# Patient Record
Sex: MALE | Race: WHITE | NOT HISPANIC OR LATINO | Employment: OTHER | URBAN - NONMETROPOLITAN AREA
[De-identification: names, ages, dates, MRNs, and addresses within clinical notes are randomized per-mention and may not be internally consistent; named-entity substitution may affect disease eponyms.]

---

## 2018-04-09 ENCOUNTER — OFFICE VISIT (OUTPATIENT)
Dept: GASTROENTEROLOGY | Facility: CLINIC | Age: 58
End: 2018-04-09

## 2018-04-09 VITALS
WEIGHT: 280 LBS | OXYGEN SATURATION: 100 % | DIASTOLIC BLOOD PRESSURE: 80 MMHG | HEART RATE: 101 BPM | BODY MASS INDEX: 40.09 KG/M2 | TEMPERATURE: 97 F | HEIGHT: 70 IN | SYSTOLIC BLOOD PRESSURE: 120 MMHG

## 2018-04-09 DIAGNOSIS — E11.9 TYPE 2 DIABETES MELLITUS WITHOUT COMPLICATION, WITH LONG-TERM CURRENT USE OF INSULIN (HCC): ICD-10-CM

## 2018-04-09 DIAGNOSIS — I10 ESSENTIAL HYPERTENSION: ICD-10-CM

## 2018-04-09 DIAGNOSIS — D68.9 COAGULOPATHY (HCC): ICD-10-CM

## 2018-04-09 DIAGNOSIS — Z86.010 HISTORY OF ADENOMATOUS POLYP OF COLON: Primary | ICD-10-CM

## 2018-04-09 DIAGNOSIS — Z72.0 TOBACCO USE: ICD-10-CM

## 2018-04-09 DIAGNOSIS — Z79.4 TYPE 2 DIABETES MELLITUS WITHOUT COMPLICATION, WITH LONG-TERM CURRENT USE OF INSULIN (HCC): ICD-10-CM

## 2018-04-09 PROCEDURE — S0260 H&P FOR SURGERY: HCPCS | Performed by: NURSE PRACTITIONER

## 2018-04-09 RX ORDER — POLYETHYLENE GLYCOL 3350, SODIUM CHLORIDE, SODIUM BICARBONATE, POTASSIUM CHLORIDE 420; 11.2; 5.72; 1.48 G/4L; G/4L; G/4L; G/4L
4000 POWDER, FOR SOLUTION ORAL SEE ADMIN INSTRUCTIONS
Qty: 4000 ML | Refills: 0 | Status: ON HOLD | OUTPATIENT
Start: 2018-04-09 | End: 2021-04-08

## 2018-04-09 RX ORDER — FENOFIBRATE 145 MG/1
145 TABLET, COATED ORAL DAILY
COMMUNITY

## 2018-04-09 NOTE — PROGRESS NOTES
[facility] Gastroenterology    Primary Physician Praveen Garza MD    4/9/2018    Isacc Green   1960      Chief Complaint   Patient presents with   • Colonoscopy       Subjective     HPI    Isacc Green is a 57 y.o. male who presents as a referral for preventative maintenance. He has no complaints of nausea or vomiting. No change in bowels. No wt loss. No BRBPR. No melena. No abdominal pain.  Last colonoscopy was 4/2015, noting 3 polyps, and diverticulosis, path adenomatous polyp,  recall rec 3 yr, .  The patient does have history of colon polyps. The patient does not have history of colon cancer.   Family history unknown.     He is on Plavix for history of cardiac stent , with last being more than a year ago.  His cardiologist is Dr. Wood.     Past Medical History:   Diagnosis Date   • Anxiety    • Atherosclerosis of coronary artery    • Bipolar disorder    • Bladder cancer    • Colon polyp    • Coronary artery disease    • Depression    • Diabetes mellitus    • Hemorrhoids    • Hyperlipidemia    • Hypertension    • Nephrolithiasis    • Sleep apnea        Past Surgical History:   Procedure Laterality Date   • ANGIOPLASTY      with stent placement   • BACK SURGERY     • BLADDER SURGERY      cancer   • CARDIAC CATHETERIZATION     • COLONOSCOPY  04/13/2015   • CORONARY ARTERY BYPASS GRAFT     • KNEE SURGERY     • SHOULDER SURGERY      x 2   • SINUS SURGERY         Outpatient Prescriptions Marked as Taking for the 4/9/18 encounter (Office Visit) with REMA Pena   Medication Sig Dispense Refill   • ALPRAZolam (XANAX) 0.5 MG tablet 3 (three) times a day.     • Bisacodyl (LAXATIVE PO) Take  by mouth As Needed.     • clopidogrel (PLAVIX) 75 MG tablet TAKE ONE TABLET BY MOUTH EVERY DAY     • cyclobenzaprine (FLEXERIL) 10 MG tablet 3 (three) times a day.     • fenofibrate (TRICOR) 145 MG tablet Take 145 mg by mouth Daily.     • Glucose Blood (BLOOD GLUCOSE TEST) strip Place 1 each onto the  skin daily     • glucose monitoring kit (FREESTYLE) monitoring kit every day.     • insulin detemir (LEVEMIR) 100 UNIT/ML injection Inject  under the skin Daily.     • Insulin Zinc Human (NOVOLIN L SC) Inject  under the skin.     • isosorbide mononitrate (IMDUR) 60 MG 24 hr tablet Take 1 tablet by mouth daily     • Lancets misc Place 1 each onto the skin daily     • metFORMIN (GLUCOPHAGE) 1000 MG tablet Take 1 tablet by mouth 2 times daily (with meals)     • metoprolol succinate XL (TOPROL XL) 100 MG 24 hr tablet Take 1 tablet by mouth daily     • nitroglycerin (NITROSTAT) 0.4 MG SL tablet Place 1 tablet under the tongue every 5 minutes as needed for Chest pain X 3.     • oxyCODONE-acetaminophen (PERCOCET)  MG per tablet Take 1 tablet by mouth 3 times daily      • rosuvastatin (CRESTOR) 20 MG tablet Take 1 tablet by mouth nightly     • varenicline (CHANTIX STARTING MONTH PAK) 0.5 MG X 11 & 1 MG X 42 tablet Take by mouth.     • zolpidem (AMBIEN) 10 MG tablet TAKE 1 TABLET BY MOUTH AT BEDTIME AS NEEDED         Allergies   Allergen Reactions   • Codeine Nausea Only       Social History     Social History   • Marital status: Single     Spouse name: N/A   • Number of children: N/A   • Years of education: N/A     Occupational History   • Not on file.     Social History Main Topics   • Smoking status: Current Some Day Smoker     Packs/day: 0.50   • Smokeless tobacco: Never Used   • Alcohol use Yes      Comment: Rarely   • Drug use: No   • Sexual activity: Defer     Other Topics Concern   • Not on file     Social History Narrative   • No narrative on file       Family History   Problem Relation Age of Onset   • Adopted: Yes       Review of Systems   Constitutional: Negative for appetite change, chills, fatigue, fever and unexpected weight change.   HENT: Negative for sore throat and trouble swallowing.    Respiratory: Negative for cough, chest tightness, shortness of breath and wheezing.    Cardiovascular: Negative  for chest pain and palpitations.   Gastrointestinal: Negative for abdominal distention, abdominal pain, anal bleeding, blood in stool, constipation, diarrhea, nausea, rectal pain and vomiting.        As mentioned in hpi   Genitourinary: Negative for difficulty urinating and hematuria.   Musculoskeletal: Positive for arthralgias (chronic ) and back pain (chronic ).   Skin: Negative for color change and rash.   Neurological: Negative for dizziness, syncope, light-headedness and headaches.   Psychiatric/Behavioral: Negative for confusion. The patient is not nervous/anxious.        Objective     Vitals:    04/09/18 1023   BP: 120/80   Pulse: 101   Temp: 97 °F (36.1 °C)   SpO2: 100%     1    04/09/18  1023   Weight: 127 kg (280 lb)     Body mass index is 40.18 kg/m².    Physical Exam   Constitutional: He appears well-developed and well-nourished. No distress.   HENT:   Head: Normocephalic and atraumatic.   Eyes: EOM are normal. No scleral icterus.   Neck: Neck supple. No JVD present.   Cardiovascular: Normal rate, regular rhythm and normal heart sounds.    Pulmonary/Chest: Effort normal and breath sounds normal. No stridor.   Abdominal: Soft. Bowel sounds are normal. He exhibits no distension and no mass. There is no tenderness. There is no rebound and no guarding.   Musculoskeletal: He exhibits no edema.   Neurological: He is alert.   Skin: Skin is warm and dry. No rash noted.   Psychiatric: He has a normal mood and affect. His behavior is normal.   Vitals reviewed.      Imaging Results (most recent)     None          Assessment/Plan     Isacc was seen today for colonoscopy.    Diagnoses and all orders for this visit:    History of adenomatous polyp of colon  -     Case Request; Standing  -     Case Request    Type 2 diabetes mellitus without complication, with long-term current use of insulin    Essential hypertension    Coagulopathy  Comments:  plavix, h/o cardiac stent with last being more than 1 yr ago.   moreno    Tobacco use    Other orders  -     Implement Anesthesia Orders Day of Procedure; Standing  -     Obtain Informed Consent; Standing  -     polyethylene glycol-electrolytes (NULYTELY WITH FLAVOR PACKS) 420 g solution; Take 4,000 mL by mouth See Admin Instructions.      Plan for colonoscopy.  The patient was advised to take any blood pressure or heart  medications the morning of  procedure if that is when he/she normally takes. The patient was instructed how to adjust diabetes medications the am of procedure.                 COLONOSCOPY WITH ANESTHESIA (N/A)  All risks, benefits, alternatives, and indications of colonoscopy procedure have been discussed with the patient. Risks to include perforation of the colon requiring possible surgery or colostomy, risk of bleeding from biopsies or removal of colon tissue, possibility of missing a colon polyp or cancer, or adverse drug reaction.  Benefits to include the diagnosis and management of disease of the colon and rectum. Alternatives to include barium enema, radiographic evaluation, lab testing or no intervention. Pt verbalizes understanding and agrees.     The patient was advised on the risks of stopping blood thinners for a procedure.  The risks discussed included the risk of developing myocardial infarction, CVA, embolus, clogging of the arteries or stents, etc.  We discussed the potential consequences of the risks discussed.  The benefits of stopping as well as the alternatives were discussed as well.   Patient is to hold their anticoagulation medication per the direction of their prescribing physician, Dr Wood..    A letter will be sent to prescribing This is to prevent any risk or complication from bleeding intra and post procedure. If they develop bleeding post procedure they are to go the emergency department for further evaluation and treatment immediately.     I advised the patient of the risks in continuing to use tobacco, and I provided this  patient with smoking cessation educational materials.    During this visit, I spent > 3-10 minutes counseling the patient regarding smoking cessation.    Discussed the patient's BMI with him. BMI is above normal parameters. Follow-up plan includes:  no follow-up required.          Body mass index is 40.18 kg/m².        REMA Marin      EMR Dragon/transcription disclaimer:  Much of this encounter note is electronic transcription/translation of spoken language to printed text.  The electronic translation of spoken language may be erroneous, or at times, nonsensical words or phrases may be inadvertently transcribed.  Although I have reviewed the note for such errors, some may still exist.

## 2018-04-11 PROBLEM — Z86.0101 HISTORY OF ADENOMATOUS POLYP OF COLON: Status: ACTIVE | Noted: 2018-04-11

## 2018-04-11 PROBLEM — Z86.010 HISTORY OF ADENOMATOUS POLYP OF COLON: Status: ACTIVE | Noted: 2018-04-11

## 2018-04-18 ENCOUNTER — TELEPHONE (OUTPATIENT)
Dept: GASTROENTEROLOGY | Facility: CLINIC | Age: 58
End: 2018-04-18

## 2018-04-30 ENCOUNTER — HOSPITAL ENCOUNTER (OUTPATIENT)
Facility: HOSPITAL | Age: 58
Setting detail: HOSPITAL OUTPATIENT SURGERY
Discharge: HOME | End: 2018-04-30
Attending: INTERNAL MEDICINE | Admitting: INTERNAL MEDICINE

## 2018-04-30 ENCOUNTER — ANESTHESIA EVENT (OUTPATIENT)
Dept: GASTROENTEROLOGY | Facility: HOSPITAL | Age: 58
End: 2018-04-30

## 2018-04-30 ENCOUNTER — ANESTHESIA (OUTPATIENT)
Dept: GASTROENTEROLOGY | Facility: HOSPITAL | Age: 58
End: 2018-04-30

## 2018-04-30 VITALS
DIASTOLIC BLOOD PRESSURE: 70 MMHG | HEIGHT: 70 IN | BODY MASS INDEX: 38.94 KG/M2 | SYSTOLIC BLOOD PRESSURE: 109 MMHG | OXYGEN SATURATION: 98 % | RESPIRATION RATE: 16 BRPM | TEMPERATURE: 97.2 F | WEIGHT: 272 LBS | HEART RATE: 89 BPM

## 2018-04-30 DIAGNOSIS — Z86.010 HISTORY OF ADENOMATOUS POLYP OF COLON: ICD-10-CM

## 2018-04-30 LAB — GLUCOSE BLDC GLUCOMTR-MCNC: 196 MG/DL (ref 70–130)

## 2018-04-30 PROCEDURE — 45385 COLONOSCOPY W/LESION REMOVAL: CPT | Performed by: INTERNAL MEDICINE

## 2018-04-30 PROCEDURE — 25010000002 PROPOFOL 10 MG/ML EMULSION: Performed by: NURSE ANESTHETIST, CERTIFIED REGISTERED

## 2018-04-30 PROCEDURE — 88305 TISSUE EXAM BY PATHOLOGIST: CPT | Performed by: INTERNAL MEDICINE

## 2018-04-30 PROCEDURE — 82962 GLUCOSE BLOOD TEST: CPT

## 2018-04-30 RX ORDER — SODIUM CHLORIDE 9 MG/ML
100 INJECTION, SOLUTION INTRAVENOUS CONTINUOUS
Status: CANCELLED | OUTPATIENT
Start: 2018-04-30

## 2018-04-30 RX ORDER — ONDANSETRON 2 MG/ML
4 INJECTION INTRAMUSCULAR; INTRAVENOUS ONCE AS NEEDED
Status: DISCONTINUED | OUTPATIENT
Start: 2018-04-30 | End: 2018-04-30 | Stop reason: HOSPADM

## 2018-04-30 RX ORDER — METOPROLOL TARTRATE 5 MG/5ML
2 INJECTION INTRAVENOUS ONCE AS NEEDED
Status: COMPLETED | OUTPATIENT
Start: 2018-04-30 | End: 2018-04-30

## 2018-04-30 RX ORDER — SODIUM CHLORIDE 0.9 % (FLUSH) 0.9 %
3 SYRINGE (ML) INJECTION AS NEEDED
Status: DISCONTINUED | OUTPATIENT
Start: 2018-04-30 | End: 2018-04-30 | Stop reason: HOSPADM

## 2018-04-30 RX ORDER — SODIUM CHLORIDE 9 MG/ML
500 INJECTION, SOLUTION INTRAVENOUS CONTINUOUS PRN
Status: DISCONTINUED | OUTPATIENT
Start: 2018-04-30 | End: 2018-04-30 | Stop reason: HOSPADM

## 2018-04-30 RX ORDER — LIDOCAINE HYDROCHLORIDE 20 MG/ML
INJECTION, SOLUTION INFILTRATION; PERINEURAL AS NEEDED
Status: DISCONTINUED | OUTPATIENT
Start: 2018-04-30 | End: 2018-04-30 | Stop reason: SURG

## 2018-04-30 RX ORDER — SODIUM CHLORIDE 0.9 % (FLUSH) 0.9 %
1-10 SYRINGE (ML) INJECTION AS NEEDED
Status: CANCELLED | OUTPATIENT
Start: 2018-04-30

## 2018-04-30 RX ORDER — PROPOFOL 10 MG/ML
VIAL (ML) INTRAVENOUS AS NEEDED
Status: DISCONTINUED | OUTPATIENT
Start: 2018-04-30 | End: 2018-04-30 | Stop reason: SURG

## 2018-04-30 RX ADMIN — PROPOFOL 50 MG: 10 INJECTION, EMULSION INTRAVENOUS at 08:44

## 2018-04-30 RX ADMIN — PROPOFOL 50 MG: 10 INJECTION, EMULSION INTRAVENOUS at 08:36

## 2018-04-30 RX ADMIN — LIDOCAINE HYDROCHLORIDE 100 MG: 20 INJECTION, SOLUTION INFILTRATION; PERINEURAL at 08:22

## 2018-04-30 RX ADMIN — PROPOFOL 50 MG: 10 INJECTION, EMULSION INTRAVENOUS at 08:26

## 2018-04-30 RX ADMIN — PROPOFOL 50 MG: 10 INJECTION, EMULSION INTRAVENOUS at 08:30

## 2018-04-30 RX ADMIN — PROPOFOL 100 MG: 10 INJECTION, EMULSION INTRAVENOUS at 08:22

## 2018-04-30 RX ADMIN — LIDOCAINE HYDROCHLORIDE 0.5 ML: 10 INJECTION, SOLUTION EPIDURAL; INFILTRATION; INTRACAUDAL; PERINEURAL at 07:30

## 2018-04-30 RX ADMIN — METOROPROLOL TARTRATE 2 MG: 5 INJECTION, SOLUTION INTRAVENOUS at 07:49

## 2018-04-30 RX ADMIN — SODIUM CHLORIDE 500 ML: 9 INJECTION, SOLUTION INTRAVENOUS at 07:30

## 2018-04-30 RX ADMIN — PROPOFOL 50 MG: 10 INJECTION, EMULSION INTRAVENOUS at 08:33

## 2018-04-30 RX ADMIN — PROPOFOL 50 MG: 10 INJECTION, EMULSION INTRAVENOUS at 08:40

## 2018-04-30 NOTE — ANESTHESIA POSTPROCEDURE EVALUATION
Patient: Ld Green    Procedure Summary     Date:  04/30/18 Room / Location:  [facility] ENDOSCOPY 4 / [facility]    Anesthesia Start:  0822 Anesthesia Stop:  0845    Procedure:  COLONOSCOPY WITH ANESTHESIA (N/A ) Diagnosis:       History of adenomatous polyp of colon      (History of adenomatous polyp of colon [Z86.010])    Surgeon:  Gerson Martini MD Provider:  GENE Goyal CRNA    Anesthesia Type:  general ASA Status:  3          Anesthesia Type: general  Last vitals  BP   139/86 (04/30/18 0713)   Temp   97.2 °F (36.2 °C) (04/30/18 0713)   Pulse   102 (04/30/18 0713)   Resp   20 (04/30/18 0713)     SpO2   96 % (04/30/18 0713)     Post Anesthesia Care and Evaluation    Patient location during evaluation: PACU  Patient participation: complete - patient participated  Level of consciousness: awake and alert  Pain score: 0  Pain management: adequate  Airway patency: patent  Anesthetic complications: No anesthetic complications    Cardiovascular status: acceptable and stable  Respiratory status: acceptable and unassisted  Hydration status: acceptable

## 2018-04-30 NOTE — ANESTHESIA PREPROCEDURE EVALUATION
Anesthesia Evaluation     Patient summary reviewed and Nursing notes reviewed   no history of anesthetic complications:  NPO Solid Status: > 8 hours  NPO Liquid Status: > 2 hours           Airway   Mallampati: I  TM distance: <3 FB  Neck ROM: full  no difficulty expected  Dental - normal exam     Pulmonary - normal exam   (+) a smoker Current, sleep apnea,   Cardiovascular - normal exam  Exercise tolerance: poor (<4 METS) (Limited by back pain)    Patient on routine beta blocker and Beta blocker not taken-may be given intraoperatively    (+) hypertension, CAD, CABG (early 2000's) > 6 Months, cardiac stents (2 stnts, most recent 3 years ago) more than 12 months ago hyperlipidemia,     ROS comment: Pt stopped toprol 3 days ago. IV toprol ordered for pre-op    Neuro/Psych  (+) psychiatric history Anxiety, Depression and Bipolar,     (-) seizures, TIA, CVA  GI/Hepatic/Renal/Endo    (+)   diabetes mellitus type 2 using insulin,   (-) liver disease, no renal disease    Musculoskeletal     Abdominal  - normal exam    Bowel sounds: normal.   Substance History      OB/GYN          Other                        Anesthesia Plan    ASA 3     general     intravenous induction   Anesthetic plan and risks discussed with patient.

## 2018-05-01 LAB
CYTO UR: NORMAL
LAB AP CASE REPORT: NORMAL
LAB AP CLINICAL INFORMATION: NORMAL
Lab: NORMAL
PATH REPORT.FINAL DX SPEC: NORMAL
PATH REPORT.GROSS SPEC: NORMAL

## 2020-07-06 ENCOUNTER — TRANSCRIBE ORDERS (OUTPATIENT)
Dept: ADMINISTRATIVE | Facility: HOSPITAL | Age: 60
End: 2020-07-06

## 2020-07-06 DIAGNOSIS — Z01.818 PREOP TESTING: Primary | ICD-10-CM

## 2020-07-10 ENCOUNTER — LAB (OUTPATIENT)
Dept: LAB | Facility: HOSPITAL | Age: 60
End: 2020-07-10

## 2020-07-10 PROCEDURE — U0003 INFECTIOUS AGENT DETECTION BY NUCLEIC ACID (DNA OR RNA); SEVERE ACUTE RESPIRATORY SYNDROME CORONAVIRUS 2 (SARS-COV-2) (CORONAVIRUS DISEASE [COVID-19]), AMPLIFIED PROBE TECHNIQUE, MAKING USE OF HIGH THROUGHPUT TECHNOLOGIES AS DESCRIBED BY CMS-2020-01-R: HCPCS | Performed by: PAIN MEDICINE

## 2020-07-10 PROCEDURE — C9803 HOPD COVID-19 SPEC COLLECT: HCPCS | Performed by: PAIN MEDICINE

## 2020-07-11 LAB
COVID LABCORP PRIORITY: NORMAL
SARS-COV-2 RNA RESP QL NAA+PROBE: NOT DETECTED

## 2020-10-09 ENCOUNTER — TRANSCRIBE ORDERS (OUTPATIENT)
Dept: ADMINISTRATIVE | Facility: HOSPITAL | Age: 60
End: 2020-10-09

## 2020-10-09 DIAGNOSIS — Z01.818 PREOP TESTING: Primary | ICD-10-CM

## 2020-10-12 ENCOUNTER — LAB (OUTPATIENT)
Dept: LAB | Facility: HOSPITAL | Age: 60
End: 2020-10-12

## 2020-10-12 PROCEDURE — U0003 INFECTIOUS AGENT DETECTION BY NUCLEIC ACID (DNA OR RNA); SEVERE ACUTE RESPIRATORY SYNDROME CORONAVIRUS 2 (SARS-COV-2) (CORONAVIRUS DISEASE [COVID-19]), AMPLIFIED PROBE TECHNIQUE, MAKING USE OF HIGH THROUGHPUT TECHNOLOGIES AS DESCRIBED BY CMS-2020-01-R: HCPCS | Performed by: PAIN MEDICINE

## 2020-10-12 PROCEDURE — C9803 HOPD COVID-19 SPEC COLLECT: HCPCS | Performed by: PAIN MEDICINE

## 2020-10-12 PROCEDURE — C9803 HOPD COVID-19 SPEC COLLECT: HCPCS

## 2020-10-13 LAB
COVID LABCORP PRIORITY: NORMAL
SARS-COV-2 RNA RESP QL NAA+PROBE: NOT DETECTED

## 2021-01-14 ENCOUNTER — TRANSCRIBE ORDERS (OUTPATIENT)
Dept: ADMINISTRATIVE | Facility: HOSPITAL | Age: 61
End: 2021-01-14

## 2021-01-14 DIAGNOSIS — Z01.818 PREOP TESTING: Primary | ICD-10-CM

## 2021-01-15 ENCOUNTER — LAB (OUTPATIENT)
Dept: LAB | Facility: HOSPITAL | Age: 61
End: 2021-01-15

## 2021-01-15 LAB — SARS-COV-2 ORF1AB RESP QL NAA+PROBE: NOT DETECTED

## 2021-01-15 PROCEDURE — U0004 COV-19 TEST NON-CDC HGH THRU: HCPCS | Performed by: PAIN MEDICINE

## 2021-01-15 PROCEDURE — C9803 HOPD COVID-19 SPEC COLLECT: HCPCS | Performed by: PAIN MEDICINE

## 2021-03-10 ENCOUNTER — OFFICE VISIT (OUTPATIENT)
Dept: GASTROENTEROLOGY | Facility: CLINIC | Age: 61
End: 2021-03-10

## 2021-03-10 VITALS
SYSTOLIC BLOOD PRESSURE: 122 MMHG | WEIGHT: 267 LBS | HEART RATE: 87 BPM | BODY MASS INDEX: 38.22 KG/M2 | OXYGEN SATURATION: 98 % | DIASTOLIC BLOOD PRESSURE: 72 MMHG | HEIGHT: 70 IN | TEMPERATURE: 95.7 F

## 2021-03-10 DIAGNOSIS — D68.9 COAGULOPATHY (HCC): ICD-10-CM

## 2021-03-10 DIAGNOSIS — I10 HYPERTENSION, UNSPECIFIED TYPE: ICD-10-CM

## 2021-03-10 DIAGNOSIS — E11.9 TYPE 2 DIABETES MELLITUS WITHOUT COMPLICATION, UNSPECIFIED WHETHER LONG TERM INSULIN USE (HCC): ICD-10-CM

## 2021-03-10 DIAGNOSIS — Z86.010 HISTORY OF ADENOMATOUS POLYP OF COLON: Primary | ICD-10-CM

## 2021-03-10 PROCEDURE — 99214 OFFICE O/P EST MOD 30 MIN: CPT | Performed by: NURSE PRACTITIONER

## 2021-03-10 NOTE — PROGRESS NOTES
[facility] Gastroenterology    Primary Physician Praveen Garza MD    3/10/2021    Ld Green   1960      Chief Complaint   Patient presents with   • Colonoscopy       Subjective     HPI    Ld Green is a 60 y.o. male who presents as a referral for preventative maintenance. He has no complaints of nausea or vomiting. No change in bowels. No wt loss. No BRBPR. No melena. No abdominal pain.        Last colonoscopy was 4/2018 noting 3 polyps ( path adenomatous) and diverticulosis. The patient does not have history of colon cancer.   He has no medical history of his biological family.       He is on plavix for history of cardiac stents with last being 2-3 years ago. Cardiologist is Dr. Birch at [facility].       Past Medical History:   Diagnosis Date   • Anxiety    • Atherosclerosis of coronary artery    • Bipolar disorder (CMS/HCC)    • Bladder cancer (CMS/HCC)    • Colon polyp    • Coronary artery disease    • Depression    • Diabetes mellitus (CMS/HCC)    • Hemorrhoids    • Hyperlipidemia    • Hypertension    • Nephrolithiasis    • Sleep apnea        Past Surgical History:   Procedure Laterality Date   • ANGIOPLASTY      with stent placement   • BACK SURGERY     • BLADDER SURGERY      cancer   • CARDIAC CATHETERIZATION     • COLONOSCOPY  04/13/2015   • COLONOSCOPY N/A 4/30/2018    Procedure: COLONOSCOPY WITH ANESTHESIA;  Surgeon: Gerson Martini MD;  Location: [facility] ENDOSCOPY;  Service: Gastroenterology   • CORONARY ARTERY BYPASS GRAFT     • KNEE SURGERY     • SHOULDER SURGERY      x 2   • SINUS SURGERY         Outpatient Medications Marked as Taking for the 3/10/21 encounter (Office Visit) with Maria L Keyes APRN   Medication Sig Dispense Refill   • Alirocumab (PRALUENT SC) Inject  under the skin into the appropriate area as directed Every 14 (Fourteen) Days.     • ALPRAZolam (XANAX) 0.5 MG tablet 3 (three) times a day.     • Bisacodyl (LAXATIVE PO) Take  by mouth As Needed  (rare).     • clopidogrel (PLAVIX) 75 MG tablet TAKE ONE TABLET BY MOUTH EVERY DAY     • cyclobenzaprine (FLEXERIL) 10 MG tablet 3 (three) times a day.     • Dulaglutide (TRULICITY SC) Inject  under the skin into the appropriate area as directed 1 (One) Time Per Week.     • fenofibrate (TRICOR) 145 MG tablet Take 145 mg by mouth Daily.     • Glucose Blood (BLOOD GLUCOSE TEST) strip Place 1 each onto the skin daily     • glucose monitoring kit (FREESTYLE) monitoring kit every day.     • insulin detemir (LEVEMIR) 100 UNIT/ML injection Inject  under the skin Daily.     • Insulin Zinc Human (NOVOLIN L SC) Inject  under the skin.     • isosorbide mononitrate (IMDUR) 60 MG 24 hr tablet Take 1 tablet by mouth daily     • Lancets misc Place 1 each onto the skin daily     • metFORMIN (GLUCOPHAGE) 1000 MG tablet Take 1 tablet by mouth 2 times daily (with meals)     • metoprolol succinate XL (TOPROL XL) 100 MG 24 hr tablet Take 1 tablet by mouth daily     • nitroglycerin (NITROSTAT) 0.4 MG SL tablet Place 1 tablet under the tongue every 5 minutes as needed for Chest pain X 3.     • oxyCODONE-acetaminophen (PERCOCET)  MG per tablet Take 1 tablet by mouth 3 times daily      • zolpidem (AMBIEN) 10 MG tablet TAKE 1 TABLET BY MOUTH AT BEDTIME AS NEEDED         Allergies   Allergen Reactions   • Codeine Nausea Only       Social History     Socioeconomic History   • Marital status:      Spouse name: Not on file   • Number of children: Not on file   • Years of education: Not on file   • Highest education level: Not on file   Tobacco Use   • Smoking status: Current Every Day Smoker     Packs/day: 0.50   • Smokeless tobacco: Never Used   • Tobacco comment: 2 a day   Substance and Sexual Activity   • Alcohol use: Yes     Comment: Rarely   • Drug use: No   • Sexual activity: Defer       Family History   Adopted: Yes       Review of Systems   Constitutional: Negative for chills, fever and unexpected weight change.      Respiratory: Negative for cough, shortness of breath and wheezing.    Cardiovascular: Negative for chest pain and palpitations.   Gastrointestinal: Negative for abdominal distention, abdominal pain, anal bleeding, blood in stool, constipation, diarrhea, nausea and vomiting.       Objective     Vitals:    03/10/21 0944   BP: 122/72   Pulse: 87   Temp: 95.7 °F (35.4 °C)   SpO2: 98%         03/10/21  0944   Weight: 121 kg (267 lb)     Body mass index is 38.31 kg/m².    Physical Exam  Vitals reviewed.   Constitutional:       General: He is not in acute distress.  Cardiovascular:      Rate and Rhythm: Normal rate and regular rhythm.      Heart sounds: Normal heart sounds.   Pulmonary:      Effort: Pulmonary effort is normal.      Breath sounds: Normal breath sounds.   Abdominal:      General: Bowel sounds are normal. There is no distension.      Palpations: Abdomen is soft.      Tenderness: There is no abdominal tenderness.   Skin:     General: Skin is warm and dry.   Neurological:      Mental Status: He is alert.         Imaging Results (Most Recent)     None          Assessment/Plan     Diagnoses and all orders for this visit:    1. History of adenomatous polyp of colon (Primary)    2. Coagulopathy (CMS/[facility])    3. Type 2 diabetes mellitus without complication, unspecified whether long term insulin use (CMS/[facility])    4. Hypertension, unspecified type             Plan for colonoscopy. The patient was advised to take any blood pressure or heart  medications the morning of  procedure if that is when he/she normally takes. The patient was instructed how to adjust diabetes medications the am of procedure.       The patient was advised on the risks of stopping blood thinners for a procedure.  The risks discussed included the risk of developing myocardial infarction, CVA, embolus, clogging of the arteries or stents, etc.  We discussed the potential consequences of the risks discussed.  The benefits of stopping as well as the  alternatives were discussed as well.   Patient is to hold their anticoagulation medication per the direction of their prescribing physician, Dr. Osman.   A letter will be sent to prescribing This is to prevent any risk or complication from bleeding intra and post procedure. If they develop bleeding post procedure they are to go the emergency department for further evaluation and treatment immediately.            Body mass index is 38.31 kg/m².    Patient's Body mass index is 38.31 kg/m². BMI is above normal parameters. Recommendations include: recommend weight loss, no f/u .    Colonoscopy   All risks, benefits, alternatives, and indications of colonoscopy procedure have been discussed with the patient. Risks to include perforation of the colon requiring possible surgery or colostomy, risk of bleeding from biopsies or removal of colon tissue, possibility of missing a colon polyp or cancer, or adverse drug reaction.  Benefits to include the diagnosis and management of disease of the colon and rectum. Alternatives to include barium enema, radiographic evaluation, lab testing or no intervention. Pt verbalizes understanding and agrees.       REMA Marin      EMR Dragon/transcription disclaimer:  Much of this encounter note is electronic transcription/translation of spoken language to printed text.  The electronic translation of spoken language may be erroneous, or at times, nonsensical words or phrases may be inadvertently transcribed.  Although I have reviewed the note for such errors, some may still exist.

## 2021-03-19 ENCOUNTER — TELEPHONE (OUTPATIENT)
Dept: GASTROENTEROLOGY | Facility: CLINIC | Age: 61
End: 2021-03-19

## 2021-03-19 ENCOUNTER — PREP FOR SURGERY (OUTPATIENT)
Dept: OTHER | Facility: HOSPITAL | Age: 61
End: 2021-03-19

## 2021-03-19 DIAGNOSIS — Z86.010 HISTORY OF ADENOMATOUS POLYP OF COLON: Primary | ICD-10-CM

## 2021-03-19 NOTE — TELEPHONE ENCOUNTER
Please let patient know that Dr. Birch says ok to hold Plavix 7 days prior to procedure.  If he is taking aspirin 81 mg he can continue that but if he takes 325 mg he will have to switch over to the 81 mg 5 days prior to procedure.  He can use MiraLAX prep.

## 2021-03-19 NOTE — TELEPHONE ENCOUNTER
PT is scheduled for 4-8-21.  PT will hold his Plavix.  COVID test discussed.   Will mail instructions.

## 2021-04-02 ENCOUNTER — TRANSCRIBE ORDERS (OUTPATIENT)
Dept: LAB | Facility: HOSPITAL | Age: 61
End: 2021-04-02

## 2021-04-02 DIAGNOSIS — Z01.818 PREOPERATIVE TESTING: Primary | ICD-10-CM

## 2021-04-06 ENCOUNTER — LAB (OUTPATIENT)
Dept: LAB | Facility: HOSPITAL | Age: 61
End: 2021-04-06

## 2021-04-06 LAB — SARS-COV-2 ORF1AB RESP QL NAA+PROBE: NOT DETECTED

## 2021-04-06 PROCEDURE — U0004 COV-19 TEST NON-CDC HGH THRU: HCPCS | Performed by: INTERNAL MEDICINE

## 2021-04-06 PROCEDURE — U0005 INFEC AGEN DETEC AMPLI PROBE: HCPCS | Performed by: INTERNAL MEDICINE

## 2021-04-06 PROCEDURE — C9803 HOPD COVID-19 SPEC COLLECT: HCPCS | Performed by: INTERNAL MEDICINE

## 2021-04-08 ENCOUNTER — TELEPHONE (OUTPATIENT)
Dept: GASTROENTEROLOGY | Facility: CLINIC | Age: 61
End: 2021-04-08

## 2021-04-08 ENCOUNTER — ANESTHESIA (OUTPATIENT)
Dept: GASTROENTEROLOGY | Facility: HOSPITAL | Age: 61
End: 2021-04-08

## 2021-04-08 ENCOUNTER — HOSPITAL ENCOUNTER (OUTPATIENT)
Facility: HOSPITAL | Age: 61
Setting detail: HOSPITAL OUTPATIENT SURGERY
Discharge: HOME | End: 2021-04-08
Attending: INTERNAL MEDICINE | Admitting: INTERNAL MEDICINE

## 2021-04-08 ENCOUNTER — ANESTHESIA EVENT (OUTPATIENT)
Dept: GASTROENTEROLOGY | Facility: HOSPITAL | Age: 61
End: 2021-04-08

## 2021-04-08 VITALS
BODY MASS INDEX: 36.94 KG/M2 | DIASTOLIC BLOOD PRESSURE: 75 MMHG | SYSTOLIC BLOOD PRESSURE: 115 MMHG | WEIGHT: 258 LBS | HEART RATE: 75 BPM | OXYGEN SATURATION: 94 % | HEIGHT: 70 IN | RESPIRATION RATE: 16 BRPM | TEMPERATURE: 97.1 F

## 2021-04-08 LAB — GLUCOSE BLDC GLUCOMTR-MCNC: 112 MG/DL (ref 70–130)

## 2021-04-08 PROCEDURE — 25010000002 PROPOFOL 10 MG/ML EMULSION: Performed by: NURSE ANESTHETIST, CERTIFIED REGISTERED

## 2021-04-08 PROCEDURE — 82962 GLUCOSE BLOOD TEST: CPT

## 2021-04-08 PROCEDURE — G0105 COLORECTAL SCRN; HI RISK IND: HCPCS | Performed by: INTERNAL MEDICINE

## 2021-04-08 RX ORDER — SODIUM CHLORIDE 9 MG/ML
500 INJECTION, SOLUTION INTRAVENOUS CONTINUOUS PRN
Status: DISCONTINUED | OUTPATIENT
Start: 2021-04-08 | End: 2021-04-08 | Stop reason: HOSPADM

## 2021-04-08 RX ORDER — LIDOCAINE HYDROCHLORIDE 10 MG/ML
0.5 INJECTION, SOLUTION EPIDURAL; INFILTRATION; INTRACAUDAL; PERINEURAL ONCE AS NEEDED
Status: CANCELLED | OUTPATIENT
Start: 2021-04-08

## 2021-04-08 RX ORDER — SODIUM CHLORIDE 0.9 % (FLUSH) 0.9 %
10 SYRINGE (ML) INJECTION AS NEEDED
Status: DISCONTINUED | OUTPATIENT
Start: 2021-04-08 | End: 2021-04-08 | Stop reason: HOSPADM

## 2021-04-08 RX ORDER — PROPOFOL 10 MG/ML
VIAL (ML) INTRAVENOUS AS NEEDED
Status: DISCONTINUED | OUTPATIENT
Start: 2021-04-08 | End: 2021-04-08 | Stop reason: SURG

## 2021-04-08 RX ORDER — LIDOCAINE HYDROCHLORIDE 20 MG/ML
INJECTION, SOLUTION EPIDURAL; INFILTRATION; INTRACAUDAL; PERINEURAL AS NEEDED
Status: DISCONTINUED | OUTPATIENT
Start: 2021-04-08 | End: 2021-04-08 | Stop reason: SURG

## 2021-04-08 RX ORDER — ONDANSETRON 2 MG/ML
4 INJECTION INTRAMUSCULAR; INTRAVENOUS ONCE AS NEEDED
Status: DISCONTINUED | OUTPATIENT
Start: 2021-04-08 | End: 2021-04-08 | Stop reason: HOSPADM

## 2021-04-08 RX ADMIN — SODIUM CHLORIDE 500 ML: 9 INJECTION, SOLUTION INTRAVENOUS at 11:27

## 2021-04-08 RX ADMIN — PROPOFOL 290 MG: 10 INJECTION, EMULSION INTRAVENOUS at 13:47

## 2021-04-08 RX ADMIN — LIDOCAINE HYDROCHLORIDE 100 MG: 20 INJECTION, SOLUTION EPIDURAL; INFILTRATION; INTRACAUDAL; PERINEURAL at 13:47

## 2021-04-08 NOTE — ANESTHESIA POSTPROCEDURE EVALUATION
"Patient: Ld Green    Procedure Summary     Date: 04/08/21 Room / Location: [facility] ENDOSCOPY 5 / [facility]OS[facility]    Anesthesia Start: 1346 Anesthesia Stop: 1406    Procedure: COLONOSCOPY WITH ANESTHESIA (N/A ) Diagnosis:       History of adenomatous polyp of colon      (History of adenomatous polyp of colon [Z86.010])    Surgeons: Gerson Martini MD Provider: Mirna Curz CRNA    Anesthesia Type: MAC ASA Status: 3          Anesthesia Type: MAC    Vitals  Vitals Value Taken Time   BP 95/72 04/08/21 1406   Temp     Pulse 75 04/08/21 1409   Resp 22 04/08/21 1406   SpO2 95 % 04/08/21 1409   Vitals shown include unvalidated device data.        Post Anesthesia Care and Evaluation    Patient location during evaluation: PACU  Patient participation: complete - patient participated  Level of consciousness: awake and alert  Pain management: adequate  Airway patency: patent  Anesthetic complications: No anesthetic complications    Cardiovascular status: acceptable  Respiratory status: acceptable  Hydration status: acceptable    Comments: Blood pressure 95/72, pulse 76, temperature 97.1 °F (36.2 °C), temperature source Temporal, resp. rate 22, height 177.8 cm (70\"), weight 117 kg (258 lb), SpO2 92 %.    Pt discharged from PACU based on josé score >8      "

## 2021-04-08 NOTE — ANESTHESIA PREPROCEDURE EVALUATION
Anesthesia Evaluation     Patient summary reviewed   no history of anesthetic complications:  NPO Solid Status: > 8 hours  NPO Liquid Status: > 2 hours           Airway   Mallampati: I  TM distance: >3 FB  Neck ROM: full  No difficulty expected  Dental      Comment: Multiple chipped teeth    Pulmonary    (+) a smoker Current Smoked day of surgery, sleep apnea on CPAP,   (-) asthma  Cardiovascular     (+) hypertension, CAD, CABG (3V), angina, hyperlipidemia,       Neuro/Psych  (-) seizures, TIA, CVA  GI/Hepatic/Renal/Endo    (+) obesity,   diabetes mellitus using insulin,   (-) liver disease    Musculoskeletal     Abdominal    Substance History      OB/GYN          Other      history of cancer (bladder)                    Anesthesia Plan    ASA 3     MAC     intravenous induction     Anesthetic plan, all risks, benefits, and alternatives have been provided, discussed and informed consent has been obtained with: patient.

## 2021-04-16 ENCOUNTER — TRANSCRIBE ORDERS (OUTPATIENT)
Dept: ADMINISTRATIVE | Facility: HOSPITAL | Age: 61
End: 2021-04-16

## 2021-04-16 DIAGNOSIS — Z01.818 PREOP TESTING: Primary | ICD-10-CM

## 2021-04-20 ENCOUNTER — LAB (OUTPATIENT)
Dept: LAB | Facility: HOSPITAL | Age: 61
End: 2021-04-20

## 2021-04-20 LAB — SARS-COV-2 ORF1AB RESP QL NAA+PROBE: NOT DETECTED

## 2021-04-20 PROCEDURE — U0005 INFEC AGEN DETEC AMPLI PROBE: HCPCS | Performed by: PAIN MEDICINE

## 2021-04-20 PROCEDURE — U0004 COV-19 TEST NON-CDC HGH THRU: HCPCS | Performed by: PAIN MEDICINE

## 2021-04-20 PROCEDURE — C9803 HOPD COVID-19 SPEC COLLECT: HCPCS | Performed by: PAIN MEDICINE

## 2021-08-02 ENCOUNTER — TRANSCRIBE ORDERS (OUTPATIENT)
Dept: LAB | Facility: HOSPITAL | Age: 61
End: 2021-08-02

## 2021-08-05 PROCEDURE — U0005 INFEC AGEN DETEC AMPLI PROBE: HCPCS | Performed by: NURSE PRACTITIONER

## 2021-08-05 PROCEDURE — U0004 COV-19 TEST NON-CDC HGH THRU: HCPCS | Performed by: NURSE PRACTITIONER

## 2021-12-27 ENCOUNTER — TRANSCRIBE ORDERS (OUTPATIENT)
Dept: LAB | Facility: HOSPITAL | Age: 61
End: 2021-12-27

## 2021-12-27 DIAGNOSIS — Z01.818 PREOP TESTING: Primary | ICD-10-CM

## 2022-02-07 ENCOUNTER — TRANSCRIBE ORDERS (OUTPATIENT)
Dept: LAB | Facility: HOSPITAL | Age: 62
End: 2022-02-07

## 2022-02-07 DIAGNOSIS — Z01.818 PREOP TESTING: Primary | ICD-10-CM

## 2022-02-08 ENCOUNTER — LAB (OUTPATIENT)
Dept: LAB | Facility: HOSPITAL | Age: 62
End: 2022-02-08

## 2022-02-08 LAB — SARS-COV-2 ORF1AB RESP QL NAA+PROBE: NOT DETECTED

## 2022-02-08 PROCEDURE — U0005 INFEC AGEN DETEC AMPLI PROBE: HCPCS | Performed by: PAIN MEDICINE

## 2022-02-08 PROCEDURE — U0004 COV-19 TEST NON-CDC HGH THRU: HCPCS | Performed by: PAIN MEDICINE

## 2022-02-08 PROCEDURE — C9803 HOPD COVID-19 SPEC COLLECT: HCPCS | Performed by: PAIN MEDICINE

## 2022-03-01 ENCOUNTER — TRANSCRIBE ORDERS (OUTPATIENT)
Dept: LAB | Facility: HOSPITAL | Age: 62
End: 2022-03-01

## 2022-03-01 DIAGNOSIS — Z01.818 PREOP TESTING: Primary | ICD-10-CM

## 2022-03-08 ENCOUNTER — LAB (OUTPATIENT)
Dept: LAB | Facility: HOSPITAL | Age: 62
End: 2022-03-08

## 2022-03-08 LAB — SARS-COV-2 ORF1AB RESP QL NAA+PROBE: NOT DETECTED

## 2022-03-08 PROCEDURE — U0005 INFEC AGEN DETEC AMPLI PROBE: HCPCS | Performed by: PAIN MEDICINE

## 2022-03-08 PROCEDURE — C9803 HOPD COVID-19 SPEC COLLECT: HCPCS | Performed by: PAIN MEDICINE

## 2022-03-08 PROCEDURE — U0004 COV-19 TEST NON-CDC HGH THRU: HCPCS | Performed by: PAIN MEDICINE

## 2024-01-01 ENCOUNTER — APPOINTMENT (OUTPATIENT)
Dept: CT IMAGING | Facility: HOSPITAL | Age: 64
End: 2024-01-01
Payer: MEDICARE

## 2024-01-01 ENCOUNTER — APPOINTMENT (OUTPATIENT)
Dept: GENERAL RADIOLOGY | Facility: HOSPITAL | Age: 64
End: 2024-01-01
Payer: MEDICARE

## 2024-01-01 ENCOUNTER — HOSPITAL ENCOUNTER (INPATIENT)
Facility: HOSPITAL | Age: 64
LOS: 10 days | End: 2025-01-08
Attending: EMERGENCY MEDICINE
Payer: MEDICARE

## 2024-01-01 DIAGNOSIS — R41.82 ALTERED MENTAL STATUS, UNSPECIFIED ALTERED MENTAL STATUS TYPE: ICD-10-CM

## 2024-01-01 DIAGNOSIS — J96.00 ACUTE RESPIRATORY FAILURE, UNSPECIFIED WHETHER WITH HYPOXIA OR HYPERCAPNIA: Primary | ICD-10-CM

## 2024-01-01 DIAGNOSIS — F14.10 COCAINE ABUSE: ICD-10-CM

## 2024-01-01 LAB
ALBUMIN SERPL-MCNC: 3.3 G/DL (ref 3.5–5.2)
ALBUMIN SERPL-MCNC: 3.7 G/DL (ref 3.5–5.2)
ALBUMIN SERPL-MCNC: 4.5 G/DL (ref 3.5–5.2)
ALBUMIN/GLOB SERPL: 1.3 G/DL
ALBUMIN/GLOB SERPL: 1.5 G/DL
ALBUMIN/GLOB SERPL: 1.6 G/DL
ALP SERPL-CCNC: 108 U/L (ref 39–117)
ALP SERPL-CCNC: 126 U/L (ref 39–117)
ALP SERPL-CCNC: 165 U/L (ref 39–117)
ALT SERPL W P-5'-P-CCNC: 15 U/L (ref 1–41)
ALT SERPL W P-5'-P-CCNC: 16 U/L (ref 1–41)
ALT SERPL W P-5'-P-CCNC: 22 U/L (ref 1–41)
AMPHET+METHAMPHET UR QL: NEGATIVE
AMPHETAMINES UR QL: NEGATIVE
ANION GAP SERPL CALCULATED.3IONS-SCNC: 13 MMOL/L (ref 5–15)
ANION GAP SERPL CALCULATED.3IONS-SCNC: 22 MMOL/L (ref 5–15)
ANION GAP SERPL CALCULATED.3IONS-SCNC: 6 MMOL/L (ref 5–15)
APAP SERPL-MCNC: <5 MCG/ML (ref 0–30)
ARTERIAL PATENCY WRIST A: ABNORMAL
ARTERIAL PATENCY WRIST A: POSITIVE
AST SERPL-CCNC: 25 U/L (ref 1–40)
AST SERPL-CCNC: 26 U/L (ref 1–40)
AST SERPL-CCNC: 27 U/L (ref 1–40)
ATMOSPHERIC PRESS: 739 MMHG
ATMOSPHERIC PRESS: 746 MMHG
ATMOSPHERIC PRESS: 746 MMHG
ATMOSPHERIC PRESS: 747 MMHG
ATMOSPHERIC PRESS: 748 MMHG
B PARAPERT DNA SPEC QL NAA+PROBE: NOT DETECTED
B PERT DNA SPEC QL NAA+PROBE: NOT DETECTED
BACTERIA UR QL AUTO: ABNORMAL /HPF
BARBITURATES UR QL SCN: NEGATIVE
BASE EXCESS BLDA CALC-SCNC: -2 MMOL/L (ref 0–2)
BASE EXCESS BLDA CALC-SCNC: -2.6 MMOL/L (ref 0–2)
BASE EXCESS BLDA CALC-SCNC: -3.4 MMOL/L (ref 0–2)
BASE EXCESS BLDA CALC-SCNC: 0.7 MMOL/L (ref 0–2)
BASE EXCESS BLDA CALC-SCNC: 3.5 MMOL/L (ref 0–2)
BASOPHILS # BLD AUTO: 0.07 10*3/MM3 (ref 0–0.2)
BASOPHILS # BLD AUTO: 0.12 10*3/MM3 (ref 0–0.2)
BASOPHILS # BLD AUTO: 0.19 10*3/MM3 (ref 0–0.2)
BASOPHILS NFR BLD AUTO: 0.4 % (ref 0–1.5)
BASOPHILS NFR BLD AUTO: 0.6 % (ref 0–1.5)
BASOPHILS NFR BLD AUTO: 0.6 % (ref 0–1.5)
BDY SITE: ABNORMAL
BENZODIAZ UR QL SCN: NEGATIVE
BILIRUB SERPL-MCNC: 1.2 MG/DL (ref 0–1.2)
BILIRUB SERPL-MCNC: 1.6 MG/DL (ref 0–1.2)
BILIRUB SERPL-MCNC: 1.6 MG/DL (ref 0–1.2)
BILIRUB UR QL STRIP: NEGATIVE
BODY TEMPERATURE: 37
BUN SERPL-MCNC: 17 MG/DL (ref 8–23)
BUN SERPL-MCNC: 24 MG/DL (ref 8–23)
BUN SERPL-MCNC: 30 MG/DL (ref 8–23)
BUN/CREAT SERPL: 20.9 (ref 7–25)
BUN/CREAT SERPL: 25.8 (ref 7–25)
BUN/CREAT SERPL: 28.8 (ref 7–25)
BUPRENORPHINE SERPL-MCNC: NEGATIVE NG/ML
C PNEUM DNA NPH QL NAA+NON-PROBE: NOT DETECTED
C TRACH RRNA CVX QL NAA+PROBE: NOT DETECTED
CALCIUM SPEC-SCNC: 8.8 MG/DL (ref 8.6–10.5)
CALCIUM SPEC-SCNC: 9 MG/DL (ref 8.6–10.5)
CALCIUM SPEC-SCNC: 9.4 MG/DL (ref 8.6–10.5)
CANNABINOIDS SERPL QL: NEGATIVE
CHLORIDE SERPL-SCNC: 100 MMOL/L (ref 98–107)
CHLORIDE SERPL-SCNC: 107 MMOL/L (ref 98–107)
CHLORIDE SERPL-SCNC: 112 MMOL/L (ref 98–107)
CK SERPL-CCNC: 263 U/L (ref 20–200)
CK SERPL-CCNC: 502 U/L (ref 20–200)
CK SERPL-CCNC: 548 U/L (ref 20–200)
CLARITY UR: ABNORMAL
CO2 SERPL-SCNC: 20 MMOL/L (ref 22–29)
CO2 SERPL-SCNC: 23 MMOL/L (ref 22–29)
CO2 SERPL-SCNC: 27 MMOL/L (ref 22–29)
COCAINE UR QL: POSITIVE
COLOR UR: YELLOW
CREAT SERPL-MCNC: 0.66 MG/DL (ref 0.76–1.27)
CREAT SERPL-MCNC: 1.04 MG/DL (ref 0.76–1.27)
CREAT SERPL-MCNC: 1.15 MG/DL (ref 0.76–1.27)
D-LACTATE SERPL-SCNC: 1.7 MMOL/L (ref 0.5–2)
DEPRECATED RDW RBC AUTO: 42.4 FL (ref 37–54)
DEPRECATED RDW RBC AUTO: 45.3 FL (ref 37–54)
DEPRECATED RDW RBC AUTO: 46.4 FL (ref 37–54)
EGFRCR SERPLBLD CKD-EPI 2021: 105.4 ML/MIN/1.73
EGFRCR SERPLBLD CKD-EPI 2021: 71.5 ML/MIN/1.73
EGFRCR SERPLBLD CKD-EPI 2021: 80.7 ML/MIN/1.73
EOSINOPHIL # BLD AUTO: 0 10*3/MM3 (ref 0–0.4)
EOSINOPHIL # BLD AUTO: 0.01 10*3/MM3 (ref 0–0.4)
EOSINOPHIL # BLD AUTO: 0.03 10*3/MM3 (ref 0–0.4)
EOSINOPHIL NFR BLD AUTO: 0 % (ref 0.3–6.2)
EOSINOPHIL NFR BLD AUTO: 0 % (ref 0.3–6.2)
EOSINOPHIL NFR BLD AUTO: 0.2 % (ref 0.3–6.2)
ERYTHROCYTE [DISTWIDTH] IN BLOOD BY AUTOMATED COUNT: 12.9 % (ref 12.3–15.4)
ERYTHROCYTE [DISTWIDTH] IN BLOOD BY AUTOMATED COUNT: 13.3 % (ref 12.3–15.4)
ERYTHROCYTE [DISTWIDTH] IN BLOOD BY AUTOMATED COUNT: 13.3 % (ref 12.3–15.4)
ETHANOL UR QL: <0.01 %
FENTANYL UR-MCNC: NEGATIVE NG/ML
FLUAV SUBTYP SPEC NAA+PROBE: NOT DETECTED
FLUBV RNA ISLT QL NAA+PROBE: NOT DETECTED
GEN 5 1HR TROPONIN T REFLEX: 16 NG/L
GLOBULIN UR ELPH-MCNC: 2.5 GM/DL
GLOBULIN UR ELPH-MCNC: 2.6 GM/DL
GLOBULIN UR ELPH-MCNC: 2.9 GM/DL
GLUCOSE BLDC GLUCOMTR-MCNC: 120 MG/DL (ref 70–130)
GLUCOSE BLDC GLUCOMTR-MCNC: 149 MG/DL (ref 70–130)
GLUCOSE BLDC GLUCOMTR-MCNC: 151 MG/DL (ref 70–130)
GLUCOSE BLDC GLUCOMTR-MCNC: 164 MG/DL (ref 70–130)
GLUCOSE BLDC GLUCOMTR-MCNC: 164 MG/DL (ref 70–130)
GLUCOSE BLDC GLUCOMTR-MCNC: 175 MG/DL (ref 70–130)
GLUCOSE BLDC GLUCOMTR-MCNC: 179 MG/DL (ref 70–130)
GLUCOSE BLDC GLUCOMTR-MCNC: 180 MG/DL (ref 70–130)
GLUCOSE SERPL-MCNC: 111 MG/DL (ref 65–99)
GLUCOSE SERPL-MCNC: 165 MG/DL (ref 65–99)
GLUCOSE SERPL-MCNC: 180 MG/DL (ref 65–99)
GLUCOSE UR STRIP-MCNC: NEGATIVE MG/DL
GRAN CASTS URNS QL MICRO: ABNORMAL /LPF
HADV DNA SPEC NAA+PROBE: NOT DETECTED
HCO3 BLDA-SCNC: 23.3 MMOL/L (ref 20–26)
HCO3 BLDA-SCNC: 23.3 MMOL/L (ref 20–26)
HCO3 BLDA-SCNC: 24.9 MMOL/L (ref 20–26)
HCO3 BLDA-SCNC: 25.3 MMOL/L (ref 20–26)
HCO3 BLDA-SCNC: 28.2 MMOL/L (ref 20–26)
HCOV 229E RNA SPEC QL NAA+PROBE: NOT DETECTED
HCOV HKU1 RNA SPEC QL NAA+PROBE: NOT DETECTED
HCOV NL63 RNA SPEC QL NAA+PROBE: NOT DETECTED
HCOV OC43 RNA SPEC QL NAA+PROBE: NOT DETECTED
HCT VFR BLD AUTO: 41.7 % (ref 37.5–51)
HCT VFR BLD AUTO: 43.4 % (ref 37.5–51)
HCT VFR BLD AUTO: 50 % (ref 37.5–51)
HGB BLD-MCNC: 13.7 G/DL (ref 13–17.7)
HGB BLD-MCNC: 14.4 G/DL (ref 13–17.7)
HGB BLD-MCNC: 17.7 G/DL (ref 13–17.7)
HGB UR QL STRIP.AUTO: ABNORMAL
HMPV RNA NPH QL NAA+NON-PROBE: NOT DETECTED
HOLD SPECIMEN: NORMAL
HPIV1 RNA ISLT QL NAA+PROBE: NOT DETECTED
HPIV2 RNA SPEC QL NAA+PROBE: NOT DETECTED
HPIV3 RNA NPH QL NAA+PROBE: NOT DETECTED
HPIV4 P GENE NPH QL NAA+PROBE: NOT DETECTED
HYALINE CASTS UR QL AUTO: ABNORMAL /LPF
IMM GRANULOCYTES # BLD AUTO: 0.16 10*3/MM3 (ref 0–0.05)
IMM GRANULOCYTES # BLD AUTO: 0.22 10*3/MM3 (ref 0–0.05)
IMM GRANULOCYTES # BLD AUTO: 0.88 10*3/MM3 (ref 0–0.05)
IMM GRANULOCYTES NFR BLD AUTO: 0.9 % (ref 0–0.5)
IMM GRANULOCYTES NFR BLD AUTO: 1.1 % (ref 0–0.5)
IMM GRANULOCYTES NFR BLD AUTO: 3 % (ref 0–0.5)
INHALED O2 CONCENTRATION: 100 %
INHALED O2 CONCENTRATION: 30 %
INHALED O2 CONCENTRATION: 40 %
INHALED O2 CONCENTRATION: 40 %
INHALED O2 CONCENTRATION: 60 %
INR PPP: 0.96 (ref 0.91–1.09)
KETONES UR QL STRIP: ABNORMAL
LEUKOCYTE ESTERASE UR QL STRIP.AUTO: NEGATIVE
LYMPHOCYTES # BLD AUTO: 1.21 10*3/MM3 (ref 0.7–3.1)
LYMPHOCYTES # BLD AUTO: 1.95 10*3/MM3 (ref 0.7–3.1)
LYMPHOCYTES # BLD AUTO: 2.19 10*3/MM3 (ref 0.7–3.1)
LYMPHOCYTES NFR BLD AUTO: 10.8 % (ref 19.6–45.3)
LYMPHOCYTES NFR BLD AUTO: 11.1 % (ref 19.6–45.3)
LYMPHOCYTES NFR BLD AUTO: 4.1 % (ref 19.6–45.3)
Lab: ABNORMAL
M PNEUMO IGG SER IA-ACNC: NOT DETECTED
MAGNESIUM SERPL-MCNC: 1.8 MG/DL (ref 1.6–2.4)
MAGNESIUM SERPL-MCNC: 2 MG/DL (ref 1.6–2.4)
MAGNESIUM SERPL-MCNC: 2.1 MG/DL (ref 1.6–2.4)
MCH RBC QN AUTO: 30.8 PG (ref 26.6–33)
MCH RBC QN AUTO: 30.9 PG (ref 26.6–33)
MCH RBC QN AUTO: 31.8 PG (ref 26.6–33)
MCHC RBC AUTO-ENTMCNC: 32.9 G/DL (ref 31.5–35.7)
MCHC RBC AUTO-ENTMCNC: 33.2 G/DL (ref 31.5–35.7)
MCHC RBC AUTO-ENTMCNC: 35.4 G/DL (ref 31.5–35.7)
MCV RBC AUTO: 89.8 FL (ref 79–97)
MCV RBC AUTO: 92.9 FL (ref 79–97)
MCV RBC AUTO: 93.9 FL (ref 79–97)
METHADONE UR QL SCN: NEGATIVE
MODALITY: ABNORMAL
MONOCYTES # BLD AUTO: 2.31 10*3/MM3 (ref 0.1–0.9)
MONOCYTES # BLD AUTO: 2.64 10*3/MM3 (ref 0.1–0.9)
MONOCYTES # BLD AUTO: 2.66 10*3/MM3 (ref 0.1–0.9)
MONOCYTES NFR BLD AUTO: 13 % (ref 5–12)
MONOCYTES NFR BLD AUTO: 15.2 % (ref 5–12)
MONOCYTES NFR BLD AUTO: 7.8 % (ref 5–12)
MRSA DNA SPEC QL NAA+PROBE: NORMAL
N GONORRHOEA RRNA SPEC QL NAA+PROBE: NOT DETECTED
NEUTROPHILS NFR BLD AUTO: 12.65 10*3/MM3 (ref 1.7–7)
NEUTROPHILS NFR BLD AUTO: 15.05 10*3/MM3 (ref 1.7–7)
NEUTROPHILS NFR BLD AUTO: 24.89 10*3/MM3 (ref 1.7–7)
NEUTROPHILS NFR BLD AUTO: 72.2 % (ref 42.7–76)
NEUTROPHILS NFR BLD AUTO: 74.5 % (ref 42.7–76)
NEUTROPHILS NFR BLD AUTO: 84.5 % (ref 42.7–76)
NITRITE UR QL STRIP: NEGATIVE
NRBC BLD AUTO-RTO: 0 /100 WBC (ref 0–0.2)
OPIATES UR QL: NEGATIVE
OSMOLALITY SERPL: 302 MOSM/KG (ref 280–301)
OXYCODONE UR QL SCN: NEGATIVE
PCO2 BLDA: 39.7 MM HG (ref 35–45)
PCO2 BLDA: 40.8 MM HG (ref 35–45)
PCO2 BLDA: 42.1 MM HG (ref 35–45)
PCO2 BLDA: 43.3 MM HG (ref 35–45)
PCO2 BLDA: 55.7 MM HG (ref 35–45)
PCO2 TEMP ADJ BLD: 39.7 MM HG (ref 35–45)
PCO2 TEMP ADJ BLD: 40.8 MM HG (ref 35–45)
PCO2 TEMP ADJ BLD: 42.1 MM HG (ref 35–45)
PCO2 TEMP ADJ BLD: 43.3 MM HG (ref 35–45)
PCO2 TEMP ADJ BLD: 55.7 MM HG (ref 35–45)
PCP UR QL SCN: NEGATIVE
PEEP RESPIRATORY: 5 CM[H2O]
PH BLDA: 7.26 PH UNITS (ref 7.35–7.45)
PH BLDA: 7.34 PH UNITS (ref 7.35–7.45)
PH BLDA: 7.37 PH UNITS (ref 7.35–7.45)
PH BLDA: 7.41 PH UNITS (ref 7.35–7.45)
PH BLDA: 7.43 PH UNITS (ref 7.35–7.45)
PH UR STRIP.AUTO: 5.5 [PH] (ref 5–8)
PH, TEMP CORRECTED: 7.26 PH UNITS (ref 7.35–7.45)
PH, TEMP CORRECTED: 7.34 PH UNITS (ref 7.35–7.45)
PH, TEMP CORRECTED: 7.37 PH UNITS (ref 7.35–7.45)
PH, TEMP CORRECTED: 7.41 PH UNITS (ref 7.35–7.45)
PH, TEMP CORRECTED: 7.43 PH UNITS (ref 7.35–7.45)
PHOSPHATE SERPL-MCNC: 1.7 MG/DL (ref 2.5–4.5)
PHOSPHATE SERPL-MCNC: 2.9 MG/DL (ref 2.5–4.5)
PHOSPHATE SERPL-MCNC: 4.2 MG/DL (ref 2.5–4.5)
PLATELET # BLD AUTO: 177 10*3/MM3 (ref 140–450)
PLATELET # BLD AUTO: 232 10*3/MM3 (ref 140–450)
PLATELET # BLD AUTO: 308 10*3/MM3 (ref 140–450)
PMV BLD AUTO: 10.4 FL (ref 6–12)
PMV BLD AUTO: 10.5 FL (ref 6–12)
PMV BLD AUTO: 10.7 FL (ref 6–12)
PO2 BLD: 178 MM[HG] (ref 0–500)
PO2 BLD: 183 MM[HG] (ref 0–500)
PO2 BLD: 203 MM[HG] (ref 0–500)
PO2 BLD: 246 MM[HG] (ref 0–500)
PO2 BLD: 259 MM[HG] (ref 0–500)
PO2 BLDA: 110 MM HG (ref 83–108)
PO2 BLDA: 246 MM HG (ref 83–108)
PO2 BLDA: 71.2 MM HG (ref 83–108)
PO2 BLDA: 77.6 MM HG (ref 83–108)
PO2 BLDA: 81.3 MM HG (ref 83–108)
PO2 TEMP ADJ BLD: 110 MM HG (ref 83–108)
PO2 TEMP ADJ BLD: 246 MM HG (ref 83–108)
PO2 TEMP ADJ BLD: 71.2 MM HG (ref 83–108)
PO2 TEMP ADJ BLD: 77.6 MM HG (ref 83–108)
PO2 TEMP ADJ BLD: 81.3 MM HG (ref 83–108)
POTASSIUM SERPL-SCNC: 3.3 MMOL/L (ref 3.5–5.2)
POTASSIUM SERPL-SCNC: 3.5 MMOL/L (ref 3.5–5.2)
POTASSIUM SERPL-SCNC: 3.7 MMOL/L (ref 3.5–5.2)
POTASSIUM SERPL-SCNC: 4.3 MMOL/L (ref 3.5–5.2)
PROCALCITONIN SERPL-MCNC: 0.28 NG/ML (ref 0–0.25)
PROT SERPL-MCNC: 5.9 G/DL (ref 6–8.5)
PROT SERPL-MCNC: 6.2 G/DL (ref 6–8.5)
PROT SERPL-MCNC: 7.4 G/DL (ref 6–8.5)
PROT UR QL STRIP: ABNORMAL
PROTHROMBIN TIME: 13.2 SECONDS (ref 11.8–14.8)
QT INTERVAL: 348 MS
QT INTERVAL: 354 MS
QT INTERVAL: 358 MS
QT INTERVAL: 396 MS
QTC INTERVAL: 430 MS
QTC INTERVAL: 462 MS
QTC INTERVAL: 464 MS
QTC INTERVAL: 497 MS
RBC # BLD AUTO: 4.44 10*6/MM3 (ref 4.14–5.8)
RBC # BLD AUTO: 4.67 10*6/MM3 (ref 4.14–5.8)
RBC # BLD AUTO: 5.57 10*6/MM3 (ref 4.14–5.8)
RBC # UR STRIP: ABNORMAL /HPF
REF LAB TEST METHOD: ABNORMAL
RHINOVIRUS RNA SPEC NAA+PROBE: NOT DETECTED
RSV RNA NPH QL NAA+NON-PROBE: NOT DETECTED
SALICYLATES SERPL-MCNC: 0.3 MG/DL
SAO2 % BLDCOA: 91.7 % (ref 94–99)
SAO2 % BLDCOA: 96 % (ref 94–99)
SAO2 % BLDCOA: 96.7 % (ref 94–99)
SAO2 % BLDCOA: 98.1 % (ref 94–99)
SAO2 % BLDCOA: 99.7 % (ref 94–99)
SARS-COV-2 RNA RESP QL NAA+PROBE: NOT DETECTED
SET MECH RESP RATE: 14
SET MECH RESP RATE: 16
SODIUM SERPL-SCNC: 142 MMOL/L (ref 136–145)
SODIUM SERPL-SCNC: 143 MMOL/L (ref 136–145)
SODIUM SERPL-SCNC: 145 MMOL/L (ref 136–145)
SP GR UR STRIP: 1.01 (ref 1–1.03)
SQUAMOUS #/AREA URNS HPF: ABNORMAL /HPF
TRICYCLICS UR QL SCN: POSITIVE
TROPONIN T NUMERIC DELTA: 0 NG/L
TROPONIN T SERPL HS-MCNC: 16 NG/L
UROBILINOGEN UR QL STRIP: ABNORMAL
VENTILATOR MODE: ABNORMAL
VENTILATOR MODE: AC
VT ON VENT VENT: 500 ML
WBC # UR STRIP: ABNORMAL /HPF
WBC NRBC COR # BLD AUTO: 17.52 10*3/MM3 (ref 3.4–10.8)
WBC NRBC COR # BLD AUTO: 20.23 10*3/MM3 (ref 3.4–10.8)
WBC NRBC COR # BLD AUTO: 29.48 10*3/MM3 (ref 3.4–10.8)
WHOLE BLOOD HOLD COAG: NORMAL
WHOLE BLOOD HOLD SPECIMEN: NORMAL

## 2024-01-01 PROCEDURE — 25010000002 PROPOFOL 1000 MG/100ML EMULSION

## 2024-01-01 PROCEDURE — 87070 CULTURE OTHR SPECIMN AEROBIC: CPT | Performed by: INTERNAL MEDICINE

## 2024-01-01 PROCEDURE — 36600 WITHDRAWAL OF ARTERIAL BLOOD: CPT

## 2024-01-01 PROCEDURE — 31500 INSERT EMERGENCY AIRWAY: CPT

## 2024-01-01 PROCEDURE — 94761 N-INVAS EAR/PLS OXIMETRY MLT: CPT

## 2024-01-01 PROCEDURE — 36415 COLL VENOUS BLD VENIPUNCTURE: CPT | Performed by: EMERGENCY MEDICINE

## 2024-01-01 PROCEDURE — 25010000002 VANCOMYCIN 1 G RECONSTITUTED SOLUTION: Performed by: INTERNAL MEDICINE

## 2024-01-01 PROCEDURE — 82803 BLOOD GASES ANY COMBINATION: CPT

## 2024-01-01 PROCEDURE — 93010 ELECTROCARDIOGRAM REPORT: CPT | Performed by: INTERNAL MEDICINE

## 2024-01-01 PROCEDURE — 87186 SC STD MICRODIL/AGAR DIL: CPT | Performed by: INTERNAL MEDICINE

## 2024-01-01 PROCEDURE — 80179 DRUG ASSAY SALICYLATE: CPT | Performed by: EMERGENCY MEDICINE

## 2024-01-01 PROCEDURE — 71045 X-RAY EXAM CHEST 1 VIEW: CPT

## 2024-01-01 PROCEDURE — 25010000002 PIPERACILLIN SOD-TAZOBACTAM PER 1 G: Performed by: INTERNAL MEDICINE

## 2024-01-01 PROCEDURE — 25010000002 ENOXAPARIN PER 10 MG: Performed by: NURSE PRACTITIONER

## 2024-01-01 PROCEDURE — 85610 PROTHROMBIN TIME: CPT | Performed by: EMERGENCY MEDICINE

## 2024-01-01 PROCEDURE — 82077 ASSAY SPEC XCP UR&BREATH IA: CPT | Performed by: EMERGENCY MEDICINE

## 2024-01-01 PROCEDURE — 82550 ASSAY OF CK (CPK): CPT | Performed by: INTERNAL MEDICINE

## 2024-01-01 PROCEDURE — 70450 CT HEAD/BRAIN W/O DYE: CPT

## 2024-01-01 PROCEDURE — 25810000003 LACTATED RINGERS SOLUTION: Performed by: EMERGENCY MEDICINE

## 2024-01-01 PROCEDURE — 85025 COMPLETE CBC W/AUTO DIFF WBC: CPT | Performed by: INTERNAL MEDICINE

## 2024-01-01 PROCEDURE — 25010000002 FUROSEMIDE PER 20 MG

## 2024-01-01 PROCEDURE — 5A1955Z RESPIRATORY VENTILATION, GREATER THAN 96 CONSECUTIVE HOURS: ICD-10-PCS | Performed by: INTERNAL MEDICINE

## 2024-01-01 PROCEDURE — 25010000002 MORPHINE PER 10 MG: Performed by: INTERNAL MEDICINE

## 2024-01-01 PROCEDURE — 82948 REAGENT STRIP/BLOOD GLUCOSE: CPT

## 2024-01-01 PROCEDURE — 94799 UNLISTED PULMONARY SVC/PX: CPT

## 2024-01-01 PROCEDURE — 83605 ASSAY OF LACTIC ACID: CPT | Performed by: EMERGENCY MEDICINE

## 2024-01-01 PROCEDURE — 99291 CRITICAL CARE FIRST HOUR: CPT | Performed by: INTERNAL MEDICINE

## 2024-01-01 PROCEDURE — 25010000002 PIPERACILLIN SOD-TAZOBACTAM PER 1 G: Performed by: EMERGENCY MEDICINE

## 2024-01-01 PROCEDURE — 0BH17EZ INSERTION OF ENDOTRACHEAL AIRWAY INTO TRACHEA, VIA NATURAL OR ARTIFICIAL OPENING: ICD-10-PCS | Performed by: EMERGENCY MEDICINE

## 2024-01-01 PROCEDURE — 84100 ASSAY OF PHOSPHORUS: CPT

## 2024-01-01 PROCEDURE — 25010000002 PROPOFOL 10 MG/ML EMULSION: Performed by: EMERGENCY MEDICINE

## 2024-01-01 PROCEDURE — 80320 DRUG SCREEN QUANTALCOHOLS: CPT | Performed by: EMERGENCY MEDICINE

## 2024-01-01 PROCEDURE — 25810000003 DEXTROSE 5 % AND SODIUM CHLORIDE 0.9 % 5-0.9 % SOLUTION: Performed by: INTERNAL MEDICINE

## 2024-01-01 PROCEDURE — 87641 MR-STAPH DNA AMP PROBE: CPT | Performed by: NURSE PRACTITIONER

## 2024-01-01 PROCEDURE — 94003 VENT MGMT INPAT SUBQ DAY: CPT

## 2024-01-01 PROCEDURE — 0202U NFCT DS 22 TRGT SARS-COV-2: CPT | Performed by: EMERGENCY MEDICINE

## 2024-01-01 PROCEDURE — 84132 ASSAY OF SERUM POTASSIUM: CPT

## 2024-01-01 PROCEDURE — 25810000003 SODIUM CHLORIDE 0.9 % SOLUTION 500 ML FLEX CONT: Performed by: EMERGENCY MEDICINE

## 2024-01-01 PROCEDURE — 80307 DRUG TEST PRSMV CHEM ANLYZR: CPT | Performed by: EMERGENCY MEDICINE

## 2024-01-01 PROCEDURE — 85025 COMPLETE CBC W/AUTO DIFF WBC: CPT | Performed by: EMERGENCY MEDICINE

## 2024-01-01 PROCEDURE — 25010000002 VANCOMYCIN 1 G RECONSTITUTED SOLUTION 1 EACH VIAL: Performed by: EMERGENCY MEDICINE

## 2024-01-01 PROCEDURE — 80053 COMPREHEN METABOLIC PANEL: CPT | Performed by: EMERGENCY MEDICINE

## 2024-01-01 PROCEDURE — 25810000003 SODIUM CHLORIDE 0.9 % SOLUTION: Performed by: EMERGENCY MEDICINE

## 2024-01-01 PROCEDURE — 80053 COMPREHEN METABOLIC PANEL: CPT | Performed by: INTERNAL MEDICINE

## 2024-01-01 PROCEDURE — 82550 ASSAY OF CK (CPK): CPT | Performed by: EMERGENCY MEDICINE

## 2024-01-01 PROCEDURE — 83930 ASSAY OF BLOOD OSMOLALITY: CPT | Performed by: EMERGENCY MEDICINE

## 2024-01-01 PROCEDURE — 84100 ASSAY OF PHOSPHORUS: CPT | Performed by: INTERNAL MEDICINE

## 2024-01-01 PROCEDURE — 87147 CULTURE TYPE IMMUNOLOGIC: CPT | Performed by: INTERNAL MEDICINE

## 2024-01-01 PROCEDURE — 83735 ASSAY OF MAGNESIUM: CPT | Performed by: EMERGENCY MEDICINE

## 2024-01-01 PROCEDURE — 51702 INSERT TEMP BLADDER CATH: CPT

## 2024-01-01 PROCEDURE — 25010000002 AMIODARONE IN DEXTROSE 5% 150-4.21 MG/100ML-% SOLUTION: Performed by: INTERNAL MEDICINE

## 2024-01-01 PROCEDURE — 94664 DEMO&/EVAL PT USE INHALER: CPT

## 2024-01-01 PROCEDURE — 72125 CT NECK SPINE W/O DYE: CPT

## 2024-01-01 PROCEDURE — 87491 CHLMYD TRACH DNA AMP PROBE: CPT | Performed by: PHYSICIAN ASSISTANT

## 2024-01-01 PROCEDURE — 74018 RADEX ABDOMEN 1 VIEW: CPT

## 2024-01-01 PROCEDURE — 25010000002 AMIODARONE IN DEXTROSE 5% 360-4.14 MG/200ML-% SOLUTION: Performed by: INTERNAL MEDICINE

## 2024-01-01 PROCEDURE — 80143 DRUG ASSAY ACETAMINOPHEN: CPT | Performed by: EMERGENCY MEDICINE

## 2024-01-01 PROCEDURE — 99291 CRITICAL CARE FIRST HOUR: CPT

## 2024-01-01 PROCEDURE — 83735 ASSAY OF MAGNESIUM: CPT | Performed by: INTERNAL MEDICINE

## 2024-01-01 PROCEDURE — 25810000003 SODIUM CHLORIDE 0.9 % SOLUTION: Performed by: INTERNAL MEDICINE

## 2024-01-01 PROCEDURE — 87040 BLOOD CULTURE FOR BACTERIA: CPT | Performed by: EMERGENCY MEDICINE

## 2024-01-01 PROCEDURE — 81001 URINALYSIS AUTO W/SCOPE: CPT | Performed by: EMERGENCY MEDICINE

## 2024-01-01 PROCEDURE — 87591 N.GONORRHOEAE DNA AMP PROB: CPT | Performed by: PHYSICIAN ASSISTANT

## 2024-01-01 PROCEDURE — 93005 ELECTROCARDIOGRAM TRACING: CPT | Performed by: EMERGENCY MEDICINE

## 2024-01-01 PROCEDURE — 84484 ASSAY OF TROPONIN QUANT: CPT | Performed by: EMERGENCY MEDICINE

## 2024-01-01 PROCEDURE — 94002 VENT MGMT INPAT INIT DAY: CPT

## 2024-01-01 PROCEDURE — 93005 ELECTROCARDIOGRAM TRACING: CPT | Performed by: INTERNAL MEDICINE

## 2024-01-01 PROCEDURE — 87205 SMEAR GRAM STAIN: CPT | Performed by: INTERNAL MEDICINE

## 2024-01-01 PROCEDURE — 84145 PROCALCITONIN (PCT): CPT | Performed by: EMERGENCY MEDICINE

## 2024-01-01 PROCEDURE — 25010000002 FENTANYL CITRATE (PF) 50 MCG/ML SOLUTION: Performed by: INTERNAL MEDICINE

## 2024-01-01 RX ORDER — BISACODYL 10 MG
10 SUPPOSITORY, RECTAL RECTAL DAILY PRN
Status: DISCONTINUED | OUTPATIENT
Start: 2024-01-01 | End: 2024-01-01

## 2024-01-01 RX ORDER — METOPROLOL TARTRATE 100 MG/1
100 TABLET ORAL EVERY 12 HOURS SCHEDULED
Status: DISCONTINUED | OUTPATIENT
Start: 2024-01-01 | End: 2025-01-01

## 2024-01-01 RX ORDER — ENOXAPARIN SODIUM 100 MG/ML
40 INJECTION SUBCUTANEOUS DAILY
Status: DISCONTINUED | OUTPATIENT
Start: 2024-01-01 | End: 2025-01-01

## 2024-01-01 RX ORDER — ACETAMINOPHEN 325 MG/1
650 TABLET ORAL EVERY 6 HOURS PRN
Status: DISCONTINUED | OUTPATIENT
Start: 2024-01-01 | End: 2025-01-01

## 2024-01-01 RX ORDER — SODIUM CHLORIDE 9 MG/ML
40 INJECTION, SOLUTION INTRAVENOUS AS NEEDED
Status: DISCONTINUED | OUTPATIENT
Start: 2024-01-01 | End: 2025-01-01

## 2024-01-01 RX ORDER — AMOXICILLIN 250 MG
2 CAPSULE ORAL 2 TIMES DAILY
Status: DISCONTINUED | OUTPATIENT
Start: 2024-01-01 | End: 2024-01-01

## 2024-01-01 RX ORDER — LISINOPRIL 20 MG/1
40 TABLET ORAL DAILY
Status: DISCONTINUED | OUTPATIENT
Start: 2024-01-01 | End: 2025-01-01

## 2024-01-01 RX ORDER — AMOXICILLIN 250 MG
2 CAPSULE ORAL 2 TIMES DAILY
Status: DISCONTINUED | OUTPATIENT
Start: 2024-01-01 | End: 2025-01-01

## 2024-01-01 RX ORDER — DEXTROSE MONOHYDRATE AND SODIUM CHLORIDE 5; .9 G/100ML; G/100ML
100 INJECTION, SOLUTION INTRAVENOUS CONTINUOUS
Status: DISCONTINUED | OUTPATIENT
Start: 2024-01-01 | End: 2024-01-01

## 2024-01-01 RX ORDER — FUROSEMIDE 10 MG/ML
20 INJECTION INTRAMUSCULAR; INTRAVENOUS ONCE
Status: COMPLETED | OUTPATIENT
Start: 2024-01-01 | End: 2024-01-01

## 2024-01-01 RX ORDER — BISACODYL 10 MG
10 SUPPOSITORY, RECTAL RECTAL DAILY PRN
Status: DISCONTINUED | OUTPATIENT
Start: 2024-01-01 | End: 2025-01-01

## 2024-01-01 RX ORDER — ATORVASTATIN CALCIUM 40 MG/1
40 TABLET, FILM COATED ORAL NIGHTLY
Status: DISCONTINUED | OUTPATIENT
Start: 2024-01-01 | End: 2025-01-01

## 2024-01-01 RX ORDER — BISACODYL 5 MG/1
5 TABLET, DELAYED RELEASE ORAL DAILY PRN
Status: DISCONTINUED | OUTPATIENT
Start: 2024-01-01 | End: 2024-01-01

## 2024-01-01 RX ORDER — IPRATROPIUM BROMIDE AND ALBUTEROL SULFATE 2.5; .5 MG/3ML; MG/3ML
3 SOLUTION RESPIRATORY (INHALATION)
Status: DISCONTINUED | OUTPATIENT
Start: 2024-01-01 | End: 2025-01-01

## 2024-01-01 RX ORDER — POLYETHYLENE GLYCOL 3350 17 G/17G
17 POWDER, FOR SOLUTION ORAL DAILY PRN
Status: DISCONTINUED | OUTPATIENT
Start: 2024-01-01 | End: 2024-01-01

## 2024-01-01 RX ORDER — MORPHINE SULFATE 2 MG/ML
2 INJECTION, SOLUTION INTRAMUSCULAR; INTRAVENOUS
Status: DISCONTINUED | OUTPATIENT
Start: 2024-01-01 | End: 2024-01-01

## 2024-01-01 RX ORDER — FAMOTIDINE 20 MG/1
20 TABLET, FILM COATED ORAL EVERY 12 HOURS SCHEDULED
Status: DISCONTINUED | OUTPATIENT
Start: 2024-01-01 | End: 2025-01-01

## 2024-01-01 RX ORDER — CLOPIDOGREL BISULFATE 75 MG/1
75 TABLET ORAL NIGHTLY
Status: DISCONTINUED | OUTPATIENT
Start: 2024-01-01 | End: 2025-01-01

## 2024-01-01 RX ORDER — NITROGLYCERIN 0.4 MG/1
0.4 TABLET SUBLINGUAL
Status: DISCONTINUED | OUTPATIENT
Start: 2024-01-01 | End: 2025-01-01

## 2024-01-01 RX ORDER — VANCOMYCIN/0.9 % SOD CHLORIDE 1.5G/250ML
1500 PLASTIC BAG, INJECTION (ML) INTRAVENOUS EVERY 24 HOURS
Status: DISCONTINUED | OUTPATIENT
Start: 2024-01-01 | End: 2024-01-01

## 2024-01-01 RX ORDER — ACETAMINOPHEN 650 MG/1
650 SUPPOSITORY RECTAL EVERY 4 HOURS PRN
Status: DISCONTINUED | OUTPATIENT
Start: 2024-01-01 | End: 2025-01-01

## 2024-01-01 RX ORDER — FAMOTIDINE 10 MG/ML
20 INJECTION, SOLUTION INTRAVENOUS 2 TIMES DAILY
Status: DISCONTINUED | OUTPATIENT
Start: 2024-01-01 | End: 2024-01-01

## 2024-01-01 RX ORDER — ROCURONIUM BROMIDE 10 MG/ML
75 INJECTION, SOLUTION INTRAVENOUS ONCE
Status: COMPLETED | OUTPATIENT
Start: 2024-01-01 | End: 2024-01-01

## 2024-01-01 RX ORDER — ALBUTEROL SULFATE 5 MG/ML
1.25 SOLUTION RESPIRATORY (INHALATION)
Status: DISCONTINUED | OUTPATIENT
Start: 2024-01-01 | End: 2024-01-01

## 2024-01-01 RX ORDER — POTASSIUM CHLORIDE 1.5 G/1.58G
40 POWDER, FOR SOLUTION ORAL EVERY 4 HOURS
Status: COMPLETED | OUTPATIENT
Start: 2024-01-01 | End: 2024-01-01

## 2024-01-01 RX ORDER — AMLODIPINE BESYLATE 5 MG/1
10 TABLET ORAL NIGHTLY
Status: DISCONTINUED | OUTPATIENT
Start: 2024-01-01 | End: 2025-01-01

## 2024-01-01 RX ORDER — PROPOFOL 10 MG/ML
INJECTION, EMULSION INTRAVENOUS
Status: COMPLETED
Start: 2024-01-01 | End: 2024-01-01

## 2024-01-01 RX ORDER — SODIUM CHLORIDE 0.9 % (FLUSH) 0.9 %
10 SYRINGE (ML) INJECTION AS NEEDED
Status: DISCONTINUED | OUTPATIENT
Start: 2024-01-01 | End: 2025-01-01

## 2024-01-01 RX ORDER — SODIUM CHLORIDE 0.9 % (FLUSH) 0.9 %
10 SYRINGE (ML) INJECTION EVERY 12 HOURS SCHEDULED
Status: DISCONTINUED | OUTPATIENT
Start: 2024-01-01 | End: 2025-01-01

## 2024-01-01 RX ORDER — CHLORHEXIDINE GLUCONATE 500 MG/1
1 CLOTH TOPICAL ONCE
Status: COMPLETED | OUTPATIENT
Start: 2024-01-01 | End: 2024-01-01

## 2024-01-01 RX ORDER — CHLORHEXIDINE GLUCONATE ORAL RINSE 1.2 MG/ML
15 SOLUTION DENTAL EVERY 12 HOURS SCHEDULED
Status: DISCONTINUED | OUTPATIENT
Start: 2024-01-01 | End: 2025-01-01

## 2024-01-01 RX ORDER — FENTANYL CITRATE 50 UG/ML
25 INJECTION, SOLUTION INTRAMUSCULAR; INTRAVENOUS
Status: DISCONTINUED | OUTPATIENT
Start: 2024-01-01 | End: 2025-01-01 | Stop reason: SDUPTHER

## 2024-01-01 RX ORDER — BUDESONIDE 0.5 MG/2ML
0.5 INHALANT ORAL
Status: DISCONTINUED | OUTPATIENT
Start: 2024-01-01 | End: 2025-01-01

## 2024-01-01 RX ORDER — ACETAMINOPHEN 325 MG/1
650 TABLET ORAL EVERY 6 HOURS PRN
Status: DISCONTINUED | OUTPATIENT
Start: 2024-01-01 | End: 2024-01-01

## 2024-01-01 RX ORDER — HYDRALAZINE HYDROCHLORIDE 20 MG/ML
10 INJECTION INTRAMUSCULAR; INTRAVENOUS EVERY 6 HOURS PRN
Status: DISCONTINUED | OUTPATIENT
Start: 2024-01-01 | End: 2025-01-01

## 2024-01-01 RX ORDER — CHLORHEXIDINE GLUCONATE 500 MG/1
1 CLOTH TOPICAL EVERY 24 HOURS
Status: DISCONTINUED | OUTPATIENT
Start: 2024-01-01 | End: 2025-01-01

## 2024-01-01 RX ORDER — ETOMIDATE 2 MG/ML
20 INJECTION INTRAVENOUS ONCE
Status: COMPLETED | OUTPATIENT
Start: 2024-01-01 | End: 2024-01-01

## 2024-01-01 RX ORDER — POLYETHYLENE GLYCOL 3350 17 G/17G
17 POWDER, FOR SOLUTION ORAL DAILY PRN
Status: DISCONTINUED | OUTPATIENT
Start: 2024-01-01 | End: 2025-01-01

## 2024-01-01 RX ORDER — METOPROLOL SUCCINATE 50 MG/1
200 TABLET, EXTENDED RELEASE ORAL NIGHTLY
Status: DISCONTINUED | OUTPATIENT
Start: 2024-01-01 | End: 2024-01-01

## 2024-01-01 RX ADMIN — Medication 10 ML: at 20:23

## 2024-01-01 RX ADMIN — AMLODIPINE BESYLATE 10 MG: 5 TABLET ORAL at 21:13

## 2024-01-01 RX ADMIN — AMIODARONE HYDROCHLORIDE 1 MG/MIN: 1.8 INJECTION, SOLUTION INTRAVENOUS at 14:33

## 2024-01-01 RX ADMIN — FENTANYL CITRATE 25 MCG: 50 INJECTION, SOLUTION INTRAMUSCULAR; INTRAVENOUS at 08:17

## 2024-01-01 RX ADMIN — PIPERACILLIN SODIUM AND TAZOBACTAM SODIUM 3.38 G: 3; .375 INJECTION, POWDER, LYOPHILIZED, FOR SOLUTION INTRAVENOUS at 20:30

## 2024-01-01 RX ADMIN — POTASSIUM CHLORIDE 40 MEQ: 1.5 POWDER, FOR SOLUTION ORAL at 03:36

## 2024-01-01 RX ADMIN — PIPERACILLIN AND TAZOBACTAM 4.5 G: 4; .5 INJECTION, POWDER, FOR SOLUTION INTRAVENOUS at 21:13

## 2024-01-01 RX ADMIN — ALBUTEROL SULFATE 1.25 MG: 2.5 SOLUTION RESPIRATORY (INHALATION) at 20:24

## 2024-01-01 RX ADMIN — MORPHINE SULFATE 2 MG: 2 INJECTION, SOLUTION INTRAMUSCULAR; INTRAVENOUS at 09:25

## 2024-01-01 RX ADMIN — BUDESONIDE INHALATION 0.5 MG: 0.5 SUSPENSION RESPIRATORY (INHALATION) at 20:24

## 2024-01-01 RX ADMIN — ENOXAPARIN SODIUM 40 MG: 100 INJECTION SUBCUTANEOUS at 18:30

## 2024-01-01 RX ADMIN — FENTANYL CITRATE 25 MCG: 50 INJECTION, SOLUTION INTRAMUSCULAR; INTRAVENOUS at 22:22

## 2024-01-01 RX ADMIN — METOPROLOL TARTRATE 100 MG: 100 TABLET, FILM COATED ORAL at 21:13

## 2024-01-01 RX ADMIN — ALBUTEROL SULFATE 1.25 MG: 2.5 SOLUTION RESPIRATORY (INHALATION) at 06:57

## 2024-01-01 RX ADMIN — CLOPIDOGREL BISULFATE 75 MG: 75 TABLET ORAL at 20:49

## 2024-01-01 RX ADMIN — FLUOROMETHOLONE ACETATE 2 DROP: 1 SUSPENSION/ DROPS OPHTHALMIC at 21:14

## 2024-01-01 RX ADMIN — CHLORHEXIDINE GLUCONATE 1 APPLICATION: 500 CLOTH TOPICAL at 03:07

## 2024-01-01 RX ADMIN — DOCUSATE SODIUM 50 MG AND SENNOSIDES 8.6 MG 2 TABLET: 8.6; 5 TABLET, FILM COATED ORAL at 08:17

## 2024-01-01 RX ADMIN — Medication 10 ML: at 08:58

## 2024-01-01 RX ADMIN — FLUOROMETHOLONE ACETATE 2 DROP: 1 SUSPENSION/ DROPS OPHTHALMIC at 17:28

## 2024-01-01 RX ADMIN — CHLORHEXIDINE GLUCONATE 1 APPLICATION: 500 CLOTH TOPICAL at 18:31

## 2024-01-01 RX ADMIN — Medication 1 APPLICATION: at 08:16

## 2024-01-01 RX ADMIN — ROCURONIUM 75 MG: 50 INJECTION, SOLUTION INTRAVENOUS at 12:16

## 2024-01-01 RX ADMIN — ACETAMINOPHEN 650 MG: 325 TABLET ORAL at 14:10

## 2024-01-01 RX ADMIN — PIPERACILLIN SODIUM AND TAZOBACTAM SODIUM 3.38 G: 3; .375 INJECTION, POWDER, LYOPHILIZED, FOR SOLUTION INTRAVENOUS at 15:07

## 2024-01-01 RX ADMIN — PIPERACILLIN SODIUM AND TAZOBACTAM SODIUM 3.38 G: 3; .375 INJECTION, POWDER, LYOPHILIZED, FOR SOLUTION INTRAVENOUS at 12:56

## 2024-01-01 RX ADMIN — SODIUM CHLORIDE, POTASSIUM CHLORIDE, SODIUM LACTATE AND CALCIUM CHLORIDE 1000 ML: 600; 310; 30; 20 INJECTION, SOLUTION INTRAVENOUS at 12:45

## 2024-01-01 RX ADMIN — AMIODARONE HYDROCHLORIDE 0.5 MG/MIN: 1.8 INJECTION, SOLUTION INTRAVENOUS at 20:03

## 2024-01-01 RX ADMIN — PROPOFOL INJECTABLE EMULSION 15 MCG/KG/MIN: 10 INJECTION, EMULSION INTRAVENOUS at 18:31

## 2024-01-01 RX ADMIN — ATORVASTATIN CALCIUM 40 MG: 40 TABLET, FILM COATED ORAL at 21:13

## 2024-01-01 RX ADMIN — POTASSIUM & SODIUM PHOSPHATES POWDER PACK 280-160-250 MG 2 PACKET: 280-160-250 PACK at 04:34

## 2024-01-01 RX ADMIN — AMLODIPINE BESYLATE 10 MG: 5 TABLET ORAL at 20:49

## 2024-01-01 RX ADMIN — Medication 10 ML: at 20:50

## 2024-01-01 RX ADMIN — METOPROLOL TARTRATE 100 MG: 100 TABLET, FILM COATED ORAL at 08:16

## 2024-01-01 RX ADMIN — ACETAMINOPHEN 650 MG: 650 SUPPOSITORY RECTAL at 16:12

## 2024-01-01 RX ADMIN — IPRATROPIUM BROMIDE AND ALBUTEROL SULFATE 3 ML: .5; 3 SOLUTION RESPIRATORY (INHALATION) at 21:05

## 2024-01-01 RX ADMIN — VANCOMYCIN HYDROCHLORIDE 2250 MG: 1 INJECTION, POWDER, LYOPHILIZED, FOR SOLUTION INTRAVENOUS at 15:42

## 2024-01-01 RX ADMIN — CHLORHEXIDINE GLUCONATE 0.12% ORAL RINSE 15 ML: 1.2 LIQUID ORAL at 08:16

## 2024-01-01 RX ADMIN — FLUOROMETHOLONE ACETATE 2 DROP: 1 SUSPENSION/ DROPS OPHTHALMIC at 11:08

## 2024-01-01 RX ADMIN — ENOXAPARIN SODIUM 40 MG: 100 INJECTION SUBCUTANEOUS at 08:06

## 2024-01-01 RX ADMIN — CHLORHEXIDINE GLUCONATE 0.12% ORAL RINSE 15 ML: 1.2 LIQUID ORAL at 08:06

## 2024-01-01 RX ADMIN — VANCOMYCIN HYDROCHLORIDE 1500 MG: 1 INJECTION, POWDER, LYOPHILIZED, FOR SOLUTION INTRAVENOUS at 16:15

## 2024-01-01 RX ADMIN — DOCUSATE SODIUM 50 MG AND SENNOSIDES 8.6 MG 2 TABLET: 8.6; 5 TABLET, FILM COATED ORAL at 20:24

## 2024-01-01 RX ADMIN — ETOMIDATE 20 MG: 2 INJECTION, SOLUTION INTRAVENOUS at 12:16

## 2024-01-01 RX ADMIN — MORPHINE SULFATE 2 MG: 2 INJECTION, SOLUTION INTRAMUSCULAR; INTRAVENOUS at 14:33

## 2024-01-01 RX ADMIN — IPRATROPIUM BROMIDE AND ALBUTEROL SULFATE 3 ML: .5; 3 SOLUTION RESPIRATORY (INHALATION) at 07:11

## 2024-01-01 RX ADMIN — Medication 10 ML: at 21:14

## 2024-01-01 RX ADMIN — Medication 1 APPLICATION: at 08:06

## 2024-01-01 RX ADMIN — POTASSIUM & SODIUM PHOSPHATES POWDER PACK 280-160-250 MG 2 PACKET: 280-160-250 PACK at 11:22

## 2024-01-01 RX ADMIN — IPRATROPIUM BROMIDE 0.5 MG: 0.5 SOLUTION RESPIRATORY (INHALATION) at 06:57

## 2024-01-01 RX ADMIN — Medication 10 ML: at 22:01

## 2024-01-01 RX ADMIN — ACETAMINOPHEN 650 MG: 325 TABLET ORAL at 08:16

## 2024-01-01 RX ADMIN — DOCUSATE SODIUM 50 MG AND SENNOSIDES 8.6 MG 2 TABLET: 8.6; 5 TABLET, FILM COATED ORAL at 20:49

## 2024-01-01 RX ADMIN — FAMOTIDINE 20 MG: 20 TABLET, FILM COATED ORAL at 21:13

## 2024-01-01 RX ADMIN — Medication 1 APPLICATION: at 20:49

## 2024-01-01 RX ADMIN — PIPERACILLIN SODIUM AND TAZOBACTAM SODIUM 3.38 G: 3; .375 INJECTION, POWDER, LYOPHILIZED, FOR SOLUTION INTRAVENOUS at 05:17

## 2024-01-01 RX ADMIN — IPRATROPIUM BROMIDE AND ALBUTEROL SULFATE 3 ML: .5; 3 SOLUTION RESPIRATORY (INHALATION) at 15:11

## 2024-01-01 RX ADMIN — FENTANYL CITRATE 25 MCG: 50 INJECTION, SOLUTION INTRAMUSCULAR; INTRAVENOUS at 18:51

## 2024-01-01 RX ADMIN — AMIODARONE HYDROCHLORIDE 0.5 MG/MIN: 1.8 INJECTION, SOLUTION INTRAVENOUS at 08:58

## 2024-01-01 RX ADMIN — Medication 10 ML: at 08:06

## 2024-01-01 RX ADMIN — CHLORHEXIDINE GLUCONATE 0.12% ORAL RINSE 15 ML: 1.2 LIQUID ORAL at 20:49

## 2024-01-01 RX ADMIN — DOCUSATE SODIUM 50 MG AND SENNOSIDES 8.6 MG 2 TABLET: 8.6; 5 TABLET, FILM COATED ORAL at 22:01

## 2024-01-01 RX ADMIN — ACETAMINOPHEN 650 MG: 325 TABLET ORAL at 21:13

## 2024-01-01 RX ADMIN — DEXTROSE AND SODIUM CHLORIDE 100 ML/HR: 5; 900 INJECTION, SOLUTION INTRAVENOUS at 06:50

## 2024-01-01 RX ADMIN — FAMOTIDINE 20 MG: 10 INJECTION INTRAVENOUS at 08:06

## 2024-01-01 RX ADMIN — CLOPIDOGREL BISULFATE 75 MG: 75 TABLET ORAL at 21:13

## 2024-01-01 RX ADMIN — MORPHINE SULFATE 2 MG: 2 INJECTION, SOLUTION INTRAMUSCULAR; INTRAVENOUS at 03:59

## 2024-01-01 RX ADMIN — METOPROLOL TARTRATE 100 MG: 100 TABLET, FILM COATED ORAL at 20:49

## 2024-01-01 RX ADMIN — IPRATROPIUM BROMIDE AND ALBUTEROL SULFATE 3 ML: .5; 3 SOLUTION RESPIRATORY (INHALATION) at 14:26

## 2024-01-01 RX ADMIN — FENTANYL CITRATE 25 MCG: 50 INJECTION, SOLUTION INTRAMUSCULAR; INTRAVENOUS at 15:26

## 2024-01-01 RX ADMIN — Medication 1 APPLICATION: at 18:31

## 2024-01-01 RX ADMIN — Medication 1 APPLICATION: at 21:13

## 2024-01-01 RX ADMIN — AMIODARONE HYDROCHLORIDE 150 MG: 1.5 INJECTION, SOLUTION INTRAVENOUS at 14:15

## 2024-01-01 RX ADMIN — CHLORHEXIDINE GLUCONATE 0.12% ORAL RINSE 15 ML: 1.2 LIQUID ORAL at 20:22

## 2024-01-01 RX ADMIN — BUDESONIDE INHALATION 0.5 MG: 0.5 SUSPENSION RESPIRATORY (INHALATION) at 06:57

## 2024-01-01 RX ADMIN — CHLORHEXIDINE GLUCONATE 1 APPLICATION: 500 CLOTH TOPICAL at 03:36

## 2024-01-01 RX ADMIN — BUDESONIDE INHALATION 0.5 MG: 0.5 SUSPENSION RESPIRATORY (INHALATION) at 07:11

## 2024-01-01 RX ADMIN — ENOXAPARIN SODIUM 40 MG: 100 INJECTION SUBCUTANEOUS at 08:17

## 2024-01-01 RX ADMIN — MORPHINE SULFATE 2 MG: 2 INJECTION, SOLUTION INTRAMUSCULAR; INTRAVENOUS at 21:57

## 2024-01-01 RX ADMIN — METOPROLOL TARTRATE 100 MG: 100 TABLET, FILM COATED ORAL at 12:56

## 2024-01-01 RX ADMIN — PIPERACILLIN SODIUM AND TAZOBACTAM SODIUM 3.38 G: 3; .375 INJECTION, POWDER, LYOPHILIZED, FOR SOLUTION INTRAVENOUS at 04:34

## 2024-01-01 RX ADMIN — DOCUSATE SODIUM 50 MG AND SENNOSIDES 8.6 MG 2 TABLET: 8.6; 5 TABLET, FILM COATED ORAL at 08:06

## 2024-01-01 RX ADMIN — POTASSIUM CHLORIDE 40 MEQ: 1.5 POWDER, FOR SOLUTION ORAL at 08:16

## 2024-01-01 RX ADMIN — PROPOFOL INJECTABLE EMULSION 20 MCG/KG/MIN: 10 INJECTION, EMULSION INTRAVENOUS at 03:04

## 2024-01-01 RX ADMIN — PIPERACILLIN AND TAZOBACTAM 4.5 G: 4; .5 INJECTION, POWDER, FOR SOLUTION INTRAVENOUS at 12:33

## 2024-01-01 RX ADMIN — ATORVASTATIN CALCIUM 40 MG: 40 TABLET, FILM COATED ORAL at 20:49

## 2024-01-01 RX ADMIN — BUDESONIDE INHALATION 0.5 MG: 0.5 SUSPENSION RESPIRATORY (INHALATION) at 20:16

## 2024-01-01 RX ADMIN — PIPERACILLIN SODIUM AND TAZOBACTAM SODIUM 3.38 G: 3; .375 INJECTION, POWDER, LYOPHILIZED, FOR SOLUTION INTRAVENOUS at 20:49

## 2024-01-01 RX ADMIN — FAMOTIDINE 20 MG: 20 TABLET, FILM COATED ORAL at 20:49

## 2024-01-01 RX ADMIN — BUDESONIDE INHALATION 0.5 MG: 0.5 SUSPENSION RESPIRATORY (INHALATION) at 21:05

## 2024-01-01 RX ADMIN — ACETAMINOPHEN 650 MG: 325 TABLET ORAL at 00:09

## 2024-01-01 RX ADMIN — FAMOTIDINE 20 MG: 10 INJECTION INTRAVENOUS at 20:22

## 2024-01-01 RX ADMIN — PROPOFOL INJECTABLE EMULSION 5 MCG/KG/MIN: 10 INJECTION, EMULSION INTRAVENOUS at 12:29

## 2024-01-01 RX ADMIN — IPRATROPIUM BROMIDE 0.5 MG: 0.5 SOLUTION RESPIRATORY (INHALATION) at 20:24

## 2024-01-01 RX ADMIN — SODIUM CHLORIDE 500 ML: 0.9 INJECTION, SOLUTION INTRAVENOUS at 15:37

## 2024-01-01 RX ADMIN — FENTANYL CITRATE 25 MCG: 50 INJECTION, SOLUTION INTRAMUSCULAR; INTRAVENOUS at 17:22

## 2024-01-01 RX ADMIN — IPRATROPIUM BROMIDE AND ALBUTEROL SULFATE 3 ML: .5; 3 SOLUTION RESPIRATORY (INHALATION) at 20:16

## 2024-01-01 RX ADMIN — IPRATROPIUM BROMIDE AND ALBUTEROL SULFATE 3 ML: .5; 3 SOLUTION RESPIRATORY (INHALATION) at 11:31

## 2024-01-01 RX ADMIN — FLUOROMETHOLONE ACETATE 2 DROP: 1 SUSPENSION/ DROPS OPHTHALMIC at 08:17

## 2024-01-01 RX ADMIN — FAMOTIDINE 20 MG: 20 TABLET, FILM COATED ORAL at 08:16

## 2024-01-01 RX ADMIN — FENTANYL CITRATE 25 MCG: 50 INJECTION, SOLUTION INTRAMUSCULAR; INTRAVENOUS at 13:15

## 2024-01-01 RX ADMIN — LISINOPRIL 40 MG: 20 TABLET ORAL at 08:16

## 2024-01-01 RX ADMIN — FUROSEMIDE 20 MG: 10 INJECTION, SOLUTION INTRAMUSCULAR; INTRAVENOUS at 18:31

## 2024-01-01 RX ADMIN — DEXTROSE AND SODIUM CHLORIDE 100 ML/HR: 5; 900 INJECTION, SOLUTION INTRAVENOUS at 20:32

## 2024-01-01 RX ADMIN — CHLORHEXIDINE GLUCONATE 0.12% ORAL RINSE 15 ML: 1.2 LIQUID ORAL at 21:13

## 2024-01-01 RX ADMIN — DEXTROSE AND SODIUM CHLORIDE 100 ML/HR: 5; 900 INJECTION, SOLUTION INTRAVENOUS at 16:44

## 2024-07-13 ENCOUNTER — APPOINTMENT (OUTPATIENT)
Dept: GENERAL RADIOLOGY | Facility: HOSPITAL | Age: 64
End: 2024-07-13
Payer: MEDICARE

## 2024-07-13 ENCOUNTER — HOSPITAL ENCOUNTER (INPATIENT)
Facility: HOSPITAL | Age: 64
LOS: 1 days | Discharge: HOME | End: 2024-07-14
Attending: STUDENT IN AN ORGANIZED HEALTH CARE EDUCATION/TRAINING PROGRAM | Admitting: INTERNAL MEDICINE
Payer: MEDICARE

## 2024-07-13 ENCOUNTER — APPOINTMENT (OUTPATIENT)
Dept: CT IMAGING | Facility: HOSPITAL | Age: 64
End: 2024-07-13
Payer: MEDICARE

## 2024-07-13 DIAGNOSIS — R56.9 SEIZURE: Primary | ICD-10-CM

## 2024-07-13 DIAGNOSIS — E16.2 HYPOGLYCEMIA: ICD-10-CM

## 2024-07-13 LAB
ALBUMIN SERPL-MCNC: 4.1 G/DL (ref 3.5–5.2)
ALBUMIN/GLOB SERPL: 1.3 G/DL
ALP SERPL-CCNC: 150 U/L (ref 39–117)
ALT SERPL W P-5'-P-CCNC: 25 U/L (ref 1–41)
AMPHET+METHAMPHET UR QL: NEGATIVE
AMPHETAMINES UR QL: NEGATIVE
ANION GAP SERPL CALCULATED.3IONS-SCNC: 10 MMOL/L (ref 5–15)
AST SERPL-CCNC: 30 U/L (ref 1–40)
BACTERIA UR QL AUTO: NORMAL /HPF
BARBITURATES UR QL SCN: NEGATIVE
BASOPHILS # BLD AUTO: 0.06 10*3/MM3 (ref 0–0.2)
BASOPHILS NFR BLD AUTO: 0.4 % (ref 0–1.5)
BENZODIAZ UR QL SCN: NEGATIVE
BILIRUB SERPL-MCNC: 0.9 MG/DL (ref 0–1.2)
BILIRUB UR QL STRIP: NEGATIVE
BUN SERPL-MCNC: 18 MG/DL (ref 8–23)
BUN/CREAT SERPL: 25.4 (ref 7–25)
BUPRENORPHINE SERPL-MCNC: NEGATIVE NG/ML
CALCIUM SPEC-SCNC: 9.6 MG/DL (ref 8.6–10.5)
CANNABINOIDS SERPL QL: NEGATIVE
CHLORIDE SERPL-SCNC: 102 MMOL/L (ref 98–107)
CLARITY UR: CLEAR
CO2 SERPL-SCNC: 28 MMOL/L (ref 22–29)
COCAINE UR QL: POSITIVE
COLOR UR: ABNORMAL
CREAT SERPL-MCNC: 0.71 MG/DL (ref 0.76–1.27)
DEPRECATED RDW RBC AUTO: 42 FL (ref 37–54)
EGFRCR SERPLBLD CKD-EPI 2021: 103.7 ML/MIN/1.73
EOSINOPHIL # BLD AUTO: 0.07 10*3/MM3 (ref 0–0.4)
EOSINOPHIL NFR BLD AUTO: 0.5 % (ref 0.3–6.2)
ERYTHROCYTE [DISTWIDTH] IN BLOOD BY AUTOMATED COUNT: 12.3 % (ref 12.3–15.4)
FENTANYL UR-MCNC: NEGATIVE NG/ML
GLOBULIN UR ELPH-MCNC: 3.1 GM/DL
GLUCOSE BLDC GLUCOMTR-MCNC: 110 MG/DL (ref 70–130)
GLUCOSE BLDC GLUCOMTR-MCNC: 110 MG/DL (ref 70–130)
GLUCOSE BLDC GLUCOMTR-MCNC: 112 MG/DL (ref 70–130)
GLUCOSE BLDC GLUCOMTR-MCNC: 148 MG/DL (ref 70–130)
GLUCOSE BLDC GLUCOMTR-MCNC: 48 MG/DL (ref 70–130)
GLUCOSE BLDC GLUCOMTR-MCNC: 80 MG/DL (ref 70–130)
GLUCOSE BLDC GLUCOMTR-MCNC: 83 MG/DL (ref 70–130)
GLUCOSE SERPL-MCNC: 198 MG/DL (ref 65–99)
GLUCOSE UR STRIP-MCNC: ABNORMAL MG/DL
HCT VFR BLD AUTO: 44.5 % (ref 37.5–51)
HGB BLD-MCNC: 15.2 G/DL (ref 13–17.7)
HGB UR QL STRIP.AUTO: NEGATIVE
HYALINE CASTS UR QL AUTO: NORMAL /LPF
IMM GRANULOCYTES # BLD AUTO: 0.07 10*3/MM3 (ref 0–0.05)
IMM GRANULOCYTES NFR BLD AUTO: 0.5 % (ref 0–0.5)
KETONES UR QL STRIP: ABNORMAL
LEUKOCYTE ESTERASE UR QL STRIP.AUTO: NEGATIVE
LIPASE SERPL-CCNC: 21 U/L (ref 13–60)
LYMPHOCYTES # BLD AUTO: 1 10*3/MM3 (ref 0.7–3.1)
LYMPHOCYTES NFR BLD AUTO: 7.2 % (ref 19.6–45.3)
MAGNESIUM SERPL-MCNC: 2.1 MG/DL (ref 1.6–2.4)
MCH RBC QN AUTO: 31.6 PG (ref 26.6–33)
MCHC RBC AUTO-ENTMCNC: 34.2 G/DL (ref 31.5–35.7)
MCV RBC AUTO: 92.5 FL (ref 79–97)
METHADONE UR QL SCN: NEGATIVE
MONOCYTES # BLD AUTO: 1.01 10*3/MM3 (ref 0.1–0.9)
MONOCYTES NFR BLD AUTO: 7.3 % (ref 5–12)
NEUTROPHILS NFR BLD AUTO: 11.6 10*3/MM3 (ref 1.7–7)
NEUTROPHILS NFR BLD AUTO: 84.1 % (ref 42.7–76)
NITRITE UR QL STRIP: NEGATIVE
NRBC BLD AUTO-RTO: 0 /100 WBC (ref 0–0.2)
OPIATES UR QL: NEGATIVE
OXYCODONE UR QL SCN: NEGATIVE
PCP UR QL SCN: NEGATIVE
PH UR STRIP.AUTO: 6 [PH] (ref 5–8)
PLATELET # BLD AUTO: 199 10*3/MM3 (ref 140–450)
PMV BLD AUTO: 10.3 FL (ref 6–12)
POTASSIUM SERPL-SCNC: 3.3 MMOL/L (ref 3.5–5.2)
PROT SERPL-MCNC: 7.2 G/DL (ref 6–8.5)
PROT UR QL STRIP: ABNORMAL
RBC # BLD AUTO: 4.81 10*6/MM3 (ref 4.14–5.8)
RBC # UR STRIP: NORMAL /HPF
REF LAB TEST METHOD: NORMAL
SODIUM SERPL-SCNC: 140 MMOL/L (ref 136–145)
SP GR UR STRIP: 1.02 (ref 1–1.03)
SQUAMOUS #/AREA URNS HPF: NORMAL /HPF
TRICYCLICS UR QL SCN: POSITIVE
UROBILINOGEN UR QL STRIP: ABNORMAL
WBC # UR STRIP: NORMAL /HPF
WBC NRBC COR # BLD AUTO: 13.81 10*3/MM3 (ref 3.4–10.8)

## 2024-07-13 PROCEDURE — 83690 ASSAY OF LIPASE: CPT | Performed by: STUDENT IN AN ORGANIZED HEALTH CARE EDUCATION/TRAINING PROGRAM

## 2024-07-13 PROCEDURE — 93005 ELECTROCARDIOGRAM TRACING: CPT | Performed by: STUDENT IN AN ORGANIZED HEALTH CARE EDUCATION/TRAINING PROGRAM

## 2024-07-13 PROCEDURE — 81001 URINALYSIS AUTO W/SCOPE: CPT | Performed by: STUDENT IN AN ORGANIZED HEALTH CARE EDUCATION/TRAINING PROGRAM

## 2024-07-13 PROCEDURE — 36415 COLL VENOUS BLD VENIPUNCTURE: CPT

## 2024-07-13 PROCEDURE — 83735 ASSAY OF MAGNESIUM: CPT | Performed by: STUDENT IN AN ORGANIZED HEALTH CARE EDUCATION/TRAINING PROGRAM

## 2024-07-13 PROCEDURE — 82948 REAGENT STRIP/BLOOD GLUCOSE: CPT

## 2024-07-13 PROCEDURE — 99285 EMERGENCY DEPT VISIT HI MDM: CPT

## 2024-07-13 PROCEDURE — 85025 COMPLETE CBC W/AUTO DIFF WBC: CPT | Performed by: STUDENT IN AN ORGANIZED HEALTH CARE EDUCATION/TRAINING PROGRAM

## 2024-07-13 PROCEDURE — 80307 DRUG TEST PRSMV CHEM ANLYZR: CPT | Performed by: STUDENT IN AN ORGANIZED HEALTH CARE EDUCATION/TRAINING PROGRAM

## 2024-07-13 PROCEDURE — 70450 CT HEAD/BRAIN W/O DYE: CPT

## 2024-07-13 PROCEDURE — 80053 COMPREHEN METABOLIC PANEL: CPT | Performed by: STUDENT IN AN ORGANIZED HEALTH CARE EDUCATION/TRAINING PROGRAM

## 2024-07-13 PROCEDURE — 71045 X-RAY EXAM CHEST 1 VIEW: CPT

## 2024-07-13 PROCEDURE — 25010000002 ENOXAPARIN PER 10 MG: Performed by: INTERNAL MEDICINE

## 2024-07-13 PROCEDURE — P9612 CATHETERIZE FOR URINE SPEC: HCPCS

## 2024-07-13 PROCEDURE — 72125 CT NECK SPINE W/O DYE: CPT

## 2024-07-13 PROCEDURE — 93010 ELECTROCARDIOGRAM REPORT: CPT | Performed by: INTERNAL MEDICINE

## 2024-07-13 RX ORDER — SODIUM CHLORIDE 9 MG/ML
40 INJECTION, SOLUTION INTRAVENOUS AS NEEDED
Status: DISCONTINUED | OUTPATIENT
Start: 2024-07-13 | End: 2024-07-14 | Stop reason: HOSPADM

## 2024-07-13 RX ORDER — SODIUM CHLORIDE 0.9 % (FLUSH) 0.9 %
10 SYRINGE (ML) INJECTION AS NEEDED
Status: DISCONTINUED | OUTPATIENT
Start: 2024-07-13 | End: 2024-07-14 | Stop reason: HOSPADM

## 2024-07-13 RX ORDER — DEXTROSE MONOHYDRATE 25 G/50ML
25 INJECTION, SOLUTION INTRAVENOUS
Status: DISCONTINUED | OUTPATIENT
Start: 2024-07-13 | End: 2024-07-14 | Stop reason: HOSPADM

## 2024-07-13 RX ORDER — ENOXAPARIN SODIUM 100 MG/ML
40 INJECTION SUBCUTANEOUS
Status: DISCONTINUED | OUTPATIENT
Start: 2024-07-13 | End: 2024-07-14 | Stop reason: HOSPADM

## 2024-07-13 RX ORDER — POLYETHYLENE GLYCOL 3350 17 G/17G
17 POWDER, FOR SOLUTION ORAL DAILY PRN
Status: DISCONTINUED | OUTPATIENT
Start: 2024-07-13 | End: 2024-07-14 | Stop reason: HOSPADM

## 2024-07-13 RX ORDER — BISACODYL 5 MG/1
5 TABLET, DELAYED RELEASE ORAL DAILY PRN
Status: DISCONTINUED | OUTPATIENT
Start: 2024-07-13 | End: 2024-07-14 | Stop reason: HOSPADM

## 2024-07-13 RX ORDER — POTASSIUM CHLORIDE 1.5 G/1.58G
40 POWDER, FOR SOLUTION ORAL EVERY 4 HOURS
Status: COMPLETED | OUTPATIENT
Start: 2024-07-13 | End: 2024-07-13

## 2024-07-13 RX ORDER — DEXTROSE MONOHYDRATE AND SODIUM CHLORIDE 5; .45 G/100ML; G/100ML
100 INJECTION, SOLUTION INTRAVENOUS CONTINUOUS
Status: DISCONTINUED | OUTPATIENT
Start: 2024-07-13 | End: 2024-07-14 | Stop reason: HOSPADM

## 2024-07-13 RX ORDER — BISACODYL 10 MG
10 SUPPOSITORY, RECTAL RECTAL DAILY PRN
Status: DISCONTINUED | OUTPATIENT
Start: 2024-07-13 | End: 2024-07-14 | Stop reason: HOSPADM

## 2024-07-13 RX ORDER — NICOTINE POLACRILEX 4 MG
15 LOZENGE BUCCAL
Status: DISCONTINUED | OUTPATIENT
Start: 2024-07-13 | End: 2024-07-14 | Stop reason: HOSPADM

## 2024-07-13 RX ORDER — SODIUM CHLORIDE 0.9 % (FLUSH) 0.9 %
10 SYRINGE (ML) INJECTION EVERY 12 HOURS SCHEDULED
Status: DISCONTINUED | OUTPATIENT
Start: 2024-07-13 | End: 2024-07-14 | Stop reason: HOSPADM

## 2024-07-13 RX ORDER — AMOXICILLIN 250 MG
2 CAPSULE ORAL 2 TIMES DAILY PRN
Status: DISCONTINUED | OUTPATIENT
Start: 2024-07-13 | End: 2024-07-14 | Stop reason: HOSPADM

## 2024-07-13 RX ORDER — DEXTROSE MONOHYDRATE 25 G/50ML
25 INJECTION, SOLUTION INTRAVENOUS ONCE
Status: COMPLETED | OUTPATIENT
Start: 2024-07-13 | End: 2024-07-13

## 2024-07-13 RX ORDER — IBUPROFEN 600 MG/1
1 TABLET ORAL
Status: DISCONTINUED | OUTPATIENT
Start: 2024-07-13 | End: 2024-07-14 | Stop reason: HOSPADM

## 2024-07-13 RX ADMIN — DEXTROSE AND SODIUM CHLORIDE 100 ML/HR: 5; 450 INJECTION, SOLUTION INTRAVENOUS at 18:05

## 2024-07-13 RX ADMIN — POTASSIUM CHLORIDE 40 MEQ: 1.5 POWDER, FOR SOLUTION ORAL at 20:17

## 2024-07-13 RX ADMIN — Medication 10 ML: at 20:18

## 2024-07-13 RX ADMIN — POTASSIUM CHLORIDE 40 MEQ: 1.5 POWDER, FOR SOLUTION ORAL at 18:05

## 2024-07-13 RX ADMIN — DEXTROSE MONOHYDRATE 25 G: 25 INJECTION, SOLUTION INTRAVENOUS at 12:59

## 2024-07-13 RX ADMIN — PHENOL 1 SPRAY: 1.5 LIQUID ORAL at 22:03

## 2024-07-13 RX ADMIN — ENOXAPARIN SODIUM 40 MG: 100 INJECTION SUBCUTANEOUS at 18:05

## 2024-07-13 NOTE — ED PROVIDER NOTES
Subjective   History of Present Illness  Patient presents due to altered mental status.  Somebody called EMS after finding him unresponsive laying in chair slumped over.  EMS had a blood sugar in the 30s on arrival to the administered oral glucose and brought him here.  Hypoglycemic on arrival here, but responds well to D50.  He has evidence of urinary incontinence and tongue biting, was not aware of either.  Denies any known head trauma.  Says that he usually uses a Dexcom and his Dexcom is not working and he has been using his insulin.  Denies other focal symptoms at this time; he is still somnolent and review of systems is limited.    Review of Systems   Constitutional:  Negative for fever.   Respiratory:  Negative for shortness of breath.    Cardiovascular:  Negative for chest pain.   Gastrointestinal:  Negative for abdominal pain and vomiting.       Past Medical History:   Diagnosis Date    Anxiety     Atherosclerosis of coronary artery     Bipolar disorder     Bladder cancer     Colon polyp     Coronary artery disease     Depression     Diabetes mellitus     Hemorrhoids     Hyperlipidemia     Hypertension     Nephrolithiasis     Sleep apnea        Allergies   Allergen Reactions    Codeine Nausea Only       Past Surgical History:   Procedure Laterality Date    ANGIOPLASTY      with stent placement    BACK SURGERY      BLADDER SURGERY      cancer    CARDIAC CATHETERIZATION      COLONOSCOPY  04/13/2015    COLONOSCOPY N/A 4/30/2018    Procedure: COLONOSCOPY WITH ANESTHESIA;  Surgeon: Gerson Martini MD;  Location:  [facility];  Service: Gastroenterology    COLONOSCOPY N/A 4/8/2021    Procedure: COLONOSCOPY WITH ANESTHESIA;  Surgeon: Gerson Martini MD;  Location:  [facility];  Service: Gastroenterology;  Laterality: N/A;  Pre: Hx Colon Polyps  Post: divertivculosis   Dr. Garza  CO2 Inflation Used    CORONARY ARTERY BYPASS GRAFT      KNEE SURGERY      SHOULDER SURGERY      x 2    SINUS SURGERY          Family History   Adopted: Yes       Social History     Socioeconomic History    Marital status:    Tobacco Use    Smoking status: Every Day     Current packs/day: 0.50     Types: Cigarettes    Smokeless tobacco: Never    Tobacco comments:     2 a day   Vaping Use    Vaping status: Never Used   Substance and Sexual Activity    Alcohol use: Yes     Comment: Rarely    Drug use: No    Sexual activity: Defer           Objective   Physical Exam  Vitals reviewed.   Constitutional:       General: He is not in acute distress.  HENT:      Head: Normocephalic and atraumatic.      Comments: Enlarge dtongue no clear bite marks no other dental trauma     Right Ear: Tympanic membrane normal.      Left Ear: Tympanic membrane normal.   Eyes:      Extraocular Movements: Extraocular movements intact.      Conjunctiva/sclera: Conjunctivae normal.   Cardiovascular:      Pulses: Normal pulses.      Heart sounds: Normal heart sounds.   Pulmonary:      Effort: Pulmonary effort is normal. No respiratory distress.   Abdominal:      General: Abdomen is flat. There is no distension.      Tenderness: There is abdominal tenderness (generalized).   Musculoskeletal:      Cervical back: Normal range of motion and neck supple.      Comments: Scalp atraumatic.   Forehead atraumatic, stable to palpation, nontender.   Midface stable, nontender, no injuries noted.   No intraoral injuries noted.   No otorrhea.   Trachea midline, breath sounds were noted bilaterally. PERRL.    Cervical spine: no midline tenderness to palpation. No paraspinal tenderness to palpation.  Thoracic spine: no midline tenderness to palpation. No paraspinal tenderness to palpation.  Lumbar spine: chronic lower midline tenderness to palpation. Pt states this is unchanged, and not new. No paraspinal tenderness to palpation.  Spine stable with no palpable deformity or step-off    Clavicles stable and nontender to AP and lateral compression.  Chest stable and nontender  to AP and lateral compression.  Pelvis stable and nontender to lateral compression.    Extremities are warm, well-perfused with capillary refill intact and no gross motor deficits.  No focal tenderness upon palpation of the four extremities.   Skin:     General: Skin is warm and dry.      Comments: Small amount of blood outside of mouth, dried. Likely small amount of dried emesis on upper shirt as well. No clear tongue biting but tongue is swollen.    Neurological:      General: No focal deficit present.      Mental Status: He is alert. Mental status is at baseline.      Comments: Right upper extremity: 5/5 strength with handgrip and flexion/extension of shoulders, elbows.   Light touch sensation intact and equal when compared to the left upper extremity.    Left upper extremity: 5/5 strength with handgrip and flexion/extension of shoulders, elbows.   Light touch sensation intact and equal when compared to the right upper extremity.    Right lower extremity: 5/5 strength with flexion/extension of hips, knees, and dorsi/plantarflexion of ankles. Able to wiggle toes.   Light touch sensation intact and equal when compared to the left lower extremity.    Left lower extremity: 5/5 strength with flexion/extension of hips, knees, and dorsi/plantarflexion of ankles. Able to wiggle toes.   Light touch sensation intact and equal when compared to the right lower extremity.    Light sensation intact in bilateral face. CN 2-12 normal.    Psychiatric:         Behavior: Behavior normal.         Thought Content: Thought content normal.         Procedures           ED Course  ED Course as of 07/13/24 1654   Sat Jul 13, 2024   1428 CT imaging unremarkable.  Patient is cocaine positive UDS, labs unremarkable, glucose obtained at an hour is 110.  Will reassess his symptoms. [AS]   1450 Did ozempic yesterday. Also with same dose long acting. Has reduced novolin. [AS]   1519 WBC(!): 13.81  Dr. Lynch from ICU will evaluate; I called ICU  due to the history of seizure, swollen tongue, and need for hourly glucose checks.  None of these are heart indications however her glucose checks may be difficult to perform on the floor so I have started with the ICU doctor. [AS]      ED Course User Index  [AS] Imer Bermudez MD                                             Medical Decision Making  Problems Addressed:  Hypoglycemia: complicated acute illness or injury  Seizure: complicated acute illness or injury    Amount and/or Complexity of Data Reviewed  Labs: ordered. Decision-making details documented in ED Course.  Radiology: ordered.  ECG/medicine tests: ordered.    Risk  Prescription drug management.  Decision regarding hospitalization.      Ld Green is a 63 y.o. male with PMH above who presents to the Emergency Department with AMS. Hypoglycemic with evidence of a seizure. H/o DM using insulin.  Mentation improved with D50.  Mild abdominal tenderness nonfocal with no peritonitic signs.  Will reassess after he improves more from the D50.  Most likely summation of the patient's constellation of symptoms is hypoglycemia related to insulin use without monitoring his sugar with the Dexcom, which probably led to hypoglycemic seizure. Chart review shows he does take long acting insulin.     ED Course:   -See ED course. Dr. Lynch evaluated rec'd floor admission for the above. Admitted to hospitalist. Serial exams no tongue enlargemnet no dyspnea. Held glc stable in ED.      Final diagnosis: hypoglycemia, seizure    All questions answered. Patient/family was understanding and in agreement with today's assessment and plan. The patient was monitored during their stay in the ED and dispositioned without acute event.    Electronically signed by:  Imer Bermudez MD 7/13/2024 16:54 CDT      Note: Dragon medical dictation software was used in the creation of this note.        Final diagnoses:   Seizure   Hypoglycemia       ED Disposition  ED  Disposition       ED Disposition   Decision to Admit    Condition   --    Comment   Level of Care: Remote Telemetry [26]   Diagnosis: Hypoglycemia [296665]   Admitting Physician: BARBARA GONZALEZ [[id number]]   Attending Physician: BARBARA GONZALEZ [[id number]]   Certification: I Certify That Inpatient Hospital Services Are Medically Necessary For Greater Than 2 Midnights                 No follow-up provider specified.       Medication List      No changes were made to your prescriptions during this visit.            Imer Bermudez MD  07/13/24 [id number]

## 2024-07-13 NOTE — PLAN OF CARE
Goal Outcome Evaluation:  Plan of Care Reviewed With: patient        Progress: no change     Pt arrived to the floor around 1800. Pt a/o x4. Up x1 to bathroom. Last BM 7/11/2024. Room air. Tele on. No c/o pain at this time. Lovenox for VTE. FOLEY. PPP. VSS. Call light within reach. Safety maintained.

## 2024-07-13 NOTE — H&P
[facility] Medicine Services  HISTORY AND PHYSICAL    Date of Admission: 7/13/2024  Primary Care Physician: Praveen Garza MD    Subjective   Primary Historian: patient    Chief Complaint: altered mental status    History of Present Illness  This is a 63-year-old gentleman with past medical history significant for diabetes mellitus type 2 who apparently was found by EMS unresponsive laying in chair after he slumped over apparently has a blood glucose of 30 upon arrival he responded well to D50 apparently had urinary incontinence he had evidence of tongue biting, apparently his glucometer was not working at home, he acknowledged the fact that he did some cocaine yesterday with some of his friends.  He will be admitted for further evaluation and management.        Review of Systems   Otherwise complete ROS reviewed and negative except as mentioned in the HPI.    Past Medical History:   Past Medical History:   Diagnosis Date    Anxiety     Atherosclerosis of coronary artery     Bipolar disorder     Bladder cancer     Colon polyp     Coronary artery disease     Depression     Diabetes mellitus     Hemorrhoids     Hyperlipidemia     Hypertension     Nephrolithiasis     Sleep apnea      Past Surgical History:  Past Surgical History:   Procedure Laterality Date    ANGIOPLASTY      with stent placement    BACK SURGERY      BLADDER SURGERY      cancer    CARDIAC CATHETERIZATION      COLONOSCOPY  04/13/2015    COLONOSCOPY N/A 4/30/2018    Procedure: COLONOSCOPY WITH ANESTHESIA;  Surgeon: Gerson Martini MD;  Location: [facility] ENDOSCOPY;  Service: Gastroenterology    COLONOSCOPY N/A 4/8/2021    Procedure: COLONOSCOPY WITH ANESTHESIA;  Surgeon: Gerson Martini MD;  Location: [facility] ENDOSCOPY;  Service: Gastroenterology;  Laterality: N/A;  Pre: Hx Colon Polyps  Post: divertivculosis   Dr. Garza  CO2 Inflation Used    CORONARY ARTERY BYPASS GRAFT      KNEE SURGERY      SHOULDER  SURGERY      x 2    SINUS SURGERY       Social History:  reports that he has been smoking cigarettes. He has never used smokeless tobacco. He reports current alcohol use. He reports that he does not use drugs.    Family History: family history is not on file. He was adopted.       Allergies:  Allergies   Allergen Reactions    Codeine Nausea Only       Medications:  Prior to Admission medications    Medication Sig Start Date End Date Taking? Authorizing Provider   Alirocumab (PRALUENT SC) Inject  under the skin into the appropriate area as directed Every 14 (Fourteen) Days.    Rizwan Peralta MD   ALPRAZolam (XANAX) 0.5 MG tablet 3 (three) times a day.    Rizwan Peralta MD   atorvastatin (LIPITOR) 40 MG tablet Take 1 tablet by mouth Daily. 7/19/21   Emergency, Nurse Dee RN   Bisacodyl (LAXATIVE PO) Take  by mouth As Needed (rare).    Rizwan Peralta MD   clopidogrel (PLAVIX) 75 MG tablet TAKE ONE TABLET BY MOUTH EVERY DAY 4/26/16   Rizwan Peralta MD   cyclobenzaprine (FLEXERIL) 10 MG tablet 3 (three) times a day.    Rizwan Peralta MD   Dulaglutide (TRULICITY SC) Inject  under the skin into the appropriate area as directed 1 (One) Time Per Week.    Rizwan Peralta MD   fenofibrate (TRICOR) 145 MG tablet Take 145 mg by mouth Daily.    Rizwan Peralta MD   Glucose Blood (BLOOD GLUCOSE TEST) strip Place 1 each onto the skin daily 5/21/15   Rizwan Peralta MD   glucose monitoring kit (FREESTYLE) monitoring kit every day. 5/6/15   Rizwan Peralta MD   insulin detemir (LEVEMIR) 100 UNIT/ML injection Inject  under the skin Daily.    Rizwan Peralta MD   insulin regular (NovoLIN R) 100 UNIT/ML injection INJECT 30 UNITS SUBCUTANEOUSLY 4 TIMES DAILY WITH MEALS AND AT BEDTIME 2/19/21   Emergency, Nurse Epic, RN   Insulin Zinc Human (NOVOLIN L SC) Inject  under the skin.    Rizwan Peralta MD   isosorbide mononitrate (IMDUR) 60 MG 24 hr tablet Take 1 tablet by  "mouth daily 8/16/16   Rizwan Peralta MD   Lancets misc Place 1 each onto the skin daily 5/21/15   Rizwan Peralta MD   metFORMIN (GLUCOPHAGE) 1000 MG tablet Take 1 tablet by mouth 2 times daily (with meals) 9/21/16   Rizwan Peralta MD   metoprolol succinate XL (TOPROL XL) 100 MG 24 hr tablet Take 1 tablet by mouth daily 6/9/16   Rizwan Peralta MD   nitroglycerin (NITROSTAT) 0.4 MG SL tablet Place 1 tablet under the tongue every 5 minutes as needed for Chest pain X 3. 7/22/16   Rizwan Peralta MD   ondansetron (ZOFRAN) 4 MG tablet Take 4 mg by mouth As Needed. 5/6/21   Emergency, Nurse Epic, RN   oxyCODONE-acetaminophen (PERCOCET)  MG per tablet Take 1 tablet by mouth 3 times daily  8/5/16   Rizwan Peralta MD   ranolazine (RANEXA) 500 MG 12 hr tablet Take 1 tablet by mouth 2 (two) times a day. 7/19/21   Emergency, Nurse NADIA Price   rosuvastatin (CRESTOR) 20 MG tablet Take 1 tablet by mouth nightly 9/21/16   Rizwan Peralta MD   zolpidem (AMBIEN) 10 MG tablet TAKE 1 TABLET BY MOUTH AT BEDTIME AS NEEDED 7/13/16   Rizwan Peralta MD     I have utilized all available immediate resources to obtain, update, or review the patient's current medications (including all prescriptions, over-the-counter products, herbals, cannabis/cannabidiol products, and vitamin/mineral/dietary (nutritional) supplements).    Objective     Vital Signs: /90   Pulse 88   Temp 97.8 °F (36.6 °C) (Oral)   Resp 19   Ht 175.3 cm (69\")   Wt 122 kg (268 lb)   SpO2 96%   BMI 39.58 kg/m²   Physical Exam   General: Patient is alert, awake, oriented x 3 in no apparent distress at time of examination  HEENT: Normocephalic, atraumatic, pupils are equal reactive to light and accommodate.  Neck: Supple, no JVD, no carotid bruits  CVS: S1, S2, regular rhythm and rate  Lungs: Clear to auscultation bilaterally  Abdomen: Soft, nontender, nondistended bowel sounds are present  Extremities: No " cyanosis, no clubbing, no edema pulses are intact  Neurologic: No focal deficits      Results Reviewed:  Lab Results (last 24 hours)       Procedure Component Value Units Date/Time    POC Glucose Once [[id number]]  (Normal) Collected: 07/13/24 1613    Specimen: Blood Updated: 07/13/24 1624     Glucose 80 mg/dL      Comment: : [id number] Gio DesireeMeter ID: [id number]       POC Glucose Once [[id number]]  (Normal) Collected: 07/13/24 1526    Specimen: Blood Updated: 07/13/24 1538     Glucose 83 mg/dL      Comment: : [id number] Sudhakarlenpatito DesireeMeter ID: [id number]       Urinalysis With Culture If Indicated - Straight Cath [[id number]]  (Abnormal) Collected: 07/13/24 1338    Specimen: Urine from Straight Cath Updated: 07/13/24 1427     Color, UA Dark Yellow     Appearance, UA Clear     pH, UA 6.0     Specific Gravity, UA 1.022     Glucose, UA >=1000 mg/dL (3+)     Ketones, UA 15 mg/dL (1+)     Bilirubin, UA Negative     Blood, UA Negative     Protein,  mg/dL (2+)     Leuk Esterase, UA Negative     Nitrite, UA Negative     Urobilinogen, UA 1.0 E.U./dL    Narrative:      In absence of clinical symptoms, the presence of pyuria, bacteria, and/or nitrites on the urinalysis result does not correlate with infection.    Urinalysis, Microscopic Only - Straight Cath [[id number]] Collected: 07/13/24 1338    Specimen: Urine from Straight Cath Updated: 07/13/24 1427     RBC, UA 0-2 /HPF      WBC, UA 0-2 /HPF      Comment: Urine culture not indicated.        Bacteria, UA None Seen /HPF      Squamous Epithelial Cells, UA 0-2 /HPF      Hyaline Casts, UA 3-6 /LPF      Methodology Automated Microscopy    Fentanyl, Urine - Straight Cath [[id number]]  (Normal) Collected: 07/13/24 1338    Specimen: Urine from Straight Cath Updated: 07/13/24 1420     Fentanyl, Urine Negative    Narrative:      Negative Threshold:      Fentanyl 5 ng/mL     The normal value for the drug tested is negative. This report includes final  unconfirmed screening results to be used for medical treatment purposes only. Unconfirmed results must not be used for non-medical purposes such as employment or legal testing. Clinical consideration should be applied to any drug of abuse test, particularly when unconfirmed results are used.           Urine Drug Screen - Straight Cath [[id number]]  (Abnormal) Collected: 07/13/24 1338    Specimen: Urine from Straight Cath Updated: 07/13/24 1419     THC, Screen, Urine Negative     Phencyclidine (PCP), Urine Negative     Cocaine Screen, Urine Positive     Methamphetamine, Ur Negative     Opiate Screen Negative     Amphetamine Screen, Urine Negative     Benzodiazepine Screen, Urine Negative     Tricyclic Antidepressants Screen Positive     Methadone Screen, Urine Negative     Barbiturates Screen, Urine Negative     Oxycodone Screen, Urine Negative     Buprenorphine, Screen, Urine Negative    Narrative:      Cutoff For Drugs Screened:    Amphetamines               500 ng/ml  Barbiturates               200 ng/ml  Benzodiazepines            150 ng/ml  Cocaine                    150 ng/ml  Methadone                  200 ng/ml  Opiates                    100 ng/ml  Phencyclidine               25 ng/ml  THC                         50 ng/ml  Methamphetamine            500 ng/ml  Tricyclic Antidepressants  300 ng/ml  Oxycodone                  100 ng/ml  Buprenorphine               10 ng/ml    The normal value for all drugs tested is negative. This report includes unconfirmed screening results, with the cutoff values listed, to be used for medical treatment purposes only.  Unconfirmed results must not be used for non-medical purposes such as employment or legal testing.  Clinical consideration should be applied to any drug of abuse test, particularly when unconfirmed results are used.      POC Glucose Once [[id number]]  (Normal) Collected: 07/13/24 1407    Specimen: Blood Updated: 07/13/24 1418     Glucose 110 mg/dL      Comment:  : [id number] Gio Alberto ID: [id number]       Comprehensive Metabolic Panel [[id number]]  (Abnormal) Collected: 07/13/24 1323    Specimen: Blood Updated: 07/13/24 1351     Glucose 198 mg/dL      BUN 18 mg/dL      Creatinine 0.71 mg/dL      Sodium 140 mmol/L      Potassium 3.3 mmol/L      Chloride 102 mmol/L      CO2 28.0 mmol/L      Calcium 9.6 mg/dL      Total Protein 7.2 g/dL      Albumin 4.1 g/dL      ALT (SGPT) 25 U/L      AST (SGOT) 30 U/L      Alkaline Phosphatase 150 U/L      Total Bilirubin 0.9 mg/dL      Globulin 3.1 gm/dL      A/G Ratio 1.3 g/dL      BUN/Creatinine Ratio 25.4     Anion Gap 10.0 mmol/L      eGFR 103.7 mL/min/1.73     Narrative:      GFR Normal >60  Chronic Kidney Disease <60  Kidney Failure <15      Magnesium [[id number]]  (Normal) Collected: 07/13/24 1323    Specimen: Blood Updated: 07/13/24 1351     Magnesium 2.1 mg/dL     Lipase [[id number]]  (Normal) Collected: 07/13/24 1323    Specimen: Blood Updated: 07/13/24 1351     Lipase 21 U/L     CBC & Differential [[id number]]  (Abnormal) Collected: 07/13/24 1323    Specimen: Blood Updated: 07/13/24 1332    Narrative:      The following orders were created for panel order CBC & Differential.  Procedure                               Abnormality         Status                     ---------                               -----------         ------                     CBC Auto Differential[[id number]]        Abnormal            Final result                 Please view results for these tests on the individual orders.    CBC Auto Differential [[id number]]  (Abnormal) Collected: 07/13/24 1323    Specimen: Blood Updated: 07/13/24 1332     WBC 13.81 10*3/mm3      RBC 4.81 10*6/mm3      Hemoglobin 15.2 g/dL      Hematocrit 44.5 %      MCV 92.5 fL      MCH 31.6 pg      MCHC 34.2 g/dL      RDW 12.3 %      RDW-SD 42.0 fl      MPV 10.3 fL      Platelets 199 10*3/mm3      Neutrophil % 84.1 %      Lymphocyte % 7.2 %      Monocyte % 7.3 %       Eosinophil % 0.5 %      Basophil % 0.4 %      Immature Grans % 0.5 %      Neutrophils, Absolute 11.60 10*3/mm3      Lymphocytes, Absolute 1.00 10*3/mm3      Monocytes, Absolute 1.01 10*3/mm3      Eosinophils, Absolute 0.07 10*3/mm3      Basophils, Absolute 0.06 10*3/mm3      Immature Grans, Absolute 0.07 10*3/mm3      nRBC 0.0 /100 WBC     POC Glucose Once [[id number]]  (Normal) Collected: 07/13/24 1318    Specimen: Blood Updated: 07/13/24 1328     Glucose 110 mg/dL      Comment: : [id number] Gio Alberto ID: [id number]       POC Glucose Once [[id number]]  (Abnormal) Collected: 07/13/24 1248    Specimen: Blood Updated: 07/13/24 1259     Glucose 48 mg/dL      Comment: : [id number] James FlemingMeter ID: [id number]             Imaging Results (Last 24 Hours)       Procedure Component Value Units Date/Time    CT Head Without Contrast [[id number]] Collected: 07/13/24 1405     Updated: 07/13/24 1421    Narrative:      EXAM: CT HEAD WO CONTRAST-, CT CERVICAL SPINE WO CONTRAST-      DATE: 7/13/2024 12:45 PM     HISTORY: possible head trauma AMS       COMPARISON: None available.     DOSE LENGTH PRODUCT: 1411.45 mGy.cm  Automated exposure control was also  utilized to decrease patient radiation dose.     CT HEAD:     TECHNIQUE: Unenhanced CT images obtained from vertex to skull base with  multiplanar reformats.     FINDINGS:     There are chronic changes with volume loss and chronic ischemic change  of the periventricular white matter. No acute intracranial hemorrhage is  identified. No mass effect or midline shift or hydrocephalus is seen.  There is no evidence for infarct.     The visualized paranasal sinuses and mastoid air cells are clear. Scalp  soft tissues are unremarkable. Visualized orbits and globes are  unremarkable.          Impression:      1. Chronic changes and no acute intracranial abnormality.           CT CERVICAL SPINE:     TECHNIQUE: Unenhanced CT images of the cervical spine were obtained  with  multiplanar reformats.     FINDINGS:     There is degenerative change at C1-C2. Anterior and interbody partial  osseous fusion noted at C3-C4. There is fusion of the facet joints at  C3-C4 and fusion of the left facet joint at C2-C3. There is multilevel  cervical spine spondylosis with spurring of the endplates at the C4-C5,  C5-C6, and C6-C7 levels. Prevertebral soft tissues are normal. No acute  fracture or spondylolisthesis is seen.     The visualized soft tissues of the neck are unremarkable. There is  bilateral carotid calcification.     Visualized lung apices are clear.     IMPRESSION:     Postoperative and chronic degenerative changes with no visualized  fracture or spondylolisthesis.     This report was signed and finalized on 7/13/2024 2:18 PM by Dimitri Mike.       CT Cervical Spine Without Contrast [[id number]] Collected: 07/13/24 1405     Updated: 07/13/24 1421    Narrative:      EXAM: CT HEAD WO CONTRAST-, CT CERVICAL SPINE WO CONTRAST-      DATE: 7/13/2024 12:45 PM     HISTORY: possible head trauma AMS       COMPARISON: None available.     DOSE LENGTH PRODUCT: 1411.45 mGy.cm  Automated exposure control was also  utilized to decrease patient radiation dose.     CT HEAD:     TECHNIQUE: Unenhanced CT images obtained from vertex to skull base with  multiplanar reformats.     FINDINGS:     There are chronic changes with volume loss and chronic ischemic change  of the periventricular white matter. No acute intracranial hemorrhage is  identified. No mass effect or midline shift or hydrocephalus is seen.  There is no evidence for infarct.     The visualized paranasal sinuses and mastoid air cells are clear. Scalp  soft tissues are unremarkable. Visualized orbits and globes are  unremarkable.          Impression:      1. Chronic changes and no acute intracranial abnormality.           CT CERVICAL SPINE:     TECHNIQUE: Unenhanced CT images of the cervical spine were obtained with  multiplanar  reformats.     FINDINGS:     There is degenerative change at C1-C2. Anterior and interbody partial  osseous fusion noted at C3-C4. There is fusion of the facet joints at  C3-C4 and fusion of the left facet joint at C2-C3. There is multilevel  cervical spine spondylosis with spurring of the endplates at the C4-C5,  C5-C6, and C6-C7 levels. Prevertebral soft tissues are normal. No acute  fracture or spondylolisthesis is seen.     The visualized soft tissues of the neck are unremarkable. There is  bilateral carotid calcification.     Visualized lung apices are clear.     IMPRESSION:     Postoperative and chronic degenerative changes with no visualized  fracture or spondylolisthesis.     This report was signed and finalized on 7/13/2024 2:18 PM by Dimitri Mike.       XR Chest 1 View [[id number]] Collected: 07/13/24 1354     Updated: 07/13/24 1358    Narrative:      EXAM: XR CHEST 1 VW-      DATE: 7/13/2024 12:21 PM     HISTORY: AMS       COMPARISON: 11/13/2014.     TECHNIQUE:  Frontal view(s) of the chest submitted.     FINDINGS:    Lungs are grossly clear. No effusion or pneumothorax is seen. There is  borderline enlargement of the cardiac silhouette. The patient is status  post median sternotomy. Superior median sternotomy wires are fractured  but there is no migration. Dorsal column stimulator leads project at the  lower thoracic spine.          Impression:         1. Postoperative chest with no active disease identified.     This report was signed and finalized on 7/13/2024 1:55 PM by Dimitri Mike.             I have personally reviewed and interpreted the radiology studies and ECG obtained at time of admission.     Assessment / Plan   Assessment:   Active Hospital Problems    Diagnosis     **Hypoglycemia        Treatment Plan  The patient will be admitted to my service here at [facility].  1.  Hypoglycemic episode  2.  History of diabetes mellitus type 2  3.  Suspected seizure activity  secondary to hypoglycemia  4.  Cocaine intoxication      The patient will be admitted to the floor, he will be placed on glycemic management, patient will be started on D5 half-normal saline, will hold oral hypoglycemic at this time.    Medical Decision Making  Number and Complexity of problems: 2  Differential Diagnosis: none    Conditions and Status        Condition is improving.     [facility] Data  External documents reviewed: n/a  Cardiac tracing (EKG, telemetry) interpretation: sinus  Radiology interpretation: ct head   Labs reviewed: yes  Any tests that were considered but not ordered: none     Decision rules/scores evaluated (example [id number], [place], etc):     Discussed with: patient , nursing staff      Care Planning  Shared decision making: patient  Code status and discussions: full    Disposition  Social Determinants of Health that impact treatment or disposition: none  Estimated length of stay is 1 day.     I confirmed that the patient's advanced care plan is present, code status is documented, and a surrogate decision maker is listed in the patient's medical record.     The patient's surrogate decision maker is the patient.     The patient was seen and examined by me on 7/13/2024 at 1615.    Electronically signed by Ha Dominguez MD, 07/13/24, 16:36 CDT.

## 2024-07-13 NOTE — ED NOTES
The following fall interventions were initiated:    [x] Patient and/or family given education   [x] Call light within reach and educated on how to use   [x] Bed rails up per protocol    [x] Bed locked and in the lowest position   [x] Bed alarm set and on loudest setting   [x] Fall wrist band applied   [] Non skid footwear applied   [x] Room free of clutter   [x] Patient items within reach   [x] Adequate lighting provided  [] Falls sign present    [x] Patient moved closer to nursing station   [] Restraints applied

## 2024-07-13 NOTE — ED NOTES
Nursing report ED to floor  Ld rGeen  63 y.o.  male    HPI:   Chief Complaint   Patient presents with    Hypoglycemia       Admitting doctor:   Ha Dominguez MD    Consulting provider(s):  Consults       No orders found from 6/14/2024 to 7/14/2024.             Admitting diagnosis:   The primary encounter diagnosis was Seizure. A diagnosis of Hypoglycemia was also pertinent to this visit.    Code status:   Current Code Status       Date Active Code Status Order ID Comments User Context       7/13/2024 1607 CPR (Attempt to Resuscitate) [id number]  Ha Dominguez MD ED        Question Answer    Code Status (Patient has no pulse and is not breathing) CPR (Attempt to Resuscitate)    Medical Interventions (Patient has pulse or is breathing) Full Support    Level Of Support Discussed With Patient                    Allergies:   Codeine    Intake and Output  No intake or output data in the 24 hours ending 07/13/24 1626    Weight:       07/13/24  1252   Weight: 122 kg (268 lb)       Most recent vitals:   Vitals:    07/13/24 1609 07/13/24 1613 07/13/24 1615 07/13/24 1616   BP:   158/95 153/90   Pulse: 85  88 88   Resp:       Temp:  97.8 °F (36.6 °C)     TempSrc:  Oral     SpO2: 94%  97% 96%   Weight:       Height:         Oxygen Therapy: .    Active LDAs/IV Access:   Lines, Drains & Airways       Active LDAs       Name Placement date Placement time Site Days    Peripheral IV 07/13/24 1256 Anterior;Distal;Left;Upper Arm 07/13/24  1256  Arm  less than 1    Peripheral IV 07/13/24 1334 Anterior;Right Forearm 07/13/24  1334  Forearm  less than 1                    Labs (abnormal labs have a star):   Labs Reviewed   COMPREHENSIVE METABOLIC PANEL - Abnormal; Notable for the following components:       Result Value    Glucose 198 (*)     Creatinine 0.71 (*)     Potassium 3.3 (*)     Alkaline Phosphatase 150 (*)     BUN/Creatinine Ratio 25.4 (*)     All other components within normal limits    Narrative:     GFR Normal  >60  Chronic Kidney Disease <60  Kidney Failure <15     URINALYSIS W/ CULTURE IF INDICATED - Abnormal; Notable for the following components:    Color, UA Dark Yellow (*)     Glucose, UA >=1000 mg/dL (3+) (*)     Ketones, UA 15 mg/dL (1+) (*)     Protein,  mg/dL (2+) (*)     All other components within normal limits    Narrative:     In absence of clinical symptoms, the presence of pyuria, bacteria, and/or nitrites on the urinalysis result does not correlate with infection.   URINE DRUG SCREEN - Abnormal; Notable for the following components:    Cocaine Screen, Urine Positive (*)     Tricyclic Antidepressants Screen Positive (*)     All other components within normal limits    Narrative:     Cutoff For Drugs Screened:    Amphetamines               500 ng/ml  Barbiturates               200 ng/ml  Benzodiazepines            150 ng/ml  Cocaine                    150 ng/ml  Methadone                  200 ng/ml  Opiates                    100 ng/ml  Phencyclidine               25 ng/ml  THC                         50 ng/ml  Methamphetamine            500 ng/ml  Tricyclic Antidepressants  300 ng/ml  Oxycodone                  100 ng/ml  Buprenorphine               10 ng/ml    The normal value for all drugs tested is negative. This report includes unconfirmed screening results, with the cutoff values listed, to be used for medical treatment purposes only.  Unconfirmed results must not be used for non-medical purposes such as employment or legal testing.  Clinical consideration should be applied to any drug of abuse test, particularly when unconfirmed results are used.     CBC WITH AUTO DIFFERENTIAL - Abnormal; Notable for the following components:    WBC 13.81 (*)     Neutrophil % 84.1 (*)     Lymphocyte % 7.2 (*)     Neutrophils, Absolute 11.60 (*)     Monocytes, Absolute 1.01 (*)     Immature Grans, Absolute 0.07 (*)     All other components within normal limits   POCT GLUCOSE FINGERSTICK - Abnormal; Notable for the  following components:    Glucose 48 (*)     All other components within normal limits   MAGNESIUM - Normal   LIPASE - Normal   FENTANYL, URINE - Normal    Narrative:     Negative Threshold:      Fentanyl 5 ng/mL     The normal value for the drug tested is negative. This report includes final unconfirmed screening results to be used for medical treatment purposes only. Unconfirmed results must not be used for non-medical purposes such as employment or legal testing. Clinical consideration should be applied to any drug of abuse test, particularly when unconfirmed results are used.          POCT GLUCOSE FINGERSTICK - Normal   POCT GLUCOSE FINGERSTICK - Normal   POCT GLUCOSE FINGERSTICK - Normal   POCT GLUCOSE FINGERSTICK - Normal   URINALYSIS, MICROSCOPIC ONLY   POCT GLUCOSE FINGERSTICK   POCT GLUCOSE FINGERSTICK   POCT GLUCOSE FINGERSTICK   CBC AND DIFFERENTIAL    Narrative:     The following orders were created for panel order CBC & Differential.  Procedure                               Abnormality         Status                     ---------                               -----------         ------                     CBC Auto Differential[[id number]]        Abnormal            Final result                 Please view results for these tests on the individual orders.       Meds given in ED:   Medications   potassium chloride (KLOR-CON) packet 40 mEq (has no administration in time range)   dextrose (D50W) (25 g/50 mL) IV injection 25 g (25 g Intravenous Given 7/13/24 1259)           NIH Stroke Scale:       Isolation/Infection(s):  No active isolations   No active infections     COVID Testing  Collected .  Resulted .    Nursing report ED to floor:  Mental status: a&ox4  Ambulatory status: fall risk precautions.   Precautions: fall risk    ED nurse phone extentsiph- [phone]

## 2024-07-14 VITALS
HEART RATE: 93 BPM | OXYGEN SATURATION: 94 % | HEIGHT: 70 IN | WEIGHT: 244.8 LBS | RESPIRATION RATE: 16 BRPM | SYSTOLIC BLOOD PRESSURE: 117 MMHG | DIASTOLIC BLOOD PRESSURE: 50 MMHG | BODY MASS INDEX: 35.05 KG/M2 | TEMPERATURE: 97.8 F

## 2024-07-14 PROBLEM — E16.2 HYPOGLYCEMIA: Status: RESOLVED | Noted: 2024-07-13 | Resolved: 2024-07-14

## 2024-07-14 LAB
GLUCOSE BLDC GLUCOMTR-MCNC: 110 MG/DL (ref 70–130)
GLUCOSE BLDC GLUCOMTR-MCNC: 131 MG/DL (ref 70–130)
GLUCOSE BLDC GLUCOMTR-MCNC: 141 MG/DL (ref 70–130)
POTASSIUM SERPL-SCNC: 3.9 MMOL/L (ref 3.5–5.2)

## 2024-07-14 PROCEDURE — 36415 COLL VENOUS BLD VENIPUNCTURE: CPT | Performed by: INTERNAL MEDICINE

## 2024-07-14 PROCEDURE — 82948 REAGENT STRIP/BLOOD GLUCOSE: CPT

## 2024-07-14 PROCEDURE — 84132 ASSAY OF SERUM POTASSIUM: CPT | Performed by: INTERNAL MEDICINE

## 2024-07-14 RX ORDER — LISINOPRIL 40 MG/1
40 TABLET ORAL DAILY
COMMUNITY

## 2024-07-14 RX ORDER — AMLODIPINE BESYLATE 10 MG/1
10 TABLET ORAL NIGHTLY
COMMUNITY

## 2024-07-14 RX ORDER — ESZOPICLONE 3 MG/1
3 TABLET, FILM COATED ORAL NIGHTLY
COMMUNITY

## 2024-07-14 RX ORDER — FUROSEMIDE 40 MG/1
40 TABLET ORAL NIGHTLY
COMMUNITY

## 2024-07-14 RX ORDER — SEMAGLUTIDE 2.68 MG/ML
1 INJECTION, SOLUTION SUBCUTANEOUS WEEKLY
COMMUNITY

## 2024-07-14 RX ADMIN — Medication 10 ML: at 08:37

## 2024-07-14 NOTE — PLAN OF CARE
Goal Outcome Evaluation:  Plan of Care Reviewed With: patient        Progress: improving     Pt a/o x4. Pt is back to baseline. Room air. No c/o pain. Lovenox for VTE. Last BM 7/11/2024. LANA GOINS. PPP. Pt is to d/c home today. Call light within reach. Safety maintained.

## 2024-07-14 NOTE — DISCHARGE SUMMARY
[facility] Medicine Services  DISCHARGE SUMMARY       Date of Admission: 7/13/2024  Date of Discharge:  7/14/2024  Primary Care Physician: Praveen Garza MD    Presenting Problem/History of Present Illness:  hypoglycemia    Final Discharge Diagnoses:  There are no active hospital problems to display for this patient.      Consults: none    Procedures Performed: none    Pertinent Test Results:       Imaging Results (All)       Procedure Component Value Units Date/Time    CT Head Without Contrast [[id number]] Collected: 07/13/24 1405     Updated: 07/13/24 1421    Narrative:      EXAM: CT HEAD WO CONTRAST-, CT CERVICAL SPINE WO CONTRAST-      DATE: 7/13/2024 12:45 PM     HISTORY: possible head trauma AMS       COMPARISON: None available.     DOSE LENGTH PRODUCT: 1411.45 mGy.cm  Automated exposure control was also  utilized to decrease patient radiation dose.     CT HEAD:     TECHNIQUE: Unenhanced CT images obtained from vertex to skull base with  multiplanar reformats.     FINDINGS:     There are chronic changes with volume loss and chronic ischemic change  of the periventricular white matter. No acute intracranial hemorrhage is  identified. No mass effect or midline shift or hydrocephalus is seen.  There is no evidence for infarct.     The visualized paranasal sinuses and mastoid air cells are clear. Scalp  soft tissues are unremarkable. Visualized orbits and globes are  unremarkable.          Impression:      1. Chronic changes and no acute intracranial abnormality.           CT CERVICAL SPINE:     TECHNIQUE: Unenhanced CT images of the cervical spine were obtained with  multiplanar reformats.     FINDINGS:     There is degenerative change at C1-C2. Anterior and interbody partial  osseous fusion noted at C3-C4. There is fusion of the facet joints at  C3-C4 and fusion of the left facet joint at C2-C3. There is multilevel  cervical spine spondylosis with spurring of the  endplates at the C4-C5,  C5-C6, and C6-C7 levels. Prevertebral soft tissues are normal. No acute  fracture or spondylolisthesis is seen.     The visualized soft tissues of the neck are unremarkable. There is  bilateral carotid calcification.     Visualized lung apices are clear.     IMPRESSION:     Postoperative and chronic degenerative changes with no visualized  fracture or spondylolisthesis.     This report was signed and finalized on 7/13/2024 2:18 PM by Dimitri Mike.       CT Cervical Spine Without Contrast [[id number]] Collected: 07/13/24 1405     Updated: 07/13/24 1421    Narrative:      EXAM: CT HEAD WO CONTRAST-, CT CERVICAL SPINE WO CONTRAST-      DATE: 7/13/2024 12:45 PM     HISTORY: possible head trauma AMS       COMPARISON: None available.     DOSE LENGTH PRODUCT: 1411.45 mGy.cm  Automated exposure control was also  utilized to decrease patient radiation dose.     CT HEAD:     TECHNIQUE: Unenhanced CT images obtained from vertex to skull base with  multiplanar reformats.     FINDINGS:     There are chronic changes with volume loss and chronic ischemic change  of the periventricular white matter. No acute intracranial hemorrhage is  identified. No mass effect or midline shift or hydrocephalus is seen.  There is no evidence for infarct.     The visualized paranasal sinuses and mastoid air cells are clear. Scalp  soft tissues are unremarkable. Visualized orbits and globes are  unremarkable.          Impression:      1. Chronic changes and no acute intracranial abnormality.           CT CERVICAL SPINE:     TECHNIQUE: Unenhanced CT images of the cervical spine were obtained with  multiplanar reformats.     FINDINGS:     There is degenerative change at C1-C2. Anterior and interbody partial  osseous fusion noted at C3-C4. There is fusion of the facet joints at  C3-C4 and fusion of the left facet joint at C2-C3. There is multilevel  cervical spine spondylosis with spurring of the endplates at the  C4-C5,  C5-C6, and C6-C7 levels. Prevertebral soft tissues are normal. No acute  fracture or spondylolisthesis is seen.     The visualized soft tissues of the neck are unremarkable. There is  bilateral carotid calcification.     Visualized lung apices are clear.     IMPRESSION:     Postoperative and chronic degenerative changes with no visualized  fracture or spondylolisthesis.     This report was signed and finalized on 7/13/2024 2:18 PM by Dimitri Mike.       XR Chest 1 View [[id number]] Collected: 07/13/24 1354     Updated: 07/13/24 1358    Narrative:      EXAM: XR CHEST 1 VW-      DATE: 7/13/2024 12:21 PM     HISTORY: AMS       COMPARISON: 11/13/2014.     TECHNIQUE:  Frontal view(s) of the chest submitted.     FINDINGS:    Lungs are grossly clear. No effusion or pneumothorax is seen. There is  borderline enlargement of the cardiac silhouette. The patient is status  post median sternotomy. Superior median sternotomy wires are fractured  but there is no migration. Dorsal column stimulator leads project at the  lower thoracic spine.          Impression:         1. Postoperative chest with no active disease identified.     This report was signed and finalized on 7/13/2024 1:55 PM by Dimitri Mike.             LAB RESULTS:      Lab 07/13/24  1323   WBC 13.81*   HEMOGLOBIN 15.2   HEMATOCRIT 44.5   PLATELETS 199   NEUTROS ABS 11.60*   IMMATURE GRANS (ABS) 0.07*   LYMPHS ABS 1.00   MONOS ABS 1.01*   EOS ABS 0.07   MCV 92.5         Lab 07/14/24  0439 07/13/24  1323   SODIUM  --  140   POTASSIUM 3.9 3.3*   CHLORIDE  --  102   CO2  --  28.0   ANION GAP  --  10.0   BUN  --  18   CREATININE  --  0.71*   EGFR  --  103.7   GLUCOSE  --  198*   CALCIUM  --  9.6   MAGNESIUM  --  2.1         Lab 07/13/24  1323   TOTAL PROTEIN 7.2   ALBUMIN 4.1   GLOBULIN 3.1   ALT (SGPT) 25   AST (SGOT) 30   BILIRUBIN 0.9   ALK PHOS 150*   LIPASE 21                     Brief Urine Lab Results  (Last result in the past 365 days)         "Color   Clarity   Blood   Leuk Est   Nitrite   Protein   CREAT   Urine HCG        07/13/24 1338 Dark Yellow   Clear   Negative   Negative   Negative   100 mg/dL (2+)                 Microbiology Results (last 10 days)       ** No results found for the last 240 hours. **            Hospital Course: This is a 63-year-old gentleman with past medical history significant for diabetes mellitus type 2 who apparently was found by EMS unresponsive laying in chair after he slumped over apparently has a blood glucose of 30 upon arrival he responded well to D50 apparently had urinary incontinence he had evidence of tongue biting, apparently his glucometer was not working at home, he acknowledged the fact that he did some cocaine yesterday with some of his friends. He will be admitted for further evaluation and management.     His glucose has improved, he was able to tolerate his diet, has no evidence of seizure activity, he will be discharged home today, please follow-up with his primary care physician in 3 to 5 days.      Physical Exam on Discharge:  /50 (BP Location: Right arm, Patient Position: Lying)   Pulse 93   Temp 97.8 °F (36.6 °C) (Oral)   Resp 16   Ht 177.8 cm (70\")   Wt 111 kg (244 lb 12.8 oz)   SpO2 94%   BMI 35.13 kg/m²   Physical Exam    General: Patient is alert, awake, oriented x 3 in no apparent distress at time of examination  HEENT: Normocephalic, atraumatic, pupils are equal reactive to light and accommodate.  Neck: Supple, no JVD, no carotid bruits  CVS: S1, S2, regular rhythm and rate  Lungs: Clear to auscultation bilaterally  Abdomen: Soft, nontender, nondistended bowel sounds are present  Extremities: No cyanosis, no clubbing, no edema pulses are intact  Neurologic: No focal deficits    Condition on Discharge: stable    Discharge Disposition:  Home or Self Care    Discharge Medications:     Discharge Medications        Continue These Medications        Instructions Start Date   amLODIPine 10 " MG tablet  Commonly known as: NORVASC   10 mg, Oral, Nightly      atorvastatin 40 MG tablet  Commonly known as: LIPITOR   1 tablet, Oral, Nightly      clopidogrel 75 MG tablet  Commonly known as: PLAVIX   Take 1 tablet by mouth Every Night.      cyclobenzaprine 10 MG tablet  Commonly known as: FLEXERIL   10 mg, Oral, 3 Times Daily PRN      eszopiclone 3 MG tablet  Commonly known as: LUNESTA   3 mg, Oral, Nightly, Take immediately before bedtime      FreeStyle Honorio 2 Sensor misc   1 each, Does not apply, Every 14 Days      furosemide 40 MG tablet  Commonly known as: LASIX   40 mg, Oral, Nightly      insulin detemir 100 UNIT/ML injection  Commonly known as: LEVEMIR   40 Units, Subcutaneous, Daily      insulin regular 100 UNIT/ML injection  Commonly known as: humuLIN R,novoLIN R   10 Units, Subcutaneous, 3 Times Daily Before Meals      isosorbide mononitrate 60 MG 24 hr tablet  Commonly known as: IMDUR   Take 1 tablet by mouth Every Night.      lisinopril 40 MG tablet  Commonly known as: PRINIVIL,ZESTRIL   40 mg, Oral, Daily      metoprolol succinate  MG 24 hr tablet  Commonly known as: TOPROL-XL   Take 1 tablet by mouth Every Night.      nitroglycerin 0.4 MG SL tablet  Commonly known as: NITROSTAT   Place 1 tablet under the tongue Every 5 (Five) Minutes As Needed for Chest Pain.      oxyCODONE-acetaminophen  MG per tablet  Commonly known as: PERCOCET   Take 1 tablet by mouth 3 times daily       Ozempic (2 MG/DOSE) 8 MG/3ML solution pen-injector  Generic drug: Semaglutide (2 MG/DOSE)   1 mg, Subcutaneous, Weekly             ASK your doctor about these medications        Instructions Start Date   Dextromethorphan-buPROPion ER  MG tablet controlled-release  Commonly known as: AUVELITY   1 tablet, Oral, 2 Times Daily      naloxone 4 MG/0.1ML nasal spray  Commonly known as: NARCAN   1 spray, Nasal, As Needed, Call 911. Don't prime. [place] in 1 nostril for overdose. Repeat in 2-3 minutes in other nostril  if no or minimal breathing/responsiveness.                   Discharge Diet:   Diet Instructions       Diet: Diabetic Diets; Consistent Carbohydrate; Regular (IDDSI 7); Thin (IDDSI 0)      Discharge Diet: Diabetic Diets    Diabetic Diet: Consistent Carbohydrate    Texture: Regular (IDDSI 7)    Fluid Consistency: Thin (IDDSI 0)            Activity at Discharge:   Activity Instructions       Activity as Tolerated              Follow-up Appointments:   No future appointments.    Test Results Pending at Discharge: none    Electronically signed by Ha Dominguez MD, 07/14/24, 11:16 CDT.    Time: 30 minutes.

## 2024-07-14 NOTE — PLAN OF CARE
Goal Outcome Evaluation:  Plan of Care Reviewed With: patient        Progress: no change     Alert and oriented x4. IVF. VSS. Safety maintained.

## 2024-07-15 NOTE — PAYOR COMM NOTE
"Ld Green (63 y.o. Male) [id number]   ADMIT 07/13   [facility] [phone]      Date of Birth   1960    Social Security Number       Address   [address] [facility] [address] [facility] [address]    Home Phone   [phone]    MRN   [id number]       [employer]   Other    Marital Status                               Admission Date   7/13/24    Admission Type   Emergency    Admitting Provider   Ha Dominguez MD    Attending Provider       Department, Room/Bed   [facility] 3A, 337/1       Discharge Date   7/14/2024    Discharge Disposition   Home or Self Care    Discharge Destination                                 Attending Provider: (none)   Allergies: Codeine    Isolation: None   Infection: None   Code Status: Prior    Ht: 177.8 cm (70\")   Wt: 111 kg (244 lb 12.8 oz)    Admission Cmt: None   Principal Problem: Hypoglycemia [E16.2]                   Active Insurance as of 7/13/2024       Primary Coverage       Payor Plan Insurance Group Employer/Plan Group    [facility] MEDICARE REPLACEMENT [facility] MED ADV SNP HMO [id number]       Payor Plan Address Payor Plan Phone Number Payor Plan Fax Number Effective Dates    [address] [address]   1/1/2024 - None Entered    [facility] [id number]         Subscriber Name Subscriber Birth Date Member ID       LD GREEN 1960 [id number]               Secondary Coverage       Payor Plan Insurance Group Employer/Plan Group    [employer]        Payor Plan Address Payor Plan Phone Number Payor Plan Fax Number Effective Dates    [address]   10/1/2014 - None Entered    [facility] [id number]         Subscriber Name Subscriber Birth Date Member ID       LD GREEN 1960 [id number]                     Emergency Contacts        (Rel.) Home Phone Work Phone Mobile Phone    Fe Suresh (Other) -- [phone]               "   History & Physical        Ha Dominguez MD at 07/13/24 1635              [facility]  HISTORY AND PHYSICAL    Date of Admission: 7/13/2024  Primary Care Physician: Praveen Garza MD    Subjective   Primary Historian: patient    Chief Complaint: altered mental status    History of Present Illness  This is a 63-year-old gentleman with past medical history significant for diabetes mellitus type 2 who apparently was found by EMS unresponsive laying in chair after he slumped over apparently has a blood glucose of 30 upon arrival he responded well to D50 apparently had urinary incontinence he had evidence of tongue biting, apparently his glucometer was not working at home, he acknowledged the fact that he did some cocaine yesterday with some of his friends.  He will be admitted for further evaluation and management.        Review of Systems   Otherwise complete ROS reviewed and negative except as mentioned in the HPI.    Past Medical History:   Past Medical History:   Diagnosis Date    Anxiety     Atherosclerosis of coronary artery     Bipolar disorder     Bladder cancer     Colon polyp     Coronary artery disease     Depression     Diabetes mellitus     Hemorrhoids     Hyperlipidemia     Hypertension     Nephrolithiasis     Sleep apnea      Past Surgical History:  Past Surgical History:   Procedure Laterality Date    ANGIOPLASTY      with stent placement    BACK SURGERY      BLADDER SURGERY      cancer    CARDIAC CATHETERIZATION      COLONOSCOPY  04/13/2015    COLONOSCOPY N/A 4/30/2018    Procedure: COLONOSCOPY WITH ANESTHESIA;  Surgeon: Gerson Martini MD;  Location: [facility] ENDOSCOPY;  Service: Gastroenterology    COLONOSCOPY N/A 4/8/2021    Procedure: COLONOSCOPY WITH ANESTHESIA;  Surgeon: Gerson Martini MD;  Location: [facility] ENDOSCOPY;  Service: Gastroenterology;  Laterality: N/A;  Pre: Hx Colon Polyps  Post: divertivculosis   Dr. Garza  CO2  Inflation Used    CORONARY ARTERY BYPASS GRAFT      KNEE SURGERY      SHOULDER SURGERY      x 2    SINUS SURGERY       Social History:  reports that he has been smoking cigarettes. He has never used smokeless tobacco. He reports current alcohol use. He reports that he does not use drugs.    Family History: family history is not on file. He was adopted.       Allergies:  Allergies   Allergen Reactions    Codeine Nausea Only       Medications:  Prior to Admission medications    Medication Sig Start Date End Date Taking? Authorizing Provider   Alirocumab (PRALUENT SC) Inject  under the skin into the appropriate area as directed Every 14 (Fourteen) Days.    Rizwan Peralta MD   ALPRAZolam (XANAX) 0.5 MG tablet 3 (three) times a day.    Rizwan Peralta MD   atorvastatin (LIPITOR) 40 MG tablet Take 1 tablet by mouth Daily. 7/19/21   Emergency, Nurse Dee RN   Bisacodyl (LAXATIVE PO) Take  by mouth As Needed (rare).    Rizwan Peralta MD   clopidogrel (PLAVIX) 75 MG tablet TAKE ONE TABLET BY MOUTH EVERY DAY 4/26/16   Rizwan Peralta MD   cyclobenzaprine (FLEXERIL) 10 MG tablet 3 (three) times a day.    Rizwan Peralta MD   Dulaglutide (TRULICITY SC) Inject  under the skin into the appropriate area as directed 1 (One) Time Per Week.    Rizwan Peralta MD   fenofibrate (TRICOR) 145 MG tablet Take 145 mg by mouth Daily.    Rizwan Peralta MD   Glucose Blood (BLOOD GLUCOSE TEST) strip Place 1 each onto the skin daily 5/21/15   Rizwan Peralta MD   glucose monitoring kit (FREESTYLE) monitoring kit every day. 5/6/15   Rizwan Peralta MD   insulin detemir (LEVEMIR) 100 UNIT/ML injection Inject  under the skin Daily.    Rizwan Peralta MD   insulin regular (NovoLIN R) 100 UNIT/ML injection INJECT 30 UNITS SUBCUTANEOUSLY 4 TIMES DAILY WITH MEALS AND AT BEDTIME 2/19/21   Emergency, Nurse Epic, RN   Insulin Zinc Human (NOVOLIN L SC) Inject  under the skin.    Fabian  "MD Rizwan   isosorbide mononitrate (IMDUR) 60 MG 24 hr tablet Take 1 tablet by mouth daily 8/16/16   Rizwan Peralta MD   Lancets misc Place 1 each onto the skin daily 5/21/15   Rizwan Peralta MD   metFORMIN (GLUCOPHAGE) 1000 MG tablet Take 1 tablet by mouth 2 times daily (with meals) 9/21/16   Rizwan Peralta MD   metoprolol succinate XL (TOPROL XL) 100 MG 24 hr tablet Take 1 tablet by mouth daily 6/9/16   Rizwan Peralta MD   nitroglycerin (NITROSTAT) 0.4 MG SL tablet Place 1 tablet under the tongue every 5 minutes as needed for Chest pain X 3. 7/22/16   Rizwan Peralta MD   ondansetron (ZOFRAN) 4 MG tablet Take 4 mg by mouth As Needed. 5/6/21   Emergency, Nurse Epic, RN   oxyCODONE-acetaminophen (PERCOCET)  MG per tablet Take 1 tablet by mouth 3 times daily  8/5/16   Rizwan Peralta MD   ranolazine (RANEXA) 500 MG 12 hr tablet Take 1 tablet by mouth 2 (two) times a day. 7/19/21   Emergency, Nurse NADIA Price   rosuvastatin (CRESTOR) 20 MG tablet Take 1 tablet by mouth nightly 9/21/16   Rizwan Peralta MD   zolpidem (AMBIEN) 10 MG tablet TAKE 1 TABLET BY MOUTH AT BEDTIME AS NEEDED 7/13/16   Rizwan Peralta MD     I have utilized all available immediate resources to obtain, update, or review the patient's current medications (including all prescriptions, over-the-counter products, herbals, cannabis/cannabidiol products, and vitamin/mineral/dietary (nutritional) supplements).    Objective     Vital Signs: /90   Pulse 88   Temp 97.8 °F (36.6 °C) (Oral)   Resp 19   Ht 175.3 cm (69\")   Wt 122 kg (268 lb)   SpO2 96%   BMI 39.58 kg/m²   Physical Exam   General: Patient is alert, awake, oriented x 3 in no apparent distress at time of examination  HEENT: Normocephalic, atraumatic, pupils are equal reactive to light and accommodate.  Neck: Supple, no JVD, no carotid bruits  CVS: S1, S2, regular rhythm and rate  Lungs: Clear to auscultation " bilaterally  Abdomen: Soft, nontender, nondistended bowel sounds are present  Extremities: No cyanosis, no clubbing, no edema pulses are intact  Neurologic: No focal deficits      Results Reviewed:  Lab Results (last 24 hours)       Procedure Component Value Units Date/Time    POC Glucose Once [[id number]]  (Normal) Collected: 07/13/24 1613    Specimen: Blood Updated: 07/13/24 1624     Glucose 80 mg/dL      Comment: : [id number] Gio DesireeMeter ID: [id number]       POC Glucose Once [[id number]]  (Normal) Collected: 07/13/24 1526    Specimen: Blood Updated: 07/13/24 1538     Glucose 83 mg/dL      Comment: : [id number] Sullenger DesireeMeter ID: [id number]       Urinalysis With Culture If Indicated - Straight Cath [[id number]]  (Abnormal) Collected: 07/13/24 1338    Specimen: Urine from Straight Cath Updated: 07/13/24 1427     Color, UA Dark Yellow     Appearance, UA Clear     pH, UA 6.0     Specific Gravity, UA 1.022     Glucose, UA >=1000 mg/dL (3+)     Ketones, UA 15 mg/dL (1+)     Bilirubin, UA Negative     Blood, UA Negative     Protein,  mg/dL (2+)     Leuk Esterase, UA Negative     Nitrite, UA Negative     Urobilinogen, UA 1.0 E.U./dL    Narrative:      In absence of clinical symptoms, the presence of pyuria, bacteria, and/or nitrites on the urinalysis result does not correlate with infection.    Urinalysis, Microscopic Only - Straight Cath [[id number]] Collected: 07/13/24 1338    Specimen: Urine from Straight Cath Updated: 07/13/24 1427     RBC, UA 0-2 /HPF      WBC, UA 0-2 /HPF      Comment: Urine culture not indicated.        Bacteria, UA None Seen /HPF      Squamous Epithelial Cells, UA 0-2 /HPF      Hyaline Casts, UA 3-6 /LPF      Methodology Automated Microscopy    Fentanyl, Urine - Straight Cath [[id number]]  (Normal) Collected: 07/13/24 1338    Specimen: Urine from Straight Cath Updated: 07/13/24 1420     Fentanyl, Urine Negative    Narrative:      Negative Threshold:      Fentanyl 5  ng/mL     The normal value for the drug tested is negative. This report includes final unconfirmed screening results to be used for medical treatment purposes only. Unconfirmed results must not be used for non-medical purposes such as employment or legal testing. Clinical consideration should be applied to any drug of abuse test, particularly when unconfirmed results are used.           Urine Drug Screen - Straight Cath [[id number]]  (Abnormal) Collected: 07/13/24 1338    Specimen: Urine from Straight Cath Updated: 07/13/24 1419     THC, Screen, Urine Negative     Phencyclidine (PCP), Urine Negative     Cocaine Screen, Urine Positive     Methamphetamine, Ur Negative     Opiate Screen Negative     Amphetamine Screen, Urine Negative     Benzodiazepine Screen, Urine Negative     Tricyclic Antidepressants Screen Positive     Methadone Screen, Urine Negative     Barbiturates Screen, Urine Negative     Oxycodone Screen, Urine Negative     Buprenorphine, Screen, Urine Negative    Narrative:      Cutoff For Drugs Screened:    Amphetamines               500 ng/ml  Barbiturates               200 ng/ml  Benzodiazepines            150 ng/ml  Cocaine                    150 ng/ml  Methadone                  200 ng/ml  Opiates                    100 ng/ml  Phencyclidine               25 ng/ml  THC                         50 ng/ml  Methamphetamine            500 ng/ml  Tricyclic Antidepressants  300 ng/ml  Oxycodone                  100 ng/ml  Buprenorphine               10 ng/ml    The normal value for all drugs tested is negative. This report includes unconfirmed screening results, with the cutoff values listed, to be used for medical treatment purposes only.  Unconfirmed results must not be used for non-medical purposes such as employment or legal testing.  Clinical consideration should be applied to any drug of abuse test, particularly when unconfirmed results are used.      POC Glucose Once [[id number]]  (Normal) Collected:  07/13/24 1407    Specimen: Blood Updated: 07/13/24 1418     Glucose 110 mg/dL      Comment: : [id number] Gio Alberto ID: [id number]       Comprehensive Metabolic Panel [[id number]]  (Abnormal) Collected: 07/13/24 1323    Specimen: Blood Updated: 07/13/24 1351     Glucose 198 mg/dL      BUN 18 mg/dL      Creatinine 0.71 mg/dL      Sodium 140 mmol/L      Potassium 3.3 mmol/L      Chloride 102 mmol/L      CO2 28.0 mmol/L      Calcium 9.6 mg/dL      Total Protein 7.2 g/dL      Albumin 4.1 g/dL      ALT (SGPT) 25 U/L      AST (SGOT) 30 U/L      Alkaline Phosphatase 150 U/L      Total Bilirubin 0.9 mg/dL      Globulin 3.1 gm/dL      A/G Ratio 1.3 g/dL      BUN/Creatinine Ratio 25.4     Anion Gap 10.0 mmol/L      eGFR 103.7 mL/min/1.73     Narrative:      GFR Normal >60  Chronic Kidney Disease <60  Kidney Failure <15      Magnesium [[id number]]  (Normal) Collected: 07/13/24 1323    Specimen: Blood Updated: 07/13/24 1351     Magnesium 2.1 mg/dL     Lipase [[id number]]  (Normal) Collected: 07/13/24 1323    Specimen: Blood Updated: 07/13/24 1351     Lipase 21 U/L     CBC & Differential [[id number]]  (Abnormal) Collected: 07/13/24 1323    Specimen: Blood Updated: 07/13/24 1332    Narrative:      The following orders were created for panel order CBC & Differential.  Procedure                               Abnormality         Status                     ---------                               -----------         ------                     CBC Auto Differential[[id number]]        Abnormal            Final result                 Please view results for these tests on the individual orders.    CBC Auto Differential [[id number]]  (Abnormal) Collected: 07/13/24 1323    Specimen: Blood Updated: 07/13/24 1332     WBC 13.81 10*3/mm3      RBC 4.81 10*6/mm3      Hemoglobin 15.2 g/dL      Hematocrit 44.5 %      MCV 92.5 fL      MCH 31.6 pg      MCHC 34.2 g/dL      RDW 12.3 %      RDW-SD 42.0 fl      MPV 10.3 fL      Platelets 199  10*3/mm3      Neutrophil % 84.1 %      Lymphocyte % 7.2 %      Monocyte % 7.3 %      Eosinophil % 0.5 %      Basophil % 0.4 %      Immature Grans % 0.5 %      Neutrophils, Absolute 11.60 10*3/mm3      Lymphocytes, Absolute 1.00 10*3/mm3      Monocytes, Absolute 1.01 10*3/mm3      Eosinophils, Absolute 0.07 10*3/mm3      Basophils, Absolute 0.06 10*3/mm3      Immature Grans, Absolute 0.07 10*3/mm3      nRBC 0.0 /100 WBC     POC Glucose Once [[id number]]  (Normal) Collected: 07/13/24 1318    Specimen: Blood Updated: 07/13/24 1328     Glucose 110 mg/dL      Comment: : [id number] Gio Alberto ID: [id number]       POC Glucose Once [[id number]]  (Abnormal) Collected: 07/13/24 1248    Specimen: Blood Updated: 07/13/24 1259     Glucose 48 mg/dL      Comment: : [id number] James FlemingMeter ID: [id number]             Imaging Results (Last 24 Hours)       Procedure Component Value Units Date/Time    CT Head Without Contrast [[id number]] Collected: 07/13/24 1405     Updated: 07/13/24 1421    Narrative:      EXAM: CT HEAD WO CONTRAST-, CT CERVICAL SPINE WO CONTRAST-      DATE: 7/13/2024 12:45 PM     HISTORY: possible head trauma AMS       COMPARISON: None available.     DOSE LENGTH PRODUCT: 1411.45 mGy.cm  Automated exposure control was also  utilized to decrease patient radiation dose.     CT HEAD:     TECHNIQUE: Unenhanced CT images obtained from vertex to skull base with  multiplanar reformats.     FINDINGS:     There are chronic changes with volume loss and chronic ischemic change  of the periventricular white matter. No acute intracranial hemorrhage is  identified. No mass effect or midline shift or hydrocephalus is seen.  There is no evidence for infarct.     The visualized paranasal sinuses and mastoid air cells are clear. Scalp  soft tissues are unremarkable. Visualized orbits and globes are  unremarkable.          Impression:      1. Chronic changes and no acute intracranial abnormality.           CT  CERVICAL SPINE:     TECHNIQUE: Unenhanced CT images of the cervical spine were obtained with  multiplanar reformats.     FINDINGS:     There is degenerative change at C1-C2. Anterior and interbody partial  osseous fusion noted at C3-C4. There is fusion of the facet joints at  C3-C4 and fusion of the left facet joint at C2-C3. There is multilevel  cervical spine spondylosis with spurring of the endplates at the C4-C5,  C5-C6, and C6-C7 levels. Prevertebral soft tissues are normal. No acute  fracture or spondylolisthesis is seen.     The visualized soft tissues of the neck are unremarkable. There is  bilateral carotid calcification.     Visualized lung apices are clear.     IMPRESSION:     Postoperative and chronic degenerative changes with no visualized  fracture or spondylolisthesis.     This report was signed and finalized on 7/13/2024 2:18 PM by Dimitri Mike.       CT Cervical Spine Without Contrast [[id number]] Collected: 07/13/24 1405     Updated: 07/13/24 1421    Narrative:      EXAM: CT HEAD WO CONTRAST-, CT CERVICAL SPINE WO CONTRAST-      DATE: 7/13/2024 12:45 PM     HISTORY: possible head trauma AMS       COMPARISON: None available.     DOSE LENGTH PRODUCT: 1411.45 mGy.cm  Automated exposure control was also  utilized to decrease patient radiation dose.     CT HEAD:     TECHNIQUE: Unenhanced CT images obtained from vertex to skull base with  multiplanar reformats.     FINDINGS:     There are chronic changes with volume loss and chronic ischemic change  of the periventricular white matter. No acute intracranial hemorrhage is  identified. No mass effect or midline shift or hydrocephalus is seen.  There is no evidence for infarct.     The visualized paranasal sinuses and mastoid air cells are clear. Scalp  soft tissues are unremarkable. Visualized orbits and globes are  unremarkable.          Impression:      1. Chronic changes and no acute intracranial abnormality.           CT CERVICAL SPINE:      TECHNIQUE: Unenhanced CT images of the cervical spine were obtained with  multiplanar reformats.     FINDINGS:     There is degenerative change at C1-C2. Anterior and interbody partial  osseous fusion noted at C3-C4. There is fusion of the facet joints at  C3-C4 and fusion of the left facet joint at C2-C3. There is multilevel  cervical spine spondylosis with spurring of the endplates at the C4-C5,  C5-C6, and C6-C7 levels. Prevertebral soft tissues are normal. No acute  fracture or spondylolisthesis is seen.     The visualized soft tissues of the neck are unremarkable. There is  bilateral carotid calcification.     Visualized lung apices are clear.     IMPRESSION:     Postoperative and chronic degenerative changes with no visualized  fracture or spondylolisthesis.     This report was signed and finalized on 7/13/2024 2:18 PM by Dimitri Mike.       XR Chest 1 View [[id number]] Collected: 07/13/24 1354     Updated: 07/13/24 1358    Narrative:      EXAM: XR CHEST 1 VW-      DATE: 7/13/2024 12:21 PM     HISTORY: AMS       COMPARISON: 11/13/2014.     TECHNIQUE:  Frontal view(s) of the chest submitted.     FINDINGS:    Lungs are grossly clear. No effusion or pneumothorax is seen. There is  borderline enlargement of the cardiac silhouette. The patient is status  post median sternotomy. Superior median sternotomy wires are fractured  but there is no migration. Dorsal column stimulator leads project at the  lower thoracic spine.          Impression:         1. Postoperative chest with no active disease identified.     This report was signed and finalized on 7/13/2024 1:55 PM by Dimitri Mike.             I have personally reviewed and interpreted the radiology studies and ECG obtained at time of admission.     Assessment / Plan   Assessment:   Active Hospital Problems    Diagnosis     **Hypoglycemia        Treatment Plan  The patient will be admitted to my service here at [facility].  1.  Hypoglycemic  episode  2.  History of diabetes mellitus type 2  3.  Suspected seizure activity secondary to hypoglycemia  4.  Cocaine intoxication      The patient will be admitted to the floor, he will be placed on glycemic management, patient will be started on D5 half-normal saline, will hold oral hypoglycemic at this time.    Medical Decision Making  Number and Complexity of problems: 2  Differential Diagnosis: none    Conditions and Status        Condition is improving.     MDM Data  External documents reviewed: n/a  Cardiac tracing (EKG, telemetry) interpretation: sinus  Radiology interpretation: ct head   Labs reviewed: yes  Any tests that were considered but not ordered: none     Decision rules/scores evaluated (example [id number], Wells, etc):     Discussed with: patient , nursing staff      Care Planning  Shared decision making: patient  Code status and discussions: full    Disposition  Social Determinants of Health that impact treatment or disposition: none  Estimated length of stay is 1 day.     I confirmed that the patient's advanced care plan is present, code status is documented, and a surrogate decision maker is listed in the patient's medical record.     The patient's surrogate decision maker is the patient.     The patient was seen and examined by me on 7/13/2024 at 1615.    Electronically signed by Ha Dominguez MD, 07/13/24, 16:36 CDT.              Electronically signed by Ha Dominguez MD at 07/13/24 1659          Emergency Department Notes        Marizol Powers, RN at 07/13/24 1626          Nursing report ED to floor  Ld Green  63 y.o.  male    HPI:   Chief Complaint   Patient presents with    Hypoglycemia       Admitting doctor:   Ha Dominguez MD    Consulting provider(s):  Consults       No orders found from 6/14/2024 to 7/14/2024.             Admitting diagnosis:   The primary encounter diagnosis was Seizure. A diagnosis of Hypoglycemia was also pertinent to this visit.    Code  status:   Current Code Status       Date Active Code Status Order ID Comments User Context       7/13/2024 1607 CPR (Attempt to Resuscitate) [id number]  Ha Dominguez MD ED        Question Answer    Code Status (Patient has no pulse and is not breathing) CPR (Attempt to Resuscitate)    Medical Interventions (Patient has pulse or is breathing) Full Support    Level Of Support Discussed With Patient                    Allergies:   Codeine    Intake and Output  No intake or output data in the 24 hours ending 07/13/24 1626    Weight:       07/13/24  1252   Weight: 122 kg (268 lb)       Most recent vitals:   Vitals:    07/13/24 1609 07/13/24 1613 07/13/24 1615 07/13/24 1616   BP:   158/95 153/90   Pulse: 85  88 88   Resp:       Temp:  97.8 °F (36.6 °C)     TempSrc:  Oral     SpO2: 94%  97% 96%   Weight:       Height:         Oxygen Therapy: .    Active LDAs/IV Access:   Lines, Drains & Airways       Active LDAs       Name Placement date Placement time Site Days    Peripheral IV 07/13/24 1256 Anterior;Distal;Left;Upper Arm 07/13/24  1256  Arm  less than 1    Peripheral IV 07/13/24 1334 Anterior;Right Forearm 07/13/24  1334  Forearm  less than 1                    Labs (abnormal labs have a star):   Labs Reviewed   COMPREHENSIVE METABOLIC PANEL - Abnormal; Notable for the following components:       Result Value    Glucose 198 (*)     Creatinine 0.71 (*)     Potassium 3.3 (*)     Alkaline Phosphatase 150 (*)     BUN/Creatinine Ratio 25.4 (*)     All other components within normal limits    Narrative:     GFR Normal >60  Chronic Kidney Disease <60  Kidney Failure <15     URINALYSIS W/ CULTURE IF INDICATED - Abnormal; Notable for the following components:    Color, UA Dark Yellow (*)     Glucose, UA >=1000 mg/dL (3+) (*)     Ketones, UA 15 mg/dL (1+) (*)     Protein,  mg/dL (2+) (*)     All other components within normal limits    Narrative:     In absence of clinical symptoms, the presence of pyuria, bacteria,  and/or nitrites on the urinalysis result does not correlate with infection.   URINE DRUG SCREEN - Abnormal; Notable for the following components:    Cocaine Screen, Urine Positive (*)     Tricyclic Antidepressants Screen Positive (*)     All other components within normal limits    Narrative:     Cutoff For Drugs Screened:    Amphetamines               500 ng/ml  Barbiturates               200 ng/ml  Benzodiazepines            150 ng/ml  Cocaine                    150 ng/ml  Methadone                  200 ng/ml  Opiates                    100 ng/ml  Phencyclidine               25 ng/ml  THC                         50 ng/ml  Methamphetamine            500 ng/ml  Tricyclic Antidepressants  300 ng/ml  Oxycodone                  100 ng/ml  Buprenorphine               10 ng/ml    The normal value for all drugs tested is negative. This report includes unconfirmed screening results, with the cutoff values listed, to be used for medical treatment purposes only.  Unconfirmed results must not be used for non-medical purposes such as employment or legal testing.  Clinical consideration should be applied to any drug of abuse test, particularly when unconfirmed results are used.     CBC WITH AUTO DIFFERENTIAL - Abnormal; Notable for the following components:    WBC 13.81 (*)     Neutrophil % 84.1 (*)     Lymphocyte % 7.2 (*)     Neutrophils, Absolute 11.60 (*)     Monocytes, Absolute 1.01 (*)     Immature Grans, Absolute 0.07 (*)     All other components within normal limits   POCT GLUCOSE FINGERSTICK - Abnormal; Notable for the following components:    Glucose 48 (*)     All other components within normal limits   MAGNESIUM - Normal   LIPASE - Normal   FENTANYL, URINE - Normal    Narrative:     Negative Threshold:      Fentanyl 5 ng/mL     The normal value for the drug tested is negative. This report includes final unconfirmed screening results to be used for medical treatment purposes only. Unconfirmed results must not be used  for non-medical purposes such as employment or legal testing. Clinical consideration should be applied to any drug of abuse test, particularly when unconfirmed results are used.          POCT GLUCOSE FINGERSTICK - Normal   POCT GLUCOSE FINGERSTICK - Normal   POCT GLUCOSE FINGERSTICK - Normal   POCT GLUCOSE FINGERSTICK - Normal   URINALYSIS, MICROSCOPIC ONLY   POCT GLUCOSE FINGERSTICK   POCT GLUCOSE FINGERSTICK   POCT GLUCOSE FINGERSTICK   CBC AND DIFFERENTIAL    Narrative:     The following orders were created for panel order CBC & Differential.  Procedure                               Abnormality         Status                     ---------                               -----------         ------                     CBC Auto Differential[[id number]]        Abnormal            Final result                 Please view results for these tests on the individual orders.       Meds given in ED:   Medications   potassium chloride (KLOR-CON) packet 40 mEq (has no administration in time range)   dextrose (D50W) (25 g/50 mL) IV injection 25 g (25 g Intravenous Given 7/13/24 1259)           NIH Stroke Scale:       Isolation/Infection(s):  No active isolations   No active infections     COVID Testing  Collected .  Resulted .    Nursing report ED to floor:  Mental status: a&ox4  Ambulatory status: fall risk precautions.   Precautions: fall risk    ED nurse phone extentsity- [phone]    Electronically signed by Marizol Powers RN at 07/13/24 6788       Imer Bermudez MD at 07/13/24 1303          Subjective   History of Present Illness  Patient presents due to altered mental status.  Somebody called EMS after finding him unresponsive laying in chair slumped over.  EMS had a blood sugar in the 30s on arrival to the administered oral glucose and brought him here.  Hypoglycemic on arrival here, but responds well to D50.  He has evidence of urinary incontinence and tongue biting, was not aware of either.  Denies any known  head trauma.  Says that he usually uses a Dexcom and his Dexcom is not working and he has been using his insulin.  Denies other focal symptoms at this time; he is still somnolent and review of systems is limited.    Review of Systems   Constitutional:  Negative for fever.   Respiratory:  Negative for shortness of breath.    Cardiovascular:  Negative for chest pain.   Gastrointestinal:  Negative for abdominal pain and vomiting.       Past Medical History:   Diagnosis Date    Anxiety     Atherosclerosis of coronary artery     Bipolar disorder     Bladder cancer     Colon polyp     Coronary artery disease     Depression     Diabetes mellitus     Hemorrhoids     Hyperlipidemia     Hypertension     Nephrolithiasis     Sleep apnea        Allergies   Allergen Reactions    Codeine Nausea Only       Past Surgical History:   Procedure Laterality Date    ANGIOPLASTY      with stent placement    BACK SURGERY      BLADDER SURGERY      cancer    CARDIAC CATHETERIZATION      COLONOSCOPY  04/13/2015    COLONOSCOPY N/A 4/30/2018    Procedure: COLONOSCOPY WITH ANESTHESIA;  Surgeon: Gerson Martini MD;  Location:  [facility];  Service: Gastroenterology    COLONOSCOPY N/A 4/8/2021    Procedure: COLONOSCOPY WITH ANESTHESIA;  Surgeon: Gerson Martini MD;  Location:  [facility];  Service: Gastroenterology;  Laterality: N/A;  Pre: Hx Colon Polyps  Post: divertivculosis   Dr. Garza  CO2 Inflation Used    CORONARY ARTERY BYPASS GRAFT      KNEE SURGERY      SHOULDER SURGERY      x 2    SINUS SURGERY         Family History   Adopted: Yes       Social History     Socioeconomic History    Marital status:    Tobacco Use    Smoking status: Every Day     Current packs/day: 0.50     Types: Cigarettes    Smokeless tobacco: Never    Tobacco comments:     2 a day   Vaping Use    Vaping status: Never Used   Substance and Sexual Activity    Alcohol use: Yes     Comment: Rarely    Drug use: No    Sexual activity: Defer            Objective   Physical Exam  Vitals reviewed.   Constitutional:       General: He is not in acute distress.  HENT:      Head: Normocephalic and atraumatic.      Comments: Enlarge dtongue no clear bite marks no other dental trauma     Right Ear: Tympanic membrane normal.      Left Ear: Tympanic membrane normal.   Eyes:      Extraocular Movements: Extraocular movements intact.      Conjunctiva/sclera: Conjunctivae normal.   Cardiovascular:      Pulses: Normal pulses.      Heart sounds: Normal heart sounds.   Pulmonary:      Effort: Pulmonary effort is normal. No respiratory distress.   Abdominal:      General: Abdomen is flat. There is no distension.      Tenderness: There is abdominal tenderness (generalized).   Musculoskeletal:      Cervical back: Normal range of motion and neck supple.      Comments: Scalp atraumatic.   Forehead atraumatic, stable to palpation, nontender.   Midface stable, nontender, no injuries noted.   No intraoral injuries noted.   No otorrhea.   Trachea midline, breath sounds were noted bilaterally. PERRL.    Cervical spine: no midline tenderness to palpation. No paraspinal tenderness to palpation.  Thoracic spine: no midline tenderness to palpation. No paraspinal tenderness to palpation.  Lumbar spine: chronic lower midline tenderness to palpation. Pt states this is unchanged, and not new. No paraspinal tenderness to palpation.  Spine stable with no palpable deformity or step-off    Clavicles stable and nontender to AP and lateral compression.  Chest stable and nontender to AP and lateral compression.  Pelvis stable and nontender to lateral compression.    Extremities are warm, well-perfused with capillary refill intact and no gross motor deficits.  No focal tenderness upon palpation of the four extremities.   Skin:     General: Skin is warm and dry.      Comments: Small amount of blood outside of mouth, dried. Likely small amount of dried emesis on upper shirt as well. No clear tongue  biting but tongue is swollen.    Neurological:      General: No focal deficit present.      Mental Status: He is alert. Mental status is at baseline.      Comments: Right upper extremity: 5/5 strength with handgrip and flexion/extension of shoulders, elbows.   Light touch sensation intact and equal when compared to the left upper extremity.    Left upper extremity: 5/5 strength with handgrip and flexion/extension of shoulders, elbows.   Light touch sensation intact and equal when compared to the right upper extremity.    Right lower extremity: 5/5 strength with flexion/extension of hips, knees, and dorsi/plantarflexion of ankles. Able to wiggle toes.   Light touch sensation intact and equal when compared to the left lower extremity.    Left lower extremity: 5/5 strength with flexion/extension of hips, knees, and dorsi/plantarflexion of ankles. Able to wiggle toes.   Light touch sensation intact and equal when compared to the right lower extremity.    Light sensation intact in bilateral face. CN 2-12 normal.    Psychiatric:         Behavior: Behavior normal.         Thought Content: Thought content normal.         Procedures          ED Course  ED Course as of 07/13/24 1654   Sat Jul 13, 2024   1428 CT imaging unremarkable.  Patient is cocaine positive UDS, labs unremarkable, glucose obtained at an hour is 110.  Will reassess his symptoms. [AS]   1450 Did ozempic yesterday. Also with same dose long acting. Has reduced novolin. [AS]   1519 WBC(!): 13.81  Dr. Lynch from ICU will evaluate; I called ICU due to the history of seizure, swollen tongue, and need for hourly glucose checks.  None of these are heart indications however her glucose checks may be difficult to perform on the floor so I have started with the ICU doctor. [AS]      ED Course User Index  [AS] Imer Bermudez MD                                             Medical Decision Making  Problems Addressed:  Hypoglycemia: complicated acute illness or  injury  Seizure: complicated acute illness or injury    Amount and/or Complexity of Data Reviewed  Labs: ordered. Decision-making details documented in ED Course.  Radiology: ordered.  ECG/medicine tests: ordered.    Risk  Prescription drug management.  Decision regarding hospitalization.      Ld Green is a 63 y.o. male with PMH above who presents to the Emergency Department with AMS. Hypoglycemic with evidence of a seizure. H/o DM using insulin.  Mentation improved with D50.  Mild abdominal tenderness nonfocal with no peritonitic signs.  Will reassess after he improves more from the D50.  Most likely summation of the patient's constellation of symptoms is hypoglycemia related to insulin use without monitoring his sugar with the Dexcom, which probably led to hypoglycemic seizure. Chart review shows he does take long acting insulin.     ED Course:   -See ED course. Dr. Lynch evaluated rec'd floor admission for the above. Admitted to hospitalist. Serial exams no tongue enlargemnet no dyspnea. Held glc stable in ED.      Final diagnosis: hypoglycemia, seizure    All questions answered. Patient/family was understanding and in agreement with today's assessment and plan. The patient was monitored during their stay in the ED and dispositioned without acute event.    Electronically signed by:  Imer Bermudez MD 7/13/2024 16:54 CDT      Note: Dragon medical dictation software was used in the creation of this note.        Final diagnoses:   Seizure   Hypoglycemia       ED Disposition  ED Disposition       ED Disposition   Decision to Admit    Condition   --    Comment   Level of Care: Remote Telemetry [26]   Diagnosis: Hypoglycemia [672577]   Admitting Physician: BARBARA GONZALEZ [[id number]]   Attending Physician: BARBARA GONZALEZ [[id number]]   Certification: I Certify That Inpatient Hospital Services Are Medically Necessary For Greater Than 2 Midnights                 No follow-up provider specified.       Medication  List      No changes were made to your prescriptions during this visit.            Imer Bermudez MD  07/13/24 1654      Electronically signed by Imer Bermudez MD at 07/13/24 1654       Marizol Powers, RN at 07/13/24 1300          The following fall interventions were initiated:    [x] Patient and/or family given education   [x] Call light within reach and educated on how to use   [x] Bed rails up per protocol    [x] Bed locked and in the lowest position   [x] Bed alarm set and on loudest setting   [x] Fall wrist band applied   [] Non skid footwear applied   [x] Room free of clutter   [x] Patient items within reach   [x] Adequate lighting provided  [] Falls sign present    [x] Patient moved closer to nursing station   [] Restraints applied        Electronically signed by Marizol Powers, RN at 07/13/24 1625       Vital Signs (last 3 days) before discharge       Date/Time Temp Temp src Pulse Resp BP Patient Position SpO2    07/14/24 0738 97.8 (36.6) Oral 93 16 117/50 Lying 94    07/14/24 0428 98.3 (36.8) Oral 101 18 138/65 Lying 96    07/14/24 0030 98.9 (37.2) Oral 95 18 125/53 Lying 97    07/13/24 1931 98.6 (37) Oral 97 18 145/61 Lying 99    07/13/24 1700 99.2 (37.3) Oral 96 18 141/81 Lying 100    07/13/24 1631 -- -- 90 -- 160/89 -- 95    07/13/24 1616 -- -- 88 -- 153/90 -- 96    07/13/24 1615 -- -- 88 -- 158/95 -- 97    07/13/24 16:13:56 97.8 (36.6) Oral -- -- -- -- --    07/13/24 1609 -- -- 85 -- -- -- 94    07/13/24 15:25:27 97.5 (36.4) Oral -- -- -- -- --    07/13/24 1252 97.7 (36.5) Oral 87 19 128/73 -- 95          No current facility-administered medications for this encounter.     Current Outpatient Medications   Medication Sig Dispense Refill    amLODIPine (NORVASC) 10 MG tablet Take 1 tablet by mouth Every Night.      atorvastatin (LIPITOR) 40 MG tablet Take 1 tablet by mouth Every Night.      clopidogrel (PLAVIX) 75 MG tablet Take 1 tablet by mouth Every Night.       Continuous Glucose Sensor (FreeStyle Honorio 2 Sensor) misc Use 1 each Every 14 (Fourteen) Days.      cyclobenzaprine (FLEXERIL) 10 MG tablet Take 1 tablet by mouth 3 (Three) Times a Day As Needed for Muscle Spasms.      Dextromethorphan-buPROPion ER (AUVELITY)  MG tablet controlled-release Take 1 tablet by mouth 2 (Two) Times a Day.      eszopiclone (LUNESTA) 3 MG tablet Take 1 tablet by mouth Every Night. Take immediately before bedtime      furosemide (LASIX) 40 MG tablet Take 1 tablet by mouth Every Night.      insulin detemir (LEVEMIR) 100 UNIT/ML injection Inject 40 Units under the skin into the appropriate area as directed Daily.      insulin regular (humuLIN R,novoLIN R) 100 UNIT/ML injection Inject 10 Units under the skin into the appropriate area as directed 3 (Three) Times a Day Before Meals.      isosorbide mononitrate (IMDUR) 60 MG 24 hr tablet Take 1 tablet by mouth Every Night.      lisinopril (PRINIVIL,ZESTRIL) 40 MG tablet Take 1 tablet by mouth Daily.      metoprolol succinate XL (TOPROL-XL) 200 MG 24 hr tablet Take 1 tablet by mouth Every Night.      oxyCODONE-acetaminophen (PERCOCET)  MG per tablet Take 1 tablet by mouth 3 times daily       Semaglutide, 2 MG/DOSE, (Ozempic, 2 MG/DOSE,) 8 MG/3ML solution pen-injector Inject 1 mg under the skin into the appropriate area as directed 1 (One) Time Per Week.      naloxone (NARCAN) 4 MG/0.1ML nasal spray 1 spray into the nostril(s) as directed by provider As Needed for Opioid Reversal. Call 911. Don't prime. [place] in 1 nostril for overdose. Repeat in 2-3 minutes in other nostril if no or minimal breathing/responsiveness.      nitroglycerin (NITROSTAT) 0.4 MG SL tablet Place 1 tablet under the tongue Every 5 (Five) Minutes As Needed for Chest Pain.          Discharge Summary        Ha Dominguez MD at 07/14/24 1116                [facility]  DISCHARGE SUMMARY       Date of Admission:  7/13/2024  Date of Discharge:  7/14/2024  Primary Care Physician: Praveen Garza MD    Presenting Problem/History of Present Illness:  hypoglycemia    Final Discharge Diagnoses:  There are no active hospital problems to display for this patient.      Consults: none    Procedures Performed: none    Pertinent Test Results:       Imaging Results (All)       Procedure Component Value Units Date/Time    CT Head Without Contrast [[id number]] Collected: 07/13/24 1405     Updated: 07/13/24 1421    Narrative:      EXAM: CT HEAD WO CONTRAST-, CT CERVICAL SPINE WO CONTRAST-      DATE: 7/13/2024 12:45 PM     HISTORY: possible head trauma AMS       COMPARISON: None available.     DOSE LENGTH PRODUCT: 1411.45 mGy.cm  Automated exposure control was also  utilized to decrease patient radiation dose.     CT HEAD:     TECHNIQUE: Unenhanced CT images obtained from vertex to skull base with  multiplanar reformats.     FINDINGS:     There are chronic changes with volume loss and chronic ischemic change  of the periventricular white matter. No acute intracranial hemorrhage is  identified. No mass effect or midline shift or hydrocephalus is seen.  There is no evidence for infarct.     The visualized paranasal sinuses and mastoid air cells are clear. Scalp  soft tissues are unremarkable. Visualized orbits and globes are  unremarkable.          Impression:      1. Chronic changes and no acute intracranial abnormality.           CT CERVICAL SPINE:     TECHNIQUE: Unenhanced CT images of the cervical spine were obtained with  multiplanar reformats.     FINDINGS:     There is degenerative change at C1-C2. Anterior and interbody partial  osseous fusion noted at C3-C4. There is fusion of the facet joints at  C3-C4 and fusion of the left facet joint at C2-C3. There is multilevel  cervical spine spondylosis with spurring of the endplates at the C4-C5,  C5-C6, and C6-C7 levels. Prevertebral soft tissues are normal. No acute  fracture or  spondylolisthesis is seen.     The visualized soft tissues of the neck are unremarkable. There is  bilateral carotid calcification.     Visualized lung apices are clear.     IMPRESSION:     Postoperative and chronic degenerative changes with no visualized  fracture or spondylolisthesis.     This report was signed and finalized on 7/13/2024 2:18 PM by Dimitri Mike.       CT Cervical Spine Without Contrast [[id number]] Collected: 07/13/24 1405     Updated: 07/13/24 1421    Narrative:      EXAM: CT HEAD WO CONTRAST-, CT CERVICAL SPINE WO CONTRAST-      DATE: 7/13/2024 12:45 PM     HISTORY: possible head trauma AMS       COMPARISON: None available.     DOSE LENGTH PRODUCT: 1411.45 mGy.cm  Automated exposure control was also  utilized to decrease patient radiation dose.     CT HEAD:     TECHNIQUE: Unenhanced CT images obtained from vertex to skull base with  multiplanar reformats.     FINDINGS:     There are chronic changes with volume loss and chronic ischemic change  of the periventricular white matter. No acute intracranial hemorrhage is  identified. No mass effect or midline shift or hydrocephalus is seen.  There is no evidence for infarct.     The visualized paranasal sinuses and mastoid air cells are clear. Scalp  soft tissues are unremarkable. Visualized orbits and globes are  unremarkable.          Impression:      1. Chronic changes and no acute intracranial abnormality.           CT CERVICAL SPINE:     TECHNIQUE: Unenhanced CT images of the cervical spine were obtained with  multiplanar reformats.     FINDINGS:     There is degenerative change at C1-C2. Anterior and interbody partial  osseous fusion noted at C3-C4. There is fusion of the facet joints at  C3-C4 and fusion of the left facet joint at C2-C3. There is multilevel  cervical spine spondylosis with spurring of the endplates at the C4-C5,  C5-C6, and C6-C7 levels. Prevertebral soft tissues are normal. No acute  fracture or spondylolisthesis is  seen.     The visualized soft tissues of the neck are unremarkable. There is  bilateral carotid calcification.     Visualized lung apices are clear.     IMPRESSION:     Postoperative and chronic degenerative changes with no visualized  fracture or spondylolisthesis.     This report was signed and finalized on 7/13/2024 2:18 PM by Dimitri Mike.       XR Chest 1 View [[id number]] Collected: 07/13/24 1354     Updated: 07/13/24 1358    Narrative:      EXAM: XR CHEST 1 VW-      DATE: 7/13/2024 12:21 PM     HISTORY: AMS       COMPARISON: 11/13/2014.     TECHNIQUE:  Frontal view(s) of the chest submitted.     FINDINGS:    Lungs are grossly clear. No effusion or pneumothorax is seen. There is  borderline enlargement of the cardiac silhouette. The patient is status  post median sternotomy. Superior median sternotomy wires are fractured  but there is no migration. Dorsal column stimulator leads project at the  lower thoracic spine.          Impression:         1. Postoperative chest with no active disease identified.     This report was signed and finalized on 7/13/2024 1:55 PM by Dimitri Mike.             LAB RESULTS:      Lab 07/13/24  1323   WBC 13.81*   HEMOGLOBIN 15.2   HEMATOCRIT 44.5   PLATELETS 199   NEUTROS ABS 11.60*   IMMATURE GRANS (ABS) 0.07*   LYMPHS ABS 1.00   MONOS ABS 1.01*   EOS ABS 0.07   MCV 92.5         Lab 07/14/24  0439 07/13/24  1323   SODIUM  --  140   POTASSIUM 3.9 3.3*   CHLORIDE  --  102   CO2  --  28.0   ANION GAP  --  10.0   BUN  --  18   CREATININE  --  0.71*   EGFR  --  103.7   GLUCOSE  --  198*   CALCIUM  --  9.6   MAGNESIUM  --  2.1         Lab 07/13/24  1323   TOTAL PROTEIN 7.2   ALBUMIN 4.1   GLOBULIN 3.1   ALT (SGPT) 25   AST (SGOT) 30   BILIRUBIN 0.9   ALK PHOS 150*   LIPASE 21                     Brief Urine Lab Results  (Last result in the past 365 days)        Color   Clarity   Blood   Leuk Est   Nitrite   Protein   CREAT   Urine HCG        07/13/24 1338 Dark Yellow   Clear    "Negative   Negative   Negative   100 mg/dL (2+)                 Microbiology Results (last 10 days)       ** No results found for the last 240 hours. **            Hospital Course: This is a 63-year-old gentleman with past medical history significant for diabetes mellitus type 2 who apparently was found by EMS unresponsive laying in chair after he slumped over apparently has a blood glucose of 30 upon arrival he responded well to D50 apparently had urinary incontinence he had evidence of tongue biting, apparently his glucometer was not working at home, he acknowledged the fact that he did some cocaine yesterday with some of his friends. He will be admitted for further evaluation and management.     His glucose has improved, he was able to tolerate his diet, has no evidence of seizure activity, he will be discharged home today, please follow-up with his primary care physician in 3 to 5 days.      Physical Exam on Discharge:  /50 (BP Location: Right arm, Patient Position: Lying)   Pulse 93   Temp 97.8 °F (36.6 °C) (Oral)   Resp 16   Ht 177.8 cm (70\")   Wt 111 kg (244 lb 12.8 oz)   SpO2 94%   BMI 35.13 kg/m²   Physical Exam    General: Patient is alert, awake, oriented x 3 in no apparent distress at time of examination  HEENT: Normocephalic, atraumatic, pupils are equal reactive to light and accommodate.  Neck: Supple, no JVD, no carotid bruits  CVS: S1, S2, regular rhythm and rate  Lungs: Clear to auscultation bilaterally  Abdomen: Soft, nontender, nondistended bowel sounds are present  Extremities: No cyanosis, no clubbing, no edema pulses are intact  Neurologic: No focal deficits    Condition on Discharge: stable    Discharge Disposition:  Home or Self Care    Discharge Medications:     Discharge Medications        Continue These Medications        Instructions Start Date   amLODIPine 10 MG tablet  Commonly known as: NORVASC   10 mg, Oral, Nightly      atorvastatin 40 MG tablet  Commonly known as: " LIPITOR   1 tablet, Oral, Nightly      clopidogrel 75 MG tablet  Commonly known as: PLAVIX   Take 1 tablet by mouth Every Night.      cyclobenzaprine 10 MG tablet  Commonly known as: FLEXERIL   10 mg, Oral, 3 Times Daily PRN      eszopiclone 3 MG tablet  Commonly known as: LUNESTA   3 mg, Oral, Nightly, Take immediately before bedtime      FreeStyle Honorio 2 Sensor misc   1 each, Does not apply, Every 14 Days      furosemide 40 MG tablet  Commonly known as: LASIX   40 mg, Oral, Nightly      insulin detemir 100 UNIT/ML injection  Commonly known as: LEVEMIR   40 Units, Subcutaneous, Daily      insulin regular 100 UNIT/ML injection  Commonly known as: humuLIN R,novoLIN R   10 Units, Subcutaneous, 3 Times Daily Before Meals      isosorbide mononitrate 60 MG 24 hr tablet  Commonly known as: IMDUR   Take 1 tablet by mouth Every Night.      lisinopril 40 MG tablet  Commonly known as: PRINIVIL,ZESTRIL   40 mg, Oral, Daily      metoprolol succinate  MG 24 hr tablet  Commonly known as: TOPROL-XL   Take 1 tablet by mouth Every Night.      nitroglycerin 0.4 MG SL tablet  Commonly known as: NITROSTAT   Place 1 tablet under the tongue Every 5 (Five) Minutes As Needed for Chest Pain.      oxyCODONE-acetaminophen  MG per tablet  Commonly known as: PERCOCET   Take 1 tablet by mouth 3 times daily       Ozempic (2 MG/DOSE) 8 MG/3ML solution pen-injector  Generic drug: Semaglutide (2 MG/DOSE)   1 mg, Subcutaneous, Weekly             ASK your doctor about these medications        Instructions Start Date   Dextromethorphan-buPROPion ER  MG tablet controlled-release  Commonly known as: AUVELITY   1 tablet, Oral, 2 Times Daily      naloxone 4 MG/0.1ML nasal spray  Commonly known as: NARCAN   1 spray, Nasal, As Needed, Call 911. Don't prime. [place] in 1 nostril for overdose. Repeat in 2-3 minutes in other nostril if no or minimal breathing/responsiveness.                   Discharge Diet:   Diet Instructions       Diet:  Diabetic Diets; Consistent Carbohydrate; Regular (IDDSI 7); Thin (IDDSI 0)      Discharge Diet: Diabetic Diets    Diabetic Diet: Consistent Carbohydrate    Texture: Regular (IDDSI 7)    Fluid Consistency: Thin (IDDSI 0)            Activity at Discharge:   Activity Instructions       Activity as Tolerated              Follow-up Appointments:   No future appointments.    Test Results Pending at Discharge: none    Electronically signed by Ha Domniguez MD, 07/14/24, 11:16 CDT.    Time: 30 minutes.         Electronically signed by Ha Dominguez MD at 07/14/24 2029

## 2024-07-16 LAB
QT INTERVAL: 418 MS
QTC INTERVAL: 493 MS

## 2024-08-25 ENCOUNTER — HOSPITAL ENCOUNTER (OUTPATIENT)
Facility: HOSPITAL | Age: 64
Setting detail: OBSERVATION
Discharge: HOME | End: 2024-08-28
Attending: INTERNAL MEDICINE | Admitting: INTERNAL MEDICINE
Payer: MEDICARE

## 2024-08-25 ENCOUNTER — APPOINTMENT (OUTPATIENT)
Dept: GENERAL RADIOLOGY | Facility: HOSPITAL | Age: 64
End: 2024-08-25
Payer: MEDICARE

## 2024-08-25 ENCOUNTER — APPOINTMENT (OUTPATIENT)
Dept: CT IMAGING | Facility: HOSPITAL | Age: 64
End: 2024-08-25
Payer: MEDICARE

## 2024-08-25 DIAGNOSIS — R11.2 NAUSEA AND VOMITING, UNSPECIFIED VOMITING TYPE: Primary | ICD-10-CM

## 2024-08-25 DIAGNOSIS — E16.2 HYPOGLYCEMIA: ICD-10-CM

## 2024-08-25 DIAGNOSIS — R41.82 ALTERED MENTAL STATUS, UNSPECIFIED ALTERED MENTAL STATUS TYPE: ICD-10-CM

## 2024-08-25 LAB
ALBUMIN SERPL-MCNC: 4.2 G/DL (ref 3.5–5.2)
ALBUMIN/GLOB SERPL: 1.2 G/DL
ALP SERPL-CCNC: 170 U/L (ref 39–117)
ALT SERPL W P-5'-P-CCNC: 20 U/L (ref 1–41)
ANION GAP SERPL CALCULATED.3IONS-SCNC: 11 MMOL/L (ref 5–15)
AST SERPL-CCNC: 26 U/L (ref 1–40)
BACTERIA UR QL AUTO: ABNORMAL /HPF
BASOPHILS # BLD AUTO: 0.14 10*3/MM3 (ref 0–0.2)
BASOPHILS NFR BLD AUTO: 0.5 % (ref 0–1.5)
BILIRUB SERPL-MCNC: 1.1 MG/DL (ref 0–1.2)
BILIRUB UR QL STRIP: NEGATIVE
BUN SERPL-MCNC: 19 MG/DL (ref 8–23)
BUN/CREAT SERPL: 38.8 (ref 7–25)
CALCIUM SPEC-SCNC: 9.4 MG/DL (ref 8.6–10.5)
CHLORIDE SERPL-SCNC: 103 MMOL/L (ref 98–107)
CK SERPL-CCNC: 372 U/L (ref 20–200)
CLARITY UR: CLEAR
CO2 SERPL-SCNC: 25 MMOL/L (ref 22–29)
COLOR UR: YELLOW
CREAT SERPL-MCNC: 0.49 MG/DL (ref 0.76–1.27)
D-LACTATE SERPL-SCNC: 0.8 MMOL/L (ref 0.5–2)
DEPRECATED RDW RBC AUTO: 44.6 FL (ref 37–54)
EGFRCR SERPLBLD CKD-EPI 2021: 116 ML/MIN/1.73
EOSINOPHIL # BLD AUTO: 0.01 10*3/MM3 (ref 0–0.4)
EOSINOPHIL NFR BLD AUTO: 0 % (ref 0.3–6.2)
ERYTHROCYTE [DISTWIDTH] IN BLOOD BY AUTOMATED COUNT: 12.8 % (ref 12.3–15.4)
GEN 5 2HR TROPONIN T REFLEX: 9 NG/L
GLOBULIN UR ELPH-MCNC: 3.6 GM/DL
GLUCOSE BLDC GLUCOMTR-MCNC: 133 MG/DL (ref 70–130)
GLUCOSE BLDC GLUCOMTR-MCNC: 139 MG/DL (ref 70–130)
GLUCOSE BLDC GLUCOMTR-MCNC: 58 MG/DL (ref 70–130)
GLUCOSE SERPL-MCNC: 121 MG/DL (ref 65–99)
GLUCOSE UR STRIP-MCNC: ABNORMAL MG/DL
HCT VFR BLD AUTO: 49.7 % (ref 37.5–51)
HGB BLD-MCNC: 16.4 G/DL (ref 13–17.7)
HGB UR QL STRIP.AUTO: NEGATIVE
HOLD SPECIMEN: NORMAL
HYALINE CASTS UR QL AUTO: ABNORMAL /LPF
IMM GRANULOCYTES # BLD AUTO: 0.59 10*3/MM3 (ref 0–0.05)
IMM GRANULOCYTES NFR BLD AUTO: 2.2 % (ref 0–0.5)
KETONES UR QL STRIP: ABNORMAL
LEUKOCYTE ESTERASE UR QL STRIP.AUTO: NEGATIVE
LIPASE SERPL-CCNC: 17 U/L (ref 13–60)
LYMPHOCYTES # BLD AUTO: 0.98 10*3/MM3 (ref 0.7–3.1)
LYMPHOCYTES NFR BLD AUTO: 3.6 % (ref 19.6–45.3)
MCH RBC QN AUTO: 31.1 PG (ref 26.6–33)
MCHC RBC AUTO-ENTMCNC: 33 G/DL (ref 31.5–35.7)
MCV RBC AUTO: 94.3 FL (ref 79–97)
MONOCYTES # BLD AUTO: 1.97 10*3/MM3 (ref 0.1–0.9)
MONOCYTES NFR BLD AUTO: 7.2 % (ref 5–12)
NEUTROPHILS NFR BLD AUTO: 23.53 10*3/MM3 (ref 1.7–7)
NEUTROPHILS NFR BLD AUTO: 86.5 % (ref 42.7–76)
NITRITE UR QL STRIP: NEGATIVE
NRBC BLD AUTO-RTO: 0 /100 WBC (ref 0–0.2)
PH UR STRIP.AUTO: <=5 [PH] (ref 5–8)
PLATELET # BLD AUTO: 243 10*3/MM3 (ref 140–450)
PMV BLD AUTO: 10.2 FL (ref 6–12)
POTASSIUM SERPL-SCNC: 4.4 MMOL/L (ref 3.5–5.2)
PROT SERPL-MCNC: 7.8 G/DL (ref 6–8.5)
PROT UR QL STRIP: ABNORMAL
RBC # BLD AUTO: 5.27 10*6/MM3 (ref 4.14–5.8)
RBC # UR STRIP: ABNORMAL /HPF
REF LAB TEST METHOD: ABNORMAL
SODIUM SERPL-SCNC: 139 MMOL/L (ref 136–145)
SP GR UR STRIP: 1.02 (ref 1–1.03)
SQUAMOUS #/AREA URNS HPF: ABNORMAL /HPF
TROPONIN T DELTA: 0 NG/L
TROPONIN T SERPL HS-MCNC: 9 NG/L
UROBILINOGEN UR QL STRIP: ABNORMAL
WBC # UR STRIP: ABNORMAL /HPF
WBC NRBC COR # BLD AUTO: 27.22 10*3/MM3 (ref 3.4–10.8)
WHOLE BLOOD HOLD COAG: NORMAL
WHOLE BLOOD HOLD SPECIMEN: NORMAL

## 2024-08-25 PROCEDURE — 87154 CUL TYP ID BLD PTHGN 6+ TRGT: CPT | Performed by: INTERNAL MEDICINE

## 2024-08-25 PROCEDURE — 96361 HYDRATE IV INFUSION ADD-ON: CPT

## 2024-08-25 PROCEDURE — G0378 HOSPITAL OBSERVATION PER HR: HCPCS

## 2024-08-25 PROCEDURE — 25010000002 PIPERACILLIN SOD-TAZOBACTAM PER 1 G: Performed by: INTERNAL MEDICINE

## 2024-08-25 PROCEDURE — 82948 REAGENT STRIP/BLOOD GLUCOSE: CPT

## 2024-08-25 PROCEDURE — 71045 X-RAY EXAM CHEST 1 VIEW: CPT

## 2024-08-25 PROCEDURE — 25010000002 ENOXAPARIN PER 10 MG: Performed by: INTERNAL MEDICINE

## 2024-08-25 PROCEDURE — 87040 BLOOD CULTURE FOR BACTERIA: CPT | Performed by: INTERNAL MEDICINE

## 2024-08-25 PROCEDURE — 83605 ASSAY OF LACTIC ACID: CPT | Performed by: INTERNAL MEDICINE

## 2024-08-25 PROCEDURE — 82550 ASSAY OF CK (CPK): CPT | Performed by: INTERNAL MEDICINE

## 2024-08-25 PROCEDURE — 81001 URINALYSIS AUTO W/SCOPE: CPT | Performed by: INTERNAL MEDICINE

## 2024-08-25 PROCEDURE — 80053 COMPREHEN METABOLIC PANEL: CPT | Performed by: INTERNAL MEDICINE

## 2024-08-25 PROCEDURE — 25510000001 IOPAMIDOL 61 % SOLUTION: Performed by: INTERNAL MEDICINE

## 2024-08-25 PROCEDURE — 25810000003 SODIUM CHLORIDE 0.9 % SOLUTION: Performed by: INTERNAL MEDICINE

## 2024-08-25 PROCEDURE — 99285 EMERGENCY DEPT VISIT HI MDM: CPT

## 2024-08-25 PROCEDURE — 96372 THER/PROPH/DIAG INJ SC/IM: CPT

## 2024-08-25 PROCEDURE — 83690 ASSAY OF LIPASE: CPT | Performed by: INTERNAL MEDICINE

## 2024-08-25 PROCEDURE — 87086 URINE CULTURE/COLONY COUNT: CPT | Performed by: INTERNAL MEDICINE

## 2024-08-25 PROCEDURE — 84484 ASSAY OF TROPONIN QUANT: CPT | Performed by: INTERNAL MEDICINE

## 2024-08-25 PROCEDURE — 36415 COLL VENOUS BLD VENIPUNCTURE: CPT

## 2024-08-25 PROCEDURE — 74177 CT ABD & PELVIS W/CONTRAST: CPT

## 2024-08-25 PROCEDURE — 70450 CT HEAD/BRAIN W/O DYE: CPT

## 2024-08-25 PROCEDURE — 87147 CULTURE TYPE IMMUNOLOGIC: CPT | Performed by: INTERNAL MEDICINE

## 2024-08-25 PROCEDURE — 85025 COMPLETE CBC W/AUTO DIFF WBC: CPT | Performed by: INTERNAL MEDICINE

## 2024-08-25 PROCEDURE — P9612 CATHETERIZE FOR URINE SPEC: HCPCS

## 2024-08-25 RX ORDER — ISOSORBIDE MONONITRATE 60 MG/1
60 TABLET, EXTENDED RELEASE ORAL NIGHTLY
Status: DISCONTINUED | OUTPATIENT
Start: 2024-08-25 | End: 2024-08-28 | Stop reason: HOSPADM

## 2024-08-25 RX ORDER — NITROGLYCERIN 0.4 MG/1
0.4 TABLET SUBLINGUAL
Status: DISCONTINUED | OUTPATIENT
Start: 2024-08-25 | End: 2024-08-28 | Stop reason: HOSPADM

## 2024-08-25 RX ORDER — AMLODIPINE BESYLATE 10 MG/1
10 TABLET ORAL NIGHTLY
Status: DISCONTINUED | OUTPATIENT
Start: 2024-08-25 | End: 2024-08-28 | Stop reason: HOSPADM

## 2024-08-25 RX ORDER — FAMOTIDINE 20 MG/1
40 TABLET, FILM COATED ORAL DAILY
Status: DISCONTINUED | OUTPATIENT
Start: 2024-08-25 | End: 2024-08-28 | Stop reason: HOSPADM

## 2024-08-25 RX ORDER — OXYCODONE AND ACETAMINOPHEN 10; 325 MG/1; MG/1
1 TABLET ORAL 3 TIMES DAILY
Status: DISCONTINUED | OUTPATIENT
Start: 2024-08-25 | End: 2024-08-28 | Stop reason: HOSPADM

## 2024-08-25 RX ORDER — NALOXONE HCL 0.4 MG/ML
0.4 VIAL (ML) INJECTION
Status: DISCONTINUED | OUTPATIENT
Start: 2024-08-25 | End: 2024-08-28 | Stop reason: HOSPADM

## 2024-08-25 RX ORDER — SODIUM CHLORIDE 9 MG/ML
100 INJECTION, SOLUTION INTRAVENOUS CONTINUOUS
Status: DISCONTINUED | OUTPATIENT
Start: 2024-08-25 | End: 2024-08-27

## 2024-08-25 RX ORDER — TOBRAMYCIN 3 MG/ML
2 SOLUTION/ DROPS OPHTHALMIC ONCE
Status: COMPLETED | OUTPATIENT
Start: 2024-08-25 | End: 2024-08-25

## 2024-08-25 RX ORDER — BISACODYL 5 MG/1
5 TABLET, DELAYED RELEASE ORAL DAILY PRN
Status: DISCONTINUED | OUTPATIENT
Start: 2024-08-25 | End: 2024-08-28 | Stop reason: HOSPADM

## 2024-08-25 RX ORDER — HYDROCODONE BITARTRATE AND ACETAMINOPHEN 5; 325 MG/1; MG/1
1 TABLET ORAL EVERY 6 HOURS PRN
Status: DISCONTINUED | OUTPATIENT
Start: 2024-08-25 | End: 2024-08-28 | Stop reason: HOSPADM

## 2024-08-25 RX ORDER — ZOLPIDEM TARTRATE 5 MG/1
5 TABLET ORAL NIGHTLY
Status: DISCONTINUED | OUTPATIENT
Start: 2024-08-25 | End: 2024-08-28 | Stop reason: HOSPADM

## 2024-08-25 RX ORDER — ENOXAPARIN SODIUM 100 MG/ML
40 INJECTION SUBCUTANEOUS DAILY
Status: DISCONTINUED | OUTPATIENT
Start: 2024-08-25 | End: 2024-08-28 | Stop reason: HOSPADM

## 2024-08-25 RX ORDER — POLYETHYLENE GLYCOL 3350 17 G/17G
17 POWDER, FOR SOLUTION ORAL DAILY PRN
Status: DISCONTINUED | OUTPATIENT
Start: 2024-08-25 | End: 2024-08-28 | Stop reason: HOSPADM

## 2024-08-25 RX ORDER — NICOTINE POLACRILEX 4 MG
15 LOZENGE BUCCAL
Status: DISCONTINUED | OUTPATIENT
Start: 2024-08-25 | End: 2024-08-28 | Stop reason: HOSPADM

## 2024-08-25 RX ORDER — MORPHINE SULFATE 2 MG/ML
1 INJECTION, SOLUTION INTRAMUSCULAR; INTRAVENOUS EVERY 4 HOURS PRN
Status: DISCONTINUED | OUTPATIENT
Start: 2024-08-25 | End: 2024-08-27

## 2024-08-25 RX ORDER — ONDANSETRON 2 MG/ML
4 INJECTION INTRAMUSCULAR; INTRAVENOUS EVERY 6 HOURS PRN
Status: DISCONTINUED | OUTPATIENT
Start: 2024-08-25 | End: 2024-08-28 | Stop reason: HOSPADM

## 2024-08-25 RX ORDER — ACETAMINOPHEN 650 MG/1
650 SUPPOSITORY RECTAL EVERY 4 HOURS PRN
Status: DISCONTINUED | OUTPATIENT
Start: 2024-08-25 | End: 2024-08-28 | Stop reason: HOSPADM

## 2024-08-25 RX ORDER — DEXTROSE MONOHYDRATE 25 G/50ML
25 INJECTION, SOLUTION INTRAVENOUS
Status: DISCONTINUED | OUTPATIENT
Start: 2024-08-25 | End: 2024-08-28 | Stop reason: HOSPADM

## 2024-08-25 RX ORDER — TETRACAINE HYDROCHLORIDE 5 MG/ML
2 SOLUTION OPHTHALMIC ONCE
Status: COMPLETED | OUTPATIENT
Start: 2024-08-25 | End: 2024-08-25

## 2024-08-25 RX ORDER — METOPROLOL SUCCINATE 100 MG/1
200 TABLET, EXTENDED RELEASE ORAL NIGHTLY
Status: DISCONTINUED | OUTPATIENT
Start: 2024-08-25 | End: 2024-08-28 | Stop reason: HOSPADM

## 2024-08-25 RX ORDER — SODIUM CHLORIDE 0.9 % (FLUSH) 0.9 %
10 SYRINGE (ML) INJECTION EVERY 12 HOURS SCHEDULED
Status: DISCONTINUED | OUTPATIENT
Start: 2024-08-25 | End: 2024-08-28 | Stop reason: HOSPADM

## 2024-08-25 RX ORDER — CLOPIDOGREL BISULFATE 75 MG/1
75 TABLET ORAL NIGHTLY
Status: DISCONTINUED | OUTPATIENT
Start: 2024-08-25 | End: 2024-08-28 | Stop reason: HOSPADM

## 2024-08-25 RX ORDER — ACETAMINOPHEN 325 MG/1
650 TABLET ORAL EVERY 4 HOURS PRN
Status: DISCONTINUED | OUTPATIENT
Start: 2024-08-25 | End: 2024-08-28 | Stop reason: HOSPADM

## 2024-08-25 RX ORDER — LISINOPRIL 20 MG/1
40 TABLET ORAL DAILY
Status: DISCONTINUED | OUTPATIENT
Start: 2024-08-25 | End: 2024-08-28 | Stop reason: HOSPADM

## 2024-08-25 RX ORDER — IBUPROFEN 600 MG/1
1 TABLET ORAL
Status: DISCONTINUED | OUTPATIENT
Start: 2024-08-25 | End: 2024-08-28 | Stop reason: HOSPADM

## 2024-08-25 RX ORDER — BISACODYL 10 MG
10 SUPPOSITORY, RECTAL RECTAL DAILY PRN
Status: DISCONTINUED | OUTPATIENT
Start: 2024-08-25 | End: 2024-08-28 | Stop reason: HOSPADM

## 2024-08-25 RX ORDER — SODIUM CHLORIDE 9 MG/ML
40 INJECTION, SOLUTION INTRAVENOUS AS NEEDED
Status: DISCONTINUED | OUTPATIENT
Start: 2024-08-25 | End: 2024-08-28 | Stop reason: HOSPADM

## 2024-08-25 RX ORDER — ATORVASTATIN CALCIUM 40 MG/1
40 TABLET, FILM COATED ORAL NIGHTLY
Status: DISCONTINUED | OUTPATIENT
Start: 2024-08-25 | End: 2024-08-28 | Stop reason: HOSPADM

## 2024-08-25 RX ORDER — ACETAMINOPHEN 160 MG/5ML
650 SOLUTION ORAL EVERY 4 HOURS PRN
Status: DISCONTINUED | OUTPATIENT
Start: 2024-08-25 | End: 2024-08-28 | Stop reason: HOSPADM

## 2024-08-25 RX ORDER — INSULIN LISPRO 100 [IU]/ML
2-7 INJECTION, SOLUTION INTRAVENOUS; SUBCUTANEOUS
Status: DISCONTINUED | OUTPATIENT
Start: 2024-08-25 | End: 2024-08-26

## 2024-08-25 RX ORDER — SODIUM CHLORIDE 0.9 % (FLUSH) 0.9 %
10 SYRINGE (ML) INJECTION AS NEEDED
Status: DISCONTINUED | OUTPATIENT
Start: 2024-08-25 | End: 2024-08-28 | Stop reason: HOSPADM

## 2024-08-25 RX ORDER — AMOXICILLIN 250 MG
2 CAPSULE ORAL 2 TIMES DAILY PRN
Status: DISCONTINUED | OUTPATIENT
Start: 2024-08-25 | End: 2024-08-28 | Stop reason: HOSPADM

## 2024-08-25 RX ORDER — IOPAMIDOL 612 MG/ML
100 INJECTION, SOLUTION INTRAVASCULAR
Status: COMPLETED | OUTPATIENT
Start: 2024-08-25 | End: 2024-08-25

## 2024-08-25 RX ORDER — CYCLOBENZAPRINE HCL 10 MG
10 TABLET ORAL 3 TIMES DAILY PRN
Status: DISCONTINUED | OUTPATIENT
Start: 2024-08-25 | End: 2024-08-28 | Stop reason: HOSPADM

## 2024-08-25 RX ADMIN — FAMOTIDINE 40 MG: 20 TABLET, FILM COATED ORAL at 22:30

## 2024-08-25 RX ADMIN — CLOPIDOGREL BISULFATE 75 MG: 75 TABLET, FILM COATED ORAL at 22:30

## 2024-08-25 RX ADMIN — IOPAMIDOL 100 ML: 612 INJECTION, SOLUTION INTRAVENOUS at 16:27

## 2024-08-25 RX ADMIN — AMLODIPINE BESYLATE 10 MG: 10 TABLET ORAL at 22:30

## 2024-08-25 RX ADMIN — TETRACAINE HYDROCHLORIDE 2 DROP: 5 SOLUTION OPHTHALMIC at 17:36

## 2024-08-25 RX ADMIN — PIPERACILLIN AND TAZOBACTAM 4.5 G: 4; .5 INJECTION, POWDER, FOR SOLUTION INTRAVENOUS at 18:27

## 2024-08-25 RX ADMIN — ZOLPIDEM TARTRATE 5 MG: 5 TABLET ORAL at 22:30

## 2024-08-25 RX ADMIN — ISOSORBIDE MONONITRATE 60 MG: 60 TABLET, EXTENDED RELEASE ORAL at 22:31

## 2024-08-25 RX ADMIN — Medication 10 ML: at 22:31

## 2024-08-25 RX ADMIN — LISINOPRIL 40 MG: 20 TABLET ORAL at 22:30

## 2024-08-25 RX ADMIN — OXYCODONE AND ACETAMINOPHEN 1 TABLET: 325; 10 TABLET ORAL at 22:30

## 2024-08-25 RX ADMIN — ENOXAPARIN SODIUM 40 MG: 100 INJECTION SUBCUTANEOUS at 22:31

## 2024-08-25 RX ADMIN — SODIUM CHLORIDE 100 ML/HR: 9 INJECTION, SOLUTION INTRAVENOUS at 22:32

## 2024-08-25 RX ADMIN — TOBRAMYCIN OPHTHALMIC SOLUTION 2 DROP: 3 SOLUTION/ DROPS OPHTHALMIC at 15:45

## 2024-08-25 RX ADMIN — METOPROLOL SUCCINATE 200 MG: 100 TABLET, FILM COATED, EXTENDED RELEASE ORAL at 22:30

## 2024-08-25 RX ADMIN — ATORVASTATIN CALCIUM 40 MG: 40 TABLET ORAL at 22:30

## 2024-08-25 NOTE — NURSING NOTE
Received pt from ER, IV antibx's infusing, transferred to bed x4 assist, adjusted and added warm blankets, nerve stimulator noticed  and discussed with pt, he states its MRI compatible, skin intact, MASD area to groin noted, reviewed orders and released, report being given to oncoming shift nurse.

## 2024-08-25 NOTE — ED PROVIDER NOTES
Subjective   History of Present Illness  63-year-old male who was found down in his home.  He notes that he vomited in his left eye.  His eye feels like he has something in it.  He does not remember waking up this morning.  Does not remember performing any of his daily ritual or morning activities.  He lives by himself.  He denies any diarrhea.  He has no chest pain on exam.  He has no abdominal pain on exam.  When I ask about any other injury or pain, he states he has no other known injury or pain at the time.  EMS noted that he was hypoglycemic on their arrival.  The patient states that his diabetic protocol includes shots and injections.  On arrival to our ED the patient's glucose was 120.    Review of Systems   Constitutional:  Negative for chills and fever.   Respiratory:  Negative for shortness of breath.    Cardiovascular:  Negative for chest pain.   Gastrointestinal:  Positive for vomiting. Negative for abdominal pain and diarrhea.   Psychiatric/Behavioral:  Positive for confusion.        Past Medical History:   Diagnosis Date    Anxiety     Atherosclerosis of coronary artery     Bipolar disorder     Bladder cancer     Colon polyp     Coronary artery disease     Depression     Diabetes mellitus     Hemorrhoids     Hyperlipidemia     Hypertension     Nephrolithiasis     Sleep apnea        Allergies   Allergen Reactions    Codeine Nausea Only       Past Surgical History:   Procedure Laterality Date    ANGIOPLASTY      with stent placement    BACK SURGERY      BLADDER SURGERY      cancer    CARDIAC CATHETERIZATION      COLONOSCOPY  04/13/2015    COLONOSCOPY N/A 4/30/2018    Procedure: COLONOSCOPY WITH ANESTHESIA;  Surgeon: Gerson Martini MD;  Location: [facility] ENDOSCOPY;  Service: Gastroenterology    COLONOSCOPY N/A 4/8/2021    Procedure: COLONOSCOPY WITH ANESTHESIA;  Surgeon: Gerson Martini MD;  Location: [facility] ENDOSCOPY;  Service: Gastroenterology;  Laterality: N/A;  Pre: Hx Colon Polyps  Post:  divertivculosis   Dr. Garza  CO2 Inflation Used    CORONARY ARTERY BYPASS GRAFT      KNEE SURGERY      SHOULDER SURGERY      x 2    SINUS SURGERY         Family History   Adopted: Yes       Social History     Socioeconomic History    Marital status:    Tobacco Use    Smoking status: Every Day     Current packs/day: 0.50     Types: Cigarettes    Smokeless tobacco: Never    Tobacco comments:     2 a day   Vaping Use    Vaping status: Never Used   Substance and Sexual Activity    Alcohol use: Yes     Comment: Rarely    Drug use: No    Sexual activity: Defer           Objective   Physical Exam  Vitals reviewed.   Constitutional:       Appearance: He is obese. He is ill-appearing.      Comments: Dark colored emesis on fact, chest, and abdomen.    HENT:      Head: Normocephalic and atraumatic.      Right Ear: External ear normal.      Left Ear: External ear normal.      Nose: Nose normal.   Eyes:      General: No scleral icterus.     Conjunctiva/sclera: Conjunctivae normal.   Cardiovascular:      Rate and Rhythm: Normal rate and regular rhythm.      Heart sounds: Normal heart sounds.   Pulmonary:      Effort: Pulmonary effort is normal.      Breath sounds: Normal breath sounds.   Abdominal:      General: There is no distension.      Palpations: Abdomen is soft.      Tenderness: There is no abdominal tenderness.   Musculoskeletal:         General: No swelling, tenderness or signs of injury.      Cervical back: Normal range of motion and neck supple.   Skin:     General: Skin is warm and dry.   Neurological:      Mental Status: He is alert. He is disoriented.      Cranial Nerves: No cranial nerve deficit.         Procedures       Lab Results (last 24 hours)       Procedure Component Value Units Date/Time    POC Glucose Once [[id number]]  (Abnormal) Collected: 08/25/24 1431    Specimen: Blood Updated: 08/25/24 1443     Glucose 139 mg/dL      Comment: : [id number] [facility]: [id number]       CBC &  Differential [[id number]]  (Abnormal) Collected: 08/25/24 1442    Specimen: Blood Updated: 08/25/24 1510    Narrative:      The following orders were created for panel order CBC & Differential.  Procedure                               Abnormality         Status                     ---------                               -----------         ------                     CBC Auto Differential[[id number]]        Abnormal            Final result                 Please view results for these tests on the individual orders.    Comprehensive Metabolic Panel [[id number]]  (Abnormal) Collected: 08/25/24 1442    Specimen: Blood Updated: 08/25/24 1528     Glucose 121 mg/dL      BUN 19 mg/dL      Creatinine 0.49 mg/dL      Sodium 139 mmol/L      Potassium 4.4 mmol/L      Comment: Slight hemolysis detected by analyzer. Result may be falsely elevated.        Chloride 103 mmol/L      CO2 25.0 mmol/L      Calcium 9.4 mg/dL      Total Protein 7.8 g/dL      Albumin 4.2 g/dL      ALT (SGPT) 20 U/L      AST (SGOT) 26 U/L      Alkaline Phosphatase 170 U/L      Total Bilirubin 1.1 mg/dL      Globulin 3.6 gm/dL      A/G Ratio 1.2 g/dL      BUN/Creatinine Ratio 38.8     Anion Gap 11.0 mmol/L      eGFR 116.0 mL/min/1.73     Narrative:      GFR Normal >60  Chronic Kidney Disease <60  Kidney Failure <15      Lactic Acid, Plasma [[id number]]  (Normal) Collected: 08/25/24 1442    Specimen: Blood Updated: 08/25/24 1524     Lactate 0.8 mmol/L     Lipase [[id number]]  (Normal) Collected: 08/25/24 1442    Specimen: Blood Updated: 08/25/24 1523     Lipase 17 U/L     High Sensitivity Troponin T [[id number]]  (Normal) Collected: 08/25/24 1442    Specimen: Blood Updated: 08/25/24 1523     HS Troponin T 9 ng/L     Narrative:      High Sensitive Troponin T Reference Range:  <14.0 ng/L- Negative Female for AMI  <22.0 ng/L- Negative Male for AMI  >=14 - Abnormal Female indicating possible myocardial injury.  >=22 - Abnormal Male indicating possible myocardial  injury.   Clinicians would have to utilize clinical acumen, EKG, Troponin, and serial changes to determine if it is an Acute Myocardial Infarction or myocardial injury due to an underlying chronic condition.         CBC Auto Differential [[id number]]  (Abnormal) Collected: 08/25/24 1442    Specimen: Blood Updated: 08/25/24 1510     WBC 27.22 10*3/mm3      RBC 5.27 10*6/mm3      Hemoglobin 16.4 g/dL      Hematocrit 49.7 %      MCV 94.3 fL      MCH 31.1 pg      MCHC 33.0 g/dL      RDW 12.8 %      RDW-SD 44.6 fl      MPV 10.2 fL      Platelets 243 10*3/mm3      Neutrophil % 86.5 %      Lymphocyte % 3.6 %      Monocyte % 7.2 %      Eosinophil % 0.0 %      Basophil % 0.5 %      Immature Grans % 2.2 %      Neutrophils, Absolute 23.53 10*3/mm3      Lymphocytes, Absolute 0.98 10*3/mm3      Monocytes, Absolute 1.97 10*3/mm3      Eosinophils, Absolute 0.01 10*3/mm3      Basophils, Absolute 0.14 10*3/mm3      Immature Grans, Absolute 0.59 10*3/mm3      nRBC 0.0 /100 WBC     CK [[id number]]  (Abnormal) Collected: 08/25/24 1442    Specimen: Blood Updated: 08/25/24 1619     Creatine Kinase 372 U/L     Urinalysis With Culture If Indicated - Straight Cath [[id number]]  (Abnormal) Collected: 08/25/24 1502    Specimen: Urine from Straight Cath Updated: 08/25/24 1519     Color, UA Yellow     Appearance, UA Clear     pH, UA <=5.0     Specific Gravity, UA 1.022     Glucose,  mg/dL (Trace)     Ketones, UA 40 mg/dL (2+)     Bilirubin, UA Negative     Blood, UA Negative     Protein,  mg/dL (2+)     Leuk Esterase, UA Negative     Nitrite, UA Negative     Urobilinogen, UA 0.2 E.U./dL    Narrative:      In absence of clinical symptoms, the presence of pyuria, bacteria, and/or nitrites on the urinalysis result does not correlate with infection.    Urinalysis, Microscopic Only - Straight Cath [[id number]]  (Abnormal) Collected: 08/25/24 1502    Specimen: Urine from Straight Cath Updated: 08/25/24 1519     RBC, UA 3-5 /HPF      WBC, UA  6-10 /HPF      Bacteria, UA None Seen /HPF      Squamous Epithelial Cells, UA 0-2 /HPF      Hyaline Casts, UA 7-12 /LPF      Methodology Automated Microscopy    Urine Culture - Urine, Straight Cath [[id number]] Collected: 08/25/24 1502    Specimen: Urine from Straight Cath Updated: 08/25/24 1518    High Sensitivity Troponin T 2Hr [[id number]]  (Normal) Collected: 08/25/24 1644    Specimen: Blood Updated: 08/25/24 1707     HS Troponin T 9 ng/L      Troponin T Delta 0 ng/L     Narrative:      High Sensitive Troponin T Reference Range:  <14.0 ng/L- Negative Female for AMI  <22.0 ng/L- Negative Male for AMI  >=14 - Abnormal Female indicating possible myocardial injury.  >=22 - Abnormal Male indicating possible myocardial injury.   Clinicians would have to utilize clinical acumen, EKG, Troponin, and serial changes to determine if it is an Acute Myocardial Infarction or myocardial injury due to an underlying chronic condition.               CT Head Without Contrast   Final Result   1. No acute intracranial abnormality is seen.               This report was signed and finalized on 8/25/2024 4:39 PM by Dr. Luis Wells MD.          CT Abdomen Pelvis With Contrast   Final Result   1. No acute abnormality is seen within the abdomen or pelvis.       This report was signed and finalized on 8/25/2024 5:03 PM by Dr. Luis Wells MD.          XR Chest 1 View   Final Result   1. No focal acute lung disease.   2. No evidence of aspiration.               This report was signed and finalized on 8/25/2024 3:11 PM by Dr. Luis Wells MD.                ED Course  ED Course as of 08/25/24 1754   Sun Aug 25, 2024   1753 Hospitalist is agreeable to admission. [AJ]      ED Course User Index  [AJ] Kimberli Workman DO                                             Medical Decision Making  Differential diagnosis includes: Ulcer, acute rhabdomyolysis, urinary tract infection, CVA, hypoglycemia    Problems Addressed:  Altered mental  status, unspecified altered mental status type: complicated acute illness or injury  Hypoglycemia: complicated acute illness or injury  Nausea and vomiting, unspecified vomiting type: complicated acute illness or injury    Amount and/or Complexity of Data Reviewed  Labs: ordered.     Details: CBC CMP lactic lipase  Radiology: ordered.     Details: Chest x-ray    Risk  Prescription drug management.  Decision regarding hospitalization.        Final diagnoses:   Nausea and vomiting, unspecified vomiting type   Altered mental status, unspecified altered mental status type   Hypoglycemia       ED Disposition  ED Disposition       ED Disposition   Decision to Admit    Condition   --    Comment   Level of Care: Med/Surg [1]   Diagnosis: AMS (altered mental status) [[id number]]   Admitting Physician: LIV DOUGLASS [[id number]]                 No follow-up provider specified.       Medication List      No changes were made to your prescriptions during this visit.            Kimberli Workman DO  08/25/24 1602       Kimberli Workman DO  08/25/24 1287

## 2024-08-25 NOTE — ED NOTES
Nursing report ED to floor  Ld Green  63 y.o.  male    HPI:   Chief Complaint   Patient presents with    Hypoglycemia       Admitting doctor:   Dell Olmstead MD    Consulting provider(s):  Consults       No orders found from 7/27/2024 to 8/26/2024.             Admitting diagnosis:   The primary encounter diagnosis was Nausea and vomiting, unspecified vomiting type. Diagnoses of Altered mental status, unspecified altered mental status type and Hypoglycemia were also pertinent to this visit.    Code status:   Current Code Status       Date Active Code Status Order ID Comments User Context       8/25/2024 1831 No CPR (Do Not Attempt to Resuscitate) [id number]  Dell Olmstead MD ED        Question Answer    Code Status (Patient has no pulse and is not breathing) No CPR (Do Not Attempt to Resuscitate)    Medical Interventions (Patient has pulse or is breathing) Limited Support    Medical Intervention Limits: No intubation (DNI)     No cardioversion    Comments No CPR                    Allergies:   Codeine    Intake and Output  No intake or output data in the 24 hours ending 08/25/24 1836    Weight:       08/25/24  1432   Weight: 109 kg (240 lb)       Most recent vitals:   Vitals:    08/25/24 1551 08/25/24 1606 08/25/24 1611 08/25/24 1616   BP: 143/87 139/82 129/83 140/81   Pulse: 79 84 84 85   Resp:       Temp:       TempSrc:       SpO2: 96% 94% 95% 94%   Weight:       Height:         Oxygen Therapy: .    Active LDAs/IV Access:   Lines, Drains & Airways       Active LDAs       Name Placement date Placement time Site Days    Peripheral IV 08/25/24 Right Antecubital 08/25/24  --  Antecubital  less than 1                    Labs (abnormal labs have a star):   Labs Reviewed   COMPREHENSIVE METABOLIC PANEL - Abnormal; Notable for the following components:       Result Value    Glucose 121 (*)     Creatinine 0.49 (*)     Alkaline Phosphatase 170 (*)     BUN/Creatinine Ratio 38.8 (*)     All other components  within normal limits    Narrative:     GFR Normal >60  Chronic Kidney Disease <60  Kidney Failure <15     URINALYSIS W/ CULTURE IF INDICATED - Abnormal; Notable for the following components:    Glucose,  mg/dL (Trace) (*)     Ketones, UA 40 mg/dL (2+) (*)     Protein,  mg/dL (2+) (*)     All other components within normal limits    Narrative:     In absence of clinical symptoms, the presence of pyuria, bacteria, and/or nitrites on the urinalysis result does not correlate with infection.   CBC WITH AUTO DIFFERENTIAL - Abnormal; Notable for the following components:    WBC 27.22 (*)     Neutrophil % 86.5 (*)     Lymphocyte % 3.6 (*)     Eosinophil % 0.0 (*)     Immature Grans % 2.2 (*)     Neutrophils, Absolute 23.53 (*)     Monocytes, Absolute 1.97 (*)     Immature Grans, Absolute 0.59 (*)     All other components within normal limits   URINALYSIS, MICROSCOPIC ONLY - Abnormal; Notable for the following components:    RBC, UA 3-5 (*)     WBC, UA 6-10 (*)     All other components within normal limits   CK - Abnormal; Notable for the following components:    Creatine Kinase 372 (*)     All other components within normal limits   POCT GLUCOSE FINGERSTICK - Abnormal; Notable for the following components:    Glucose 139 (*)     All other components within normal limits   LACTIC ACID, PLASMA - Normal   LIPASE - Normal   TROPONIN - Normal    Narrative:     High Sensitive Troponin T Reference Range:  <14.0 ng/L- Negative Female for AMI  <22.0 ng/L- Negative Male for AMI  >=14 - Abnormal Female indicating possible myocardial injury.  >=22 - Abnormal Male indicating possible myocardial injury.   Clinicians would have to utilize clinical acumen, EKG, Troponin, and serial changes to determine if it is an Acute Myocardial Infarction or myocardial injury due to an underlying chronic condition.        HIGH SENSITIVITIY TROPONIN T 2HR - Normal    Narrative:     High Sensitive Troponin T Reference Range:  <14.0 ng/L-  Negative Female for AMI  <22.0 ng/L- Negative Male for AMI  >=14 - Abnormal Female indicating possible myocardial injury.  >=22 - Abnormal Male indicating possible myocardial injury.   Clinicians would have to utilize clinical acumen, EKG, Troponin, and serial changes to determine if it is an Acute Myocardial Infarction or myocardial injury due to an underlying chronic condition.        URINE CULTURE   BLOOD CULTURE   BLOOD CULTURE   RAINBOW DRAW    Narrative:     The following orders were created for panel order [facility] [place] Draw.  Procedure                               Abnormality         Status                     ---------                               -----------         ------                     Green Top (Gel)[[id number]]                                  Final result               Lavender Top[[id number]]                                     Final result               Gold Top - SST[[id number]]                                   Final result               Gray Top[[id number]]                                         Final result               Light Blue Top[[id number]]                                   Final result                 Please view results for these tests on the individual orders.   POCT GLUCOSE FINGERSTICK   GREEN TOP   LAVENDER TOP   GOLD TOP - SST   GRAY TOP   LIGHT BLUE TOP   CBC AND DIFFERENTIAL    Narrative:     The following orders were created for panel order CBC & Differential.  Procedure                               Abnormality         Status                     ---------                               -----------         ------                     CBC Auto Differential[[id number]]        Abnormal            Final result                 Please view results for these tests on the individual orders.       Meds given in ED:   Medications   piperacillin-tazobactam (ZOSYN) 4.5 g IVPB in 100 mL NS MBP (CD) (4.5 g Intravenous New Bag 8/25/24 6311)   tobramycin 0.3 % ophthalmic solution 2 drop (2 drops Left  Eye Given 8/25/24 1545)   iopamidol (ISOVUE-300) 61 % injection 100 mL (100 mL Intravenous Given 8/25/24 1627)   tetracaine (ALTACAINE) 0.5 % ophthalmic solution 2 drop (2 drops Left Eye Given 8/25/24 1736)           NIH Stroke Scale:       Isolation/Infection(s):  No active isolations   No active infections     COVID Testing  Collected .  Resulted .    Nursing report ED to floor:  Mental status: .a&ox4. Disoriented to situation  Ambulatory status: as tolerated  Precautions: .fall    ED nurse phone extentsion- ..

## 2024-08-25 NOTE — H&P
[facility]  HISTORY AND PHYSICAL    Date of Admission: 8/25/2024  Primary Care Physician: Xiomara Neal APRN    Subjective   Primary Historian: Patient    Chief Complaint: Passed out    History of Present Illness  Mr. Green is a 63-year-old male from home with past medical history of diabetes mellitus, hypertension, sleep apnea, hyperlipidemia, depression, coronary artery disease, history of bladder cancer, bipolar disorder, and kidney stones, who was brought to the hospital because he was found unconscious in his apartment.  Patient could not relate history, but was told he was found unconscious in his apartment and EMS found his blood sugar to be low.  Patient gave a history of low blood sugars in the morning for a while [ranging from the 40s to the 50s], he takes metformin, Levemir at night and sliding scale insulin for diabetes melitis.  In the emergency room, chest x-ray, CT of the head and CT of the abdomen did not show any acute findings, but he has elevated white cell count.    Review of Systems   Otherwise complete ROS reviewed and negative except as mentioned in the HPI.    Past Medical History:   Past Medical History:   Diagnosis Date    Anxiety     Atherosclerosis of coronary artery     Bipolar disorder     Bladder cancer     Colon polyp     Coronary artery disease     Depression     Diabetes mellitus     Hemorrhoids     Hyperlipidemia     Hypertension     Nephrolithiasis     Sleep apnea      Past Surgical History:  Past Surgical History:   Procedure Laterality Date    ANGIOPLASTY      with stent placement    BACK SURGERY      BLADDER SURGERY      cancer    CARDIAC CATHETERIZATION      COLONOSCOPY  04/13/2015    COLONOSCOPY N/A 4/30/2018    Procedure: COLONOSCOPY WITH ANESTHESIA;  Surgeon: Gerson Martini MD;  Location: [facility] ENDOSCOPY;  Service: Gastroenterology    COLONOSCOPY N/A 4/8/2021    Procedure: COLONOSCOPY WITH ANESTHESIA;  Surgeon:  Gerson Martini MD;  Location: [facility] ENDOSCOPY;  Service: Gastroenterology;  Laterality: N/A;  Pre: Hx Colon Polyps  Post: divertivculosis   Dr. Garza  CO2 Inflation Used    CORONARY ARTERY BYPASS GRAFT      KNEE SURGERY      SHOULDER SURGERY      x 2    SINUS SURGERY       Social History:  reports that he has been smoking cigarettes. He has never used smokeless tobacco. He reports current alcohol use. He reports that he does not use drugs.    Family History: family history is not on file. He was adopted.       Allergies:  Allergies   Allergen Reactions    Codeine Nausea Only       Medications:  Prior to Admission medications    Medication Sig Start Date End Date Taking? Authorizing Provider   amLODIPine (NORVASC) 10 MG tablet Take 1 tablet by mouth Every Night.    Rizwan Peralta MD   atorvastatin (LIPITOR) 40 MG tablet Take 1 tablet by mouth Every Night. 7/19/21   Emergency, Nurse Dee, RN   clopidogrel (PLAVIX) 75 MG tablet Take 1 tablet by mouth Every Night. 4/26/16   Rizwan Peralta MD   Continuous Glucose Sensor (FreeStyle Honorio 2 Sensor) misc Use 1 each Every 14 (Fourteen) Days.    Rizwan Peralta MD   cyclobenzaprine (FLEXERIL) 10 MG tablet Take 1 tablet by mouth 3 (Three) Times a Day As Needed for Muscle Spasms.    Rizwan Peralta MD   Dextromethorphan-buPROPion ER (AUVELITY)  MG tablet controlled-release Take 1 tablet by mouth 2 (Two) Times a Day.    Rizwan Peralta MD   eszopiclone (LUNESTA) 3 MG tablet Take 1 tablet by mouth Every Night. Take immediately before bedtime    Rizwan Peralta MD   furosemide (LASIX) 40 MG tablet Take 1 tablet by mouth Every Night.    Rizwan Peralta MD   insulin detemir (LEVEMIR) 100 UNIT/ML injection Inject 40 Units under the skin into the appropriate area as directed Daily.    Rizwan Peralta MD   insulin regular (humuLIN R,novoLIN R) 100 UNIT/ML injection Inject 10 Units under the skin into the appropriate  "area as directed 3 (Three) Times a Day Before Meals.    Rizwan Peralta MD   isosorbide mononitrate (IMDUR) 60 MG 24 hr tablet Take 1 tablet by mouth Every Night. 8/16/16   Rizwan Peralta MD   lisinopril (PRINIVIL,ZESTRIL) 40 MG tablet Take 1 tablet by mouth Daily.    Rizwan Peralta MD   metoprolol succinate XL (TOPROL-XL) 200 MG 24 hr tablet Take 1 tablet by mouth Every Night. 6/9/16   Rizwan Peralta MD   naloxone (NARCAN) 4 MG/0.1ML nasal spray 1 spray into the nostril(s) as directed by provider As Needed for Opioid Reversal. Call 911. Don't prime. Dix in 1 nostril for overdose. Repeat in 2-3 minutes in other nostril if no or minimal breathing/responsiveness.    Rizwan Peralta MD   nitroglycerin (NITROSTAT) 0.4 MG SL tablet Place 1 tablet under the tongue Every 5 (Five) Minutes As Needed for Chest Pain. 7/22/16   Rizwan Peralta MD   oxyCODONE-acetaminophen (PERCOCET)  MG per tablet Take 1 tablet by mouth 3 times daily  8/5/16   Rizwan Peralta MD   Semaglutide, 2 MG/DOSE, (Ozempic, 2 MG/DOSE,) 8 MG/3ML solution pen-injector Inject 1 mg under the skin into the appropriate area as directed 1 (One) Time Per Week.    Rizwan Peralta MD     I have utilized all available immediate resources to obtain, update, or review the patient's current medications (including all prescriptions, over-the-counter products, herbals, cannabis/cannabidiol products, and vitamin/mineral/dietary (nutritional) supplements).    Objective     Vital Signs: /81   Pulse 85   Temp 97.9 °F (36.6 °C) (Oral)   Resp 16   Ht 177.8 cm (70\")   Wt 109 kg (240 lb)   SpO2 94%   BMI 34.44 kg/m²   Physical Exam   GEN: Awake, alert, interactive, in NAD  HEENT: Atraumatic, PERRLA, EOMI, Anicteric, Trachea midline  Lungs: CTAB, no wheezing/rales/rhonchi  Heart: RRR, +S1/s2, no rub  ABD: soft, nt/nd, +BS, no guarding/rebound  Extremities: atraumatic, no cyanosis, no edema  Skin: no rashes " or lesions  Neuro: AAOx3, no focal deficits  Psych: normal mood & affect    Results Reviewed:  Lab Results (last 24 hours)       Procedure Component Value Units Date/Time    Blood Culture - Blood, Hand, Left [[id number]] Collected: 08/25/24 1820    Specimen: Blood from Hand, Left Updated: 08/25/24 1832    Blood Culture - Blood, Hand, Right [[id number]] Collected: 08/25/24 1824    Specimen: Blood from Hand, Right Updated: 08/25/24 1832    High Sensitivity Troponin T 2Hr [[id number]]  (Normal) Collected: 08/25/24 1644    Specimen: Blood Updated: 08/25/24 1707     HS Troponin T 9 ng/L      Troponin T Delta 0 ng/L     Narrative:      High Sensitive Troponin T Reference Range:  <14.0 ng/L- Negative Female for AMI  <22.0 ng/L- Negative Male for AMI  >=14 - Abnormal Female indicating possible myocardial injury.  >=22 - Abnormal Male indicating possible myocardial injury.   Clinicians would have to utilize clinical acumen, EKG, Troponin, and serial changes to determine if it is an Acute Myocardial Infarction or myocardial injury due to an underlying chronic condition.         CK [[id number]]  (Abnormal) Collected: 08/25/24 1442    Specimen: Blood Updated: 08/25/24 1619     Creatine Kinase 372 U/L     Comprehensive Metabolic Panel [[id number]]  (Abnormal) Collected: 08/25/24 1442    Specimen: Blood Updated: 08/25/24 1528     Glucose 121 mg/dL      BUN 19 mg/dL      Creatinine 0.49 mg/dL      Sodium 139 mmol/L      Potassium 4.4 mmol/L      Comment: Slight hemolysis detected by analyzer. Result may be falsely elevated.        Chloride 103 mmol/L      CO2 25.0 mmol/L      Calcium 9.4 mg/dL      Total Protein 7.8 g/dL      Albumin 4.2 g/dL      ALT (SGPT) 20 U/L      AST (SGOT) 26 U/L      Alkaline Phosphatase 170 U/L      Total Bilirubin 1.1 mg/dL      Globulin 3.6 gm/dL      A/G Ratio 1.2 g/dL      BUN/Creatinine Ratio 38.8     Anion Gap 11.0 mmol/L      eGFR 116.0 mL/min/1.73     Narrative:      GFR Normal >60  Chronic Kidney  Disease <60  Kidney Failure <15      Lactic Acid, Plasma [[id number]]  (Normal) Collected: 08/25/24 1442    Specimen: Blood Updated: 08/25/24 1524     Lactate 0.8 mmol/L     Lipase [[id number]]  (Normal) Collected: 08/25/24 1442    Specimen: Blood Updated: 08/25/24 1523     Lipase 17 U/L     High Sensitivity Troponin T [[id number]]  (Normal) Collected: 08/25/24 1442    Specimen: Blood Updated: 08/25/24 1523     HS Troponin T 9 ng/L     Narrative:      High Sensitive Troponin T Reference Range:  <14.0 ng/L- Negative Female for AMI  <22.0 ng/L- Negative Male for AMI  >=14 - Abnormal Female indicating possible myocardial injury.  >=22 - Abnormal Male indicating possible myocardial injury.   Clinicians would have to utilize clinical acumen, EKG, Troponin, and serial changes to determine if it is an Acute Myocardial Infarction or myocardial injury due to an underlying chronic condition.         Urinalysis, Microscopic Only - Straight Cath [[id number]]  (Abnormal) Collected: 08/25/24 1502    Specimen: Urine from Straight Cath Updated: 08/25/24 1519     RBC, UA 3-5 /HPF      WBC, UA 6-10 /HPF      Bacteria, UA None Seen /HPF      Squamous Epithelial Cells, UA 0-2 /HPF      Hyaline Casts, UA 7-12 /LPF      Methodology Automated Microscopy    Urinalysis With Culture If Indicated - Straight Cath [[id number]]  (Abnormal) Collected: 08/25/24 1502    Specimen: Urine from Straight Cath Updated: 08/25/24 1519     Color, UA Yellow     Appearance, UA Clear     pH, UA <=5.0     Specific Gravity, UA 1.022     Glucose,  mg/dL (Trace)     Ketones, UA 40 mg/dL (2+)     Bilirubin, UA Negative     Blood, UA Negative     Protein,  mg/dL (2+)     Leuk Esterase, UA Negative     Nitrite, UA Negative     Urobilinogen, UA 0.2 E.U./dL    Narrative:      In absence of clinical symptoms, the presence of pyuria, bacteria, and/or nitrites on the urinalysis result does not correlate with infection.    Urine Culture - Urine, Straight Cath  [[id number]] Collected: 08/25/24 1502    Specimen: Urine from Straight Cath Updated: 08/25/24 1518    [place] Draw [[id number]] Collected: 08/25/24 1442    Specimen: Blood Updated: 08/25/24 1516    Narrative:      The following orders were created for panel order [place] Draw.  Procedure                               Abnormality         Status                     ---------                               -----------         ------                     Green Top (Gel)[[id number]]                                  Final result               Lavender Top[[id number]]                                     Final result               Gold Top - SST[[id number]]                                   Final result               Gray Top[[id number]]                                         Final result               Light Blue Top[[id number]]                                   Final result                 Please view results for these tests on the individual orders.    Green Top (Gel) [[id number]] Collected: 08/25/24 1442    Specimen: Blood Updated: 08/25/24 1516     Extra Tube Hold for add-ons.     Comment: Auto resulted.       Lavender Top [[id number]] Collected: 08/25/24 1442    Specimen: Blood Updated: 08/25/24 1516     Extra Tube hold for add-on     Comment: Auto resulted       Gold Top - SST [[id number]] Collected: 08/25/24 1442    Specimen: Blood Updated: 08/25/24 1516     Extra Tube Hold for add-ons.     Comment: Auto resulted.       Gray Top [[id number]] Collected: 08/25/24 1442    Specimen: Blood Updated: 08/25/24 1516     Extra Tube Hold for add-ons.     Comment: Auto resulted.       Light Blue Top [[id number]] Collected: 08/25/24 1442    Specimen: Blood Updated: 08/25/24 1516     Extra Tube Hold for add-ons.     Comment: Auto resulted       CBC & Differential [[id number]]  (Abnormal) Collected: 08/25/24 1442    Specimen: Blood Updated: 08/25/24 1510    Narrative:      The following orders were created for panel order CBC &  Differential.  Procedure                               Abnormality         Status                     ---------                               -----------         ------                     CBC Auto Differential[[id number]]        Abnormal            Final result                 Please view results for these tests on the individual orders.    CBC Auto Differential [[id number]]  (Abnormal) Collected: 08/25/24 1442    Specimen: Blood Updated: 08/25/24 1510     WBC 27.22 10*3/mm3      RBC 5.27 10*6/mm3      Hemoglobin 16.4 g/dL      Hematocrit 49.7 %      MCV 94.3 fL      MCH 31.1 pg      MCHC 33.0 g/dL      RDW 12.8 %      RDW-SD 44.6 fl      MPV 10.2 fL      Platelets 243 10*3/mm3      Neutrophil % 86.5 %      Lymphocyte % 3.6 %      Monocyte % 7.2 %      Eosinophil % 0.0 %      Basophil % 0.5 %      Immature Grans % 2.2 %      Neutrophils, Absolute 23.53 10*3/mm3      Lymphocytes, Absolute 0.98 10*3/mm3      Monocytes, Absolute 1.97 10*3/mm3      Eosinophils, Absolute 0.01 10*3/mm3      Basophils, Absolute 0.14 10*3/mm3      Immature Grans, Absolute 0.59 10*3/mm3      nRBC 0.0 /100 WBC     POC Glucose Once [[id number]]  (Abnormal) Collected: 08/25/24 1431    Specimen: Blood Updated: 08/25/24 1443     Glucose 139 mg/dL      Comment: : [id number] EmilyMeter ID: [id number]             Imaging Results (Last 24 Hours)       Procedure Component Value Units Date/Time    CT Abdomen Pelvis With Contrast [[id number]] Collected: 08/25/24 1700     Updated: 08/25/24 1706    Narrative:      EXAMINATION: CT ABDOMEN PELVIS W CONTRAST-      8/25/2024 3:19 PM     HISTORY: Altered mental status. Nausea and vomiting.     In order to have a CT radiation dose as low as reasonably achievable  Automated Exposure Control was utilized for adjustment of the mA and/or  KV according to patient size.     CT Dose DLP = 2367.66 mGy.cm.  (If there are multiple studies performed at the same time this  represents the total dose).      Abdomen/pelvis CT with IV contrast injection.  Axial, sagittal, and coronal sequences.     No comparison.     Heart size is within normal limits.  There is mild patchy LEFT lower lobe infiltrate compatible with  pneumonia.     Normal liver, gallbladder, pancreas, spleen, and adrenal glands.  Normal LEFT kidney.  Nonobstructing 5 mm RIGHT renal stone.  35 mm RIGHT renal cyst.     Aortic calcification with no aneurysm.  No bowel dilation.  No appendicitis.  No diverticulitis or colitis.     RIGHT hydrocele incidentally noted.  L5-S1 discectomy changes with pedicle screw hardware fusion.  Thoracic stimulator leads noted.       Impression:      1. No acute abnormality is seen within the abdomen or pelvis.     This report was signed and finalized on 8/25/2024 5:03 PM by Dr. Luis Wells MD.       CT Head Without Contrast [[id number]] Collected: 08/25/24 1637     Updated: 08/25/24 1642    Narrative:      EXAMINATION: CT HEAD WO CONTRAST-      8/25/2024 3:19 PM     HISTORY: Altered mental status.     In order to have a CT radiation dose as low as reasonably achievable  Automated Exposure Control was utilized for adjustment of the mA and/or  KV according to patient size.     CT Dose DLP = 2367.66 mGy.cm.  (If there are multiple studies performed at the same time this  represents the total dose).     Comparison:  7/13/2024.     Axial, sagittal, and coronal noncontrast CT imaging of the head.     The visualized paranasal sinuses are clear.     The brain and ventricles have an age appropriate appearance.   Mild small vessel disease.  There is no hemorrhage or mass-effect.   No acute infarction is seen.     No calvarial abnormality.       Impression:      1. No acute intracranial abnormality is seen.           This report was signed and finalized on 8/25/2024 4:39 PM by Dr. Luis Wells MD.       XR Chest 1 View [[id number]] Collected: 08/25/24 1510     Updated: 08/25/24 1514    Narrative:      EXAMINATION: XR CHEST 1 VW-      8/25/2024 2:00 PM     HISTORY: Vomiting, concern for aspiration.     1 view chest x-ray.     COMPARISON:  7/13/2024.     Median sternotomy changes.  Magnified heart size.     Fully expanded lungs with no focal infiltrate.  No pneumothorax or heart failure.       Impression:      1. No focal acute lung disease.  2. No evidence of aspiration.           This report was signed and finalized on 8/25/2024 3:11 PM by Dr. Luis Wells MD.             I have personally reviewed and interpreted the radiology studies and ECG obtained at time of admission.     Assessment / Plan   Assessment:   Active Hospital Problems    Diagnosis     **AMS (altered mental status)        Treatment Plan  The patient will be admitted to my service here at [facility].     -Altered mental status secondary to hypoglycemia  Patient's mental status has improved to baseline.    -Hypoglycemia secondary to nocturnal Levemir  Patient takes metformin, sliding scale insulin and nocturnal Levemir, he reported hypoglycemia in the 40s to 50s at home.  We will hold off on home medications and use sliding scale insulin only.  We will get hemoglobin A1c for further evaluation.  Levemir dose will probably be adjusted at discharge [outpatient may be taking it in the morning to monitor blood sugar during the day]    -Leukocytosis  Patient has no sign of infection, this is likely reactionary.  He was given 1 dose of Zosyn in the emergency room, we will hold off on antibiotics for now and monitor closely.  Chest x-ray and abdominal/pelvic CT were negative, we will get blood cultures as well.    -Mild rhabdomyolysis  Patient CPK was mildly elevated, will start on IV fluid and follow serial CPK levels    Other chronic medical conditions-  Hypertension  Sleep apnea  Hyperlipidemia  Coronary artery disease  Depression/anxiety/bipolar disorder  History of bladder cancer    DVT prophylaxis-Lovenox    Medical Decision Making  Number and Complexity of  problems: High  Differential Diagnosis: Hypoglycemia, occult infection    Conditions and Status        Condition is unchanged.     MDM Data  External documents reviewed: No  Cardiac tracing (EKG, telemetry) interpretation: Yes  Radiology interpretation: Yes  Labs reviewed: Yes  Any tests that were considered but not ordered: No     Decision rules/scores evaluated (example [id number], Wells, etc): No     Discussed with: Patient     Care Planning  Shared decision making: Yes  Code status and discussions: Yes/DNR    Disposition  Social Determinants of Health that impact treatment or disposition: No  Estimated length of stay is 1 to 3 days.     I confirmed that the patient's advanced care plan is present, code status is documented, and a surrogate decision maker is listed in the patient's medical record.     The patient's surrogate decision maker is none [patient has no POA and has deferred that call to attending physician].     The patient was seen and examined by me on 8/25/2024 at 6:10 PM.    Electronically signed by eDll Olmstead MD, 08/25/24, 18:37 CDT.

## 2024-08-26 PROBLEM — E11.9 TYPE 2 DIABETES MELLITUS, WITH LONG-TERM CURRENT USE OF INSULIN: Status: ACTIVE | Noted: 2024-08-26

## 2024-08-26 PROBLEM — D72.829 LEUCOCYTOSIS: Status: ACTIVE | Noted: 2024-08-26

## 2024-08-26 PROBLEM — M62.82 NON-TRAUMATIC RHABDOMYOLYSIS: Status: ACTIVE | Noted: 2024-08-26

## 2024-08-26 PROBLEM — Z79.4 TYPE 2 DIABETES MELLITUS, WITH LONG-TERM CURRENT USE OF INSULIN: Status: ACTIVE | Noted: 2024-08-26

## 2024-08-26 PROBLEM — H11.422: Status: ACTIVE | Noted: 2024-08-26

## 2024-08-26 LAB
ANION GAP SERPL CALCULATED.3IONS-SCNC: 8 MMOL/L (ref 5–15)
BACTERIA BLD CULT: ABNORMAL
BOTTLE TYPE: ABNORMAL
BUN SERPL-MCNC: 15 MG/DL (ref 8–23)
BUN/CREAT SERPL: 22.4 (ref 7–25)
CALCIUM SPEC-SCNC: 8.9 MG/DL (ref 8.6–10.5)
CHLORIDE SERPL-SCNC: 104 MMOL/L (ref 98–107)
CK SERPL-CCNC: 279 U/L (ref 20–200)
CO2 SERPL-SCNC: 24 MMOL/L (ref 22–29)
CREAT SERPL-MCNC: 0.67 MG/DL (ref 0.76–1.27)
DEPRECATED RDW RBC AUTO: 45 FL (ref 37–54)
EGFRCR SERPLBLD CKD-EPI 2021: 105.6 ML/MIN/1.73
ERYTHROCYTE [DISTWIDTH] IN BLOOD BY AUTOMATED COUNT: 13 % (ref 12.3–15.4)
GLUCOSE BLDC GLUCOMTR-MCNC: 104 MG/DL (ref 70–130)
GLUCOSE BLDC GLUCOMTR-MCNC: 130 MG/DL (ref 70–130)
GLUCOSE BLDC GLUCOMTR-MCNC: 140 MG/DL (ref 70–130)
GLUCOSE SERPL-MCNC: 99 MG/DL (ref 65–99)
HBA1C MFR BLD: 5.4 % (ref 4.8–5.6)
HCT VFR BLD AUTO: 42.1 % (ref 37.5–51)
HGB BLD-MCNC: 14 G/DL (ref 13–17.7)
MCH RBC QN AUTO: 31.5 PG (ref 26.6–33)
MCHC RBC AUTO-ENTMCNC: 33.3 G/DL (ref 31.5–35.7)
MCV RBC AUTO: 94.6 FL (ref 79–97)
PLATELET # BLD AUTO: 217 10*3/MM3 (ref 140–450)
PMV BLD AUTO: 10.8 FL (ref 6–12)
POTASSIUM SERPL-SCNC: 4 MMOL/L (ref 3.5–5.2)
RBC # BLD AUTO: 4.45 10*6/MM3 (ref 4.14–5.8)
SODIUM SERPL-SCNC: 136 MMOL/L (ref 136–145)
WBC NRBC COR # BLD AUTO: 18.34 10*3/MM3 (ref 3.4–10.8)

## 2024-08-26 PROCEDURE — G0378 HOSPITAL OBSERVATION PER HR: HCPCS

## 2024-08-26 PROCEDURE — 96365 THER/PROPH/DIAG IV INF INIT: CPT

## 2024-08-26 PROCEDURE — 96366 THER/PROPH/DIAG IV INF ADDON: CPT

## 2024-08-26 PROCEDURE — 94799 UNLISTED PULMONARY SVC/PX: CPT

## 2024-08-26 PROCEDURE — 82550 ASSAY OF CK (CPK): CPT | Performed by: INTERNAL MEDICINE

## 2024-08-26 PROCEDURE — 83036 HEMOGLOBIN GLYCOSYLATED A1C: CPT

## 2024-08-26 PROCEDURE — 96361 HYDRATE IV INFUSION ADD-ON: CPT

## 2024-08-26 PROCEDURE — 94761 N-INVAS EAR/PLS OXIMETRY MLT: CPT

## 2024-08-26 PROCEDURE — 25810000003 SODIUM CHLORIDE 0.9 % SOLUTION 500 ML FLEX CONT

## 2024-08-26 PROCEDURE — 96372 THER/PROPH/DIAG INJ SC/IM: CPT

## 2024-08-26 PROCEDURE — 25010000002 VANCOMYCIN 1 G RECONSTITUTED SOLUTION 1 EACH VIAL

## 2024-08-26 PROCEDURE — 94660 CPAP INITIATION&MGMT: CPT

## 2024-08-26 PROCEDURE — 80048 BASIC METABOLIC PNL TOTAL CA: CPT | Performed by: INTERNAL MEDICINE

## 2024-08-26 PROCEDURE — 85027 COMPLETE CBC AUTOMATED: CPT

## 2024-08-26 PROCEDURE — 25810000003 SODIUM CHLORIDE 0.9 % SOLUTION: Performed by: INTERNAL MEDICINE

## 2024-08-26 PROCEDURE — 82948 REAGENT STRIP/BLOOD GLUCOSE: CPT

## 2024-08-26 PROCEDURE — 25010000002 ENOXAPARIN PER 10 MG: Performed by: INTERNAL MEDICINE

## 2024-08-26 RX ORDER — INSULIN LISPRO 100 [IU]/ML
2-7 INJECTION, SOLUTION INTRAVENOUS; SUBCUTANEOUS
Status: DISCONTINUED | OUTPATIENT
Start: 2024-08-26 | End: 2024-08-28 | Stop reason: HOSPADM

## 2024-08-26 RX ORDER — VANCOMYCIN/0.9 % SOD CHLORIDE 1.5G/250ML
1500 PLASTIC BAG, INJECTION (ML) INTRAVENOUS EVERY 12 HOURS
Status: DISCONTINUED | OUTPATIENT
Start: 2024-08-27 | End: 2024-08-27

## 2024-08-26 RX ORDER — SEMAGLUTIDE 2.68 MG/ML
0.5 INJECTION, SOLUTION SUBCUTANEOUS WEEKLY
Status: CANCELLED
Start: 2024-08-26

## 2024-08-26 RX ADMIN — OXYCODONE AND ACETAMINOPHEN 1 TABLET: 325; 10 TABLET ORAL at 20:17

## 2024-08-26 RX ADMIN — POLYVINYL ALCOHOL, POVIDONE 1 DROP: 14; 6 SOLUTION/ DROPS OPHTHALMIC at 06:16

## 2024-08-26 RX ADMIN — CLOPIDOGREL BISULFATE 75 MG: 75 TABLET, FILM COATED ORAL at 20:18

## 2024-08-26 RX ADMIN — VANCOMYCIN HYDROCHLORIDE 2250 MG: 1 INJECTION, POWDER, LYOPHILIZED, FOR SOLUTION INTRAVENOUS at 15:54

## 2024-08-26 RX ADMIN — ISOSORBIDE MONONITRATE 60 MG: 60 TABLET, EXTENDED RELEASE ORAL at 20:17

## 2024-08-26 RX ADMIN — AMLODIPINE BESYLATE 10 MG: 10 TABLET ORAL at 20:17

## 2024-08-26 RX ADMIN — Medication 10 ML: at 20:18

## 2024-08-26 RX ADMIN — OXYCODONE AND ACETAMINOPHEN 1 TABLET: 325; 10 TABLET ORAL at 08:00

## 2024-08-26 RX ADMIN — Medication 10 ML: at 08:01

## 2024-08-26 RX ADMIN — FAMOTIDINE 40 MG: 20 TABLET, FILM COATED ORAL at 08:00

## 2024-08-26 RX ADMIN — LISINOPRIL 40 MG: 20 TABLET ORAL at 08:00

## 2024-08-26 RX ADMIN — POLYETHYLENE GLYCOL AND PROPYLENE GLYCOL 2 DROP: 4; 3 SOLUTION/ DROPS OPHTHALMIC at 17:15

## 2024-08-26 RX ADMIN — PHENOL 1 SPRAY: 1.5 LIQUID ORAL at 17:12

## 2024-08-26 RX ADMIN — ATORVASTATIN CALCIUM 40 MG: 40 TABLET ORAL at 20:17

## 2024-08-26 RX ADMIN — SODIUM CHLORIDE 100 ML/HR: 9 INJECTION, SOLUTION INTRAVENOUS at 06:10

## 2024-08-26 RX ADMIN — ENOXAPARIN SODIUM 40 MG: 100 INJECTION SUBCUTANEOUS at 08:00

## 2024-08-26 RX ADMIN — POLYVINYL ALCOHOL, POVIDONE 1 DROP: 14; 6 SOLUTION/ DROPS OPHTHALMIC at 04:05

## 2024-08-26 RX ADMIN — METOPROLOL SUCCINATE 200 MG: 100 TABLET, FILM COATED, EXTENDED RELEASE ORAL at 20:18

## 2024-08-26 RX ADMIN — POLYETHYLENE GLYCOL AND PROPYLENE GLYCOL 2 DROP: 4; 3 SOLUTION/ DROPS OPHTHALMIC at 11:32

## 2024-08-26 RX ADMIN — POLYETHYLENE GLYCOL AND PROPYLENE GLYCOL 2 DROP: 4; 3 SOLUTION/ DROPS OPHTHALMIC at 13:00

## 2024-08-26 RX ADMIN — OXYCODONE AND ACETAMINOPHEN 1 TABLET: 325; 10 TABLET ORAL at 15:54

## 2024-08-26 RX ADMIN — POLYETHYLENE GLYCOL AND PROPYLENE GLYCOL 2 DROP: 4; 3 SOLUTION/ DROPS OPHTHALMIC at 15:57

## 2024-08-26 RX ADMIN — ZOLPIDEM TARTRATE 5 MG: 5 TABLET ORAL at 20:17

## 2024-08-26 NOTE — PROGRESS NOTES
"Pharmacy Dosing Service  Pharmacokinetics  Vancomycin Initial Evaluation  Assessment/Action/Plan:  Loading dose?: 2250 mg  Current Order: Vancomycin 1500 mg IVPB every 12 hours  Current end date:08/27/24  MRSA Nasal PCR ordered: No    Vancomycin dosage initiated based on population pharmacokinetic parameters. Pharmacy will continue to follow daily and adjust dose accordingly. Only 24 hours of therapy currently ordered as hospitalist team will re-evaluate patient tomorrow and follow culture results.      Subjective:  Ld Green is a 63 y.o. male with a Vancomycin \"Pharmacy to Dose\" consult for the treatment of Bacteremia , day 1 of 2 of treatment (adjust pending cultures tomorrow).    AUC Model Data:  Loading dose: 2250 mg at 15:30 08/26/2024.  Regimen: 1500 mg IV every 12 hours.  Start time: 05:30 on 08/27/2024  Exposure target: AUC24 (range)400-600 mg/L.hr   AUC24,ss: 570 mg/L.hr  PAUC*: 75 %  Ctrough,ss: 14.9 mg/L  Pconc*: 37 %  Tox.: 10 %    Objective:  Ht: 177.8 cm (70\"); Wt: 111 kg (244 lb 7.8 oz)  Estimated Creatinine Clearance: 142.6 mL/min (A) (by C-G formula based on SCr of 0.67 mg/dL (L)).   Creatinine   Date Value Ref Range Status   08/26/2024 0.67 (L) 0.76 - 1.27 mg/dL Final   08/25/2024 0.49 (L) 0.76 - 1.27 mg/dL Final   07/13/2024 0.71 (L) 0.76 - 1.27 mg/dL Final   02/14/2022 0.6 0.5 - 1.2 mg/dL Final   12/20/2021 0.8 0.5 - 1.2 mg/dL Final   11/16/2021 0.7 0.5 - 1.2 mg/dL Final      Lab Results   Component Value Date    WBC 18.34 (H) 08/26/2024    WBC 27.22 (H) 08/25/2024    WBC 13.81 (H) 07/13/2024      Baseline culture results:  Microbiology Results (last 10 days)       Procedure Component Value - Date/Time    Blood Culture - Blood, Hand, Left [[id number]]  (Abnormal) Collected: 08/25/24 1820    Lab Status: Preliminary result Specimen: Blood from Hand, Left Updated: 08/26/24 1326     Blood Culture Abnormal Stain     Gram Stain Aerobic Bottle Gram positive cocci in [facility]" [facility]  08/26/24 14:30 CDT

## 2024-08-26 NOTE — CASE MANAGEMENT/SOCIAL WORK
Discharge Planning Assessment   [place]     Patient Name: Ld Green  MRN: [id number]  Today's Date: 8/26/2024    Admit Date: 8/25/2024        Discharge Needs Assessment       Row Name 08/26/24 1543       Living Environment    People in Home alone    Potentially Unsafe Housing Conditions none    In the past 12 months has the electric, gas, oil, or water company threatened to shut off services in your home? No    Primary Care Provided by self    Provides Primary Care For no one    Family Caregiver if Needed other (see comments)    Quality of Family Relationships helpful;involved    Able to Return to Prior Arrangements yes       Resource/Environmental Concerns    Resource/Environmental Concerns none    Transportation Concerns none       Transportation Needs    In the past 12 months, has lack of transportation kept you from medical appointments or from getting medications? no    In the past 12 months, has lack of transportation kept you from meetings, work, or from getting things needed for daily living? No       Food Insecurity    Within the past 12 months, you worried that your food would run out before you got the money to buy more. Never true    Within the past 12 months, the food you bought just didn't last and you didn't have money to get more. Never true       Transition Planning    Patient/Family Anticipates Transition to home;home with help/services    Patient/Family Anticipated Services at Transition home health care;outpatient care    Transportation Anticipated family or friend will provide       Discharge Needs Assessment    Readmission Within the Last 30 Days no previous admission in last 30 days    Equipment Currently Used at Home none    Concerns to be Addressed adjustment to diagnosis/illness    Anticipated Changes Related to Illness none    Equipment Needed After Discharge none    Outpatient/Agency/Support Group Needs homecare agency;outpatient therapy    Discharge Facility/Level of Care Needs  home with home health;outpatient therapy    Discharge Coordination/Progress Will follow up after therapy evaluates to help determine appropriate dc plan. ?HH/outpt/snf      Row Name 08/26/24 1536       Living Environment    People in Home alone    Potentially Unsafe Housing Conditions none                   Discharge Plan    No documentation.                 Continued Care and Services - Admitted Since 8/25/2024    No active coordination exists for this encounter.          Demographic Summary    No documentation.                  Functional Status    No documentation.                  Psychosocial    No documentation.                  Abuse/Neglect    No documentation.                  Legal    No documentation.                  Substance Abuse    No documentation.                  Patient Forms    No documentation.                     RAHEL Kang

## 2024-08-26 NOTE — PROGRESS NOTES
"Patient Name: Ld Green  Date of Admission: 8/25/2024  Today's Date: 08/26/24  Length of Stay: 0  Primary Care Physician: Xiomara Neal APRN    Subjective   Chief Complaint: Syncope  Hypoglycemia  Associated symptoms include fatigue. Pertinent negatives include no abdominal pain, chest pain or coughing.      Emanuel Green is a 63-year-old male with a history of CAD, hypertension, atherosclerosis, anxiety, depression, bipolar disorder, hyperlipidemia, sleep apnea with home BiPAP, nephrolithiasis, bladder cancer and diabetes type 2, recently hospitalized 7/14/2024 for similar episode of hypoglycemia.  Patient was brought per EMS after he was found unconscious in his apartment.  EMS reported his blood glucose as low on their Accu dose, per patient blood glucose has been running from the 40s to the 50s over the last several weeks.  Patient states he had gone to sleep the night before at approximately midnight and he was found by his neighbors at 1400.  Upon admission patient's blood glucose was 58, other lab abnormalities were CK of 372, alk phos 170, WBC 27.22 with no obvious signs or symptoms of infection to include negative chest x-ray, urinalysis with 6-10 WBC negative nitrates, negative leukocytes, CT abdomen pelvis negative for any acute abnormalities, blood and urine cultures pending.  Patient was given 4.5 g of Zosyn and admitted for overnight evaluation and treatment.    Today:   Patient is resting comfortably in bed on room air, oxygen saturation 94%.  No family at bedside.  Patient has worn our BiPAP overnight with no complaints of shortness of breath or lack of sleep.  Patient states he is feeling \"wiped out\" and is concerned about returning home by himself.  After extensive discussion with patient regarding his regimen for his diabetes.  He stated that he started taking Ozempic approximately 3 months ago, with no alteration to his Levemir dosing prior to last hospitalization when it was decreased " to 40 units nightly.  In discussing patient's daily routine he does not always take his insulin as prescribed, he states it depends on how he is feeling, however he tends to take his night dosage of Levemir regardless.  Will await diabetes educator consultation and PT evaluation of patient overnight with continued hydration for mild rhabdomyolysis.  In addition overnight blood glucose of 58, discontinued SSI at nightly and will evaluate SSI only at mealtimes.  Patient verbalized understanding of discussion, all his questions were answered to the best of my ability and he was encouraged to ambulate with the nursing staff to evaluate his stability and stamina and will evaluate in the morning for possible discharge home.    This afternoon anaerobic blood culture bottle revealed gram-positive cocci in clusters, continue IV Zosyn x 24 hours.    Documented weights    08/25/24 1432 08/25/24 1854   Weight: 109 kg (240 lb) 111 kg (244 lb 7.8 oz)        Review of Systems   Constitutional:  Positive for fatigue.   Eyes:  Positive for pain, redness and visual disturbance.   Respiratory:  Negative for cough and shortness of breath.    Cardiovascular:  Negative for chest pain.   Gastrointestinal:  Negative for abdominal pain.      All pertinent negatives and positives are as above. All other systems have been reviewed and are negative unless otherwise stated.     Objective    Temp:  [97.9 °F (36.6 °C)-98.3 °F (36.8 °C)] 98 °F (36.7 °C)  Heart Rate:  [76-90] 81  Resp:  [16-18] 18  BP: (102-146)/(60-89) 139/80  Physical Exam  Vitals and nursing note reviewed.   Constitutional:       Appearance: He is obese. He is ill-appearing.   HENT:      Head: Normocephalic.      Right Ear: Tympanic membrane normal.      Nose: Nose normal.      Mouth/Throat:      Mouth: Mucous membranes are moist.      Pharynx: Oropharynx is clear.   Eyes:      General: Visual field deficit present.         Left eye: Discharge present.     Conjunctiva/sclera:       Left eye: Left conjunctiva is injected.      Comments: Left eye blurriness due to irritation   Cardiovascular:      Rate and Rhythm: Normal rate and regular rhythm.      Pulses: Normal pulses.      Heart sounds: Normal heart sounds.   Pulmonary:      Effort: No tachypnea, accessory muscle usage or respiratory distress.      Breath sounds: Examination of the right-upper field reveals decreased breath sounds. Examination of the left-upper field reveals decreased breath sounds. Examination of the right-middle field reveals decreased breath sounds. Examination of the left-middle field reveals decreased breath sounds. Examination of the right-lower field reveals decreased breath sounds. Examination of the left-lower field reveals decreased breath sounds. Decreased breath sounds present.   Genitourinary:     Comments: Voiding per urinal  Musculoskeletal:      Comments: Generalized weakness   Skin:     General: Skin is warm and dry.      Capillary Refill: Capillary refill takes less than 2 seconds.   Neurological:      General: No focal deficit present.      Mental Status: He is alert and oriented to person, place, and time.   Psychiatric:         Mood and Affect: Affect is flat.         Behavior: Behavior normal.         Thought Content: Thought content normal.         Judgment: Judgment normal.       Results Review:  I have reviewed the labs, radiology results, and diagnostic studies.    Laboratory Data:   Results from last 7 days   Lab Units 08/26/24  0449 08/25/24  1442   WBC 10*3/mm3 18.34* 27.22*   HEMOGLOBIN g/dL 14.0 16.4   HEMATOCRIT % 42.1 49.7   PLATELETS 10*3/mm3 217 243        Results from last 7 days   Lab Units 08/26/24  0449 08/25/24  1442   SODIUM mmol/L 136 139   POTASSIUM mmol/L 4.0 4.4   CHLORIDE mmol/L 104 103   CO2 mmol/L 24.0 25.0   BUN mg/dL 15 19   CREATININE mg/dL 0.67* 0.49*   CALCIUM mg/dL 8.9 9.4   BILIRUBIN mg/dL  --  1.1   ALK PHOS U/L  --  170*   ALT (SGPT) U/L  --  20   AST (SGOT) U/L  --  " 26   GLUCOSE mg/dL 99 121*       Culture Data:   No results found for: \"BLOODCX\", \"URINECX\", \"WOUNDCX\", \"MRSACX\", \"RESPCX\", \"STOOLCX\"    Microbiology Results (last 10 days)       ** No results found for the last 240 hours. **             Radiology Data:   Imaging Results (Last 24 Hours)       Procedure Component Value Units Date/Time    CT Abdomen Pelvis With Contrast [[id number]] Collected: 08/25/24 1700     Updated: 08/25/24 1706    Narrative:      EXAMINATION: CT ABDOMEN PELVIS W CONTRAST-      8/25/2024 3:19 PM     HISTORY: Altered mental status. Nausea and vomiting.     In order to have a CT radiation dose as low as reasonably achievable  Automated Exposure Control was utilized for adjustment of the mA and/or  KV according to patient size.     CT Dose DLP = 2367.66 mGy.cm.  (If there are multiple studies performed at the same time this  represents the total dose).     Abdomen/pelvis CT with IV contrast injection.  Axial, sagittal, and coronal sequences.     No comparison.     Heart size is within normal limits.  There is mild patchy LEFT lower lobe infiltrate compatible with  pneumonia.     Normal liver, gallbladder, pancreas, spleen, and adrenal glands.  Normal LEFT kidney.  Nonobstructing 5 mm RIGHT renal stone.  35 mm RIGHT renal cyst.     Aortic calcification with no aneurysm.  No bowel dilation.  No appendicitis.  No diverticulitis or colitis.     RIGHT hydrocele incidentally noted.  L5-S1 discectomy changes with pedicle screw hardware fusion.  Thoracic stimulator leads noted.       Impression:      1. No acute abnormality is seen within the abdomen or pelvis.     This report was signed and finalized on 8/25/2024 5:03 PM by Dr. Luis Wells MD.       CT Head Without Contrast [[id number]] Collected: 08/25/24 1637     Updated: 08/25/24 1642    Narrative:      EXAMINATION: CT HEAD WO CONTRAST-      8/25/2024 3:19 PM     HISTORY: Altered mental status.     In order to have a CT radiation dose as low as " reasonably achievable  Automated Exposure Control was utilized for adjustment of the mA and/or  KV according to patient size.     CT Dose DLP = 2367.66 mGy.cm.  (If there are multiple studies performed at the same time this  represents the total dose).     Comparison:  7/13/2024.     Axial, sagittal, and coronal noncontrast CT imaging of the head.     The visualized paranasal sinuses are clear.     The brain and ventricles have an age appropriate appearance.   Mild small vessel disease.  There is no hemorrhage or mass-effect.   No acute infarction is seen.     No calvarial abnormality.       Impression:      1. No acute intracranial abnormality is seen.           This report was signed and finalized on 8/25/2024 4:39 PM by Dr. Luis Wells MD.       XR Chest 1 View [[id number]] Collected: 08/25/24 1510     Updated: 08/25/24 1514    Narrative:      EXAMINATION: XR CHEST 1 VW-     8/25/2024 2:00 PM     HISTORY: Vomiting, concern for aspiration.     1 view chest x-ray.     COMPARISON:  7/13/2024.     Median sternotomy changes.  Magnified heart size.     Fully expanded lungs with no focal infiltrate.  No pneumothorax or heart failure.       Impression:      1. No focal acute lung disease.  2. No evidence of aspiration.           This report was signed and finalized on 8/25/2024 3:11 PM by Dr. Luis Wells MD.               I have reviewed the patient's current medications.     amLODIPine, 10 mg, Oral, Nightly  atorvastatin, 40 mg, Oral, Nightly  clopidogrel, 75 mg, Oral, Nightly  enoxaparin, 40 mg, Subcutaneous, Daily  famotidine, 40 mg, Oral, Daily  insulin lispro, 2-7 Units, Subcutaneous, TID With Meals  isosorbide mononitrate, 60 mg, Oral, Nightly  lisinopril, 40 mg, Oral, Daily  metoprolol succinate XL, 200 mg, Oral, Nightly  oxyCODONE-acetaminophen, 1 tablet, Oral, TID  sodium chloride, 10 mL, Intravenous, Q12H  zolpidem, 5 mg, Oral, Nightly         Intake/Output Summary (Last 24 hours) at 8/26/2024 1149  Last  data filed at 8/26/2024 0900  Gross per 24 hour   Intake 440 ml   Output --   Net 440 ml        Assessment/Plan   Assessment  Active Hospital Problems    Diagnosis     **AMS (altered mental status)     Leucocytosis     Non-traumatic rhabdomyolysis     Subconjunctival edema of left eye, due to debris     Hypoglycemia        Treatment Plan  1.  AMS/hypoglycemia-patient initially presented with altered mental status and blood glucose in the 40s per EMS.  A1c 8/26/2024...... change sliding scale insulin to 3 times daily with meals only given overnight blood glucose in the 50s.  Will continue to hold Levemir and will reevaluate overnight.  Given patient's recent initiation of Ozempic, discussed at length with patient that this dosage will need to be decreased and he will need hold Levemir and to keep a blood glucose diary for 48-72 hours and close follow-up with PCP.  Diabetes educator consult initiated given patient's lack of consistency with blood glucose management and need for education.    2.  Leukocytosis-initial presentation of WBC of 27.22, minimal WBC count and urinalysis, CT of the abdomen pelvis negative and no infectious process per chest x-ray.  Patient given one-time dose of 4.5 g of Zosyn, patient given currently stable today at 18.34 and patient remains afebrile.  Will continue to monitor with daily CBC.    3.  Nontraumatic rhabdomyolysis-patient presented after being down for approximately 10-10 hours, , creatinine 0.67.  Will continue overnight hydration and reevaluate in a.m.    4.  Subconjunctival edema of the left eye-patient states he believes that he vomited with debris landing in his eye.  Twice daily saline flushes and Systane eyedrops every hour as needed.  Continue home tobramycin drops    VTE prophylaxis with SCDs  Labs in a.m.  Medical Decision Making  Altered mental status, acute, moderate complexity, improving  Hypoglycemia, acute on chronic, moderate complexity,  improving  Leukocytosis, acute, moderate complexity, improving  Nontraumatic rhabdomyolysis, acute, moderate complexity, unchanged  Subconjunctival edema of the left eye, acute, moderate complexity, unchanged    Number and Complexity of problems: 5  Differential Diagnosis: None    Conditions and Status        Condition is improving.     [facility] Data  External documents reviewed: Epic review of previous hospitalization and primary care providers notes  Cardiac tracing (EKG, telemetry) interpretation: 2/2019 echocardiogram per cardiology reviewed  Radiology interpretation: 8/25/2024 chest x-ray, CT of the head, CT abdomen pelvis per radiology reviewed  Labs reviewed:   8/26/2026 BMP, CK, CBC, A1c, reviewed, repeat in a.m.  Any tests that were considered but not ordered: None none  Decision rules/scores evaluated (example [id number], Wells, etc): None     Discussed with: Dr. Olmstead and patient     Care Planning  Shared decision making: Dr. Olmstead and patient  Code status and discussions: Full code per patient  Surrogate Decision Maker is friend Fe Lay  Social Determinants of Health that impact treatment or disposition: Multiple glucose related hospitalizations, will evaluate patient's compliance via diabetic educator consultation with possible need for SNF versus home health   I expect the patient to be discharged to home with home health in 1 day.     Electronically signed by REMA Harris, 08/26/24, 11:49 CDT.

## 2024-08-26 NOTE — PLAN OF CARE
Goal Outcome Evaluation:  Plan of Care Reviewed With: patient        Progress: improving       VSS. 2L NC, BIPAP through the night. Up ad miguel, had him call out when he needed to get up just so I could unhook him and it was easier. A/Ox3-situation, unaware of events leading up to why he is here but aware of person, place, time. Lovenox. Ivs bilat AC. LAC NS @ 100. ACHS. Natural tears for irritation d/t vomit in eyes when they found him. Resting comfortably, call light in reach. Safety maintained.

## 2024-08-26 NOTE — PLAN OF CARE
Goal Outcome Evaluation:           Progress: improving  Outcome Evaluation: Pt A&Ox4, room air during day, voiding. Pt up ad miguel, will use call light to be unhooked from monitors and IV. Blood glucose monitored and treated as ordered. IV antibiotics given per order. C/o lower back pain, left eye pain, burning, and discharge, and c/o pain on tongue. Eye drops and throat chloraseptic spray given per order. Lovenox given per order. Call light within reach.

## 2024-08-26 NOTE — PROGRESS NOTES
Nutrition Services    Patient Name:  Ld Green  YOB: 1960  MRN: [id number]  Admit Date:  8/25/2024    DM Education Consult received. Review of labs Hgb A1c of 5.4% indicate DM education not necessary at this time.   RD/inpatient nutrition services team will continue to follow per protocol.       Electronically signed by:  Ania Renteria RDN, CHATO  08/26/24 15:30 CDT

## 2024-08-27 LAB
ANION GAP SERPL CALCULATED.3IONS-SCNC: 6 MMOL/L (ref 5–15)
BACTERIA SPEC AEROBE CULT: ABNORMAL
BACTERIA SPEC AEROBE CULT: NO GROWTH
BUN SERPL-MCNC: 8 MG/DL (ref 8–23)
BUN/CREAT SERPL: 14.3 (ref 7–25)
CALCIUM SPEC-SCNC: 8.4 MG/DL (ref 8.6–10.5)
CHLORIDE SERPL-SCNC: 105 MMOL/L (ref 98–107)
CK SERPL-CCNC: 161 U/L (ref 20–200)
CO2 SERPL-SCNC: 28 MMOL/L (ref 22–29)
CREAT SERPL-MCNC: 0.56 MG/DL (ref 0.76–1.27)
DEPRECATED RDW RBC AUTO: 44 FL (ref 37–54)
EGFRCR SERPLBLD CKD-EPI 2021: 111.4 ML/MIN/1.73
ERYTHROCYTE [DISTWIDTH] IN BLOOD BY AUTOMATED COUNT: 13 % (ref 12.3–15.4)
GLUCOSE BLDC GLUCOMTR-MCNC: 116 MG/DL (ref 70–130)
GLUCOSE BLDC GLUCOMTR-MCNC: 117 MG/DL (ref 70–130)
GLUCOSE BLDC GLUCOMTR-MCNC: 130 MG/DL (ref 70–130)
GLUCOSE SERPL-MCNC: 116 MG/DL (ref 65–99)
GRAM STN SPEC: ABNORMAL
HCT VFR BLD AUTO: 38 % (ref 37.5–51)
HGB BLD-MCNC: 12.8 G/DL (ref 13–17.7)
ISOLATED FROM: ABNORMAL
MAGNESIUM SERPL-MCNC: 1.7 MG/DL (ref 1.6–2.4)
MCH RBC QN AUTO: 31.4 PG (ref 26.6–33)
MCHC RBC AUTO-ENTMCNC: 33.7 G/DL (ref 31.5–35.7)
MCV RBC AUTO: 93.1 FL (ref 79–97)
PLATELET # BLD AUTO: 158 10*3/MM3 (ref 140–450)
PMV BLD AUTO: 10.1 FL (ref 6–12)
POTASSIUM SERPL-SCNC: 3.6 MMOL/L (ref 3.5–5.2)
RBC # BLD AUTO: 4.08 10*6/MM3 (ref 4.14–5.8)
SODIUM SERPL-SCNC: 139 MMOL/L (ref 136–145)
WBC NRBC COR # BLD AUTO: 10.08 10*3/MM3 (ref 3.4–10.8)

## 2024-08-27 PROCEDURE — 94660 CPAP INITIATION&MGMT: CPT

## 2024-08-27 PROCEDURE — 80048 BASIC METABOLIC PNL TOTAL CA: CPT

## 2024-08-27 PROCEDURE — 25010000002 ENOXAPARIN PER 10 MG: Performed by: INTERNAL MEDICINE

## 2024-08-27 PROCEDURE — 82948 REAGENT STRIP/BLOOD GLUCOSE: CPT

## 2024-08-27 PROCEDURE — 25010000002 VANCOMYCIN 10 G RECONSTITUTED SOLUTION

## 2024-08-27 PROCEDURE — 83735 ASSAY OF MAGNESIUM: CPT

## 2024-08-27 PROCEDURE — 25810000003 SODIUM CHLORIDE 0.9 % SOLUTION

## 2024-08-27 PROCEDURE — 96372 THER/PROPH/DIAG INJ SC/IM: CPT

## 2024-08-27 PROCEDURE — G0378 HOSPITAL OBSERVATION PER HR: HCPCS

## 2024-08-27 PROCEDURE — 85027 COMPLETE CBC AUTOMATED: CPT

## 2024-08-27 PROCEDURE — 82550 ASSAY OF CK (CPK): CPT

## 2024-08-27 PROCEDURE — 94799 UNLISTED PULMONARY SVC/PX: CPT

## 2024-08-27 RX ORDER — POTASSIUM CHLORIDE 750 MG/1
40 CAPSULE, EXTENDED RELEASE ORAL EVERY 4 HOURS
Status: COMPLETED | OUTPATIENT
Start: 2024-08-27 | End: 2024-08-27

## 2024-08-27 RX ADMIN — Medication 10 ML: at 20:12

## 2024-08-27 RX ADMIN — ENOXAPARIN SODIUM 40 MG: 100 INJECTION SUBCUTANEOUS at 09:50

## 2024-08-27 RX ADMIN — CLOPIDOGREL BISULFATE 75 MG: 75 TABLET, FILM COATED ORAL at 20:11

## 2024-08-27 RX ADMIN — FAMOTIDINE 40 MG: 20 TABLET, FILM COATED ORAL at 09:49

## 2024-08-27 RX ADMIN — CYCLOBENZAPRINE HYDROCHLORIDE 10 MG: 10 TABLET, FILM COATED ORAL at 23:36

## 2024-08-27 RX ADMIN — POTASSIUM CHLORIDE 40 MEQ: 750 CAPSULE, EXTENDED RELEASE ORAL at 10:35

## 2024-08-27 RX ADMIN — FLUOROMETHOLONE ACETATE 2 DROP: 1 SUSPENSION/ DROPS OPHTHALMIC at 17:27

## 2024-08-27 RX ADMIN — Medication 10 ML: at 09:50

## 2024-08-27 RX ADMIN — METOPROLOL SUCCINATE 200 MG: 100 TABLET, FILM COATED, EXTENDED RELEASE ORAL at 20:11

## 2024-08-27 RX ADMIN — ISOSORBIDE MONONITRATE 60 MG: 60 TABLET, EXTENDED RELEASE ORAL at 20:11

## 2024-08-27 RX ADMIN — ATORVASTATIN CALCIUM 40 MG: 40 TABLET ORAL at 20:11

## 2024-08-27 RX ADMIN — OXYCODONE AND ACETAMINOPHEN 1 TABLET: 325; 10 TABLET ORAL at 20:11

## 2024-08-27 RX ADMIN — SODIUM CHLORIDE 1500 MG: 9 INJECTION, SOLUTION INTRAVENOUS at 05:13

## 2024-08-27 RX ADMIN — FLUOROMETHOLONE ACETATE 2 DROP: 1 SUSPENSION/ DROPS OPHTHALMIC at 11:58

## 2024-08-27 RX ADMIN — FLUOROMETHOLONE ACETATE 2 DROP: 1 SUSPENSION/ DROPS OPHTHALMIC at 20:12

## 2024-08-27 RX ADMIN — OXYCODONE AND ACETAMINOPHEN 1 TABLET: 325; 10 TABLET ORAL at 09:49

## 2024-08-27 RX ADMIN — POTASSIUM CHLORIDE 40 MEQ: 750 CAPSULE, EXTENDED RELEASE ORAL at 05:48

## 2024-08-27 RX ADMIN — OXYCODONE AND ACETAMINOPHEN 1 TABLET: 325; 10 TABLET ORAL at 16:13

## 2024-08-27 RX ADMIN — ZOLPIDEM TARTRATE 5 MG: 5 TABLET ORAL at 20:11

## 2024-08-27 RX ADMIN — AMLODIPINE BESYLATE 10 MG: 10 TABLET ORAL at 20:11

## 2024-08-27 RX ADMIN — LISINOPRIL 40 MG: 20 TABLET ORAL at 09:49

## 2024-08-27 NOTE — PLAN OF CARE
Goal Outcome Evaluation:  Plan of Care Reviewed With: patient        Progress: no change     Alert and oriented x4. Up ad miguel. IVF. IV antibiotics continue. VSS. Safety maintained.

## 2024-08-27 NOTE — PROGRESS NOTES
Patient Name: Ld Green  Date of Admission: 8/25/2024  Today's Date: 08/27/24  Length of Stay: 0  Primary Care Physician: Xiomara Neal APRN    Subjective   Chief Complaint: Syncope  Hypoglycemia  Associated symptoms include fatigue and weakness. Pertinent negatives include no abdominal pain, chest pain or coughing.      Emanuel Green is a 63-year-old male with a history of CAD, hypertension, atherosclerosis, anxiety, depression, bipolar disorder, hyperlipidemia, sleep apnea with home BiPAP, nephrolithiasis, bladder cancer and diabetes type 2, recently hospitalized 7/14/2024 for similar episode of hypoglycemia.  Patient was brought per EMS after he was found unconscious in his apartment.  EMS reported his blood glucose as low on their Accu dose, per patient blood glucose has been running from the 40s to the 50s over the last several weeks.  Patient states he had gone to sleep the night before at approximately midnight and he was found by his neighbors at 1400.  Upon admission patient's blood glucose was 58, other lab abnormalities were CK of 372, alk phos 170, WBC 27.22 with no obvious signs or symptoms of infection to include negative chest x-ray, urinalysis with 6-10 WBC negative nitrates, negative leukocytes, CT abdomen pelvis negative for any acute abnormalities, blood and urine cultures pending.  Patient was given 4.5 g of Zosyn and admitted for overnight evaluation and treatment.    Today:   Patient is resting comfortably in bed on 2 L, due to TYRELL and unavailable home CPAP.  No family at bedside.  Patient is alert and oriented and able to participate in assessment.  Patient states he continues to feel mildly better, still has complaints of fatigue and intermittent nausea.  Blood glucoses remained stable changes yesterday last 3 BG's 138, 116, 117.  Patient has not required any SSI coverage, will be discharged on Ozempic only due to A1c of 5.4 and to follow-up with PCP.  Patient states his erythema  and irritation in his eye was improved overnight however he is still severely injected and inability to open his eye without significant discomfort to continue with Systane drops and initiated fluorometholone drops 4 times daily, warm compresses twice daily and as needed. Reevaluate in the a.m.     8/26/2024 anaerobic blood culture bottle revealed gram-positive cocci in clusters, initiated vancomycin 1500 mg every 24.  Patient received 1 dose.  This morning blood culture x 1 negative, aerobic bottle revealed, staph suspected however PCR negative.  Urine culture finalized as negative.  To patient's lack of infectious symptoms, normal WBC, lack of febrile episodes and complete an assessment performed for any open wounds or puncture sites, patient's vancomycin was discontinued and will evaluate overnight for any additional signs or symptoms of infection or febrile episodes.  All patient's questions were answered to the best my ability and patient is in agreement for overnight evaluation with discharge home tomorrow with home health.    Documented weights    08/25/24 1432 08/25/24 1854   Weight: 109 kg (240 lb) 111 kg (244 lb 7.8 oz)        Review of Systems   Constitutional:  Positive for fatigue.   HENT:  Positive for mouth sores.    Eyes:  Positive for pain, redness and visual disturbance.   Respiratory:  Negative for cough and shortness of breath.    Cardiovascular:  Negative for chest pain.   Gastrointestinal:  Negative for abdominal pain.   Neurological:  Positive for weakness. Negative for dizziness.      All pertinent negatives and positives are as above. All other systems have been reviewed and are negative unless otherwise stated.     Objective    Temp:  [97.9 °F (36.6 °C)-98.3 °F (36.8 °C)] 98 °F (36.7 °C)  Heart Rate:  [78-90] 78  Resp:  [18] 18  BP: (132-144)/(70-80) 142/74  Physical Exam  Vitals and nursing note reviewed.   Constitutional:       Appearance: He is obese.      Comments: Patient is resting  comfortably in bed on his 2 L nightly.  No family at bedside   HENT:      Head: Normocephalic.      Right Ear: Tympanic membrane normal.      Nose: Nose normal.      Mouth/Throat:      Mouth: Mucous membranes are moist.      Pharynx: Oropharynx is clear.   Eyes:      General: Visual field deficit present.         Left eye: Discharge present.     Conjunctiva/sclera:      Left eye: Left conjunctiva is injected.      Comments: Left eye blurriness due to irritation   Cardiovascular:      Rate and Rhythm: Normal rate and regular rhythm.      Pulses: Normal pulses.      Heart sounds: Normal heart sounds.   Pulmonary:      Effort: No tachypnea, accessory muscle usage or respiratory distress.      Breath sounds: Examination of the right-upper field reveals decreased breath sounds. Examination of the left-upper field reveals decreased breath sounds. Examination of the right-middle field reveals decreased breath sounds. Examination of the left-middle field reveals decreased breath sounds. Examination of the right-lower field reveals decreased breath sounds. Examination of the left-lower field reveals decreased breath sounds. Decreased breath sounds present.   Genitourinary:     Comments: Voiding per urinal  Musculoskeletal:      Comments: Generalized weakness   Skin:     General: Skin is warm and dry.      Capillary Refill: Capillary refill takes less than 2 seconds.   Neurological:      General: No focal deficit present.      Mental Status: He is alert and oriented to person, place, and time.   Psychiatric:         Mood and Affect: Affect is flat.         Behavior: Behavior normal.         Thought Content: Thought content normal.         Judgment: Judgment normal.       Results Review:  I have reviewed the labs, radiology results, and diagnostic studies.    Laboratory Data:   Results from last 7 days   Lab Units 08/27/24  0436 08/26/24  0449 08/25/24  1442   WBC 10*3/mm3 10.08 18.34* 27.22*   HEMOGLOBIN g/dL 12.8* 14.0 16.4  "  HEMATOCRIT % 38.0 42.1 49.7   PLATELETS 10*3/mm3 158 217 243        Results from last 7 days   Lab Units 08/27/24  0436 08/26/24  0449 08/25/24  1442   SODIUM mmol/L 139 136 139   POTASSIUM mmol/L 3.6 4.0 4.4   CHLORIDE mmol/L 105 104 103   CO2 mmol/L 28.0 24.0 25.0   BUN mg/dL 8 15 19   CREATININE mg/dL 0.56* 0.67* 0.49*   CALCIUM mg/dL 8.4* 8.9 9.4   BILIRUBIN mg/dL  --   --  1.1   ALK PHOS U/L  --   --  170*   ALT (SGPT) U/L  --   --  20   AST (SGOT) U/L  --   --  26   GLUCOSE mg/dL 116* 99 121*       Culture Data:   No results found for: \"BLOODCX\", \"URINECX\", \"WOUNDCX\", \"MRSACX\", \"RESPCX\", \"STOOLCX\"    Microbiology Results (last 10 days)       Procedure Component Value - Date/Time    Blood Culture - Blood, Hand, Right [[id number]]  (Normal) Collected: 08/25/24 1824    Lab Status: Preliminary result Specimen: Blood from Hand, Right Updated: 08/26/24 1845     Blood Culture No growth at 24 hours    Blood Culture - Blood, Hand, Left [[id number]]  (Abnormal) Collected: 08/25/24 1820    Lab Status: Final result Specimen: Blood from Hand, Left Updated: 08/27/24 0517     Blood Culture Staphylococcus, coagulase negative     Isolated from Aerobic Bottle     Gram Stain Aerobic Bottle Gram positive cocci in clusters    Narrative:      Probable contaminant requires clinical correlation, susceptibility not performed unless requested by physician.      Blood Culture ID, PCR - Blood, Hand, Left [[id number]]  (Abnormal) Collected: 08/25/24 1820    Lab Status: Final result Specimen: Blood from Hand, Left Updated: 08/26/24 1435     BCID, PCR Staph spp, not aureus or lugdunensis. Identification by BCID2 PCR.     BOTTLE TYPE Aerobic Bottle    Urine Culture - Urine, Straight Cath [[id number]]  (Normal) Collected: 08/25/24 1502    Lab Status: Final result Specimen: Urine from Straight Cath Updated: 08/27/24 0930     Urine Culture No growth             Radiology Data:   Imaging Results (Last 24 Hours)       ** No results found for the " last 24 hours. **            I have reviewed the patient's current medications.     amLODIPine, 10 mg, Oral, Nightly  atorvastatin, 40 mg, Oral, Nightly  clopidogrel, 75 mg, Oral, Nightly  enoxaparin, 40 mg, Subcutaneous, Daily  famotidine, 40 mg, Oral, Daily  fluorometholone, 2 drop, Left Eye, 4x Daily  insulin lispro, 2-7 Units, Subcutaneous, TID With Meals  isosorbide mononitrate, 60 mg, Oral, Nightly  lisinopril, 40 mg, Oral, Daily  metoprolol succinate XL, 200 mg, Oral, Nightly  oxyCODONE-acetaminophen, 1 tablet, Oral, TID  potassium chloride ER, 40 mEq, Oral, Q4H  sodium chloride, 10 mL, Intravenous, Q12H  zolpidem, 5 mg, Oral, Nightly         Intake/Output Summary (Last 24 hours) at 8/27/2024 1008  Last data filed at 8/27/2024 0900  Gross per 24 hour   Intake 480 ml   Output --   Net 480 ml        Assessment/Plan   Assessment  Active Hospital Problems    Diagnosis     **AMS (altered mental status)     Leucocytosis     Non-traumatic rhabdomyolysis     Subconjunctival edema of left eye, due to debris     Type 2 diabetes mellitus, with long-term current use of insulin     Hypoglycemia        Treatment Plan  1.  AMS/hypoglycemia-patient initially presented with altered mental status and blood glucose in the 40s per EMS.  A1c 8/26/2024 5.4  continue sliding scale insulin to 3 times daily with meals only given overnight.  Patient has required no sliding scale insulin.  Discussion with patient regarding discontinuation of all insulin at discharge.  Patient will be instructed to continue his Ozempic and follow-up with his PCP within a week.     2.  Leukocytosis-initial presentation of WBC of 27.22, minimal WBC count and urinalysis, CT of the abdomen pelvis negative and no infectious process per chest x-ray.  Patient given one-time dose of 4.5 g of Zosyn.  Lab notification of gram-positive cocci in clusters of his anaerobic bottle yesterday.  Patient was given a one-time dose of vancomycin 1500 mg.  Discontinued today  after first BC was negative and PCR of suspected staph was additionally negative in the aerobic bottle.  Patient's WBC is within normal limits this morning at 10.08, will evaluate patient overnight for any febrile episodes or infectious process.    3.  Nontraumatic rhabdomyolysis-patient presented after being down for approximately 10-10 hours, , creatinine 0.49.  This a.m. CK of 161 and creatinine of 0.56.  Discontinue IV hydration, reevaluate in a.m.    4.  Subconjunctival edema of the left eye-patient states he believes that he vomited with debris landing in his eye.  Discontinue saline flushes and Systane eyedrops, initiated Fluorometholone ophthalmic drops 4 times daily and warm compresses twice daily.      VTE prophylaxis with SCDs  Labs in a.m.  Medical Decision Making  Altered mental status, acute, moderate complexity, improving  Hypoglycemia, acute on chronic, moderate complexity, improving  Leukocytosis, acute, moderate complexity, improving  Nontraumatic rhabdomyolysis, acute, moderate complexity, unchanged  Subconjunctival edema of the left eye, acute, moderate complexity, unchanged    Number and Complexity of problems: 5  Differential Diagnosis: None    Conditions and Status        Condition is improving.     [facility] Data  External documents reviewed: Epic review of previous hospitalization and primary care providers notes  Cardiac tracing (EKG, telemetry) interpretation:   Radiology interpretation:   Labs reviewed: 8/27/2024 CK, CBC, CMP reviewed, repeat labs in a.m.  Any tests that were considered but not ordered: None none  Decision rules/scores evaluated (example [id number], Wells, etc): None     Discussed with: Dr. Olmstead and patient     Care Planning  Shared decision making: Dr. Olmstead and patient  Code status and discussions: Full code per patient  Surrogate Decision Maker is friend Fe Lay  Social Determinants of Health that impact treatment or disposition: Multiple  glucose related hospitalizations, will evaluate patient's compliance via diabetic educator consultation with possible need for SNF versus home health   I expect the patient to be discharged to home with home health in 1 day.     Electronically signed by REMA Harris, 08/27/24, 10:08 CDT.

## 2024-08-27 NOTE — PLAN OF CARE
Goal Outcome Evaluation:      Patient laying in bed resting quietly. A&O x4, VSS, medicated for pain as ordered and continued on eye drops in left eye. Blood sugars have been in normal range and have not required insulin coverage. Patient up to bath room and had shower today and tolerated well. All safety maintained and call light in reach.

## 2024-08-28 VITALS
OXYGEN SATURATION: 95 % | WEIGHT: 244.49 LBS | HEART RATE: 79 BPM | HEIGHT: 70 IN | TEMPERATURE: 98.3 F | BODY MASS INDEX: 35 KG/M2 | DIASTOLIC BLOOD PRESSURE: 62 MMHG | RESPIRATION RATE: 18 BRPM | SYSTOLIC BLOOD PRESSURE: 132 MMHG

## 2024-08-28 LAB
ANION GAP SERPL CALCULATED.3IONS-SCNC: 8 MMOL/L (ref 5–15)
BUN SERPL-MCNC: 8 MG/DL (ref 8–23)
BUN/CREAT SERPL: 15.1 (ref 7–25)
CALCIUM SPEC-SCNC: 9.5 MG/DL (ref 8.6–10.5)
CHLORIDE SERPL-SCNC: 103 MMOL/L (ref 98–107)
CO2 SERPL-SCNC: 30 MMOL/L (ref 22–29)
CREAT SERPL-MCNC: 0.53 MG/DL (ref 0.76–1.27)
DEPRECATED RDW RBC AUTO: 43.2 FL (ref 37–54)
EGFRCR SERPLBLD CKD-EPI 2021: 113.3 ML/MIN/1.73
ERYTHROCYTE [DISTWIDTH] IN BLOOD BY AUTOMATED COUNT: 12.7 % (ref 12.3–15.4)
GLUCOSE SERPL-MCNC: 98 MG/DL (ref 65–99)
HCT VFR BLD AUTO: 40.1 % (ref 37.5–51)
HGB BLD-MCNC: 13.8 G/DL (ref 13–17.7)
MCH RBC QN AUTO: 31.8 PG (ref 26.6–33)
MCHC RBC AUTO-ENTMCNC: 34.4 G/DL (ref 31.5–35.7)
MCV RBC AUTO: 92.4 FL (ref 79–97)
PLATELET # BLD AUTO: 175 10*3/MM3 (ref 140–450)
PMV BLD AUTO: 10.6 FL (ref 6–12)
POTASSIUM SERPL-SCNC: 3.6 MMOL/L (ref 3.5–5.2)
RBC # BLD AUTO: 4.34 10*6/MM3 (ref 4.14–5.8)
SODIUM SERPL-SCNC: 141 MMOL/L (ref 136–145)
WBC NRBC COR # BLD AUTO: 9.12 10*3/MM3 (ref 3.4–10.8)

## 2024-08-28 PROCEDURE — G0378 HOSPITAL OBSERVATION PER HR: HCPCS

## 2024-08-28 PROCEDURE — 94799 UNLISTED PULMONARY SVC/PX: CPT

## 2024-08-28 PROCEDURE — 80048 BASIC METABOLIC PNL TOTAL CA: CPT

## 2024-08-28 PROCEDURE — 94660 CPAP INITIATION&MGMT: CPT

## 2024-08-28 PROCEDURE — 85027 COMPLETE CBC AUTOMATED: CPT

## 2024-08-28 RX ADMIN — FLUOROMETHOLONE ACETATE 2 DROP: 1 SUSPENSION/ DROPS OPHTHALMIC at 07:41

## 2024-08-28 RX ADMIN — OXYCODONE AND ACETAMINOPHEN 1 TABLET: 325; 10 TABLET ORAL at 08:08

## 2024-08-28 RX ADMIN — LISINOPRIL 40 MG: 20 TABLET ORAL at 08:09

## 2024-08-28 RX ADMIN — FAMOTIDINE 40 MG: 20 TABLET, FILM COATED ORAL at 08:08

## 2024-08-28 NOTE — PLAN OF CARE
Goal Outcome Evaluation:  Plan of Care Reviewed With: patient        Progress: improving   Alert and oriented x4. VSS. Safety maintained.

## 2024-08-28 NOTE — PLAN OF CARE
Goal Outcome Evaluation:      Patient A&O x4, VSS, Patient to be disharged home this am. IV in errol AC removed and tolerated well, tips smooth and intact. Discharge forms completed and discussed with patient and verbalized understanding. All safety maintained and call light in reach. Patient called family to come and pick him up. Will continue to monitor.      0813 Patient leaving floor per W/C with all personal belongings going to main entrance going home with family.

## 2024-08-28 NOTE — DISCHARGE SUMMARY
[facility] Medicine Services  DISCHARGE SUMMARY       Date of Admission: 8/25/2024  Date of Discharge:  8/28/2024  Primary Care Physician: Xiomara Neal APRN    Presenting Problem/History of Present Illness:  Syncope, hypoglycemia    Final Discharge Diagnoses:  Active Hospital Problems    Diagnosis     **AMS (altered mental status)     Leucocytosis     Non-traumatic rhabdomyolysis     Subconjunctival edema of left eye, due to debris     Type 2 diabetes mellitus, with long-term current use of insulin     Hypoglycemia        Consults: None    Procedures Performed: None    Pertinent Test Results:       Imaging Results (All)       Procedure Component Value Units Date/Time    CT Abdomen Pelvis With Contrast [[id number]] Collected: 08/25/24 1700     Updated: 08/25/24 1706    Narrative:      EXAMINATION: CT ABDOMEN PELVIS W CONTRAST-      8/25/2024 3:19 PM     HISTORY: Altered mental status. Nausea and vomiting.     In order to have a CT radiation dose as low as reasonably achievable  Automated Exposure Control was utilized for adjustment of the mA and/or  KV according to patient size.     CT Dose DLP = 2367.66 mGy.cm.  (If there are multiple studies performed at the same time this  represents the total dose).     Abdomen/pelvis CT with IV contrast injection.  Axial, sagittal, and coronal sequences.     No comparison.     Heart size is within normal limits.  There is mild patchy LEFT lower lobe infiltrate compatible with  pneumonia.     Normal liver, gallbladder, pancreas, spleen, and adrenal glands.  Normal LEFT kidney.  Nonobstructing 5 mm RIGHT renal stone.  35 mm RIGHT renal cyst.     Aortic calcification with no aneurysm.  No bowel dilation.  No appendicitis.  No diverticulitis or colitis.     RIGHT hydrocele incidentally noted.  L5-S1 discectomy changes with pedicle screw hardware fusion.  Thoracic stimulator leads noted.       Impression:      1. No acute abnormality is seen  within the abdomen or pelvis.     This report was signed and finalized on 8/25/2024 5:03 PM by Dr. Luis Wells MD.       CT Head Without Contrast [[id number]] Collected: 08/25/24 1637     Updated: 08/25/24 1642    Narrative:      EXAMINATION: CT HEAD WO CONTRAST-      8/25/2024 3:19 PM     HISTORY: Altered mental status.     In order to have a CT radiation dose as low as reasonably achievable  Automated Exposure Control was utilized for adjustment of the mA and/or  KV according to patient size.     CT Dose DLP = 2367.66 mGy.cm.  (If there are multiple studies performed at the same time this  represents the total dose).     Comparison:  7/13/2024.     Axial, sagittal, and coronal noncontrast CT imaging of the head.     The visualized paranasal sinuses are clear.     The brain and ventricles have an age appropriate appearance.   Mild small vessel disease.  There is no hemorrhage or mass-effect.   No acute infarction is seen.     No calvarial abnormality.       Impression:      1. No acute intracranial abnormality is seen.           This report was signed and finalized on 8/25/2024 4:39 PM by Dr. Luis Wells MD.       XR Chest 1 View [[id number]] Collected: 08/25/24 1510     Updated: 08/25/24 1514    Narrative:      EXAMINATION: XR CHEST 1 VW-     8/25/2024 2:00 PM     HISTORY: Vomiting, concern for aspiration.     1 view chest x-ray.     COMPARISON:  7/13/2024.     Median sternotomy changes.  Magnified heart size.     Fully expanded lungs with no focal infiltrate.  No pneumothorax or heart failure.       Impression:      1. No focal acute lung disease.  2. No evidence of aspiration.           This report was signed and finalized on 8/25/2024 3:11 PM by Dr. Luis Wells MD.             LAB RESULTS:      Lab 08/28/24  0358 08/27/24  0436 08/26/24  0449 08/25/24  1442   WBC 9.12 10.08 18.34* 27.22*   HEMOGLOBIN 13.8 12.8* 14.0 16.4   HEMATOCRIT 40.1 38.0 42.1 49.7   PLATELETS 175 158 217 243   NEUTROS ABS  --   --    --  23.53*   IMMATURE GRANS (ABS)  --   --   --  0.59*   LYMPHS ABS  --   --   --  0.98   MONOS ABS  --   --   --  1.97*   EOS ABS  --   --   --  0.01   MCV 92.4 93.1 94.6 94.3   LACTATE  --   --   --  0.8         Lab 08/28/24  0358 08/27/24  0436 08/26/24  0449 08/25/24  1442   SODIUM 141 139 136 139   POTASSIUM 3.6 3.6 4.0 4.4   CHLORIDE 103 105 104 103   CO2 30.0* 28.0 24.0 25.0   ANION GAP 8.0 6.0 8.0 11.0   BUN 8 8 15 19   CREATININE 0.53* 0.56* 0.67* 0.49*   EGFR 113.3 111.4 105.6 116.0   GLUCOSE 98 116* 99 121*   CALCIUM 9.5 8.4* 8.9 9.4   MAGNESIUM  --  1.7  --   --    HEMOGLOBIN A1C  --   --  5.40  --          Lab 08/25/24  1442   TOTAL PROTEIN 7.8   ALBUMIN 4.2   GLOBULIN 3.6   ALT (SGPT) 20   AST (SGOT) 26   BILIRUBIN 1.1   ALK PHOS 170*   LIPASE 17         Lab 08/25/24  1644 08/25/24  1442   HSTROP T 9 9                 Brief Urine Lab Results  (Last result in the past 365 days)        Color   Clarity   Blood   Leuk Est   Nitrite   Protein   CREAT   Urine HCG        08/25/24 1502 Yellow   Clear   Negative   Negative   Negative   100 mg/dL (2+)                 Microbiology Results (last 10 days)       Procedure Component Value - Date/Time    Blood Culture - Blood, Hand, Right [[id number]]  (Normal) Collected: 08/25/24 1824    Lab Status: Preliminary result Specimen: Blood from Hand, Right Updated: 08/27/24 1845     Blood Culture No growth at 2 days    Blood Culture - Blood, Hand, Left [[id number]]  (Abnormal) Collected: 08/25/24 1820    Lab Status: Final result Specimen: Blood from Hand, Left Updated: 08/27/24 0517     Blood Culture Staphylococcus, coagulase negative     Isolated from Aerobic Bottle     Gram Stain Aerobic Bottle Gram positive cocci in clusters    Narrative:      Probable contaminant requires clinical correlation, susceptibility not performed unless requested by physician.      Blood Culture ID, PCR - Blood, Hand, Left [[id number]]  (Abnormal) Collected: 08/25/24 1820    Lab Status: Final  result Specimen: Blood from Hand, Left Updated: 08/26/24 1435     BCID, PCR Staph spp, not aureus or lugdunensis. Identification by BCID2 PCR.     BOTTLE TYPE Aerobic Bottle    Urine Culture - Urine, Straight Cath [[id number]]  (Normal) Collected: 08/25/24 1502    Lab Status: Final result Specimen: Urine from Straight Cath Updated: 08/27/24 0930     Urine Culture No growth          Microbiology Results (last 10 days)       Procedure Component Value - Date/Time    Blood Culture - Blood, Hand, Right [[id number]]  (Normal) Collected: 08/25/24 1824    Lab Status: Preliminary result Specimen: Blood from Hand, Right Updated: 08/27/24 1845     Blood Culture No growth at 2 days    Blood Culture - Blood, Hand, Left [[id number]]  (Abnormal) Collected: 08/25/24 1820    Lab Status: Final result Specimen: Blood from Hand, Left Updated: 08/27/24 0517     Blood Culture Staphylococcus, coagulase negative     Isolated from Aerobic Bottle     Gram Stain Aerobic Bottle Gram positive cocci in clusters    Narrative:      Probable contaminant requires clinical correlation, susceptibility not performed unless requested by physician.      Blood Culture ID, PCR - Blood, Hand, Left [[id number]]  (Abnormal) Collected: 08/25/24 1820    Lab Status: Final result Specimen: Blood from Hand, Left Updated: 08/26/24 1435     BCID, PCR Staph spp, not aureus or lugdunensis. Identification by BCID2 PCR.     BOTTLE TYPE Aerobic Bottle    Urine Culture - Urine, Straight Cath [[id number]]  (Normal) Collected: 08/25/24 1502    Lab Status: Final result Specimen: Urine from Straight Cath Updated: 08/27/24 0930     Urine Culture No growth             Documented weights    08/25/24 1432 08/25/24 1854   Weight: 109 kg (240 lb) 111 kg (244 lb 7.8 oz)        Hospital Course: Ld Green is a 63-year-old male with a history of CAD, hypertension, atherosclerosis, anxiety, depression, bipolar disorder, hyperlipidemia, sleep apnea with home BiPAP, nephrolithiasis,  bladder cancer and diabetes type 2, recently hospitalized 7/14/2024 for similar episode of hypoglycemia. Patient was brought per EMS after he was found unconscious in his apartment. EMS reported his blood glucose as low on their Accu dose, per patient blood glucose has been running from the 40s to the 50s over the last several weeks. Patient states he had gone to sleep the night before at approximately midnight and he was found by his neighbors at 1400. Upon admission patient's blood glucose was 58, other lab abnormalities were CK of 372, alk phos 170, WBC 27.22 with no obvious signs or symptoms of infection to include negative chest x-ray, urinalysis with 6-10 WBC negative nitrates, negative leukocytes, CT abdomen pelvis negative for any acute abnormalities, blood and urine cultures pending. Patient was given 4.5 g of Zosyn and admitted for overnight evaluation and treatment.      AMS/hypoglycemia-patient initially presented with altered mental status and blood glucose in the 40s per EMS.  A1c 8/26/2024 5.4 patient received no sliding scale insulin per blood glucose less than 150, without any Lantus and due to patient's recurrent hospitalizations for hypoglycemia patient was discharged home with only his once a week Ozempic.  Extensive discussion regarding patient not taking any insulin, to keep a blood glucose diary for 1 week prior to follow-up with PCP and to discuss the possibility of decreasing Ozempic dose if blood sugars remained stable.     Leukocytosis-initial presentation of WBC of 27.22, minimal WBC count and urinalysis, CT of the abdomen pelvis negative and no infectious process per chest x-ray.  Patient given one-time dose of 4.5 g of Zosyn.  Lab notification of gram-positive cocci in clusters of his anaerobic bottle 8/26/2024.  Patient was given a one-time dose of vancomycin 1500 mg.  Discontinued 8/27/2024 after first BC was negative and PCR of suspected staph was additionally negative in the aerobic  bottle.  His WBC has remained within normal limits x 48 hours, has felt better every day with normal blood glucose readings, no reoccurrence of any signs or symptoms of infectious process to include abdominal pain, chest pain, shortness of breath, or urinary or bladder abnormalities.    Nontraumatic rhabdomyolysis-patient presented after being down for approximately 10-10 hours, , creatinine 0.49.  8/27 CK of 161 and creatinine of 0.56.  Patient has no complaints of muscular aches or decreased level of consciousness.  Patient states he feels better than he has in quite some time.    Subconjunctival edema of the left eye-patient states he believes that he vomited with debris landing in his eye.  Discontinue saline flushes and Systane eyedrops 8/27/2024, initiated Fluorometholone ophthalmic drops 4 times daily and warm compresses twice daily and overnight patient has had significant improvement including no complaints of blurred vision, no discharge, or pain.  Patient's left eye remains injected however significantly improved.  Patient was instructed to continue eyedrops for an additional 3 days or complete resolution of symptoms.  Patient was additionally instructed to follow-up with his ophthalmologist on Friday unless significant improvement on Thursday.    Today patient is resting in bed on room air with no family at bedside.  Patient is alert and oriented and able to participate in assessment and discharge planning.  Patient is extremely animated and conversational this morning which is different from his previous assessments.  Patient states he feels better than he has any long time, I suspect patient has been living with hypoglycemic events daily.  Patient is very excited to discontinue all insulin, he was instructed to keep a blood glucose diary for 1 week to bring to his PCP appointment in 1 week.  He was explicitly instructed to not resume any insulin other than his once a week Ozempic and to discuss  "with PCP at follow-up.  All patient's questions were answered to the best my ability and patient is in agreement for discharge home today, home health was offered and patient did not feel it was necessary.        Patient has been evaluated today 08/28/24 and is stable for discharge. Patient agrees with plan to discharge on 08/28/24    Physical Exam on Discharge:  /73 (BP Location: Left arm, Patient Position: Lying)   Pulse 83   Temp 98.1 °F (36.7 °C) (Oral)   Resp 16   Ht 177.8 cm (70\")   Wt 111 kg (244 lb 7.8 oz)   SpO2 96%   BMI 35.08 kg/m²   Physical Exam  Vitals and nursing note reviewed.   Constitutional:       Appearance: He is obese.      Comments: Is resting comfortably in bed on room air.  No family at bedside   HENT:      Head: Normocephalic.      Right Ear: Tympanic membrane normal.      Nose: Nose normal.      Mouth/Throat:      Mouth: Mucous membranes are moist.      Pharynx: Oropharynx is clear.   Eyes:      General: No visual field deficit.        Left eye: No discharge.      Conjunctiva/sclera:      Left eye: Left conjunctiva is injected.      Comments: Erythema and redness significantly improved   Cardiovascular:      Rate and Rhythm: Normal rate and regular rhythm.      Pulses: Normal pulses.      Heart sounds: Normal heart sounds.   Pulmonary:      Effort: No tachypnea, accessory muscle usage or respiratory distress.      Breath sounds: Examination of the right-upper field reveals decreased breath sounds. Examination of the left-upper field reveals decreased breath sounds. Examination of the right-middle field reveals decreased breath sounds. Examination of the left-middle field reveals decreased breath sounds. Examination of the right-lower field reveals decreased breath sounds. Examination of the left-lower field reveals decreased breath sounds. Decreased breath sounds present.   Genitourinary:     Comments: Voiding per urinal  Musculoskeletal:      Comments: Generalized weakness "   Skin:     General: Skin is warm and dry.      Capillary Refill: Capillary refill takes less than 2 seconds.   Neurological:      General: No focal deficit present.      Mental Status: He is alert and oriented to person, place, and time.   Psychiatric:         Attention and Perception: Attention normal.         Mood and Affect: Mood normal.         Speech: Speech normal.         Behavior: Behavior normal.         Thought Content: Thought content normal.         Cognition and Memory: Cognition and memory normal.         Judgment: Judgment normal.         Condition on Discharge: Able for discharge home    Discharge Disposition:  Home-Health Care [facility] [place]    Discharge Medications:     Discharge Medications        New Medications        Instructions Start Date   fluorometholone 0.1 % ophthalmic suspension  Commonly known as: EFLONE   2 drops, Left Eye, 4 Times Daily             Continue These Medications        Instructions Start Date   amLODIPine 10 MG tablet  Commonly known as: NORVASC   10 mg, Oral, Nightly      atorvastatin 40 MG tablet  Commonly known as: LIPITOR   40 mg, Oral, Nightly      clopidogrel 75 MG tablet  Commonly known as: PLAVIX   Take 1 tablet by mouth Every Night.      cyclobenzaprine 10 MG tablet  Commonly known as: FLEXERIL   10 mg, Oral, 3 Times Daily PRN      Dextromethorphan-buPROPion ER  MG tablet controlled-release  Commonly known as: AUVELITY   1 tablet, Oral, 2 Times Daily      eszopiclone 3 MG tablet  Commonly known as: LUNESTA   3 mg, Oral, Nightly, Take immediately before bedtime      furosemide 40 MG tablet  Commonly known as: LASIX   40 mg, Oral, Nightly      isosorbide mononitrate 60 MG 24 hr tablet  Commonly known as: IMDUR   Take 1 tablet by mouth Every Night.      lisinopril 40 MG tablet  Commonly known as: PRINIVIL,ZESTRIL   40 mg, Oral, Daily      metoprolol succinate  MG 24 hr tablet  Commonly known as: TOPROL-XL   Take 1 tablet by mouth Every Night.      naloxone 4  MG/0.1ML nasal spray  Commonly known as: NARCAN   1 spray, Nasal, As Needed, Call 911. Don't prime. Ledbetter in 1 nostril for overdose. Repeat in 2-3 minutes in other nostril if no or minimal breathing/responsiveness.      nitroglycerin 0.4 MG SL tablet  Commonly known as: NITROSTAT   Place 1 tablet under the tongue Every 5 (Five) Minutes As Needed for Chest Pain.      oxyCODONE-acetaminophen  MG per tablet  Commonly known as: PERCOCET   Take 1 tablet by mouth Every 8 (Eight) Hours.      Ozempic (2 MG/DOSE) 8 MG/3ML solution pen-injector  Generic drug: Semaglutide (2 MG/DOSE)   1 mg, Subcutaneous, Weekly             Stop These Medications      insulin detemir 100 UNIT/ML injection  Commonly known as: LEVEMIR     insulin regular 100 UNIT/ML injection  Commonly known as: humuLIN R,novoLIN R                Discharge Diet:   Diet Instructions       Diet: Cardiac Diets, Diabetic Diets; Healthy Heart (2-3 Na+); Regular (IDDSI 7); Thin (IDDSI 0); Consistent Carbohydrate      Discharge Diet:  Cardiac Diets  Diabetic Diets       Cardiac Diet: Healthy Heart (2-3 Na+)    Texture: Regular (IDDSI 7)    Fluid Consistency: Thin (IDDSI 0)    Diabetic Diet: Consistent Carbohydrate            Activity at Discharge:   Activity Instructions       Activity as Tolerated              Discharge Instructions:   1.  Patient was instructed to return for medical attention for any new or worsening hypoglycemia, decreased level of consciousness or syncopal episodes.  2.  Patient was instructed to continue fluorometholone eyedrops for another 3 days or solution of symptoms.  3.  Patient was instructed to report to his ophthalmologist on Friday if any continued irritation or visual disturbance.  4.  Patient was instructed to discontinue all insulin except for his once a week Ozempic.  5.  Patient was instructed to check his blood glucose twice daily and to keep a log to bring to his PCP appointment next week.  6.  Home health was offered  however patient declines at this time.    Follow-up Appointments:   No future appointments.    Test Results Pending at Discharge: None    Electronically signed by REMA Harris, 08/28/24, 07:06 CDT.    Time: 40 minutes.

## 2024-08-30 LAB — BACTERIA SPEC AEROBE CULT: NORMAL

## 2024-12-29 PROBLEM — J44.9 COPD (CHRONIC OBSTRUCTIVE PULMONARY DISEASE): Status: ACTIVE | Noted: 2024-01-01

## 2024-12-29 PROBLEM — J96.90 RESPIRATORY FAILURE: Status: ACTIVE | Noted: 2024-01-01

## 2024-12-29 PROBLEM — F19.10 SUBSTANCE ABUSE: Status: ACTIVE | Noted: 2024-01-01

## 2024-12-29 PROBLEM — R82.5 POSITIVE URINE DRUG SCREEN: Status: ACTIVE | Noted: 2024-01-01

## 2024-12-29 PROBLEM — J96.01 ACUTE RESPIRATORY FAILURE WITH HYPOXIA: Status: ACTIVE | Noted: 2024-01-01

## 2024-12-29 PROBLEM — Z72.0 TOBACCO USE: Status: ACTIVE | Noted: 2024-01-01

## 2024-12-29 PROBLEM — Z99.11 ON MECHANICALLY ASSISTED VENTILATION: Status: ACTIVE | Noted: 2024-01-01

## 2024-12-29 PROBLEM — I10 ESSENTIAL HYPERTENSION: Status: ACTIVE | Noted: 2024-01-01

## 2024-12-29 PROBLEM — E78.5 HYPERLIPIDEMIA LDL GOAL <100: Status: ACTIVE | Noted: 2024-01-01

## 2024-12-29 PROBLEM — I25.810 CORONARY ARTERY DISEASE INVOLVING AUTOLOGOUS VEIN CORONARY BYPASS GRAFT WITHOUT ANGINA PECTORIS: Status: ACTIVE | Noted: 2024-01-01

## 2024-12-29 PROBLEM — F31.9 BIPOLAR DISORDER: Status: ACTIVE | Noted: 2024-01-01

## 2024-12-29 PROBLEM — F14.10 COCAINE ABUSE: Status: ACTIVE | Noted: 2024-01-01

## 2024-12-29 NOTE — PROGRESS NOTES
RT EQUIPMENT DEVICE RELATED - SKIN ASSESSMENT    Dany Score:        RT Medical Equipment/Device:     ETT Linda/[employer]fast    Skin Assessment:      Cheek:  Intact  Neck:  Intact  Lips:  Intact  Mouth:  Intact    Device Skin Pressure Protection:  Skin-to-device areas padded:  [employer]fast    Nurse Notification:  Evelin Elizabeth, RRT      Lips are severely chapped & dry with blood tinged places on them.

## 2024-12-29 NOTE — ED PROVIDER NOTES
Subjective   History of Present Illness  64-year-old male presents to the ED for evaluation after being found in an unresponsive/comatose state.  He has a history of hypertension, hyperlipidemia, diabetes, coronary disease.  Has had ER presentations in the past for hypoglycemia.  Patient lives at the [facility], was found unresponsive by staff.  Last known well unknown.  Duration of downtime unknown.  When the patient presented to the ED he was unresponsive, had sonorous respirations.  GCS 6 (E1V1M4).  He was on a nonrebreather.  Withdrawing to painful stimuli on the right, left arm and leg appeared a little tonic.  There was no seizure activity.  No flaccid paralysis.  Unable to obtain any additional history.        History provided by:  Patient  History limited by:  Patient unresponsive      Review of Systems   Unable to perform ROS: Patient unresponsive       Past Medical History:   Diagnosis Date    Anxiety     Atherosclerosis of coronary artery     Bipolar disorder     Bladder cancer     Colon polyp     Coronary artery disease     Depression     Diabetes mellitus     Hemorrhoids     Hyperlipidemia     Hypertension     Nephrolithiasis     Sleep apnea        Allergies   Allergen Reactions    Codeine Nausea Only       Past Surgical History:   Procedure Laterality Date    ANGIOPLASTY      with stent placement    BACK SURGERY      BLADDER SURGERY      cancer    CARDIAC CATHETERIZATION      COLONOSCOPY  04/13/2015    COLONOSCOPY N/A 4/30/2018    Procedure: COLONOSCOPY WITH ANESTHESIA;  Surgeon: Gerson Martini MD;  Location:  [facility];  Service: Gastroenterology    COLONOSCOPY N/A 4/8/2021    Procedure: COLONOSCOPY WITH ANESTHESIA;  Surgeon: Gerson Martini MD;  Location: [facility] END[facility]COPY;  Service: Gastroenterology;  Laterality: N/A;  Pre: Hx Colon Polyps  Post: divertivculosis   Dr. Garza  CO2 Inflation Used    CORONARY ARTERY BYPASS GRAFT      KNEE SURGERY      SHOULDER  SURGERY      x 2    SINUS SURGERY         Family History   Adopted: Yes       Social History     Socioeconomic History    Marital status:    Tobacco Use    Smoking status: Every Day     Current packs/day: 0.50     Types: Cigarettes    Smokeless tobacco: Never    Tobacco comments:     2 a day   Vaping Use    Vaping status: Never Used   Substance and Sexual Activity    Alcohol use: Yes     Comment: Rarely    Drug use: No    Sexual activity: Defer           Objective   Physical Exam  Vitals and nursing note reviewed.   Constitutional:       General: He is in acute distress.      Appearance: He is toxic-appearing.      Comments: Unresponsive elderly male patient, he is breathing spontaneously and has deep sonorous respirations, withdrawing to painful stimuli right arm and leg.  Left arm and leg are little tonic.  No definite response to pain   HENT:      Head: Normocephalic and atraumatic.      Ears:      Comments: Atraumatic     Nose:      Comments: Atraumatic     Mouth/Throat:      Comments: Atraumatic  Eyes:      Comments: No periorbital swelling.  Pupils equally round reactive   Cardiovascular:      Rate and Rhythm: Regular rhythm. Tachycardia present.      Pulses: Normal pulses.      Heart sounds: Normal heart sounds. No murmur heard.  Pulmonary:      Breath sounds: Rhonchi present.   Abdominal:      General: Abdomen is flat.      Palpations: Abdomen is soft.      Tenderness: There is no abdominal tenderness. There is no guarding.   Musculoskeletal:      Right lower leg: No edema.      Left lower leg: No edema.   Skin:     General: Skin is warm and dry.      Capillary Refill: Capillary refill takes less than 2 seconds.      Comments: No external evidence for trauma   Neurological:      Mental Status: He is unresponsive.      GCS: GCS eye subscore is 1. GCS verbal subscore is 1. GCS motor subscore is 4.      Comments: Withdraws to painful stimuli of right arm and leg  No verbal response  No  eye-opening  Pupils are equally round and reactive           Intubation    Date/Time: 12/29/2024 12:19 PM    Performed by: Imer Valenzuela MD  Authorized by: Imer Valenzuela MD    Consent:     Consent obtained:  Emergent situation  Universal protocol:     Patient identity confirmed:  Arm band and hospital-assigned identification number  Pre-procedure details:     Indications: airway protection and altered consciousness      Patient status:  Unresponsive    Look externally: large tongue      Mouth opening - incisor distance:  3 or more finger widths    Hyoid-mental distance: 3 or more finger widths      Hyoid-thyroid distance: 2 or more finger widths      Mallampati score:  II    Obstruction: none      Neck mobility: normal      Pharmacologic strategy: RSI      Induction agents:  Etomidate    Paralytics:  Rocuronium  Procedure details:     Preoxygenation:  Bag valve mask    CPR in progress: no      Number of attempts:  1  Successful intubation attempt details:     Intubation method:  Oral    Intubation technique: direct      Laryngoscope blade:  Mac 3    Bougie used: no      Grade view: II      Tube size (mm):  7.5    Tube type:  Cuffed    Tube visualized through cords: yes    Placement assessment:     ETT at teeth/gumline (cm):  24  Critical Care    Performed by: Imer Valenzuela MD  Authorized by: Cesar Alvarado MD    Critical care provider statement:     Critical care time (minutes):  45    Critical care time was exclusive of:  Separately billable procedures and treating other patients and teaching time    Critical care was necessary to treat or prevent imminent or life-threatening deterioration of the following conditions:  Respiratory failure and CNS failure or compromise    Critical care was time spent personally by me on the following activities:  Ordering and performing treatments and interventions, ordering and review of laboratory studies, discussions with consultants, evaluation of  patient's response to treatment, examination of patient, ventilator management, review of old charts, re-evaluation of patient's condition, pulse oximetry and ordering and review of radiographic studies    I assumed direction of critical care for this patient from another provider in my specialty: no      Care discussed with: admitting provider             Lab Results (last 24 hours)       Procedure Component Value Units Date/Time    Blood Gas, Arterial - [[id number]]  (Abnormal) Collected: 12/29/24 1150    Specimen: Arterial Blood Updated: 12/29/24 1206     Site Right Radial     Pedro Pablo's Test Positive     pH, Arterial 7.365 pH units      pCO2, Arterial 40.8 mm Hg      pO2, Arterial 246.0 mm Hg      Comment: 83 Value above reference range        HCO3, Arterial 23.3 mmol/L      Base Excess, Arterial -2.0 mmol/L      Comment: 84 Value below reference range        O2 Saturation, Arterial 99.7 %      Comment: 83 Value above reference range        Temperature 37.0     Barometric Pressure for Blood Gas 746 mmHg      Modality NRB     FIO2 100 %      Ventilator Mode NA     Collected by [id number]     Comment: Meter: [id number]     :  [id number]        pCO2, Temperature Corrected 40.8 mm Hg      pH, Temp Corrected 7.365 pH Units      pO2, Temperature Corrected 246 mm Hg      PO2/FIO2 246    POC Glucose Once [[id number]]  (Normal) Collected: 12/29/24 1158    Specimen: Blood Updated: 12/29/24 1209     Glucose 120 mg/dL      Comment: : [id number] [facility] ID: [id number]       Ethanol [[id number]] Collected: 12/29/24 1202    Specimen: Blood Updated: 12/29/24 1229     Ethanol % <0.010 %     Narrative:      Not for legal purposes. Chain of Custody not followed.     Acetaminophen Level [[id number]]  (Normal) Collected: 12/29/24 1202    Specimen: Blood Updated: 12/29/24 1234     Acetaminophen <5.0 mcg/mL     Salicylate Level [[id number]]  (Normal) Collected: 12/29/24 1202    Specimen: Blood Updated: 12/29/24 1234      Salicylate 0.3 mg/dL     CBC & Differential [[id number]]  (Abnormal) Collected: 12/29/24 1202    Specimen: Blood Updated: 12/29/24 1227    Narrative:      The following orders were created for panel order CBC & Differential.  Procedure                               Abnormality         Status                     ---------                               -----------         ------                     CBC Auto Differential[[id number]]        Abnormal            Final result                 Please view results for these tests on the individual orders.    Comprehensive Metabolic Panel [[id number]]  (Abnormal) Collected: 12/29/24 1202    Specimen: Blood Updated: 12/29/24 1248     Glucose 111 mg/dL      BUN 24 mg/dL      Creatinine 1.15 mg/dL      Sodium 142 mmol/L      Potassium 3.7 mmol/L      Chloride 100 mmol/L      CO2 20.0 mmol/L      Calcium 9.4 mg/dL      Total Protein 7.4 g/dL      Albumin 4.5 g/dL      ALT (SGPT) 22 U/L      AST (SGOT) 26 U/L      Alkaline Phosphatase 165 U/L      Total Bilirubin 1.6 mg/dL      Globulin 2.9 gm/dL      A/G Ratio 1.6 g/dL      BUN/Creatinine Ratio 20.9     Anion Gap 22.0 mmol/L      eGFR 71.5 mL/min/1.73     Narrative:      GFR Categories in Chronic Kidney Disease (CKD)      GFR Category          GFR (mL/min/1.73)    Interpretation  G1                     90 or greater         Normal or high (1)  G2                      60-89                Mild decrease (1)  G3a                   45-59                Mild to moderate decrease  G3b                   30-44                Moderate to severe decrease  G4                    15-29                Severe decrease  G5                    14 or less           Kidney failure          (1)In the absence of evidence of kidney disease, neither GFR category G1 or G2 fulfill the criteria for CKD.    eGFR calculation 2021 CKD-EPI creatinine equation, which does not include race as a factor    Lactic Acid, Plasma [[id number]]  (Normal) Collected:  "12/29/24 1202    Specimen: Blood Updated: 12/29/24 1235     Lactate 1.7 mmol/L     Magnesium [[id number]]  (Normal) Collected: 12/29/24 1202    Specimen: Blood Updated: 12/29/24 1248     Magnesium 1.8 mg/dL     Procalcitonin [[id number]]  (Abnormal) Collected: 12/29/24 1202    Specimen: Blood Updated: 12/29/24 1352     Procalcitonin 0.28 ng/mL     Narrative:      As a Marker for Sepsis (Non-Neonates):    1. <0.5 ng/mL represents a low risk of severe sepsis and/or septic shock.  2. >2 ng/mL represents a high risk of severe sepsis and/or septic shock.    As a Marker for Lower Respiratory Tract Infections that require antibiotic therapy:    PCT on Admission    Antibiotic Therapy       6-12 Hrs later    >0.5                Strongly Recommended  >0.25 - <0.5        Recommended   0.1 - 0.25          Discouraged              Remeasure/reassess PCT  <0.1                Strongly Discouraged     Remeasure/reassess PCT    As 28 day mortality risk marker: \"Change in Procalcitonin Result\" (>80% or <=80%) if Day 0 (or Day 1) and Day 4 values are available. Refer to http://[email or web address]-pct-[email or web address]    Change in PCT <=80%  A decrease of PCT levels below or equal to 80% defines a positive change in PCT test result representing a higher risk for 28-day all-cause mortality of patients diagnosed with severe sepsis for septic shock.    Change in PCT >80%  A decrease of PCT levels of more than 80% defines a negative change in PCT result representing a lower risk for 28-day all-cause mortality of patients diagnosed with severe sepsis or septic shock.       Protime-INR [[id number]]  (Normal) Collected: 12/29/24 1202    Specimen: Blood Updated: 12/29/24 1233     Protime 13.2 Seconds      INR 0.96    CBC Auto Differential [[id number]]  (Abnormal) Collected: 12/29/24 1202    Specimen: Blood Updated: 12/29/24 1227     WBC 29.48 10*3/mm3      RBC 5.57 10*6/mm3      Hemoglobin 17.7 g/dL      Hematocrit 50.0 %      MCV 89.8 fL      MCH 31.8 pg  "     MCHC 35.4 g/dL      RDW 12.9 %      RDW-SD 42.4 fl      MPV 10.4 fL      Platelets 308 10*3/mm3      Neutrophil % 84.5 %      Lymphocyte % 4.1 %      Monocyte % 7.8 %      Eosinophil % 0.0 %      Basophil % 0.6 %      Immature Grans % 3.0 %      Neutrophils, Absolute 24.89 10*3/mm3      Lymphocytes, Absolute 1.21 10*3/mm3      Monocytes, Absolute 2.31 10*3/mm3      Eosinophils, Absolute 0.00 10*3/mm3      Basophils, Absolute 0.19 10*3/mm3      Immature Grans, Absolute 0.88 10*3/mm3      nRBC 0.0 /100 WBC     CK [[id number]]  (Abnormal) Collected: 12/29/24 1202    Specimen: Blood Updated: 12/29/24 1351     Creatine Kinase 502 U/L     Osmolality, Serum [[id number]]  (Abnormal) Collected: 12/29/24 1202    Specimen: Blood Updated: 12/29/24 1558     Osmolality 302 mOsm/kg     Respiratory Panel PCR w/COVID-19(SARS-CoV-2) NACHO/ARACELI/CHRISTINE/[facility] In-House, NP Swab in UTM/VTM, 2 HR TAT - Swab, Nasopharynx [[id number]]  (Normal) Collected: 12/29/24 1205    Specimen: Swab from Nasopharynx Updated: 12/29/24 1304     ADENOVIRUS, PCR Not Detected     Coronavirus 229E Not Detected     Coronavirus HKU1 Not Detected     Coronavirus NL63 Not Detected     Coronavirus OC43 Not Detected     COVID19 Not Detected     Human Metapneumovirus Not Detected     Human Rhinovirus/Enterovirus Not Detected     Influenza A PCR Not Detected     Influenza B PCR Not Detected     Parainfluenza Virus 1 Not Detected     Parainfluenza Virus 2 Not Detected     Parainfluenza Virus 3 Not Detected     Parainfluenza Virus 4 Not Detected     RSV, PCR Not Detected     Bordetella pertussis pcr Not Detected     Bordetella parapertussis PCR Not Detected     Chlamydophila pneumoniae PCR Not Detected     Mycoplasma pneumo by PCR Not Detected    Narrative:      In the setting of a positive respiratory panel with a viral infection PLUS a negative procalcitonin without other underlying concern for bacterial infection, consider observing off antibiotics or  discontinuation of antibiotics and continue supportive care. If the respiratory panel is positive for atypical bacterial infection (Bordetella pertussis, Chlamydophila pneumoniae, or Mycoplasma pneumoniae), consider antibiotic de-escalation to target atypical bacterial infection.    Blood Culture - Blood, Arm, Right [[id number]] Collected: 12/29/24 1218    Specimen: Blood from Arm, Right Updated: 12/29/24 1230    Blood Culture - Blood, Hand, Digit Right [[id number]] Collected: 12/29/24 1230    Specimen: Blood from Hand, Digit Right Updated: 12/29/24 1231    Urinalysis With Culture If Indicated - Indwelling Urethral Catheter [[id number]]  (Abnormal) Collected: 12/29/24 1254    Specimen: Urine from Indwelling Urethral Catheter Updated: 12/29/24 1332     Color, UA Yellow     Appearance, UA Cloudy     pH, UA 5.5     Specific Gravity, UA 1.014     Glucose, UA Negative     Ketones, UA Trace     Bilirubin, UA Negative     Blood, UA Large (3+)     Protein,  mg/dL (2+)     Leuk Esterase, UA Negative     Nitrite, UA Negative     Urobilinogen, UA 0.2 E.U./dL    Narrative:      In absence of clinical symptoms, the presence of pyuria, bacteria, and/or nitrites on the urinalysis result does not correlate with infection.    Urine Drug Screen - Indwelling Urethral Catheter [[id number]]  (Abnormal) Collected: 12/29/24 1254    Specimen: Urine from Indwelling Urethral Catheter Updated: 12/29/24 1315     THC, Screen, Urine Negative     Phencyclidine (PCP), Urine Negative     Cocaine Screen, Urine Positive     Methamphetamine, Ur Negative     Opiate Screen Negative     Amphetamine Screen, Urine Negative     Benzodiazepine Screen, Urine Negative     Tricyclic Antidepressants Screen Positive     Methadone Screen, Urine Negative     Barbiturates Screen, Urine Negative     Oxycodone Screen, Urine Negative     Buprenorphine, Screen, Urine Negative    Narrative:      Cutoff For Drugs Screened:    Amphetamines               500  ng/ml  Barbiturates               200 ng/ml  Benzodiazepines            150 ng/ml  Cocaine                    150 ng/ml  Methadone                  200 ng/ml  Opiates                    100 ng/ml  Phencyclidine               25 ng/ml  THC                         50 ng/ml  Methamphetamine            500 ng/ml  Tricyclic Antidepressants  300 ng/ml  Oxycodone                  100 ng/ml  Buprenorphine               10 ng/ml    The normal value for all drugs tested is negative. This report includes unconfirmed screening results, with the cutoff values listed, to be used for medical treatment purposes only.  Unconfirmed results must not be used for non-medical purposes such as employment or legal testing.  Clinical consideration should be applied to any drug of abuse test, particularly when unconfirmed results are used.      Fentanyl, Urine - Indwelling Urethral Catheter [[id number]]  (Normal) Collected: 12/29/24 1254    Specimen: Urine from Indwelling Urethral Catheter Updated: 12/29/24 1315     Fentanyl, Urine Negative    Narrative:      Negative Threshold:      Fentanyl 5 ng/mL     The normal value for the drug tested is negative. This report includes final unconfirmed screening results to be used for medical treatment purposes only. Unconfirmed results must not be used for non-medical purposes such as employment or legal testing. Clinical consideration should be applied to any drug of abuse test, particularly when unconfirmed results are used.           Urinalysis, Microscopic Only - Indwelling Urethral Catheter [[id number]]  (Abnormal) Collected: 12/29/24 1254    Specimen: Urine from Indwelling Urethral Catheter Updated: 12/29/24 1332     RBC, UA Too Numerous to Count /HPF      WBC, UA 3-5 /HPF      Comment: Urine culture not indicated.        Bacteria, UA 1+ /HPF      Squamous Epithelial Cells, UA 0-2 /HPF      Hyaline Casts, UA 0-2 /LPF      Granular Casts, UA 0-2 /LPF      Methodology Manual Light Microscopy     Blood Gas, Arterial - [[id number]]  (Abnormal) Collected: 12/29/24 1303    Specimen: Arterial Blood Updated: 12/29/24 1303     Site Right Brachial     Pedro Pablo's Test N/A     pH, Arterial 7.259 pH units      Comment: 84 Value below reference range        pCO2, Arterial 55.7 mm Hg      Comment: 83 Value above reference range        pO2, Arterial 71.2 mm Hg      Comment: 84 Value below reference range        HCO3, Arterial 24.9 mmol/L      Base Excess, Arterial -3.4 mmol/L      Comment: 84 Value below reference range        O2 Saturation, Arterial 91.7 %      Comment: 84 Value below reference range        Temperature 37.0     Barometric Pressure for Blood Gas 746 mmHg      Modality Ventilator     FIO2 40 %      Ventilator Mode AC     Set Tidal Volume 500     Set Mech Resp Rate 14.0     PEEP 5.0     Collected by [id number]     Comment: Meter: [id number]     :  [id number]        pCO2, Temperature Corrected 55.7 mm Hg      pH, Temp Corrected 7.259 pH Units      pO2, Temperature Corrected 71.2 mm Hg      PO2/FIO2 178    Blood Gas, Arterial - [[id number]]  (Abnormal) Collected: 12/29/24 1455    Specimen: Arterial Blood Updated: 12/29/24 1454     Site Right Radial     Pedro Pablo's Test Positive     pH, Arterial 7.339 pH units      Comment: 84 Value below reference range        pCO2, Arterial 43.3 mm Hg      pO2, Arterial 110.0 mm Hg      Comment: 83 Value above reference range        HCO3, Arterial 23.3 mmol/L      Base Excess, Arterial -2.6 mmol/L      Comment: 84 Value below reference range        O2 Saturation, Arterial 98.1 %      Temperature 37.0     Barometric Pressure for Blood Gas 747 mmHg      Modality Ventilator     FIO2 60 %      Ventilator Mode AC     Set Tidal Volume 500     Set Mech Resp Rate 16.0     PEEP 5.0     Collected by [id number]     Comment: Meter: [id number]     :  [id number]        pCO2, Temperature Corrected 43.3 mm Hg      pH, Temp Corrected 7.339 pH Units      pO2, Temperature Corrected 110  mm Hg      PO2/FIO2 183    Methanol [[id number]] Collected: 12/29/24 1504    Specimen: Blood Updated: 12/29/24 1512    Respiratory Culture - Aspirate, ET Suction [[id number]] Collected: 12/29/24 1507    Specimen: Aspirate from ET Suction Updated: 12/29/24 1512         XR Abdomen KUB    Result Date: 12/29/2024  XR ABDOMEN KUB- 12/29/2024 1:17 PM  HISTORY: og placement   TECHNIQUE:Single AP view of the abdomen.  COMPARISON: None      Findings/impression:  Enteric tube courses below the diaphragm overlying the stomach with the tip overlying the gastric antrum/body.      This report was signed and finalized on 12/29/2024 2:28 PM by Rubin West.      XR Chest 1 View    Result Date: 12/29/2024  EXAM: XR CHEST 1 VW- 12/29/2024 11:23 AM  HISTORY: tube placment   COMPARISON: 12/29/2024.  TECHNIQUE: Single frontal radiograph of the chest was obtained.  FINDINGS:  Support Devices: Endotracheal tube tip 3 cm above the cipriano.  Cardiac and Mediastinal Silhouettes: Normal.  Lungs/Pleura: No focal consolidation. No sizable pleural effusion. No visible pneumothorax.  Osseous structures: No acute osseous finding.  Other: None.       Endotracheal tube tip 3 cm above the cipriano.    This report was signed and finalized on 12/29/2024 12:29 PM by Rubin West.      XR Chest 1 View    Result Date: 12/29/2024  EXAM: XR CHEST 1 VW- 12/29/2024 10:54 AM  HISTORY: ams, post intubation   COMPARISON: 8/25/2024.  TECHNIQUE: Single frontal radiograph of the chest was obtained.  FINDINGS:  Support Devices: No visible endotracheal tube. Prior CABG. Spinal stimulator leads.  Cardiac and Mediastinal Silhouettes: Normal.  Lungs/Pleura: No focal consolidation. No sizable pleural effusion. No visible pneumothorax.  Osseous structures: No acute osseous finding.  Other: None.       No visible endotracheal tube.  Clear lungs.    This report was signed and finalized on 12/29/2024 12:15 PM by Rubin West.      CT Cervical Spine Without  Contrast    Result Date: 12/29/2024  Exam: CT CERVICAL SPINE WO CONTRAST- 12/29/2024 10:56 AM  HISTORY: fall with pain.  COMPARISON: 7/13/2024.  DOSE LENGTH PRODUCT: 542.05 mGy.cm mGy cm. Automated exposure control was also utilized to decrease patient radiation dose.  TECHNIQUE: Serial helical tomographic images of the cervical spine were obtained without the use of intravenous contrast. Additionally, sagittal and coronal reformatted images were also provided for review.  FINDINGS:  Motion decreases sensitivity.  Prior C3-C4 ACDF. No evidence of hardware fracture.  Straightening of the cervical lordosis.  No obvious fracture. The vertebral body heights are preserved.  Multilevel mild to moderate degenerative disc disease and facet hypertrophic changes. Ankylosis of the left C2, C3, and C4 facets.  Paraspinal soft tissues are grossly unremarkable.  Other: Mild carotid atherosclerosis.       Motion-degraded study.  No obvious fracture or traumatic malalignment.    This report was signed and finalized on 12/29/2024 12:14 PM by Rubin West.      CT Head Without Contrast    Result Date: 12/29/2024  Exam: CT HEAD WO CONTRAST- 12/29/2024 10:51 AM  HISTORY: Altered mental status  DOSE LENGTH PRODUCT: 741.46 mGy.cm mGy cm. Automated exposure control was also utilized to decrease patient radiation dose.  Technique: Helically acquired CT of the brain without IV contrast was performed. Sagittal and coronal reformations are also provided for review. Soft tissue and bone kernels are available for interpretation.  Comparison: 8/25/2024.  Findings:  Ventricles and extra-axial CSF spaces are normal in size.  No intraparenchymal or extra-axial hemorrhage.  Gray-white matter differentiation is preserved.  Orbits are grossly unremarkable. Paranasal sinuses are grossly clear. Mastoid air cells are grossly clear.  No suspicious calvarial or extracranial soft tissue abnormality.      Impression:   No acute intracranial abnormality.   This report was signed and finalized on 12/29/2024 12:08 PM by Rubin West.      ED Course  ED Course as of 12/29/24 1619   Sun Dec 29, 2024   1613 64-year-old male with history of hypertension, hyperlipidemia, diabetes, coronary disease presents to the ED after being found unresponsive during a well check.  Glucose 120.  Oxygen saturations were in the 90s on a nonrebreather.  Highly concerned about a head bleed of initial presentation.  Stat CT obtained, no evidence of bleeding.  Last known well uncertain, not a candidate for TNK if this was due to a massive stroke.    GCS was 6, patient was intubated for airway protection.  Labs reveal elevated white count, 29,000.  Respiratory panel negative.  UDS positive for cocaine and tricyclic's.  QTc mildly prolonged at 497.  ABG unremarkable.  Alcohol negative.  Lactate normal at 1.7.  Aspirin and salicylate negative.  Blood cultures ordered and pending.  Patient covered with broad-spectrum antibiotics, vancomycin and Zosyn.  CK minimally elevated at 502.  Etiology for symptoms uncertain.  Possible toxic ingestion.  Serum osmole's only minimally elevated at 302.  At this point, etiology for patient's unresponsiveness uncertain, plan for admission to the ICU for further inpatient evaluation and treatment. [AW]      ED Course User Index  [AW] Imer Valenzuela MD                                                       Medical Decision Making      Final diagnoses:   Acute respiratory failure, unspecified whether with hypoxia or hypercapnia   Altered mental status, unspecified altered mental status type   Cocaine abuse       ED Disposition  ED Disposition       ED Disposition   Decision to Admit    Condition   --    Comment   Level of Care: Critical Care [6]   Diagnosis: Respiratory failure [174021]   Admitting Physician: MADELYN HOU [[id number]]   Attending Physician: MADELYN HOU [[id number]]   Certification: I Certify That Inpatient Hospital Services Are Medically  Necessary For Greater Than 2 Midnights                 No follow-up provider specified.       Medication List      No changes were made to your prescriptions during this visit.            Imer Valenzuela MD  12/29/24 0580

## 2024-12-29 NOTE — PLAN OF CARE
Goal Outcome Evaluation: wean from mechanical ventilator

## 2024-12-29 NOTE — PROGRESS NOTES
"Pharmacy Dosing Service  Pharmacokinetics  Vancomycin Initial Evaluation  Assessment/Action/Plan:  Loading dose: Vancomycin 2250 mg IVPB once  Current Order: Vancomycin 1500 mg IVPB every 24 hours  Current end date:1/2/25  Levels: none  Additional antimicrobial agent(s): Zosyn  MRSA Nasal PCR ordered: Yes (Vancomycin indication is pneumonia, bone/joint, SSTI or IAI)    Vancomycin dosage initiated based on population pharmacokinetic parameters. Pharmacy will continue to follow daily and adjust dose accordingly.     Subjective:  Ld Green is a 64 y.o. male with a Vancomycin \"Pharmacy to Dose\" consult for the treatment of pneumonia, day 1 of 5 of treatment.    AUC Model Data:  Loading dose: 2250 mg IV once  Regimen: 1500 mg IV every 24 hours.  Start time: 16:42 on 12/30/2024  Exposure target: AUC24 (range)400-600 mg/L.hr   AUC24,ss: 506 mg/L.hr  Probability of AUC24 > 400: 67 %  Ctrough,ss: 12 mg/L  Probability of Ctrough,ss > 20: 29 %  Probability of nephrotoxicity (Lodise SHARON 2009): 7 %    Objective:  Ht: 172.7 cm (68\"); Wt: 108 kg (238 lb 9.6 oz)  Estimated Creatinine Clearance: 78.3 mL/min (by C-G formula based on SCr of 1.15 mg/dL).   Creatinine   Date Value Ref Range Status   12/29/2024 1.15 0.76 - 1.27 mg/dL Final   08/28/2024 0.53 (L) 0.76 - 1.27 mg/dL Final   08/27/2024 0.56 (L) 0.76 - 1.27 mg/dL Final   02/14/2022 0.6 0.5 - 1.2 mg/dL Final   12/20/2021 0.8 0.5 - 1.2 mg/dL Final   11/16/2021 0.7 0.5 - 1.2 mg/dL Final      Lab Results   Component Value Date    WBC 29.48 (H) 12/29/2024    WBC 9.12 08/28/2024    WBC 10.08 08/27/2024      Baseline culture results:  Microbiology Results (last 10 days)       Procedure Component Value - Date/Time    Respiratory Panel PCR w/COVID-19(SARS-CoV-2) NACHO/ARACELI/CHRISTINE/PAD/COR/ZARINA In-House, NP Swab in UTM/VTM, 2 HR TAT - Swab, Nasopharynx [[id number]]  (Normal) Collected: 12/29/24 1205    Lab Status: Final result Specimen: Swab from Nasopharynx Updated: 12/29/24 1304     " ADENOVIRUS, PCR Not Detected     Coronavirus 229E Not Detected     Coronavirus HKU1 Not Detected     Coronavirus NL63 Not Detected     Coronavirus OC43 Not Detected     COVID19 Not Detected     Human Metapneumovirus Not Detected     Human Rhinovirus/Enterovirus Not Detected     Influenza A PCR Not Detected     Influenza B PCR Not Detected     Parainfluenza Virus 1 Not Detected     Parainfluenza Virus 2 Not Detected     Parainfluenza Virus 3 Not Detected     Parainfluenza Virus 4 Not Detected     RSV, PCR Not Detected     Bordetella pertussis pcr Not Detected     Bordetella parapertussis PCR Not Detected     Chlamydophila pneumoniae PCR Not Detected     Mycoplasma pneumo by PCR Not Detected    Narrative:      In the setting of a positive respiratory panel with a viral infection PLUS a negative procalcitonin without other underlying concern for bacterial infection, consider observing off antibiotics or discontinuation of antibiotics and continue supportive care. If the respiratory panel is positive for atypical bacterial infection (Bordetella pertussis, Chlamydophila pneumoniae, or Mycoplasma pneumoniae), consider antibiotic de-escalation to target atypical bacterial infection.            Epifanio Patrick, PharmBARBRA  12/29/24 17:01 CST

## 2024-12-29 NOTE — H&P
[facility] Intens[facility]  HISTORY AND PHYSICAL    Date of Admission: 12/29/2024  Primary Care Physician: Xoimara Neal APRN    Subjective   Primary Historian: ED physician, chart notes    Chief Complaint: Mechanical ventilation, possible tracheobronchitis versus pneumonia, positive drug screen, suspected seizures.    History of Present Illness  Patient is a 64 years old  gentleman who was seen in the ER today on request from ED physician Dr. Valenzuela.    He presented to the emergency department when he was found unresponsive.  He lives in a low income housing complex [facility] and apparently was found by somebody unresponsive and comatose state.  He has known history of hypertension, diabetes mellitus type 2 on insulin., coronary artery disease and also had history of hypoglycemia in the past.  He had last hospitalization in August 2024.  He was treated for altered mental status at the time.  His workup at that time was negative mostly except leukocytosis.    At the time of presentation to the ER the patient was unresponsive with GCS 6.  The patient was having oxygen desaturation and needed nonrebreather mask and withdrawing to painful stimuli on the right and left arm and left leg appeared to be little tonic suspected to have seizure activity.  He was immediately intubated by ED physician and ICU admit was requested and patient was accepted in the ICU.    Patient could not provide any history because she was intubated on mechanical ventilation of propofol at the time of my visit.  There is no ventilator dyssynchrony noted.  He was morbidly obese he was afebrile and hemodynamically stable.  He was started on broad-spectrum antibiotic for possible aspiration and he has a urinary tract was positive for cocaine and TCA..  In reviewing the chart it appears patient was DNR and DNI during the last hospitalization but he is intubated in the ER and currently in the  CCU.  No further history could be obtained from the patient.  It also noted patient history of bladder cancer in the past and also has bipolar disorder.  He is an active smoker and probably has COPD.  No further history could be obtained from the patient.      His initial workup showed clear lung fields and the imaging studies but the endotracheal tube suction showed thick purulent secretions suspected to be tracheobronchitis or early developing pneumonia.  Lab work shows he has extreme leukocytosis white cell count 29,000.  No active seizures noted other lab work showed normal renal function with elevated blood glucose and arterial blood gas shows hypercapnia and hypoxia.  Respiratory viral panel was negative.  Respiratory culture was sent.    Review of Systems   Otherwise complete ROS reviewed and negative except as mentioned in the HPI.  He was unable to provide any review of system.  He was intubated on mechanical ventilation.    Past Medical History:   Past Medical History:   Diagnosis Date    Anxiety     Atherosclerosis of coronary artery     Bipolar disorder     Bladder cancer     Colon polyp     Coronary artery disease     Depression     Diabetes mellitus     Hemorrhoids     Hyperlipidemia     Hypertension     Nephrolithiasis     Sleep apnea      Past Surgical History:  Past Surgical History:   Procedure Laterality Date    ANGIOPLASTY      with stent placement    BACK SURGERY      BLADDER SURGERY      cancer    CARDIAC CATHETERIZATION      COLONOSCOPY  04/13/2015    COLONOSCOPY N/A 4/30/2018    Procedure: COLONOSCOPY WITH ANESTHESIA;  Surgeon: Gerson Martini MD;  Location: [facility] ENDOSCOPY;  Service: Gastroenterology    COLONOSCOPY N/A 4/8/2021    Procedure: COLONOSCOPY WITH ANESTHESIA;  Surgeon: Gerson Martini MD;  Location: [facility] ENDOSCOPY;  Service: Gastroenterology;  Laterality: N/A;  Pre: Hx Colon Polyps  Post: divertivculosis   Dr. Garza  CO2 Inflation Used    CORONARY ARTERY BYPASS GRAFT       KNEE SURGERY      SHOULDER SURGERY      x 2    SINUS SURGERY       Social History:  reports that he has been smoking cigarettes. He has never used smokeless tobacco. He reports current alcohol use. He reports that he does not use drugs.    Family History: family history is not on file. He was adopted.       Allergies:  Allergies   Allergen Reactions    Codeine Nausea Only       Medications:  Prior to Admission medications    Medication Sig Start Date End Date Taking? Authorizing Provider   amLODIPine (NORVASC) 10 MG tablet Take 1 tablet by mouth Every Night.    Rizwan Peralta MD   atorvastatin (LIPITOR) 40 MG tablet Take 1 tablet by mouth Every Night. 7/19/21   Emergency, Nurse Epic, RN   clopidogrel (PLAVIX) 75 MG tablet Take 1 tablet by mouth Every Night. 4/26/16   Rizwan Peralta MD   cyclobenzaprine (FLEXERIL) 10 MG tablet Take 1 tablet by mouth 3 (Three) Times a Day As Needed for Muscle Spasms.    Rizwan Peralta MD   Dextromethorphan-buPROPion ER (AUVELITY)  MG tablet controlled-release Take 1 tablet by mouth 2 (Two) Times a Day.    Rizwan Peralta MD   eszopiclone (LUNESTA) 3 MG tablet Take 1 tablet by mouth Every Night. Take immediately before bedtime    Rizwan Peralta MD   fluorometholone (EFLONE) 0.1 % ophthalmic suspension Administer 2 drops into the left eye 4 (Four) Times a Day. Indications: Please give patient the bottle 8/28/24   Shy APRN   furosemide (LASIX) 40 MG tablet Take 1 tablet by mouth Every Night.    Rizwan Peralta MD   isosorbide mononitrate (IMDUR) 60 MG 24 hr tablet Take 1 tablet by mouth Every Night. 8/16/16   Rizwan Peralta MD   lisinopril (PRINIVIL,ZESTRIL) 40 MG tablet Take 1 tablet by mouth Daily.    Rizwan Peralta MD   metoprolol succinate XL (TOPROL-XL) 200 MG 24 hr tablet Take 1 tablet by mouth Every Night. 6/9/16   Rizwan Peralta MD   naloxone (NARCAN) 4 MG/0.1ML nasal spray 1 spray into the  "nostril(s) as directed by provider As Needed for Opioid Reversal. Call 911. Don't prime. [place] in 1 nostril for overdose. Repeat in 2-3 minutes in other nostril if no or minimal breathing/responsiveness.    Rizwan Peralta MD   nitroglycerin (NITROSTAT) 0.4 MG SL tablet Place 1 tablet under the tongue Every 5 (Five) Minutes As Needed for Chest Pain. 7/22/16   Rizwan Peralta MD   oxyCODONE-acetaminophen (PERCOCET)  MG per tablet Take 1 tablet by mouth Every 8 (Eight) Hours. 8/5/16   Rizwan Peralta MD   Semaglutide, 2 MG/DOSE, (Ozempic, 2 MG/DOSE,) 8 MG/3ML solution pen-injector Inject 1 mg under the skin into the appropriate area as directed 1 (One) Time Per Week.    ProviderRizwan MD     I have utilized all available immediate resources to obtain, update, or review the patient's current medications (including all prescriptions, over-the-counter products, herbals, cannabis/cannabidiol products, and vitamin/mineral/dietary (nutritional) supplements).    Objective     Vital Signs: /63   Pulse 88   Temp 98.8 °F (37.1 °C) (Axillary)   Resp 17   Ht 172.7 cm (68\")   Wt 108 kg (238 lb 9.6 oz)   SpO2 95%   BMI 36.28 kg/m²     Patient is currently intubated with size 7.5 endotracheal tube.  Ventilator settings assist-control volume: Ventilation, tidal volume 500, rate 16, PEEP of 5, FiO2 40%    Physical Exam  Vitals and nursing note reviewed.   Constitutional:       General: He is not in acute distress.     Appearance: He is obese. He is ill-appearing.      Comments: Obese middle-aged  male chronically ill orally intubated on mechanical ventilation   HENT:      Head: Normocephalic and atraumatic.      Right Ear: Tympanic membrane and external ear normal.      Left Ear: Tympanic membrane normal.      Nose: Nose normal. No congestion or rhinorrhea.      Mouth/Throat:      Mouth: Mucous membranes are moist.      Pharynx: Oropharynx is clear. No oropharyngeal exudate or " posterior oropharyngeal erythema.   Eyes:      General: No scleral icterus.        Right eye: No discharge.         Left eye: No discharge.      Conjunctiva/sclera: Conjunctivae normal.      Pupils: Pupils are equal, round, and reactive to light.   Neck:      Vascular: No carotid bruit.   Cardiovascular:      Rate and Rhythm: Normal rate and regular rhythm. Tachycardia present.      Heart sounds: Normal heart sounds. No murmur heard.     No friction rub.   Pulmonary:      Effort: Pulmonary effort is normal. No respiratory distress.      Breath sounds: No stridor. Wheezing and rales present. No rhonchi.   Chest:      Chest wall: No tenderness.   Abdominal:      General: Abdomen is flat. Bowel sounds are normal. There is no distension.      Palpations: There is no mass.      Tenderness: There is no abdominal tenderness. There is no guarding or rebound.      Hernia: No hernia is present.   Genitourinary:     Comments: Not examined urinary catheter in place  Musculoskeletal:         General: No swelling, tenderness, deformity or signs of injury. Normal range of motion.      Cervical back: Normal range of motion and neck supple. No rigidity or tenderness.      Right lower leg: No edema.      Left lower leg: No edema.   Lymphadenopathy:      Cervical: No cervical adenopathy.   Skin:     General: Skin is warm.      Capillary Refill: Capillary refill takes less than 2 seconds.      Coloration: Skin is not jaundiced or pale.      Findings: No bruising or erythema.   Neurological:      General: No focal deficit present.      Comments: Could not be assessed.   Psychiatric:      Comments: Could not be assessed            Results Reviewed:  Lab Results (last 24 hours)       Procedure Component Value Units Date/Time    High Sensitivity Troponin T 1Hr [[id number]]  (Normal) Collected: 12/29/24 1753    Specimen: Blood Updated: 12/29/24 1832     HS Troponin T 16 ng/L      Troponin T Numeric Delta 0 ng/L     Narrative:      High  Sensitive Troponin T Reference Range:  <14.0 ng/L- Negative Female for AMI  <22.0 ng/L- Negative Male for AMI  >=14 - Abnormal Female indicating possible myocardial injury.  >=22 - Abnormal Male indicating possible myocardial injury.   Clinicians would have to utilize clinical acumen, EKG, Troponin, and serial changes to determine if it is an Acute Myocardial Infarction or myocardial injury due to an underlying chronic condition.         MRSA Screen, PCR (Inpatient) - Swab, Nares [[id number]] Collected: 12/29/24 1753    Specimen: Swab from Nares Updated: 12/29/24 1757    High Sensitivity Troponin T [[id number]]  (Normal) Collected: 12/29/24 1625    Specimen: Blood Updated: 12/29/24 1704     HS Troponin T 16 ng/L     Narrative:      High Sensitive Troponin T Reference Range:  <14.0 ng/L- Negative Female for AMI  <22.0 ng/L- Negative Male for AMI  >=14 - Abnormal Female indicating possible myocardial injury.  >=22 - Abnormal Male indicating possible myocardial injury.   Clinicians would have to utilize clinical acumen, EKG, Troponin, and serial changes to determine if it is an Acute Myocardial Infarction or myocardial injury due to an underlying chronic condition.         Osmolality, Serum [[id number]]  (Abnormal) Collected: 12/29/24 1202    Specimen: Blood Updated: 12/29/24 1558     Osmolality 302 mOsm/kg     Respiratory Culture - Aspirate, ET Suction [[id number]] Collected: 12/29/24 1507    Specimen: Aspirate from ET Suction Updated: 12/29/24 1512    Methanol [[id number]] Collected: 12/29/24 1504    Specimen: Blood Updated: 12/29/24 1512    Blood Gas, Arterial - [[id number]]  (Abnormal) Collected: 12/29/24 1455    Specimen: Arterial Blood Updated: 12/29/24 1454     Site Right Radial     Pedro Pablo's Test Positive     pH, Arterial 7.339 pH units      Comment: 84 Value below reference range        pCO2, Arterial 43.3 mm Hg      pO2, Arterial 110.0 mm Hg      Comment: 83 Value above reference range        HCO3, Arterial 23.3  "mmol/L      Base Excess, Arterial -2.6 mmol/L      Comment: 84 Value below reference range        O2 Saturation, Arterial 98.1 %      Temperature 37.0     Barometric Pressure for Blood Gas 747 mmHg      Modality Ventilator     FIO2 60 %      Ventilator Mode AC     Set Tidal Volume 500     Set Mech Resp Rate 16.0     PEEP 5.0     Collected by [id number]     Comment: Meter: [id number]     :  [id number]        pCO2, Temperature Corrected 43.3 mm Hg      pH, Temp Corrected 7.339 pH Units      pO2, Temperature Corrected 110 mm Hg      PO2/FIO2 183    Procalcitonin [[id number]]  (Abnormal) Collected: 12/29/24 1202    Specimen: Blood Updated: 12/29/24 1352     Procalcitonin 0.28 ng/mL     Narrative:      As a Marker for Sepsis (Non-Neonates):    1. <0.5 ng/mL represents a low risk of severe sepsis and/or septic shock.  2. >2 ng/mL represents a high risk of severe sepsis and/or septic shock.    As a Marker for Lower Respiratory Tract Infections that require antibiotic therapy:    PCT on Admission    Antibiotic Therapy       6-12 Hrs later    >0.5                Strongly Recommended  >0.25 - <0.5        Recommended   0.1 - 0.25          Discouraged              Remeasure/reassess PCT  <0.1                Strongly Discouraged     Remeasure/reassess PCT    As 28 day mortality risk marker: \"Change in Procalcitonin Result\" (>80% or <=80%) if Day 0 (or Day 1) and Day 4 values are available. Refer to http://www.[facility] [place]pct[facility]cal[facility]    Change in PCT <=80%  A decrease of PCT levels below or equal to 80% defines a positive change in PCT test result representing a higher risk for 28-day all-cause mortality of patients diagnosed with severe sepsis for septic shock.    Change in PCT >80%  A decrease of PCT levels of more than 80% defines a negative change in PCT result representing a lower risk for 28-day all-cause mortality of patients diagnosed with severe sepsis or septic shock.       CK [[id number]]  (Abnormal) Collected: " 12/29/24 1202    Specimen: Blood Updated: 12/29/24 1351     Creatine Kinase 502 U/L     Urinalysis With Culture If Indicated - Indwelling Urethral Catheter [[id number]]  (Abnormal) Collected: 12/29/24 1254    Specimen: Urine from Indwelling Urethral Catheter Updated: 12/29/24 1332     Color, UA Yellow     Appearance, UA Cloudy     pH, UA 5.5     Specific Gravity, UA 1.014     Glucose, UA Negative     Ketones, UA Trace     Bilirubin, UA Negative     Blood, UA Large (3+)     Protein,  mg/dL (2+)     Leuk Esterase, UA Negative     Nitrite, UA Negative     Urobilinogen, UA 0.2 E.U./dL    Narrative:      In absence of clinical symptoms, the presence of pyuria, bacteria, and/or nitrites on the urinalysis result does not correlate with infection.    Urinalysis, Microscopic Only - Indwelling Urethral Catheter [[id number]]  (Abnormal) Collected: 12/29/24 1254    Specimen: Urine from Indwelling Urethral Catheter Updated: 12/29/24 1332     RBC, UA Too Numerous to Count /HPF      WBC, UA 3-5 /HPF      Comment: Urine culture not indicated.        Bacteria, UA 1+ /HPF      Squamous Epithelial Cells, UA 0-2 /HPF      Hyaline Casts, UA 0-2 /LPF      Granular Casts, UA 0-2 /LPF      Methodology Manual Light Microscopy    Fentanyl, Urine - Indwelling Urethral Catheter [[id number]]  (Normal) Collected: 12/29/24 1254    Specimen: Urine from Indwelling Urethral Catheter Updated: 12/29/24 1315     Fentanyl, Urine Negative    Narrative:      Negative Threshold:      Fentanyl 5 ng/mL     The normal value for the drug tested is negative. This report includes final unconfirmed screening results to be used for medical treatment purposes only. Unconfirmed results must not be used for non-medical purposes such as employment or legal testing. Clinical consideration should be applied to any drug of abuse test, particularly when unconfirmed results are used.           Urine Drug Screen - Indwelling Urethral Catheter [[id number]]  (Abnormal)  Collected: 12/29/24 1254    Specimen: Urine from Indwelling Urethral Catheter Updated: 12/29/24 1315     THC, Screen, Urine Negative     Phencyclidine (PCP), Urine Negative     Cocaine Screen, Urine Positive     Methamphetamine, Ur Negative     Opiate Screen Negative     Amphetamine Screen, Urine Negative     Benzodiazepine Screen, Urine Negative     Tricyclic Antidepressants Screen Positive     Methadone Screen, Urine Negative     Barbiturates Screen, Urine Negative     Oxycodone Screen, Urine Negative     Buprenorphine, Screen, Urine Negative    Narrative:      Cutoff For Drugs Screened:    Amphetamines               500 ng/ml  Barbiturates               200 ng/ml  Benzodiazepines            150 ng/ml  Cocaine                    150 ng/ml  Methadone                  200 ng/ml  Opiates                    100 ng/ml  Phencyclidine               25 ng/ml  THC                         50 ng/ml  Methamphetamine            500 ng/ml  Tricyclic Antidepressants  300 ng/ml  Oxycodone                  100 ng/ml  Buprenorphine               10 ng/ml    The normal value for all drugs tested is negative. This report includes unconfirmed screening results, with the cutoff values listed, to be used for medical treatment purposes only.  Unconfirmed results must not be used for non-medical purposes such as employment or legal testing.  Clinical consideration should be applied to any drug of abuse test, particularly when unconfirmed results are used.      Respiratory Panel PCR w/COVID-19(SARS-CoV-2) NACHO/ARACELI/CHRISTINE/PAD/COR/ZARINA In-House, NP Swab in UTM/VTM, 2 HR TAT - Swab, Nasopharynx [[id number]]  (Normal) Collected: 12/29/24 1205    Specimen: Swab from Nasopharynx Updated: 12/29/24 1304     ADENOVIRUS, PCR Not Detected     Coronavirus 229E Not Detected     Coronavirus HKU1 Not Detected     Coronavirus NL63 Not Detected     Coronavirus OC43 Not Detected     COVID19 Not Detected     Human Metapneumovirus Not Detected     Human  Rhinovirus/Enterovirus Not Detected     Influenza A PCR Not Detected     Influenza B PCR Not Detected     Parainfluenza Virus 1 Not Detected     Parainfluenza Virus 2 Not Detected     Parainfluenza Virus 3 Not Detected     Parainfluenza Virus 4 Not Detected     RSV, PCR Not Detected     Bordetella pertussis pcr Not Detected     Bordetella parapertussis PCR Not Detected     Chlamydophila pneumoniae PCR Not Detected     Mycoplasma pneumo by PCR Not Detected    Narrative:      In the setting of a positive respiratory panel with a viral infection PLUS a negative procalcitonin without other underlying concern for bacterial infection, consider observing off antibiotics or discontinuation of antibiotics and continue supportive care. If the respiratory panel is positive for atypical bacterial infection (Bordetella pertussis, Chlamydophila pneumoniae, or Mycoplasma pneumoniae), consider antibiotic de-escalation to target atypical bacterial infection.    Blood Gas, Arterial - [[id number]]  (Abnormal) Collected: 12/29/24 1303    Specimen: Arterial Blood Updated: 12/29/24 1303     Site Right Brachial     Pedro Pablo's Test N/A     pH, Arterial 7.259 pH units      Comment: 84 Value below reference range        pCO2, Arterial 55.7 mm Hg      Comment: 83 Value above reference range        pO2, Arterial 71.2 mm Hg      Comment: 84 Value below reference range        HCO3, Arterial 24.9 mmol/L      Base Excess, Arterial -3.4 mmol/L      Comment: 84 Value below reference range        O2 Saturation, Arterial 91.7 %      Comment: 84 Value below reference range        Temperature 37.0     Barometric Pressure for Blood Gas 746 mmHg      Modality Ventilator     FIO2 40 %      Ventilator Mode AC     Set Tidal Volume 500     Set Mech Resp Rate 14.0     PEEP 5.0     Collected by [id number]     Comment: Meter: [id number]     :  [id number]        pCO2, Temperature Corrected 55.7 mm Hg      pH, Temp Corrected 7.259 pH Units      pO2,  Temperature Corrected 71.2 mm Hg      PO2/FIO2 178    Comprehensive Metabolic Panel [[id number]]  (Abnormal) Collected: 12/29/24 1202    Specimen: Blood Updated: 12/29/24 1248     Glucose 111 mg/dL      BUN 24 mg/dL      Creatinine 1.15 mg/dL      Sodium 142 mmol/L      Potassium 3.7 mmol/L      Chloride 100 mmol/L      CO2 20.0 mmol/L      Calcium 9.4 mg/dL      Total Protein 7.4 g/dL      Albumin 4.5 g/dL      ALT (SGPT) 22 U/L      AST (SGOT) 26 U/L      Alkaline Phosphatase 165 U/L      Total Bilirubin 1.6 mg/dL      Globulin 2.9 gm/dL      A/G Ratio 1.6 g/dL      BUN/Creatinine Ratio 20.9     Anion Gap 22.0 mmol/L      eGFR 71.5 mL/min/1.73     Narrative:      GFR Categories in Chronic Kidney Disease (CKD)      GFR Category          GFR (mL/min/1.73)    Interpretation  G1                     90 or greater         Normal or high (1)  G2                      60-89                Mild decrease (1)  G3a                   45-59                Mild to moderate decrease  G3b                   30-44                Moderate to severe decrease  G4                    15-29                Severe decrease  G5                    14 or less           Kidney failure          (1)In the absence of evidence of kidney disease, neither GFR category G1 or G2 fulfill the criteria for CKD.    eGFR calculation 2021 CKD-EPI creatinine equation, which does not include race as a factor    Magnesium [[id number]]  (Normal) Collected: 12/29/24 1202    Specimen: Blood Updated: 12/29/24 1248     Magnesium 1.8 mg/dL     Lactic Acid, Plasma [[id number]]  (Normal) Collected: 12/29/24 1202    Specimen: Blood Updated: 12/29/24 1235     Lactate 1.7 mmol/L     Acetaminophen Level [[id number]]  (Normal) Collected: 12/29/24 1202    Specimen: Blood Updated: 12/29/24 1234     Acetaminophen <5.0 mcg/mL     Salicylate Level [[id number]]  (Normal) Collected: 12/29/24 1202    Specimen: Blood Updated: 12/29/24 1234     Salicylate 0.3 mg/dL     Protime-INR  [[id number]]  (Normal) Collected: 12/29/24 1202    Specimen: Blood Updated: 12/29/24 1233     Protime 13.2 Seconds      INR 0.96    Blood Culture - Blood, Hand, Digit Right [[id number]] Collected: 12/29/24 1230    Specimen: Blood from Hand, Digit Right Updated: 12/29/24 1231    Blood Culture - Blood, Arm, Right [[id number]] Collected: 12/29/24 1218    Specimen: Blood from Arm, Right Updated: 12/29/24 1230    Ethanol [[id number]] Collected: 12/29/24 1202    Specimen: Blood Updated: 12/29/24 1229     Ethanol % <0.010 %     Narrative:      Not for legal purposes. Chain of Custody not followed.     CBC & Differential [[id number]]  (Abnormal) Collected: 12/29/24 1202    Specimen: Blood Updated: 12/29/24 1227    Narrative:      The following orders were created for panel order CBC & Differential.  Procedure                               Abnormality         Status                     ---------                               -----------         ------                     CBC Auto Differential[[id number]]        Abnormal            Final result                 Please view results for these tests on the individual orders.    CBC Auto Differential [[id number]]  (Abnormal) Collected: 12/29/24 1202    Specimen: Blood Updated: 12/29/24 1227     WBC 29.48 10*3/mm3      RBC 5.57 10*6/mm3      Hemoglobin 17.7 g/dL      Hematocrit 50.0 %      MCV 89.8 fL      MCH 31.8 pg      MCHC 35.4 g/dL      RDW 12.9 %      RDW-SD 42.4 fl      MPV 10.4 fL      Platelets 308 10*3/mm3      Neutrophil % 84.5 %      Lymphocyte % 4.1 %      Monocyte % 7.8 %      Eosinophil % 0.0 %      Basophil % 0.6 %      Immature Grans % 3.0 %      Neutrophils, Absolute 24.89 10*3/mm3      Lymphocytes, Absolute 1.21 10*3/mm3      Monocytes, Absolute 2.31 10*3/mm3      Eosinophils, Absolute 0.00 10*3/mm3      Basophils, Absolute 0.19 10*3/mm3      Immature Grans, Absolute 0.88 10*3/mm3      nRBC 0.0 /100 WBC     [place] Draw [[id number]] Collected: 12/29/24 1202    Specimen:  Blood Updated: 12/29/24 1216    Narrative:      The following orders were created for panel order [place] Draw.  Procedure                               Abnormality         Status                     ---------                               -----------         ------                     Green Top (Gel)[[id number]]                                  Final result               Lavender Top[[id number]]                                     Final result               Red Top[[id number]]                                          Final result               Mc Top[[id number]]                                         Final result               Light Blue Top[[id number]]                                   Final result                 Please view results for these tests on the individual orders.    Green Top (Gel) [[id number]] Collected: 12/29/24 1202    Specimen: Blood Updated: 12/29/24 1216     Extra Tube Hold for add-ons.     Comment: Auto resulted.       Lavender Top [[id number]] Collected: 12/29/24 1202    Specimen: Blood Updated: 12/29/24 1216     Extra Tube hold for add-on     Comment: Auto resulted       Red Top [[id number]] Collected: 12/29/24 1202    Specimen: Blood Updated: 12/29/24 1216     Extra Tube Hold for add-ons.     Comment: Auto resulted.       Gray Top [[id number]] Collected: 12/29/24 1202    Specimen: Blood Updated: 12/29/24 1216     Extra Tube Hold for add-ons.     Comment: Auto resulted.       Light Blue Top [[id number]] Collected: 12/29/24 1202    Specimen: Blood Updated: 12/29/24 1216     Extra Tube Hold for add-ons.     Comment: Auto resulted       POC Glucose Once [[id number]]  (Normal) Collected: 12/29/24 1158    Specimen: Blood Updated: 12/29/24 1209     Glucose 120 mg/dL      Comment: [id number]       Blood Gas, Arterial - [[id number]]  (Abnormal) Collected: 12/29/24 1150    Specimen: Arterial Blood Updated: 12/29/24 1206     Site Right Radial     Pedro Pablo's Test Positive      pH, Arterial 7.365 pH units      pCO2, Arterial 40.8 mm Hg      pO2, Arterial 246.0 mm Hg      Comment: 83 Value above reference range        HCO3, Arterial 23.3 mmol/L      Base Excess, Arterial -2.0 mmol/L      Comment: 84 Value below reference range        O2 Saturation, Arterial 99.7 %      Comment: 83 Value above reference range        Temperature 37.0     Barometric Pressure for Blood Gas 746 mmHg      Modality NRB     FIO2 100 %      Ventilator Mode NA     Collected by [id number]     Comment: Meter: [id number]     :  [id number]        pCO2, Temperature Corrected 40.8 mm Hg      pH, Temp Corrected 7.365 pH Units      pO2, Temperature Corrected 246 mm Hg      PO2/FIO2 246             XR Abdomen KUB    Result Date: 12/29/2024  Findings/impression:  Enteric tube courses below the diaphragm overlying the stomach with the tip overlying the gastric antrum/body.      This report was signed and finalized on 12/29/2024 2:28 PM by Rubin West.      XR Chest 1 View    Result Date: 12/29/2024   Endotracheal tube tip 3 cm above the cipriano.    This report was signed and finalized on 12/29/2024 12:29 PM by Rubin West.      XR Chest 1 View    Result Date: 12/29/2024   No visible endotracheal tube.  Clear lungs.    This report was signed and finalized on 12/29/2024 12:15 PM by Rubin West.      CT Cervical Spine Without Contrast    Result Date: 12/29/2024   Motion-degraded study.  No obvious fracture or traumatic malalignment.    This report was signed and finalized on 12/29/2024 12:14 PM by Rubin West.      CT Head Without Contrast    Result Date: 12/29/2024  Impression:   No acute intracranial abnormality.  This report was signed and finalized on 12/29/2024 12:08 PM by Rubin West.       Result Review:  I have personally reviewed the results from the time of this admission to 12/29/2024 18:54 CST and agree with these findings:  [x]  Laboratory list / accordion  [x]  Microbiology  [x]   Radiology  []  EKG/Telemetry   []  Cardiology/Vascular   []  Pathology  [x]  Old records  []  Other:  Most notable findings include: Leukocytosis abnormal blood gas      I have personally reviewed and interpreted the radiology studies and ECG obtained at time of admission.     Assessment / Plan   Assessment:   Active Hospital Problems    Diagnosis     **Acute respiratory failure with hypoxia     On mechanically assisted ventilation     Cocaine abuse     Positive urine drug screen     Substance abuse     Tobacco use     COPD (chronic obstructive pulmonary disease)     Bipolar disorder     Coronary artery disease involving autologous vein coronary bypass graft without angina pectoris     Essential hypertension     Hyperlipidemia LDL goal <100        Treatment Plan  The patient will be admitted to my service here at [facility].   Continue full mechanical ventilation for airway protection and high risk of withdrawal  Patient is positive testing for cocaine and TCA  Monitor for cardiac arrhythmias and hemodynamic stability  Continue current ventilator settings titrate PEEP and FiO2 as tolerated  SBT when more stable.  Leukocytosis noted.  Could be from underlying seizures  However there is no active seizures noted May need neurology evaluation and EEG if suspected later.  Possibility of tracheobronchitis or early pneumonia could not be ruled out.  Pneumonia workup will be followed.  Patient is empirically started on Zosyn and vancomycin which may be de-escalated later.  He started on Pulmicort and DuoNeb for underlying COPD and history of tobacco abuse  Consider nicotine patch later.  Nutritional support and remains on the ventilator for more than 2 days  Continue on IV fluid.  Repeat blood gas ordered for tomorrow morning  DVT and stress ulcer prophylaxis reviewed.  Pain and anxiety control addressed  Reviewed and reconciled home medications patient has history of cardiac problems  Repeat labs and imaging  studies from time to time.  CODE STATUS: Full.  Overall prognosis: Guarded  We will follow.  Total Time spent in seeing this patient and during treatment planning 1 hour        Code status and discussions: Full code however the patient was DNR/DNI during the last hospitalization to be addressed    Disposition:  Estimated length of stay is more than 3 nights.     The patient was seen and examined by me on 12/29/2024 at 6 PM.    Electronically signed by     Cesar Alvarado MD   Pulmonologist/Intensivist  on 12/29/2024 at 18:54 CST

## 2024-12-29 NOTE — LETTER
ADMIT: 12/29/2024- Currently Hospitalized          To Whom it May Concern:         is currently an inpatient in unstable critical condition.  is unable to make decisions for himself. His physician  feels his progress of care/outcome is poor to unlikely.        Regards,     CCU

## 2024-12-30 NOTE — CONSULTS
Nutrition PES/Intervention Note    Patient Name:  Ld Green  YOB: 1960  MRN: [id number]  Admit Date:  12/29/2024    Date:  12/30/2024    Comments:  Nutrition consult to start enteral nutrition. Pt is on vent support. He has an OG in place.     Recommendation:  Start Diabetisource AC at 30mL/hour. Increase rate by 10mL every 6 hours as tolerated to a goal rate of 80mL/hour. Free water flushes of 30mL/hour via pump.      Problem 1       Row Name 12/30/24 1152          Nutrition Diagnoses Problem 1    Problem 1 Needs Alternate     Etiology (related to) Medical Diagnosis     Pulmonary/Critical Care COPD;A/C respiratory failure;Ventilator     Signs/Symptoms (evidenced by) NPO                            Intervention Goal       Row Name 12/30/24 1153          Intervention Goal    General Reduce/improve symptoms;Meet nutritional needs for age/condition;Disease management/therapy;Nutrition support treatment     TF/PN Inititiate TF/PN     Weight Appropriate weight loss                    Nutrition Intervention       Row Name 12/30/24 1153          Nutrition Intervention    RD/Tech Action Follow Tx progress;Care plan reviewd;Recommend/ordered     Recommended/Ordered EN                    Nutrition Prescription       Row Name 12/30/24 1153          Nutrition Prescription EN    Enteral Prescription Enteral begin/change;Enteral to supply     Enteral Route OG     Product Diabetisource     TF Delivery Method Continuous     Continuous TF Goal Rate (mL/hr) 80 mL/hr     Continuous TF Starting Rate (mL/hr) 30 mL/hr     Continuous TF Goal Volume (mL) 1760 mL     Water flush (mL)  30 mL     Water Flush Frequency Per hour     New EN Prescription Ordered? Yes        EN to Supply    Kcal/Day 2112 Kcal/Day     Kcal/Kg 19.5 Kcal/Kg     Kcal/Kg Weight Method Actual weight     Protein (gm/day) 106 gm/day     Meet Estimated Kcal Need (%) 98 %     Meet Estimated Protein Need (%) 101 %     TF Free H2O (mL) 1436 mL      Total Free H2O (mL/day) 2096 mL/day     Fiber Per Day (gm/day) 27 gm/day        Other Orders    Obtain Weight Daily     Obtain Weight Ordered? Yes                    Education/Evaluation       Row Name 12/30/24 4460          Education    Education No discharge needs identified at this time        Monitor/Evaluation    Monitor Per protocol;I&O;Pertinent labs;TF delivery/tolerance;Weight;Skin status;Symptoms                     Electronically signed by:  Lulu Connell RDN, CHATO  12/30/24 12:00 CST

## 2024-12-30 NOTE — PROGRESS NOTES
"Pharmacy Dosing Service  Pharmacokinetics  Vancomycin Follow-up Evaluation    Assessment:  Active Hospital Problems    Diagnosis  POA    **Acute respiratory failure with hypoxia [J96.01]  Yes    On mechanically assisted ventilation [Z99.11]  Not Applicable    Cocaine abuse [F14.10]  Unknown    Positive urine drug screen [R82.5]  Unknown    Substance abuse [F19.10]  Unknown    Tobacco use [Z72.0]  Unknown    COPD (chronic obstructive pulmonary disease) [J44.9]  Unknown    Bipolar disorder [F31.9]  Unknown    Coronary artery disease involving autologous vein coronary bypass graft without angina pectoris [I25.810]  Unknown    Essential hypertension [I10]  Unknown    Hyperlipidemia LDL goal <100 [E78.5]  Unknown       Current Order: Vancomycin 1500 mg IVPB every 24 hours  Indication: empiric/PNA  Current end date:1/2/25    Levels/Previous evaluations:   Started with 2250 mg IV once then 1500 mg IV q24h      AUC Model Data - Current Regimen:  Regimen: 1500 mg IV every 24 hours.  Exposure target: AUC24 (range)400-600 mg/L.hr   AUC24,ss: 457 mg/L.hr  Probability of AUC24 > 400: 60 %  Ctrough,ss: 10.2 mg/L  Probability of Ctrough,ss > 20: 24 %  Probability of nephrotoxicity (Lodise SHARON 2009): 6 %        AUC Model Data - Proposed Regimen:  Same      Plan: continue current dose    Clinical pharmacist following daily     Subjective:  Ld Green is a 64 y.o. male currently on Vancomycin, day 2 of treatment.      Objective:  Ht: 172.7 cm (68\"); Wt: 108 kg (238 lb 8.6 oz)  Estimated Creatinine Clearance: 86.6 mL/min (by C-G formula based on SCr of 1.04 mg/dL).   Creatinine   Date Value Ref Range Status   12/30/2024 1.04 0.76 - 1.27 mg/dL Final   12/29/2024 1.15 0.76 - 1.27 mg/dL Final      Lab Results   Component Value Date    WBC 20.23 (H) 12/30/2024    WBC 29.48 (H) 12/29/2024       No results found for: \"VANCOPEAK\", \"VANCOTROUGH\", \"VANCORANDOM\"    Culture Results:  Microbiology Results (last 10 days)       Procedure " Component Value - Date/Time    MRSA Screen, PCR (Inpatient) - Swab, Nares [[id number]]  (Normal) Collected: 12/29/24 1753    Lab Status: Final result Specimen: Swab from Nares Updated: 12/29/24 1919     MRSA PCR No MRSA Detected    Narrative:      The negative predictive value of this diagnostic test is high and should only be used to consider de-escalating anti-MRSA therapy. A positive result may indicate colonization with MRSA and must be correlated clinically.    Respiratory Culture - Aspirate, ET Suction [[id number]] Collected: 12/29/24 1507    Lab Status: Preliminary result Specimen: Aspirate from ET Suction Updated: 12/30/24 0850     Gram Stain Moderate (3+) WBCs per low power field      Rare (1+) Epithelial cells per low power field      Moderate (3+) Mixed gram positive kapil      Moderate (3+) Mixed gram negative kapil      Mixed intracellular organisms suggestive of an aspiration event    Respiratory Panel PCR w/COVID-19(SARS-CoV-2) NACHO/ARACELI/CHRISTINE/PAD/COR/ZARINA In-House, NP Swab in UTM/VTM, 2 HR TAT - Swab, Nasopharynx [[id number]]  (Normal) Collected: 12/29/24 1205    Lab Status: Final result Specimen: Swab from Nasopharynx Updated: 12/29/24 1304     ADENOVIRUS, PCR Not Detected     Coronavirus 229E Not Detected     Coronavirus HKU1 Not Detected     Coronavirus NL63 Not Detected     Coronavirus OC43 Not Detected     COVID19 Not Detected     Human Metapneumovirus Not Detected     Human Rhinovirus/Enterovirus Not Detected     Influenza A PCR Not Detected     Influenza B PCR Not Detected     Parainfluenza Virus 1 Not Detected     Parainfluenza Virus 2 Not Detected     Parainfluenza Virus 3 Not Detected     Parainfluenza Virus 4 Not Detected     RSV, PCR Not Detected     Bordetella pertussis pcr Not Detected     Bordetella parapertussis PCR Not Detected     Chlamydophila pneumoniae PCR Not Detected     Mycoplasma pneumo by PCR Not Detected    Narrative:      In the setting of a positive respiratory panel with a viral  infection PLUS a negative procalcitonin without other underlying concern for bacterial infection, consider observing off antibiotics or discontinuation of antibiotics and continue supportive care. If the respiratory panel is positive for atypical bacterial infection (Bordetella pertussis, Chlamydophila pneumoniae, or Mycoplasma pneumoniae), consider antibiotic de-escalation to target atypical bacterial infection.            Daniel Rayo, PharmD   12/30/24 11:12 CST

## 2024-12-30 NOTE — CASE MANAGEMENT/SOCIAL WORK
Discharge Planning Assessment  [facility]     Patient Name: Ld Green  MRN: [id number]  Today's Date: 12/30/2024    Admit Date: 12/29/2024        Discharge Needs Assessment       Row Name 12/30/24 1210       Living Environment    People in Home alone    Current Living Arrangements apartment    Potentially Unsafe Housing Conditions none    Primary Care Provided by self    Provides Primary Care For no one    Family Caregiver if Needed friend(s)    Quality of Family Relationships helpful;involved;supportive       Resource/Environmental Concerns    Resource/Environmental Concerns none       Transition Planning    Patient/Family Anticipates Transition to home with help/services    Patient/Family Anticipated Services at Transition none    Transportation Anticipated family or friend will provide       Discharge Needs Assessment    Readmission Within the Last 30 Days no previous admission in last 30 days    Equipment Currently Used at Home none    Concerns to be Addressed adjustment to diagnosis/illness    Anticipated Changes Related to Illness inability to care for self    Current Discharge Risk lives alone    Discharge Coordination/Progress Pt is currently on the vent. Spoke with his ex-wife [phone] about d/c plans. She and pt are still close but do not live together. He lives alone at the [facility]. Pt does not have children. She says pt was independent prior to admission. He does not use dme. Unsure at this time if pt will need anything at d/c. He does have rx coverage for his meds.                   Discharge Plan    No documentation.                 Continued Care and Services - Admitted Since 12/29/2024    No active coordination exists for this encounter.          Demographic Summary    No documentation.                  Functional Status    No documentation.                  Psychosocial    No documentation.                  Abuse/Neglect    No documentation.                  Legal    No  documentation.                  Substance Abuse    No documentation.                  Patient Forms    No documentation.                     REEMA Ames

## 2024-12-30 NOTE — PLAN OF CARE
Goal Outcome Evaluation:  Plan of Care Reviewed With: patient        Progress: no change  Outcome Evaluation: Withdraws to pain in all ext. SR on monitor. Soft bp, but MAPs good. Low grade temps. WBCs trending down. Thick tan secretions. Propofol at 15. BL wrists restraints.

## 2024-12-30 NOTE — PROGRESS NOTES
RT EQUIPMENT DEVICE RELATED - SKIN ASSESSMENT    Dany Score:  Dany Score: 14     RT Medical Equipment/Device:     ETT Linda/Anchorfast    Skin Assessment:      Cheek:  Intact  Nose:  Intact  Lips:  Intact    Device Skin Pressure Protection:  Skin-to-device areas padded:  Anchorfast    Nurse Notification:  Evelin Dietrich, RRT

## 2024-12-30 NOTE — PAYOR COMM NOTE
"Ref:  [id number]    [facility]  [place],cm   [phone]  or  fax   [phone]        Ld Green (64 y.o. Male)       Date of Birth   1960    Social Security Number       Address   [address] [place] [address]    Home Phone   [phone]    MRN   [id number]       Quaker   Other    Marital Status                               Admission Date   12/29/24    Admission Type   Emergency    Admitting Provider       Attending Provider   Mando Olivo MD    Department, Room/Bed   [facility] CARDIAC CARE, C002/1       Discharge Date       Discharge Disposition       Discharge Destination                                 Attending Provider: Mando Olivo MD    Allergies: Codeine    Isolation: None   Infection: None   Code Status: CPR    Ht: 172.7 cm (68\")   Wt: 108 kg (238 lb 8.6 oz)    Admission Cmt: None   Principal Problem: Acute respiratory failure with hypoxia [J96.01]                   Active Insurance as of 12/29/2024       Primary Coverage       Payor Plan Insurance Group Employer/Plan Group    [facility] MEDICARE REPLACEMENT [facility] MED ADV SNP HMO [id number]       Payor Plan Address Payor Plan Phone Number Payor Plan Fax Number Effective Dates    [address] [address]   1/1/2024 - None Entered    [facility] [id number]         Subscriber Name Subscriber Birth Date Member ID       LD GREEN 1960 [id number]               Secondary Coverage       Payor Plan Insurance Group Employer/Plan Group    [employer]        Payor Plan Address Payor Plan Phone Number Payor Plan Fax Number Effective Dates    [address]   10/1/2014 - None Entered    [facility] [phone]         Subscriber Name Subscriber Birth Date Member ID       LD GREEN 1960 [id number]                     Emergency Contacts        (Rel.) Home Phone Work Phone Mobile Phone    Fe Suresh (Other) -- " [phone]                 History & Physical        Cesar Alvarado MD at 12/29/24 1754              [facility]  HISTORY AND PHYSICAL    Date of Admission: 12/29/2024  Primary Care Physician: Xiomara Neal APRN    Subjective   Primary Historian: ED physician, chart notes    Chief Complaint: Mechanical ventilation, possible tracheobronchitis versus pneumonia, positive drug screen, suspected seizures.    History of Present Illness  Patient is a 64 years old  gentleman who was seen in the ER today on request from ED physician Dr. Valenzuela.    He presented to the emergency department when he was found unresponsive.  He lives in a low income [place] [facility] and apparently was found by somebody unresponsive and comatose state.  He has known history of hypertension, diabetes mellitus type 2 on insulin., coronary artery disease and also had history of hypoglycemia in the past.  He had last hospitalization in August 2024.  He was treated for altered mental status at the time.  His workup at that time was negative mostly except leukocytosis.    At the time of presentation to the ER the patient was unresponsive with GCS 6.  The patient was having oxygen desaturation and needed nonrebreather mask and withdrawing to painful stimuli on the right and left arm and left leg appeared to be little tonic suspected to have seizure activity.  He was immediately intubated by ED physician and ICU admit was requested and patient was accepted in the ICU.    Patient could not provide any history because she was intubated on mechanical ventilation of propofol at the time of my visit.  There is no ventilator dyssynchrony noted.  He was morbidly obese he was afebrile and hemodynamically stable.  He was started on broad-spectrum antibiotic for possible aspiration and he has a urinary tract was positive for cocaine and TCA..  In reviewing the chart it  appears patient was DNR and DNI during the last hospitalization but he is intubated in the ER and currently in the CCU.  No further history could be obtained from the patient.  It also noted patient history of bladder cancer in the past and also has bipolar disorder.  He is an active smoker and probably has COPD.  No further history could be obtained from the patient.      His initial workup showed clear lung fields and the imaging studies but the endotracheal tube suction showed thick purulent secretions suspected to be tracheobronchitis or early developing pneumonia.  Lab work shows he has extreme leukocytosis white cell count 29,000.  No active seizures noted other lab work showed normal renal function with elevated blood glucose and arterial blood gas shows hypercapnia and hypoxia.  Respiratory viral panel was negative.  Respiratory culture was sent.    Review of Systems   Otherwise complete ROS reviewed and negative except as mentioned in the HPI.  He was unable to provide any review of system.  He was intubated on mechanical ventilation.    Past Medical History:   Past Medical History:   Diagnosis Date    Anxiety     Atherosclerosis of coronary artery     Bipolar disorder     Bladder cancer     Colon polyp     Coronary artery disease     Depression     Diabetes mellitus     Hemorrhoids     Hyperlipidemia     Hypertension     Nephrolithiasis     Sleep apnea      Past Surgical History:  Past Surgical History:   Procedure Laterality Date    ANGIOPLASTY      with stent placement    BACK SURGERY      BLADDER SURGERY      cancer    CARDIAC CATHETERIZATION      COLONOSCOPY  04/13/2015    COLONOSCOPY N/A 4/30/2018    Procedure: COLONOSCOPY WITH ANESTHESIA;  Surgeon: Gerson Martini MD;  Location: [facility] ENDOSCOPY;  Service: Gastroenterology    COLONOSCOPY N/A 4/8/2021    Procedure: COLONOSCOPY WITH ANESTHESIA;  Surgeon: Gerson Martini MD;  Location: [facility] ENDOSCOPY;  Service: Gastroenterology;  Laterality: N/A;   Pre: Hx Colon Polyps  Post: divertivculosis   Dr. Garza  CO2 Inflation Used    CORONARY ARTERY BYPASS GRAFT      KNEE SURGERY      SHOULDER SURGERY      x 2    SINUS SURGERY       Social History:  reports that he has been smoking cigarettes. He has never used smokeless tobacco. He reports current alcohol use. He reports that he does not use drugs.    Family History: family history is not on file. He was adopted.       Allergies:  Allergies   Allergen Reactions    Codeine Nausea Only       Medications:  Prior to Admission medications    Medication Sig Start Date End Date Taking? Authorizing Provider   amLODIPine (NORVASC) 10 MG tablet Take 1 tablet by mouth Every Night.    Rizwan Peralta MD   atorvastatin (LIPITOR) 40 MG tablet Take 1 tablet by mouth Every Night. 7/19/21   Emergency, Nurse Epic, RN   clopidogrel (PLAVIX) 75 MG tablet Take 1 tablet by mouth Every Night. 4/26/16   Rizwan Peralta MD   cyclobenzaprine (FLEXERIL) 10 MG tablet Take 1 tablet by mouth 3 (Three) Times a Day As Needed for Muscle Spasms.    Rizwan Peralta MD   Dextromethorphan-buPROPion ER (AUVELITY)  MG tablet controlled-release Take 1 tablet by mouth 2 (Two) Times a Day.    Rizwan Peralta MD   eszopiclone (LUNESTA) 3 MG tablet Take 1 tablet by mouth Every Night. Take immediately before bedtime    Rizwan Peralta MD   fluorometholone (EFLONE) 0.1 % ophthalmic suspension Administer 2 drops into the left eye 4 (Four) Times a Day. Indications: Please give patient the bottle 8/28/24   Shy APRN   furosemide (LASIX) 40 MG tablet Take 1 tablet by mouth Every Night.    Rizwan Peralta MD   isosorbide mononitrate (IMDUR) 60 MG 24 hr tablet Take 1 tablet by mouth Every Night. 8/16/16   Rizwan Peralta MD   lisinopril (PRINIVIL,ZESTRIL) 40 MG tablet Take 1 tablet by mouth Daily.    Rizwan Peralta MD   metoprolol succinate XL (TOPROL-XL) 200 MG 24 hr tablet Take 1 tablet by  "mouth Every Night. 6/9/16   Rizwan Peralta MD   naloxone (NARCAN) 4 MG/0.1ML nasal spray 1 spray into the nostril(s) as directed by provider As Needed for Opioid Reversal. Call 911. Don't prime. [place] in 1 nostril for overdose. Repeat in 2-3 minutes in other nostril if no or minimal breathing/responsiveness.    Rizwan Peralta MD   nitroglycerin (NITROSTAT) 0.4 MG SL tablet Place 1 tablet under the tongue Every 5 (Five) Minutes As Needed for Chest Pain. 7/22/16   Rizwan Peralta MD   oxyCODONE-acetaminophen (PERCOCET)  MG per tablet Take 1 tablet by mouth Every 8 (Eight) Hours. 8/5/16   Rizwan Peralta MD   Semaglutide, 2 MG/DOSE, (Ozempic, 2 MG/DOSE,) 8 MG/3ML solution pen-injector Inject 1 mg under the skin into the appropriate area as directed 1 (One) Time Per Week.    ProviderRizwan MD     I have utilized all available immediate resources to obtain, update, or review the patient's current medications (including all prescriptions, over-the-counter products, herbals, cannabis/cannabidiol products, and vitamin/mineral/dietary (nutritional) supplements).    Objective     Vital Signs: /63   Pulse 88   Temp 98.8 °F (37.1 °C) (Axillary)   Resp 17   Ht 172.7 cm (68\")   Wt 108 kg (238 lb 9.6 oz)   SpO2 95%   BMI 36.28 kg/m²     Patient is currently intubated with size 7.5 endotracheal tube.  Ventilator settings assist-control volume: Ventilation, tidal volume 500, rate 16, PEEP of 5, FiO2 40%    Physical Exam  Vitals and nursing note reviewed.   Constitutional:       General: He is not in acute distress.     Appearance: He is obese. He is ill-appearing.      Comments: Obese middle-aged  male chronically ill orally intubated on mechanical ventilation   HENT:      Head: Normocephalic and atraumatic.      Right Ear: Tympanic membrane and external ear normal.      Left Ear: Tympanic membrane normal.      Nose: Nose normal. No congestion or rhinorrhea.      " Mouth/Throat:      Mouth: Mucous membranes are moist.      Pharynx: Oropharynx is clear. No oropharyngeal exudate or posterior oropharyngeal erythema.   Eyes:      General: No scleral icterus.        Right eye: No discharge.         Left eye: No discharge.      Conjunctiva/sclera: Conjunctivae normal.      Pupils: Pupils are equal, round, and reactive to light.   Neck:      Vascular: No carotid bruit.   Cardiovascular:      Rate and Rhythm: Normal rate and regular rhythm. Tachycardia present.      Heart sounds: Normal heart sounds. No murmur heard.     No friction rub.   Pulmonary:      Effort: Pulmonary effort is normal. No respiratory distress.      Breath sounds: No stridor. Wheezing and rales present. No rhonchi.   Chest:      Chest wall: No tenderness.   Abdominal:      General: Abdomen is flat. Bowel sounds are normal. There is no distension.      Palpations: There is no mass.      Tenderness: There is no abdominal tenderness. There is no guarding or rebound.      Hernia: No hernia is present.   Genitourinary:     Comments: Not examined urinary catheter in place  Musculoskeletal:         General: No swelling, tenderness, deformity or signs of injury. Normal range of motion.      Cervical back: Normal range of motion and neck supple. No rigidity or tenderness.      Right lower leg: No edema.      Left lower leg: No edema.   Lymphadenopathy:      Cervical: No cervical adenopathy.   Skin:     General: Skin is warm.      Capillary Refill: Capillary refill takes less than 2 seconds.      Coloration: Skin is not jaundiced or pale.      Findings: No bruising or erythema.   Neurological:      General: No focal deficit present.      Comments: Could not be assessed.   Psychiatric:      Comments: Could not be assessed            Results Reviewed:  Lab Results (last 24 hours)       Procedure Component Value Units Date/Time    High Sensitivity Troponin T 1Hr [[id number]]  (Normal) Collected: 12/29/24 9516    Specimen: Blood  Updated: 12/29/24 1832     HS Troponin T 16 ng/L      Troponin T Numeric Delta 0 ng/L     Narrative:      High Sensitive Troponin T Reference Range:  <14.0 ng/L- Negative Female for AMI  <22.0 ng/L- Negative Male for AMI  >=14 - Abnormal Female indicating possible myocardial injury.  >=22 - Abnormal Male indicating possible myocardial injury.   Clinicians would have to utilize clinical acumen, EKG, Troponin, and serial changes to determine if it is an Acute Myocardial Infarction or myocardial injury due to an underlying chronic condition.         MRSA Screen, PCR (Inpatient) - Swab, Nares [[id number]] Collected: 12/29/24 1753    Specimen: Swab from Nares Updated: 12/29/24 1757    High Sensitivity Troponin T [[id number]]  (Normal) Collected: 12/29/24 1625    Specimen: Blood Updated: 12/29/24 1704     HS Troponin T 16 ng/L     Narrative:      High Sensitive Troponin T Reference Range:  <14.0 ng/L- Negative Female for AMI  <22.0 ng/L- Negative Male for AMI  >=14 - Abnormal Female indicating possible myocardial injury.  >=22 - Abnormal Male indicating possible myocardial injury.   Clinicians would have to utilize clinical acumen, EKG, Troponin, and serial changes to determine if it is an Acute Myocardial Infarction or myocardial injury due to an underlying chronic condition.         Osmolality, Serum [[id number]]  (Abnormal) Collected: 12/29/24 1202    Specimen: Blood Updated: 12/29/24 1558     Osmolality 302 mOsm/kg     Respiratory Culture - Aspirate, ET Suction [[id number]] Collected: 12/29/24 1507    Specimen: Aspirate from ET Suction Updated: 12/29/24 1512    Methanol [[id number]] Collected: 12/29/24 1504    Specimen: Blood Updated: 12/29/24 1512    Blood Gas, Arterial - [[id number]]  (Abnormal) Collected: 12/29/24 1455    Specimen: Arterial Blood Updated: 12/29/24 1454     Site Right Radial     Pedro Pablo's Test Positive     pH, Arterial 7.339 pH units      Comment: 84 Value below reference range        pCO2, Arterial 43.3 mm  "Hg      pO2, Arterial 110.0 mm Hg      Comment: 83 Value above reference range        HCO3, Arterial 23.3 mmol/L      Base Excess, Arterial -2.6 mmol/L      Comment: 84 Value below reference range        O2 Saturation, Arterial 98.1 %      Temperature 37.0     Barometric Pressure for Blood Gas 747 mmHg      Modality Ventilator     FIO2 60 %      Ventilator Mode AC     Set Tidal Volume 500     Set Mech Resp Rate 16.0     PEEP 5.0     Collected by [id number]     Comment: Meter: [id number]     :  [id number]        pCO2, Temperature Corrected 43.3 mm Hg      pH, Temp Corrected 7.339 pH Units      pO2, Temperature Corrected 110 mm Hg      PO2/FIO2 183    Procalcitonin [[id number]]  (Abnormal) Collected: 12/29/24 1202    Specimen: Blood Updated: 12/29/24 1352     Procalcitonin 0.28 ng/mL     Narrative:      As a Marker for Sepsis (Non-Neonates):    1. <0.5 ng/mL represents a low risk of severe sepsis and/or septic shock.  2. >2 ng/mL represents a high risk of severe sepsis and/or septic shock.    As a Marker for Lower Respiratory Tract Infections that require antibiotic therapy:    PCT on Admission    Antibiotic Therapy       6-12 Hrs later    >0.5                Strongly Recommended  >0.25 - <0.5        Recommended   0.1 - 0.25          Discouraged              Remeasure/reassess PCT  <0.1                Strongly Discouraged     Remeasure/reassess PCT    As 28 day mortality risk marker: \"Change in Procalcitonin Result\" (>80% or <=80%) if Day 0 (or Day 1) and Day 4 values are available. Refer to http://www.[employer]-pct-calculator.com    Change in PCT <=80%  A decrease of PCT levels below or equal to 80% defines a positive change in PCT test result representing a higher risk for 28-day all-cause mortality of patients diagnosed with severe sepsis for septic shock.    Change in PCT >80%  A decrease of PCT levels of more than 80% defines a negative change in PCT result representing a lower risk for 28-day all-cause " mortality of patients diagnosed with severe sepsis or septic shock.       CK [[id number]]  (Abnormal) Collected: 12/29/24 1202    Specimen: Blood Updated: 12/29/24 1351     Creatine Kinase 502 U/L     Urinalysis With Culture If Indicated - Indwelling Urethral Catheter [[id number]]  (Abnormal) Collected: 12/29/24 1254    Specimen: Urine from Indwelling Urethral Catheter Updated: 12/29/24 1332     Color, UA Yellow     Appearance, UA Cloudy     pH, UA 5.5     Specific Gravity, UA 1.014     Glucose, UA Negative     Ketones, UA Trace     Bilirubin, UA Negative     Blood, UA Large (3+)     Protein,  mg/dL (2+)     Leuk Esterase, UA Negative     Nitrite, UA Negative     Urobilinogen, UA 0.2 E.U./dL    Narrative:      In absence of clinical symptoms, the presence of pyuria, bacteria, and/or nitrites on the urinalysis result does not correlate with infection.    Urinalysis, Microscopic Only - Indwelling Urethral Catheter [[id number]]  (Abnormal) Collected: 12/29/24 1254    Specimen: Urine from Indwelling Urethral Catheter Updated: 12/29/24 1332     RBC, UA Too Numerous to Count /HPF      WBC, UA 3-5 /HPF      Comment: Urine culture not indicated.        Bacteria, UA 1+ /HPF      Squamous Epithelial Cells, UA 0-2 /HPF      Hyaline Casts, UA 0-2 /LPF      Granular Casts, UA 0-2 /LPF      Methodology Manual Light Microscopy    Fentanyl, Urine - Indwelling Urethral Catheter [[id number]]  (Normal) Collected: 12/29/24 1254    Specimen: Urine from Indwelling Urethral Catheter Updated: 12/29/24 1315     Fentanyl, Urine Negative    Narrative:      Negative Threshold:      Fentanyl 5 ng/mL     The normal value for the drug tested is negative. This report includes final unconfirmed screening results to be used for medical treatment purposes only. Unconfirmed results must not be used for non-medical purposes such as employment or legal testing. Clinical consideration should be applied to any drug of abuse test, particularly when  unconfirmed results are used.           Urine Drug Screen - Indwelling Urethral Catheter [[id number]]  (Abnormal) Collected: 12/29/24 1254    Specimen: Urine from Indwelling Urethral Catheter Updated: 12/29/24 1315     THC, Screen, Urine Negative     Phencyclidine (PCP), Urine Negative     Cocaine Screen, Urine Positive     Methamphetamine, Ur Negative     Opiate Screen Negative     Amphetamine Screen, Urine Negative     Benzodiazepine Screen, Urine Negative     Tricyclic Antidepressants Screen Positive     Methadone Screen, Urine Negative     Barbiturates Screen, Urine Negative     Oxycodone Screen, Urine Negative     Buprenorphine, Screen, Urine Negative    Narrative:      Cutoff For Drugs Screened:    Amphetamines               500 ng/ml  Barbiturates               200 ng/ml  Benzodiazepines            150 ng/ml  Cocaine                    150 ng/ml  Methadone                  200 ng/ml  Opiates                    100 ng/ml  Phencyclidine               25 ng/ml  THC                         50 ng/ml  Methamphetamine            500 ng/ml  Tricyclic Antidepressants  300 ng/ml  Oxycodone                  100 ng/ml  Buprenorphine               10 ng/ml    The normal value for all drugs tested is negative. This report includes unconfirmed screening results, with the cutoff values listed, to be used for medical treatment purposes only.  Unconfirmed results must not be used for non-medical purposes such as employment or legal testing.  Clinical consideration should be applied to any drug of abuse test, particularly when unconfirmed results are used.      Respiratory Panel PCR w/COVID-19(SARS-CoV-2) NACHO/ARACELI/CHRISTINE/PAD/COR/ZARINA In-House, NP Swab in [facility], 2 HR TAT - Swab, Nasopharynx [[id number]]  (Normal) Collected: 12/29/24 1205    Specimen: Swab from Nasopharynx Updated: 12/29/24 1304     ADENOVIRUS, PCR Not Detected     Coronavirus 229E Not Detected     Coronavirus HKU1 Not Detected     Coronavirus NL63 Not Detected      Coronavirus OC43 Not Detected     COVID19 Not Detected     Human Metapneumovirus Not Detected     Human Rhinovirus/Enterovirus Not Detected     Influenza A PCR Not Detected     Influenza B PCR Not Detected     Parainfluenza Virus 1 Not Detected     Parainfluenza Virus 2 Not Detected     Parainfluenza Virus 3 Not Detected     Parainfluenza Virus 4 Not Detected     RSV, PCR Not Detected     Bordetella pertussis pcr Not Detected     Bordetella parapertussis PCR Not Detected     Chlamydophila pneumoniae PCR Not Detected     Mycoplasma pneumo by PCR Not Detected    Narrative:      In the setting of a positive respiratory panel with a viral infection PLUS a negative procalcitonin without other underlying concern for bacterial infection, consider observing off antibiotics or discontinuation of antibiotics and continue supportive care. If the respiratory panel is positive for atypical bacterial infection (Bordetella pertussis, Chlamydophila pneumoniae, or Mycoplasma pneumoniae), consider antibiotic de-escalation to target atypical bacterial infection.    Blood Gas, Arterial - [[id number]]  (Abnormal) Collected: 12/29/24 1303    Specimen: Arterial Blood Updated: 12/29/24 1303     Site Right Brachial     Pedro Pablo's Test N/A     pH, Arterial 7.259 pH units      Comment: 84 Value below reference range        pCO2, Arterial 55.7 mm Hg      Comment: 83 Value above reference range        pO2, Arterial 71.2 mm Hg      Comment: 84 Value below reference range        HCO3, Arterial 24.9 mmol/L      Base Excess, Arterial -3.4 mmol/L      Comment: 84 Value below reference range        O2 Saturation, Arterial 91.7 %      Comment: 84 Value below reference range        Temperature 37.0     Barometric Pressure for Blood Gas 746 mmHg      Modality Ventilator     FIO2 40 %      Ventilator Mode AC     Set Tidal Volume 500     Set Mech Resp Rate 14.0     PEEP 5.0     Collected by [id number]     Comment: Meter: [id number]     :  121539         pCO2, Temperature Corrected 55.7 mm Hg      pH, Temp Corrected 7.259 pH Units      pO2, Temperature Corrected 71.2 mm Hg      PO2/FIO2 178    Comprehensive Metabolic Panel [[id number]]  (Abnormal) Collected: 12/29/24 1202    Specimen: Blood Updated: 12/29/24 1248     Glucose 111 mg/dL      BUN 24 mg/dL      Creatinine 1.15 mg/dL      Sodium 142 mmol/L      Potassium 3.7 mmol/L      Chloride 100 mmol/L      CO2 20.0 mmol/L      Calcium 9.4 mg/dL      Total Protein 7.4 g/dL      Albumin 4.5 g/dL      ALT (SGPT) 22 U/L      AST (SGOT) 26 U/L      Alkaline Phosphatase 165 U/L      Total Bilirubin 1.6 mg/dL      Globulin 2.9 gm/dL      A/G Ratio 1.6 g/dL      BUN/Creatinine Ratio 20.9     Anion Gap 22.0 mmol/L      eGFR 71.5 mL/min/1.73     Narrative:      GFR Categories in Chronic Kidney Disease (CKD)      GFR Category          GFR (mL/min/1.73)    Interpretation  G1                     90 or greater         Normal or high (1)  G2                      60-89                Mild decrease (1)  G3a                   45-59                Mild to moderate decrease  G3b                   30-44                Moderate to severe decrease  G4                    15-29                Severe decrease  G5                    14 or less           Kidney failure          (1)In the absence of evidence of kidney disease, neither GFR category G1 or G2 fulfill the criteria for CKD.    eGFR calculation 2021 CKD-EPI creatinine equation, which does not include race as a factor    Magnesium [[id number]]  (Normal) Collected: 12/29/24 1202    Specimen: Blood Updated: 12/29/24 1248     Magnesium 1.8 mg/dL     Lactic Acid, Plasma [[id number]]  (Normal) Collected: 12/29/24 1202    Specimen: Blood Updated: 12/29/24 1235     Lactate 1.7 mmol/L     Acetaminophen Level [[id number]]  (Normal) Collected: 12/29/24 1202    Specimen: Blood Updated: 12/29/24 1234     Acetaminophen <5.0 mcg/mL     Salicylate Level [[id number]]  (Normal) Collected: 12/29/24  1202    Specimen: Blood Updated: 12/29/24 1234     Salicylate 0.3 mg/dL     Protime-INR [[id number]]  (Normal) Collected: 12/29/24 1202    Specimen: Blood Updated: 12/29/24 1233     Protime 13.2 Seconds      INR 0.96    Blood Culture - Blood, Hand, Digit Right [[id number]] Collected: 12/29/24 1230    Specimen: Blood from Hand, Digit Right Updated: 12/29/24 1231    Blood Culture - Blood, Arm, Right [[id number]] Collected: 12/29/24 1218    Specimen: Blood from Arm, Right Updated: 12/29/24 1230    Ethanol [[id number]] Collected: 12/29/24 1202    Specimen: Blood Updated: 12/29/24 1229     Ethanol % <0.010 %     Narrative:      Not for legal purposes. Chain of Custody not followed.     CBC & Differential [[id number]]  (Abnormal) Collected: 12/29/24 1202    Specimen: Blood Updated: 12/29/24 1227    Narrative:      The following orders were created for panel order CBC & Differential.  Procedure                               Abnormality         Status                     ---------                               -----------         ------                     CBC Auto Differential[[id number]]        Abnormal            Final result                 Please view results for these tests on the individual orders.    CBC Auto Differential [[id number]]  (Abnormal) Collected: 12/29/24 1202    Specimen: Blood Updated: 12/29/24 1227     WBC 29.48 10*3/mm3      RBC 5.57 10*6/mm3      Hemoglobin 17.7 g/dL      Hematocrit 50.0 %      MCV 89.8 fL      MCH 31.8 pg      MCHC 35.4 g/dL      RDW 12.9 %      RDW-SD 42.4 fl      MPV 10.4 fL      Platelets 308 10*3/mm3      Neutrophil % 84.5 %      Lymphocyte % 4.1 %      Monocyte % 7.8 %      Eosinophil % 0.0 %      Basophil % 0.6 %      Immature Grans % 3.0 %      Neutrophils, Absolute 24.89 10*3/mm3      Lymphocytes, Absolute 1.21 10*3/mm3      Monocytes, Absolute 2.31 10*3/mm3      Eosinophils, Absolute 0.00 10*3/mm3      Basophils, Absolute 0.19 10*3/mm3      Immature Grans, Absolute 0.88 10*3/mm3       nRBC 0.0 /100 WBC     [place] Draw [[id number]] Collected: 12/29/24 1202    Specimen: Blood Updated: 12/29/24 1216    Narrative:      The following orders were created for panel order [place] Draw.  Procedure                               Abnormality         Status                     ---------                               -----------         ------                     Green Top (Gel)[[id number]]                                  Final result               Lavender Top[[id number]]                                     Final result               Red Top[[id number]]                                          Final result               Mc Top[[id number]]                                         Final result               Light Blue Top[[id number]]                                   Final result                 Please view results for these tests on the individual orders.    Green Top (Gel) [[id number]] Collected: 12/29/24 1202    Specimen: Blood Updated: 12/29/24 1216     Extra Tube Hold for add-ons.     Comment: Auto resulted.       Lavender Top [[id number]] Collected: 12/29/24 1202    Specimen: Blood Updated: 12/29/24 1216     Extra Tube hold for add-on     Comment: Auto resulted       Red Top [[id number]] Collected: 12/29/24 1202    Specimen: Blood Updated: 12/29/24 1216     Extra Tube Hold for add-ons.     Comment: Auto resulted.       Gray Top [[id number]] Collected: 12/29/24 1202    Specimen: Blood Updated: 12/29/24 1216     Extra Tube Hold for add-ons.     Comment: Auto resulted.       Light Blue Top [[id number]] Collected: 12/29/24 1202    Specimen: Blood Updated: 12/29/24 1216     Extra Tube Hold for add-ons.     Comment: Auto resulted       POC Glucose Once [[id number]]  (Normal) Collected: 12/29/24 1158    Specimen: Blood Updated: 12/29/24 1209     Glucose 120 mg/dL      Comment: : [id number] ID: [id number]       Blood Gas, Arterial - [[id number]]  (Abnormal) Collected: 12/29/24 1150     Specimen: Arterial Blood Updated: 12/29/24 1206     Site Right Radial     Pedro Pablo's Test Positive     pH, Arterial 7.365 pH units      pCO2, Arterial 40.8 mm Hg      pO2, Arterial 246.0 mm Hg      Comment: 83 Value above reference range        HCO3, Arterial 23.3 mmol/L      Base Excess, Arterial -2.0 mmol/L      Comment: 84 Value below reference range        O2 Saturation, Arterial 99.7 %      Comment: 83 Value above reference range        Temperature 37.0     Barometric Pressure for Blood Gas 746 mmHg      Modality NRB     FIO2 100 %      Ventilator Mode NA     Collected by [id number]     Comment: Meter: [id number]     :  [id number]        pCO2, Temperature Corrected 40.8 mm Hg      pH, Temp Corrected 7.365 pH Units      pO2, Temperature Corrected 246 mm Hg      PO2/FIO2 246             XR Abdomen KUB    Result Date: 12/29/2024  Findings/impression:  Enteric tube courses below the diaphragm overlying the stomach with the tip overlying the gastric antrum/body.      This report was signed and finalized on 12/29/2024 2:28 PM by Rubin West.      XR Chest 1 View    Result Date: 12/29/2024   Endotracheal tube tip 3 cm above the cipriano.    This report was signed and finalized on 12/29/2024 12:29 PM by Rubin West.      XR Chest 1 View    Result Date: 12/29/2024   No visible endotracheal tube.  Clear lungs.    This report was signed and finalized on 12/29/2024 12:15 PM by Rubin West.      CT Cervical Spine Without Contrast    Result Date: 12/29/2024   Motion-degraded study.  No obvious fracture or traumatic malalignment.    This report was signed and finalized on 12/29/2024 12:14 PM by Rubin West.      CT Head Without Contrast    Result Date: 12/29/2024  Impression:   No acute intracranial abnormality.  This report was signed and finalized on 12/29/2024 12:08 PM by Rubin West.       Result Review:  I have personally reviewed the results from the time of this admission to 12/29/2024 18:54 CST and  agree with these findings:  [x]  Laboratory list / accordion  [x]  Microbiology  [x]  Radiology  []  EKG/Telemetry   []  Cardiology/Vascular   []  Pathology  [x]  Old records  []  Other:  Most notable findings include: Leukocytosis abnormal blood gas      I have personally reviewed and interpreted the radiology studies and ECG obtained at time of admission.     Assessment / Plan   Assessment:   Active Hospital Problems    Diagnosis     **Acute respiratory failure with hypoxia     On mechanically assisted ventilation     Cocaine abuse     Positive urine drug screen     Substance abuse     Tobacco use     COPD (chronic obstructive pulmonary disease)     Bipolar disorder     Coronary artery disease involving autologous vein coronary bypass graft without angina pectoris     Essential hypertension     Hyperlipidemia LDL goal <100        Treatment Plan  The patient will be admitted to my service here at [facility].   Continue full mechanical ventilation for airway protection and high risk of withdrawal  Patient is positive testing for cocaine and TCA  Monitor for cardiac arrhythmias and hemodynamic stability  Continue current ventilator settings titrate PEEP and FiO2 as tolerated  SBT when more stable.  Leukocytosis noted.  Could be from underlying seizures  However there is no active seizures noted May need neurology evaluation and EEG if suspected later.  Possibility of tracheobronchitis or early pneumonia could not be ruled out.  Pneumonia workup will be followed.  Patient is empirically started on Zosyn and vancomycin which may be de-escalated later.  He started on Pulmicort and DuoNeb for underlying COPD and history of tobacco abuse  Consider nicotine patch later.  Nutritional support and remains on the ventilator for more than 2 days  Continue on IV fluid.  Repeat blood gas ordered for tomorrow morning  DVT and stress ulcer prophylaxis reviewed.  Pain and anxiety control addressed  Reviewed and  reconciled home medications patient has history of cardiac problems  Repeat labs and imaging studies from time to time.  CODE STATUS: Full.  Overall prognosis: Guarded  We will follow.  Total Time spent in seeing this patient and during treatment planning 1 hour        Code status and discussions: Full code however the patient was DNR/DNI during the last hospitalization to be addressed    Disposition:  Estimated length of stay is more than 3 nights.     The patient was seen and examined by me on 12/29/2024 at 6 PM.    Electronically signed by     Cesar Alvarado MD   Pulmonologist/Intensivist  on 12/29/2024 at 18:54 CST              Electronically signed by Cesar Alvarado MD at 12/29/24 1903          Emergency Department Notes        Lakesha Real RN at 12/29/24 1243          Respiratory called about pt SPO2 885 on vent    Electronically signed by Lakesha Real RN at 12/29/24 1253       Imer Valenzuela MD at 12/29/24 1219        Procedure Orders    1. Intubation [[id number]] ordered by Imer Valenzuela MD    2. Critical Care [[id number]] ordered by Imer Valenzuela MD                 Subjective   History of Present Illness  64-year-old male presents to the ED for evaluation after being found in an unresponsive/comatose state.  He has a history of hypertension, hyperlipidemia, diabetes, coronary disease.  Has had ER presentations in the past for hypoglycemia.  Patient lives at the [facility], was found unresponsive by staff.  Last known well unknown.  Duration of downtime unknown.  When the patient presented to the ED he was unresponsive, had sonorous respirations.  GCS 6 (E1V1M4).  He was on a nonrebreather.  Withdrawing to painful stimuli on the right, left arm and leg appeared a little tonic.  There was no seizure activity.  No flaccid paralysis.  Unable to obtain any additional history.        History provided by:  Patient  History limited by:  Patient  unresponsive      Review of Systems   Unable to perform ROS: Patient unresponsive       Past Medical History:   Diagnosis Date    Anxiety     Atherosclerosis of coronary artery     Bipolar disorder     Bladder cancer     Colon polyp     Coronary artery disease     Depression     Diabetes mellitus     Hemorrhoids     Hyperlipidemia     Hypertension     Nephrolithiasis     Sleep apnea        Allergies   Allergen Reactions    Codeine Nausea Only       Past Surgical History:   Procedure Laterality Date    ANGIOPLASTY      with stent placement    BACK SURGERY      BLADDER SURGERY      cancer    CARDIAC CATHETERIZATION      COLONOSCOPY  04/13/2015    COLONOSCOPY N/A 4/30/2018    Procedure: COLONOSCOPY WITH ANESTHESIA;  Surgeon: Gerson Martini MD;  Location:  [facility];  Service: Gastroenterology    COLONOSCOPY N/A 4/8/2021    Procedure: COLONOSCOPY WITH ANESTHESIA;  Surgeon: Gerson Martini MD;  Location:  [facility];  Service: Gastroenterology;  Laterality: N/A;  Pre: Hx Colon Polyps  Post: divertivculosis   Dr. Garza  CO2 Inflation Used    CORONARY ARTERY BYPASS GRAFT      KNEE SURGERY      SHOULDER SURGERY      x 2    SINUS SURGERY         Family History   Adopted: Yes       Social History     Socioeconomic History    Marital status:    Tobacco Use    Smoking status: Every Day     Current packs/day: 0.50     Types: Cigarettes    Smokeless tobacco: Never    Tobacco comments:     2 a day   Vaping Use    Vaping status: Never Used   Substance and Sexual Activity    Alcohol use: Yes     Comment: Rarely    Drug use: No    Sexual activity: Defer           Objective   Physical Exam  Vitals and nursing note reviewed.   Constitutional:       General: He is in acute distress.      Appearance: He is toxic-appearing.      Comments: Unresponsive elderly male patient, he is breathing spontaneously and has deep sonorous respirations, withdrawing to painful stimuli right arm and leg.  Left arm and leg are  little tonic.  No definite response to pain   HENT:      Head: Normocephalic and atraumatic.      Ears:      Comments: Atraumatic     Nose:      Comments: Atraumatic     Mouth/Throat:      Comments: Atraumatic  Eyes:      Comments: No periorbital swelling.  Pupils equally round reactive   Cardiovascular:      Rate and Rhythm: Regular rhythm. Tachycardia present.      Pulses: Normal pulses.      Heart sounds: Normal heart sounds. No murmur heard.  Pulmonary:      Breath sounds: Rhonchi present.   Abdominal:      General: Abdomen is flat.      Palpations: Abdomen is soft.      Tenderness: There is no abdominal tenderness. There is no guarding.   Musculoskeletal:      Right lower leg: No edema.      Left lower leg: No edema.   Skin:     General: Skin is warm and dry.      Capillary Refill: Capillary refill takes less than 2 seconds.      Comments: No external evidence for trauma   Neurological:      Mental Status: He is unresponsive.      GCS: GCS eye subscore is 1. GCS verbal subscore is 1. GCS motor subscore is 4.      Comments: Withdraws to painful stimuli of right arm and leg  No verbal response  No eye-opening  Pupils are equally round and reactive           Intubation    Date/Time: 12/29/2024 12:19 PM    Performed by: Imer Valenzuela MD  Authorized by: Imer Valenzuela MD    Consent:     Consent obtained:  Emergent situation  Universal protocol:     Patient identity confirmed:  Arm band and hospital-assigned identification number  Pre-procedure details:     Indications: airway protection and altered consciousness      Patient status:  Unresponsive    Look externally: large tongue      Mouth opening - incisor distance:  3 or more finger widths    Hyoid-mental distance: 3 or more finger widths      Hyoid-thyroid distance: 2 or more finger widths      Mallampati score:  II    Obstruction: none      Neck mobility: normal      Pharmacologic strategy: RSI      Induction agents:  Etomidate    Paralytics:   Rocuronium  Procedure details:     Preoxygenation:  Bag valve mask    CPR in progress: no      Number of attempts:  1  Successful intubation attempt details:     Intubation method:  Oral    Intubation technique: direct      Laryngoscope blade:  Mac 3    Bougie used: no      Grade view: II      Tube size (mm):  7.5    Tube type:  Cuffed    Tube visualized through cords: yes    Placement assessment:     ETT at teeth/gumline (cm):  24  Critical Care    Performed by: Imer Valenzuela MD  Authorized by: Cesar Alvarado MD    Critical care provider statement:     Critical care time (minutes):  45    Critical care time was exclusive of:  Separately billable procedures and treating other patients and teaching time    Critical care was necessary to treat or prevent imminent or life-threatening deterioration of the following conditions:  Respiratory failure and CNS failure or compromise    Critical care was time spent personally by me on the following activities:  Ordering and performing treatments and interventions, ordering and review of laboratory studies, discussions with consultants, evaluation of patient's response to treatment, examination of patient, ventilator management, review of old charts, re-evaluation of patient's condition, pulse oximetry and ordering and review of radiographic studies    I assumed direction of critical care for this patient from another provider in my specialty: no      Care discussed with: admitting provider            Lab Results (last 24 hours)       Procedure Component Value Units Date/Time    Blood Gas, Arterial - [[id number]]  (Abnormal) Collected: 12/29/24 1150    Specimen: Arterial Blood Updated: 12/29/24 1206     Site Right Radial     Pedro Pablo's Test Positive     pH, Arterial 7.365 pH units      pCO2, Arterial 40.8 mm Hg      pO2, Arterial 246.0 mm Hg      Comment: 83 Value above reference range        HCO3, Arterial 23.3 mmol/L      Base Excess, Arterial -2.0 mmol/L       Comment: 84 Value below reference range        O2 Saturation, Arterial 99.7 %      Comment: 83 Value above reference range        Temperature 37.0     Barometric Pressure for Blood Gas 746 mmHg      Modality NRB     FIO2 100 %      Ventilator Mode NA     Collected by [id number]     Comment: Meter: [id number]     :  [id number]        pCO2, Temperature Corrected 40.8 mm Hg      pH, Temp Corrected 7.365 pH Units      pO2, Temperature Corrected 246 mm Hg      PO2/FIO2 246    POC Glucose Once [[id number]]  (Normal) Collected: 12/29/24 1158    Specimen: Blood Updated: 12/29/24 1209     Glucose 120 mg/dL      Comment: : [id number] Gio Alberto ID: [id number]       Ethanol [[id number]] Collected: 12/29/24 1202    Specimen: Blood Updated: 12/29/24 1229     Ethanol % <0.010 %     Narrative:      Not for legal purposes. Chain of Custody not followed.     Acetaminophen Level [[id number]]  (Normal) Collected: 12/29/24 1202    Specimen: Blood Updated: 12/29/24 1234     Acetaminophen <5.0 mcg/mL     Salicylate Level [[id number]]  (Normal) Collected: 12/29/24 1202    Specimen: Blood Updated: 12/29/24 1234     Salicylate 0.3 mg/dL     CBC & Differential [[id number]]  (Abnormal) Collected: 12/29/24 1202    Specimen: Blood Updated: 12/29/24 1227    Narrative:      The following orders were created for panel order CBC & Differential.  Procedure                               Abnormality         Status                     ---------                               -----------         ------                     CBC Auto Differential[[id number]]        Abnormal            Final result                 Please view results for these tests on the individual orders.    Comprehensive Metabolic Panel [[id number]]  (Abnormal) Collected: 12/29/24 1202    Specimen: Blood Updated: 12/29/24 1248     Glucose 111 mg/dL      BUN 24 mg/dL      Creatinine 1.15 mg/dL      Sodium 142 mmol/L      Potassium 3.7 mmol/L      Chloride 100 mmol/L       CO2 20.0 mmol/L      Calcium 9.4 mg/dL      Total Protein 7.4 g/dL      Albumin 4.5 g/dL      ALT (SGPT) 22 U/L      AST (SGOT) 26 U/L      Alkaline Phosphatase 165 U/L      Total Bilirubin 1.6 mg/dL      Globulin 2.9 gm/dL      A/G Ratio 1.6 g/dL      BUN/Creatinine Ratio 20.9     Anion Gap 22.0 mmol/L      eGFR 71.5 mL/min/1.73     Narrative:      GFR Categories in Chronic Kidney Disease (CKD)      GFR Category          GFR (mL/min/1.73)    Interpretation  G1                     90 or greater         Normal or high (1)  G2                      60-89                Mild decrease (1)  G3a                   45-59                Mild to moderate decrease  G3b                   30-44                Moderate to severe decrease  G4                    15-29                Severe decrease  G5                    14 or less           Kidney failure          (1)In the absence of evidence of kidney disease, neither GFR category G1 or G2 fulfill the criteria for CKD.    eGFR calculation 2021 CKD-EPI creatinine equation, which does not include race as a factor    Lactic Acid, Plasma [[id number]]  (Normal) Collected: 12/29/24 1202    Specimen: Blood Updated: 12/29/24 1235     Lactate 1.7 mmol/L     Magnesium [[id number]]  (Normal) Collected: 12/29/24 1202    Specimen: Blood Updated: 12/29/24 1248     Magnesium 1.8 mg/dL     Procalcitonin [[id number]]  (Abnormal) Collected: 12/29/24 1202    Specimen: Blood Updated: 12/29/24 1352     Procalcitonin 0.28 ng/mL     Narrative:      As a Marker for Sepsis (Non-Neonates):    1. <0.5 ng/mL represents a low risk of severe sepsis and/or septic shock.  2. >2 ng/mL represents a high risk of severe sepsis and/or septic shock.    As a Marker for Lower Respiratory Tract Infections that require antibiotic therapy:    PCT on Admission    Antibiotic Therapy       6-12 Hrs later    >0.5                Strongly Recommended  >0.25 - <0.5        Recommended   0.1 - 0.25          Discouraged               "Remeasure/reassess PCT  <0.1                Strongly Discouraged     Remeasure/reassess PCT    As 28 day mortality risk marker: \"Change in Procalcitonin Result\" (>80% or <=80%) if Day 0 (or Day 1) and Day 4 values are available. Refer to http://www.[facility]-pct-calculator.com    Change in PCT <=80%  A decrease of PCT levels below or equal to 80% defines a positive change in PCT test result representing a higher risk for 28-day all-cause mortality of patients diagnosed with severe sepsis for septic shock.    Change in PCT >80%  A decrease of PCT levels of more than 80% defines a negative change in PCT result representing a lower risk for 28-day all-cause mortality of patients diagnosed with severe sepsis or septic shock.       Protime-INR [[id number]]  (Normal) Collected: 12/29/24 1202    Specimen: Blood Updated: 12/29/24 1233     Protime 13.2 Seconds      INR 0.96    CBC Auto Differential [[id number]]  (Abnormal) Collected: 12/29/24 1202    Specimen: Blood Updated: 12/29/24 1227     WBC 29.48 10*3/mm3      RBC 5.57 10*6/mm3      Hemoglobin 17.7 g/dL      Hematocrit 50.0 %      MCV 89.8 fL      MCH 31.8 pg      MCHC 35.4 g/dL      RDW 12.9 %      RDW-SD 42.4 fl      MPV 10.4 fL      Platelets 308 10*3/mm3      Neutrophil % 84.5 %      Lymphocyte % 4.1 %      Monocyte % 7.8 %      Eosinophil % 0.0 %      Basophil % 0.6 %      Immature Grans % 3.0 %      Neutrophils, Absolute 24.89 10*3/mm3      Lymphocytes, Absolute 1.21 10*3/mm3      Monocytes, Absolute 2.31 10*3/mm3      Eosinophils, Absolute 0.00 10*3/mm3      Basophils, Absolute 0.19 10*3/mm3      Immature Grans, Absolute 0.88 10*3/mm3      nRBC 0.0 /100 WBC     CK [[id number]]  (Abnormal) Collected: 12/29/24 1202    Specimen: Blood Updated: 12/29/24 1351     Creatine Kinase 502 U/L     Osmolality, Serum [[id number]]  (Abnormal) Collected: 12/29/24 1202    Specimen: Blood Updated: 12/29/24 1558     Osmolality 302 mOsm/kg     Respiratory Panel PCR w/COVID-19(SARS-CoV-2) " NACHO/ARACELI/CHRISTINE/[facility]/ZARINA In-House, NP Swab in UTM/VTM, 2 HR TAT - Swab, Nasopharynx [[id number]]  (Normal) Collected: 12/29/24 1205    Specimen: Swab from Nasopharynx Updated: 12/29/24 1304     ADENOVIRUS, PCR Not Detected     Coronavirus 229E Not Detected     Coronavirus HKU1 Not Detected     Coronavirus NL63 Not Detected     Coronavirus OC43 Not Detected     COVID19 Not Detected     Human Metapneumovirus Not Detected     Human Rhinovirus/Enterovirus Not Detected     Influenza A PCR Not Detected     Influenza B PCR Not Detected     Parainfluenza Virus 1 Not Detected     Parainfluenza Virus 2 Not Detected     Parainfluenza Virus 3 Not Detected     Parainfluenza Virus 4 Not Detected     RSV, PCR Not Detected     Bordetella pertussis pcr Not Detected     Bordetella parapertussis PCR Not Detected     Chlamydophila pneumoniae PCR Not Detected     Mycoplasma pneumo by PCR Not Detected    Narrative:      In the setting of a positive respiratory panel with a viral infection PLUS a negative procalcitonin without other underlying concern for bacterial infection, consider observing off antibiotics or discontinuation of antibiotics and continue supportive care. If the respiratory panel is positive for atypical bacterial infection (Bordetella pertussis, Chlamydophila pneumoniae, or Mycoplasma pneumoniae), consider antibiotic de-escalation to target atypical bacterial infection.    Blood Culture - Blood, Arm, Right [[id number]] Collected: 12/29/24 1218    Specimen: Blood from Arm, Right Updated: 12/29/24 1230    Blood Culture - Blood, Hand, Digit Right [[id number]] Collected: 12/29/24 1230    Specimen: Blood from Hand, Digit Right Updated: 12/29/24 1231    Urinalysis With Culture If Indicated - Indwelling Urethral Catheter [[id number]]  (Abnormal) Collected: 12/29/24 1254    Specimen: Urine from Indwelling Urethral Catheter Updated: 12/29/24 1332     Color, UA Yellow     Appearance, UA Cloudy     pH, UA 5.5     Specific Gravity, UA  1.014     Glucose, UA Negative     Ketones, UA Trace     Bilirubin, UA Negative     Blood, UA Large (3+)     Protein,  mg/dL (2+)     Leuk Esterase, UA Negative     Nitrite, UA Negative     Urobilinogen, UA 0.2 E.U./dL    Narrative:      In absence of clinical symptoms, the presence of pyuria, bacteria, and/or nitrites on the urinalysis result does not correlate with infection.    Urine Drug Screen - Indwelling Urethral Catheter [[id number]]  (Abnormal) Collected: 12/29/24 1254    Specimen: Urine from Indwelling Urethral Catheter Updated: 12/29/24 1315     THC, Screen, Urine Negative     Phencyclidine (PCP), Urine Negative     Cocaine Screen, Urine Positive     Methamphetamine, Ur Negative     Opiate Screen Negative     Amphetamine Screen, Urine Negative     Benzodiazepine Screen, Urine Negative     Tricyclic Antidepressants Screen Positive     Methadone Screen, Urine Negative     Barbiturates Screen, Urine Negative     Oxycodone Screen, Urine Negative     Buprenorphine, Screen, Urine Negative    Narrative:      Cutoff For Drugs Screened:    Amphetamines               500 ng/ml  Barbiturates               200 ng/ml  Benzodiazepines            150 ng/ml  Cocaine                    150 ng/ml  Methadone                  200 ng/ml  Opiates                    100 ng/ml  Phencyclidine               25 ng/ml  THC                         50 ng/ml  Methamphetamine            500 ng/ml  Tricyclic Antidepressants  300 ng/ml  Oxycodone                  100 ng/ml  Buprenorphine               10 ng/ml    The normal value for all drugs tested is negative. This report includes unconfirmed screening results, with the cutoff values listed, to be used for medical treatment purposes only.  Unconfirmed results must not be used for non-medical purposes such as employment or legal testing.  Clinical consideration should be applied to any drug of abuse test, particularly when unconfirmed results are used.      Fentanyl, Urine -  Indwelling Urethral Catheter [[id number]]  (Normal) Collected: 12/29/24 1254    Specimen: Urine from Indwelling Urethral Catheter Updated: 12/29/24 1315     Fentanyl, Urine Negative    Narrative:      Negative Threshold:      Fentanyl 5 ng/mL     The normal value for the drug tested is negative. This report includes final unconfirmed screening results to be used for medical treatment purposes only. Unconfirmed results must not be used for non-medical purposes such as employment or legal testing. Clinical consideration should be applied to any drug of abuse test, particularly when unconfirmed results are used.           Urinalysis, Microscopic Only - Indwelling Urethral Catheter [[id number]]  (Abnormal) Collected: 12/29/24 1254    Specimen: Urine from Indwelling Urethral Catheter Updated: 12/29/24 1332     RBC, UA Too Numerous to Count /HPF      WBC, UA 3-5 /HPF      Comment: Urine culture not indicated.        Bacteria, UA 1+ /HPF      Squamous Epithelial Cells, UA 0-2 /HPF      Hyaline Casts, UA 0-2 /LPF      Granular Casts, UA 0-2 /LPF      Methodology Manual Light Microscopy    Blood Gas, Arterial - [[id number]]  (Abnormal) Collected: 12/29/24 1303    Specimen: Arterial Blood Updated: 12/29/24 1303     Site Right Brachial     Pedro Pablo's Test N/A     pH, Arterial 7.259 pH units      Comment: 84 Value below reference range        pCO2, Arterial 55.7 mm Hg      Comment: 83 Value above reference range        pO2, Arterial 71.2 mm Hg      Comment: 84 Value below reference range        HCO3, Arterial 24.9 mmol/L      Base Excess, Arterial -3.4 mmol/L      Comment: 84 Value below reference range        O2 Saturation, Arterial 91.7 %      Comment: 84 Value below reference range        Temperature 37.0     Barometric Pressure for Blood Gas 746 mmHg      Modality Ventilator     FIO2 40 %      Ventilator Mode AC     Set Tidal Volume 500     Set Mech Resp Rate 14.0     PEEP 5.0     Collected by [id number]     Comment: Meter:  [id number]     :  [id number]        pCO2, Temperature Corrected 55.7 mm Hg      pH, Temp Corrected 7.259 pH Units      pO2, Temperature Corrected 71.2 mm Hg      PO2/FIO2 178    Blood Gas, Arterial - [[id number]]  (Abnormal) Collected: 12/29/24 1455    Specimen: Arterial Blood Updated: 12/29/24 1454     Site Right Radial     Pedro Pablo's Test Positive     pH, Arterial 7.339 pH units      Comment: 84 Value below reference range        pCO2, Arterial 43.3 mm Hg      pO2, Arterial 110.0 mm Hg      Comment: 83 Value above reference range        HCO3, Arterial 23.3 mmol/L      Base Excess, Arterial -2.6 mmol/L      Comment: 84 Value below reference range        O2 Saturation, Arterial 98.1 %      Temperature 37.0     Barometric Pressure for Blood Gas 747 mmHg      Modality Ventilator     FIO2 60 %      Ventilator Mode AC     Set Tidal Volume 500     Set Mech Resp Rate 16.0     PEEP 5.0     Collected by [id number]     Comment: Meter: [id number]     :  [id number]        pCO2, Temperature Corrected 43.3 mm Hg      pH, Temp Corrected 7.339 pH Units      pO2, Temperature Corrected 110 mm Hg      PO2/FIO2 183    Methanol [[id number]] Collected: 12/29/24 1504    Specimen: Blood Updated: 12/29/24 1512    Respiratory Culture - Aspirate, ET Suction [[id number]] Collected: 12/29/24 1507    Specimen: Aspirate from ET Suction Updated: 12/29/24 1512         XR Abdomen KUB    Result Date: 12/29/2024  XR ABDOMEN KUB- 12/29/2024 1:17 PM  HISTORY: og placement   TECHNIQUE:Single AP view of the abdomen.  COMPARISON: None      Findings/impression:  Enteric tube courses below the diaphragm overlying the stomach with the tip overlying the gastric antrum/body.      This report was signed and finalized on 12/29/2024 2:28 PM by Rubin West.      XR Chest 1 View    Result Date: 12/29/2024  EXAM: XR CHEST 1 VW- 12/29/2024 11:23 AM  HISTORY: tube placment   COMPARISON: 12/29/2024.  TECHNIQUE: Single frontal radiograph of the chest  was obtained.  FINDINGS:  Support Devices: Endotracheal tube tip 3 cm above the cipriano.  Cardiac and Mediastinal Silhouettes: Normal.  Lungs/Pleura: No focal consolidation. No sizable pleural effusion. No visible pneumothorax.  Osseous structures: No acute osseous finding.  Other: None.       Endotracheal tube tip 3 cm above the cipriano.    This report was signed and finalized on 12/29/2024 12:29 PM by Rubin West.      XR Chest 1 View    Result Date: 12/29/2024  EXAM: XR CHEST 1 VW- 12/29/2024 10:54 AM  HISTORY: ams, post intubation   COMPARISON: 8/25/2024.  TECHNIQUE: Single frontal radiograph of the chest was obtained.  FINDINGS:  Support Devices: No visible endotracheal tube. Prior CABG. Spinal stimulator leads.  Cardiac and Mediastinal Silhouettes: Normal.  Lungs/Pleura: No focal consolidation. No sizable pleural effusion. No visible pneumothorax.  Osseous structures: No acute osseous finding.  Other: None.       No visible endotracheal tube.  Clear lungs.    This report was signed and finalized on 12/29/2024 12:15 PM by Rubin West.      CT Cervical Spine Without Contrast    Result Date: 12/29/2024  Exam: CT CERVICAL SPINE WO CONTRAST- 12/29/2024 10:56 AM  HISTORY: fall with pain.  COMPARISON: 7/13/2024.  DOSE LENGTH PRODUCT: 542.05 mGy.cm mGy cm. Automated exposure control was also utilized to decrease patient radiation dose.  TECHNIQUE: Serial helical tomographic images of the cervical spine were obtained without the use of intravenous contrast. Additionally, sagittal and coronal reformatted images were also provided for review.  FINDINGS:  Motion decreases sensitivity.  Prior C3-C4 ACDF. No evidence of hardware fracture.  Straightening of the cervical lordosis.  No obvious fracture. The vertebral body heights are preserved.  Multilevel mild to moderate degenerative disc disease and facet hypertrophic changes. Ankylosis of the left C2, C3, and C4 facets.  Paraspinal soft tissues are grossly  unremarkable.  Other: Mild carotid atherosclerosis.       Motion-degraded study.  No obvious fracture or traumatic malalignment.    This report was signed and finalized on 12/29/2024 12:14 PM by Rubin West.      CT Head Without Contrast    Result Date: 12/29/2024  Exam: CT HEAD WO CONTRAST- 12/29/2024 10:51 AM  HISTORY: Altered mental status  DOSE LENGTH PRODUCT: 741.46 mGy.cm mGy cm. Automated exposure control was also utilized to decrease patient radiation dose.  Technique: Helically acquired CT of the brain without IV contrast was performed. Sagittal and coronal reformations are also provided for review. Soft tissue and bone kernels are available for interpretation.  Comparison: 8/25/2024.  Findings:  Ventricles and extra-axial CSF spaces are normal in size.  No intraparenchymal or extra-axial hemorrhage.  Gray-white matter differentiation is preserved.  Orbits are grossly unremarkable. Paranasal sinuses are grossly clear. Mastoid air cells are grossly clear.  No suspicious calvarial or extracranial soft tissue abnormality.      Impression:   No acute intracranial abnormality.  This report was signed and finalized on 12/29/2024 12:08 PM by Rubin West.      ED Course  ED Course as of 12/29/24 1619   Sun Dec 29, 2024   1613 64-year-old male with history of hypertension, hyperlipidemia, diabetes, coronary disease presents to the ED after being found unresponsive during a well check.  Glucose 120.  Oxygen saturations were in the 90s on a nonrebreather.  Highly concerned about a head bleed of initial presentation.  Stat CT obtained, no evidence of bleeding.  Last known well uncertain, not a candidate for TNK if this was due to a massive stroke.    GCS was 6, patient was intubated for airway protection.  Labs reveal elevated white count, 29,000.  Respiratory panel negative.  UDS positive for cocaine and tricyclic's.  QTc mildly prolonged at 497.  ABG unremarkable.  Alcohol negative.  Lactate normal at 1.7.   Aspirin and salicylate negative.  Blood cultures ordered and pending.  Patient covered with broad-spectrum antibiotics, vancomycin and Zosyn.  CK minimally elevated at 502.  Etiology for symptoms uncertain.  Possible toxic ingestion.  Serum osmole's only minimally elevated at 302.  At this point, etiology for patient's unresponsiveness uncertain, plan for admission to the ICU for further inpatient evaluation and treatment. [AW]      ED Course User Index  [AW] Imer Valenzuela MD                                                       Medical Decision Making      Final diagnoses:   Acute respiratory failure, unspecified whether with hypoxia or hypercapnia   Altered mental status, unspecified altered mental status type   Cocaine abuse       ED Disposition  ED Disposition       ED Disposition   Decision to Admit    Condition   --    Comment   Level of Care: Critical Care [6]   Diagnosis: Respiratory failure [280039]   Admitting Physician: MADELYN HOU [[id number]]   Attending Physician: AMDELYN HOU [[id number]]   Certification: I Certify That Inpatient Hospital Services Are Medically Necessary For Greater Than 2 Midnights                 No follow-up provider specified.       Medication List      No changes were made to your prescriptions during this visit.            Imer Valenzuela MD  12/29/24 9588      Electronically signed by Imer Valenzuela MD at 12/29/24 8284

## 2024-12-30 NOTE — PROGRESS NOTES
[facility] Intensivist Services    Date of Admission: 12/29/2024  Date of Note: 12/30/24  Primary Care Physician: Xiomara Neal APRN   LOS: 1 day     History   Next of Kin:  Primary Emergency Contact: Fe Suresh   Code Status: Code Status and Medical Interventions: CPR (Attempt to Resuscitate); Full Support    He is a 64 y.o. male smoker, drug user, diabetes mellitus most recent A1c 5.4 admitted 12/29/2024 with Acute respiratory failure with hypoxia who also  has a past medical history of Anxiety, Atherosclerosis of coronary artery, Bipolar disorder, Bladder cancer, Colon polyp, Coronary artery disease, Depression, Diabetes mellitus, Hemorrhoids, Hyperlipidemia, Hypertension, Nephrolithiasis, and Sleep apnea.    Patient apparently lives in a low income housing complex [facility] and was found down and unresponsive comatose.  Patient was recently hospitalized August 2024 for altered mental status.  Patient arrived in our emergency department 12/29/2024 unresponsive with GCS of 6.  Intubated.  No history available.  Patient had cocaine and TCA positive urine.  His ex-wife, who is his power of  from a 1995 document--discussed with hospital--states that he did not want to be on life support.  She feels like he has really had a poor quality of life recently.  He has chronically suffered from depression, bipolar disorder.  History of bladder cancer.  Wife says that he may have been getting drugs from his cousins.  She thinks he probably overdosed accidentally at home.  Patient currently decorticate posturing.    Poison control thinks his TCA positive may be cross-reactivity with Flexeril or his Auvelity depression meds--they did not think it was a TCA overdose.    On his chart (which can contain diagnoses that are not current) shows he  has History of adenomatous polyp of colon; Hypoglycemia; AMS (altered mental status); Leucocytosis; Non-traumatic rhabdomyolysis;  Subconjunctival edema of left eye, due to debris; Type 2 diabetes mellitus, with long-term current use of insulin; Acute respiratory failure with hypoxia; On mechanically assisted ventilation; Cocaine abuse; Positive urine drug screen; Substance abuse; Tobacco use; COPD (chronic obstructive pulmonary disease); Bipolar disorder; Coronary artery disease involving autologous vein coronary bypass graft without angina pectoris; Essential hypertension; and Hyperlipidemia LDL goal <100 on their problem list..     He takes Dextromethorphan-buPROPion ER, Semaglutide (2 MG/DOSE), amLODIPine, atorvastatin, clopidogrel, cyclobenzaprine, eszopiclone, fluorometholone, furosemide, isosorbide mononitrate, lisinopril, metoprolol succinate XL, naloxone, nitroglycerin, and oxyCODONE-acetaminophen at home based on most current med reconciliation.   Complete list is below.         Past Medical History     Active and Resolved Problems  Active Hospital Problems    Diagnosis  POA    **Acute respiratory failure with hypoxia [J96.01]  Yes    On mechanically assisted ventilation [Z99.11]  Not Applicable    Cocaine abuse [F14.10]  Unknown    Positive urine drug screen [R82.5]  Unknown    Substance abuse [F19.10]  Unknown    Tobacco use [Z72.0]  Unknown    COPD (chronic obstructive pulmonary disease) [J44.9]  Unknown    Bipolar disorder [F31.9]  Unknown    Coronary artery disease involving autologous vein coronary bypass graft without angina pectoris [I25.810]  Unknown    Essential hypertension [I10]  Unknown    Hyperlipidemia LDL goal <100 [E78.5]  Unknown      Resolved Hospital Problems   No resolved problems to display.       Past Medical History:   Past Medical History:   Diagnosis Date    Anxiety     Atherosclerosis of coronary artery     Bipolar disorder     Bladder cancer     Colon polyp     Coronary artery disease     Depression     Diabetes mellitus     Hemorrhoids     Hyperlipidemia     Hypertension     Nephrolithiasis     Sleep apnea         Prior Surgeries: He  has a past surgical history that includes Coronary artery bypass graft; Knee surgery; Back surgery; Cardiac catheterization; Sinus surgery; Shoulder surgery; Angioplasty; Colonoscopy (04/13/2015); Bladder surgery; Colonoscopy (N/A, 4/30/2018); and Colonoscopy (N/A, 4/8/2021).    Past Surgical History:   Past Surgical History:   Procedure Laterality Date    ANGIOPLASTY      with stent placement    BACK SURGERY      BLADDER SURGERY      cancer    CARDIAC CATHETERIZATION      COLONOSCOPY  04/13/2015    COLONOSCOPY N/A 4/30/2018    Procedure: COLONOSCOPY WITH ANESTHESIA;  Surgeon: Gerson Martini MD;  Location:  [facility];  Service: Gastroenterology    COLONOSCOPY N/A 4/8/2021    Procedure: COLONOSCOPY WITH ANESTHESIA;  Surgeon: Gerson Martini MD;  Location:  [facility];  Service: Gastroenterology;  Laterality: N/A;  Pre: Hx Colon Polyps  Post: divertivculosis   Dr. Garza  CO2 Inflation Used    CORONARY ARTERY BYPASS GRAFT      KNEE SURGERY      SHOULDER SURGERY      x 2    SINUS SURGERY         Social and Family History     Family History:  family history is not on file. He was adopted.    Tobacco/Social History:  reports that he has been smoking cigarettes. He has never used smokeless tobacco. He reports current alcohol use. He reports that he does not use drugs.    Allergies     Allergies:   He is allergic to codeine.    Labs   Basic Labs:  CBC:      Lab 12/30/24  0316 12/29/24  1202   WBC 20.23* 29.48*   HEMOGLOBIN 14.4 17.7   HEMATOCRIT 43.4 50.0   PLATELETS 232 308   NEUTROS ABS 15.05* 24.89*   IMMATURE GRANS (ABS) 0.22* 0.88*   LYMPHS ABS 2.19 1.21   MONOS ABS 2.64* 2.31*   EOS ABS 0.01 0.00   MCV 92.9 89.8       LIVER FUNCTION AND COAG TESTS:      Lab 12/30/24  0316 12/29/24  1202   TOTAL PROTEIN 6.2 7.4   ALBUMIN 3.7 4.5   GLOBULIN 2.5 2.9   ALT (SGPT) 16 22   AST (SGOT) 27 26   BILIRUBIN 1.6* 1.6*   ALK PHOS 126* 165*   PROTIME  --  13.2   INR  --  0.96        ABG:      Lab 12/30/24  0404 12/30/24  0316 12/29/24  1455 12/29/24  1303 12/29/24  1202 12/29/24  1150   PH, ARTERIAL 7.412  --  7.339* 7.259*  --  7.365   PO2 ART 77.6*  --  110.0* 71.2*  --  246.0*   PCO2, ARTERIAL 39.7  --  43.3 55.7*  --  40.8   HCO3 ART 25.3  --  23.3 24.9  --  23.3   ANION GAP  --  13.0  --   --  22.0*  --        LACTATE:      Lab 12/29/24  1202   LACTATE 1.7       CMP:      Lab 12/30/24  0316 12/29/24  1202   SODIUM 143 142   POTASSIUM 3.5 3.7   CHLORIDE 107 100   BUN 30* 24*   CREATININE 1.04 1.15   CALCIUM 8.8 9.4   MAGNESIUM 2.1 1.8   PHOSPHORUS 4.2  --    GLUCOSE 180* 111*   EGFR 80.7 71.5       Diabetic:  A1C Last 3 Results          8/26/2024    04:49   HGBA1C Last 3 Results   Hemoglobin A1C 5.40        Inpatient Medications   Scheduled Meds:amLODIPine, 10 mg, Nasogastric, Nightly  atorvastatin, 40 mg, Nasogastric, Nightly  budesonide, 0.5 mg, Nebulization, BID - RT  chlorhexidine, 15 mL, Mouth/Throat, Q12H  Chlorhexidine Gluconate Cloth, 1 Application, Topical, Q24H  clopidogrel, 75 mg, Nasogastric, Nightly  enoxaparin, 40 mg, Subcutaneous, Daily  famotidine, 20 mg, Nasogastric, Q12H  [START ON 12/31/2024] fluorometholone, 2 drop, Left Eye, 4x Daily  ipratropium-albuterol, 3 mL, Nebulization, 4x Daily - RT  lisinopril, 40 mg, Nasogastric, Daily  metoprolol tartrate, 100 mg, Nasogastric, Q12H  mupirocin, 1 Application, Each Nare, BID  piperacillin-tazobactam, 3.375 g, Intravenous, Q8H  senna-docusate sodium, 2 tablet, Nasogastric, BID  sodium chloride, 10 mL, Intravenous, Q12H  vancomycin, 1,500 mg, Intravenous, Q24H      Continuous Infusions:amiodarone, 1 mg/min, Last Rate: 1 mg/min (12/30/24 1441)   Followed by  amiodarone, 0.5 mg/min  dextrose 5 % and sodium chloride 0.9 %, 100 mL/hr, Last Rate: 100 mL/hr (12/30/24 1644)  Pharmacy to Dose Zosyn,   propofol, 5-50 mcg/kg/min, Last Rate: Stopped (12/30/24 0725)      PRN Meds:.  acetaminophen    senna-docusate sodium **AND**  polyethylene glycol **AND** bisacodyl **AND** bisacodyl    Morphine    nitroglycerin    Pharmacy to Dose Zosyn    sodium chloride    sodium chloride    I have reviewed the patient's current medications.   Outpatient Medications     Current Outpatient Medications   Medication Instructions    amLODIPine (NORVASC) 10 mg, Oral, Nightly    atorvastatin (LIPITOR) 40 mg, Oral, Nightly    clopidogrel (PLAVIX) 75 MG tablet Take 1 tablet by mouth Every Night.    cyclobenzaprine (FLEXERIL) 10 mg, Oral, 3 Times Daily PRN    Dextromethorphan-buPROPion ER (AUVELITY)  MG tablet controlled-release 1 tablet, Oral, 2 Times Daily    eszopiclone (LUNESTA) 3 mg, Oral, Nightly, Take immediately before bedtime    fluorometholone (EFLONE) 0.1 % ophthalmic suspension 2 drops, Left Eye, 4 Times Daily    furosemide (LASIX) 40 mg, Oral, Nightly    isosorbide mononitrate (IMDUR) 60 MG 24 hr tablet Take 1 tablet by mouth Every Night.    lisinopril (PRINIVIL,ZESTRIL) 40 mg, Oral, Daily    metoprolol succinate XL (TOPROL-XL) 200 MG 24 hr tablet Take 1 tablet by mouth Every Night.    naloxone (NARCAN) 4 MG/0.1ML nasal spray 1 spray, Nasal, As Needed, Call 911. Don't prime. [place] in 1 nostril for overdose. Repeat in 2-3 minutes in other nostril if no or minimal breathing/responsiveness.    nitroglycerin (NITROSTAT) 0.4 MG SL tablet Place 1 tablet under the tongue Every 5 (Five) Minutes As Needed for Chest Pain.    oxyCODONE-acetaminophen (PERCOCET)  MG per tablet Take 1 tablet by mouth Every 8 (Eight) Hours.    Ozempic (2 MG/DOSE) 1 mg, Subcutaneous, Weekly       Current Antibiotics   Pharmacy to Dose Zosyn  piperacillin-tazobactam (ZOSYN) 3.375 g IVPB in 100 mL NS MBP (CD)  vancomycin - 1.5-0.9 GM/500ML-%    Exam   Vent settings for last 24 hours:  Mode: VC/AC  FiO2 (%):  [30 %-40 %] 40 %  S RR:  [16] 16  S VT:  [500 mL] 500 mL  PEEP/CPAP (cm H2O):  [5 cm H20] 5 cm H20  MAP (cm H2O):  [6.1-9.3] 6.1    Vital signs for last 24  "hours:  Temp:  [98.3 °F (36.8 °C)-101 °F (38.3 °C)] 100.3 °F (37.9 °C)  Heart Rate:  [] 102  Resp:  [22-27] 24  BP: ()/() 120/65  FiO2 (%):  [30 %-40 %] 40 %    Vitals: His  height is 172.7 cm (68\") and weight is 108 kg (238 lb 8.6 oz). His rectal temperature is 100.3 °F (37.9 °C). His blood pressure is 120/65 and his pulse is 102. His respiration is 24 and oxygen saturation is 95%.     PHYSICAL FINDINGS: SEE VITALS ABOVE  GENERAL APPEARANCE: ° Patient is intubated.  HEAD: Injuries: No evidence of a head injury. ° Appearance: Head normocephalic.  NECK: No masses ° Thyroid: ° Not diffusely enlarged.  EYES: General/bilateral: eyes closed. Pupils: ° PERRLA. Sclera: ° Showed no icterus.  THROAT: ETT in place, appears well positioned.  EARS: Hearing: ° patient sedated.  NOSE: General/bilateral: External Deformities: ° No external nose deformities.  LYMPH NODES: No cervical or generalized adenopathy noted.  CHEST: ° Visual inspection revealed no abnormalities.  LUNGS: ° Respiration rhythm and depth was normal. ° Normal breath sounds  ° No wheezing was heard bilaterally. ° No rhonchi were heard. ° No rales/crackles were heard. ° No clubbing noted. On ventilator.  CARDIOVASCULAR: JVD not increased. Heart Rate And Rhythm: Normal. ° Heart Sounds: No S3/ S4 heard. ° Murmurs: No murmurs were heard.  BACK: No obvious deformity noted.  ABDOMEN: Visual Inspection: Abdomen was not distended. Auscultation: ° Abdominal auscultation revealed no abnormalities. ° Bowel sounds were normal. ° Palpation: ° Abdomen was soft. ° No abdominal tenderness. ° No mass was palpated in the abdomen. ° Soft. °Liver: Not visibly enlarged. Spleen: Not visibly enlarged.  MUSCULOSKELETAL SYSTEM : General/bilateral: ° Sedated.  FEET/EXTREMITIES: General/bilateral: ° No visible swelling of the feet.  NEUROLOGICAL: Intubated ° Gait And Stance: cannot test.  SKIN: ° Very poor self-care.    Intake/Output   Intake/Output this shift:  I/O this " "shift:  In: 1767 [I.V.:1327; Other:240; IV Piggyback:200]  Out: 1150 [Urine:750; Emesis/NG output:400]    Intake/Output last 3 shifts:  I/O last 3 completed shifts:  In: 1494.7 [I.V.:899.7; IV Piggyback:595]  Out: 775 [Urine:775]    Results and Cultures Review     Result Review:  I have personally reviewed the results from the time of this admission to 12/30/2024 17:48 CST and agree with these findings:  [x]  Laboratory list / accordion  []  Microbiology  []  Radiology  []  EKG/Telemetry   []  Cardiology/Vascular   []  Pathology  []  Old records  []  Other:  Most notable findings include: Mildly elevated glucose, CK5 48.      Culture Data:   Blood Culture   Date Value Ref Range Status   12/29/2024 No growth at 24 hours  Preliminary   12/29/2024 No growth at 24 hours  Preliminary       Microbiology Results (last 10 days)       Procedure Component Value - Date/Time    Chlamydia trachomatis, Neisseria gonorrhoeae, PCR - Swab, Penis [[id number]]  (Normal) Collected: 12/30/24 0839    Lab Status: Final result Specimen: Swab from Penis Updated: 12/30/24 1255     Chlamydia DNA by PCR Not Detected     Neisseria gonorrhoeae by PCR Not Detected    Narrative:      Disclaimer: The Aptima Combo 2 assay is a target amplification nucleic acid probe test that utilizes target capture for the in vitro qualitative detection and differentiation of ribosomal RNA from Chlamydia trachomatis and Neisseria gonorrhoeae to aid in the diagnosis of chlamydial and/or gonococcal urogenital disease.  Cell culture was once considered to be the \"gold standard\" for detection of CT and NG.  Culture is quite specific, but scientific studies have demonstrated that the NAAT DNA probe technologies have higher clinical sensitivities than culture.    MRSA Screen, PCR (Inpatient) - Swab, Nares [[id number]]  (Normal) Collected: 12/29/24 1753    Lab Status: Final result Specimen: Swab from Nares Updated: 12/29/24 1919     MRSA PCR No MRSA Detected    Narrative:   "    The negative predictive value of this diagnostic test is high and should only be used to consider de-escalating anti-MRSA therapy. A positive result may indicate colonization with MRSA and must be correlated clinically.    Respiratory Culture - Aspirate, ET Suction [[id number]] Collected: 12/29/24 1507    Lab Status: Preliminary result Specimen: Aspirate from ET Suction Updated: 12/30/24 0850     Gram Stain Moderate (3+) WBCs per low power field      Rare (1+) Epithelial cells per low power field      Moderate (3+) Mixed gram positive kapil      Moderate (3+) Mixed gram negative kapil      Mixed intracellular organisms suggestive of an aspiration event    Blood Culture - Blood, Hand, Digit Right [[id number]]  (Normal) Collected: 12/29/24 1230    Lab Status: Preliminary result Specimen: Blood from Hand, Digit Right Updated: 12/30/24 1245     Blood Culture No growth at 24 hours    Blood Culture - Blood, Arm, Right [[id number]]  (Normal) Collected: 12/29/24 1218    Lab Status: Preliminary result Specimen: Blood from Arm, Right Updated: 12/30/24 1245     Blood Culture No growth at 24 hours    Respiratory Panel PCR w/COVID-19(SARS-CoV-2) [facility] In-House, NP Swab in UTM/VTM, 2 HR TAT - Swab, Nasopharynx [[id number]]  (Normal) Collected: 12/29/24 1205    Lab Status: Final result Specimen: Swab from Nasopharynx Updated: 12/29/24 1304     ADENOVIRUS, PCR Not Detected     Coronavirus 229E Not Detected     Coronavirus HKU1 Not Detected     Coronavirus NL63 Not Detected     Coronavirus OC43 Not Detected     COVID19 Not Detected     Human Metapneumovirus Not Detected     Human Rhinovirus/Enterovirus Not Detected     Influenza A PCR Not Detected     Influenza B PCR Not Detected     Parainfluenza Virus 1 Not Detected     Parainfluenza Virus 2 Not Detected     Parainfluenza Virus 3 Not Detected     Parainfluenza Virus 4 Not Detected     RSV, PCR Not Detected     Bordetella pertussis pcr Not Detected     Bordetella  parapertussis PCR Not Detected     Chlamydophila pneumoniae PCR Not Detected     Mycoplasma pneumo by PCR Not Detected    Narrative:      In the setting of a positive respiratory panel with a viral infection PLUS a negative procalcitonin without other underlying concern for bacterial infection, consider observing off antibiotics or discontinuation of antibiotics and continue supportive care. If the respiratory panel is positive for atypical bacterial infection (Bordetella pertussis, Chlamydophila pneumoniae, or Mycoplasma pneumoniae), consider antibiotic de-escalation to target atypical bacterial infection.            Radiology     Imaging Results (Last 72 Hours)       Procedure Component Value Units Date/Time    XR Chest 1 View [[id number]] Collected: 12/30/24 0707     Updated: 12/30/24 0713    Narrative:      EXAMINATION: XR CHEST 1 VW-     12/30/2024 2:15 AM     HISTORY: Intubated Patient; J96.00-Acute respiratory failure,  unspecified whether with hypoxia or hypercapnia; R41.82-Altered mental  status, unspecified; F14.10-Cocaine abuse, uncomplicated     A frontal projection of the chest is compared with the previous study  dated 12/29/2024.     The lungs are moderately well-expanded.     There is mild diffuse haziness of the pulmonary parenchyma, more  pronounced on the right side. This may represent an evolving pulmonary  venous congestion.     There is loculated fluid/pleural thickening along the right upper  lateral chest wall which is moderately more pronounced since the  previous study.     There is no pneumothorax.     Moderate cardiomegaly. There is evidence of prior cardiac surgery.     The endotracheal tube is in place. The distal end of the nasogastric  tube is not included in the field-of-view and not evaluated.     Distal end of dorsal column stimulator electrodes are seen in the lower  thoracic spine similar to the previous study.       Impression:      1. Probable mild pulmonary venous congestion  more pronounced on the  right side which may be due to patient's posture/positioning.  2. Moderate cardiomegaly.  3. The distal end of the nasogastric tube is not included in the study  and not evaluated. An image of the upper abdomen may be obtained for  evaluation of position of the nasogastric tube.        This report was signed and finalized on 12/30/2024 7:10 AM by Dr. Felix Watson MD.       XR Abdomen KUB [[id number]] Collected: 12/29/24 1425     Updated: 12/29/24 1431    Narrative:      XR ABDOMEN KUB- 12/29/2024 1:17 PM     HISTORY: og placement       TECHNIQUE:Single AP view of the abdomen.     COMPARISON: None       Impression:      Findings/impression:     Enteric tube courses below the diaphragm overlying the stomach with the  tip overlying the gastric antrum/body.                 This report was signed and finalized on 12/29/2024 2:28 PM by Rubin West.       XR Chest 1 View [[id number]] Collected: 12/29/24 1229     Updated: 12/29/24 1232    Narrative:      EXAM: XR CHEST 1 VW- 12/29/2024 11:23 AM     HISTORY: tube placment       COMPARISON: 12/29/2024.     TECHNIQUE: Single frontal radiograph of the chest was obtained.     FINDINGS:     Support Devices: Endotracheal tube tip 3 cm above the cipriano.     Cardiac and Mediastinal Silhouettes: Normal.     Lungs/Pleura: No focal consolidation. No sizable pleural effusion. No  visible pneumothorax.     Osseous structures: No acute osseous finding.     Other: None.       Impression:         Endotracheal tube tip 3 cm above the cipriano.           This report was signed and finalized on 12/29/2024 12:29 PM by Rubin West.       XR Chest 1 View [[id number]] Collected: 12/29/24 1214     Updated: 12/29/24 1218    Narrative:      EXAM: XR CHEST 1 VW- 12/29/2024 10:54 AM     HISTORY: ams, post intubation       COMPARISON: 8/25/2024.     TECHNIQUE: Single frontal radiograph of the chest was obtained.     FINDINGS:     Support Devices: No visible endotracheal  tube. Prior CABG. Spinal  stimulator leads.     Cardiac and Mediastinal Silhouettes: Normal.     Lungs/Pleura: No focal consolidation. No sizable pleural effusion. No  visible pneumothorax.     Osseous structures: No acute osseous finding.     Other: None.       Impression:         No visible endotracheal tube.     Clear lungs.           This report was signed and finalized on 12/29/2024 12:15 PM by Rubin West.       CT Cervical Spine Without Contrast [[id number]] Collected: 12/29/24 1209     Updated: 12/29/24 1217    Narrative:      Exam: CT CERVICAL SPINE WO CONTRAST- 12/29/2024 10:56 AM     HISTORY: fall with pain.     COMPARISON: 7/13/2024.     DOSE LENGTH PRODUCT: 542.05 mGy.cm mGy cm. Automated exposure control  was also utilized to decrease patient radiation dose.     TECHNIQUE: Serial helical tomographic images of the cervical spine were  obtained without the use of intravenous contrast. Additionally, sagittal  and coronal reformatted images were also provided for review.     FINDINGS:     Motion decreases sensitivity.     Prior C3-C4 ACDF. No evidence of hardware fracture.     Straightening of the cervical lordosis.     No obvious fracture. The vertebral body heights are preserved.     Multilevel mild to moderate degenerative disc disease and facet  hypertrophic changes. Ankylosis of the left C2, C3, and C4 facets.     Paraspinal soft tissues are grossly unremarkable.     Other: Mild carotid atherosclerosis.       Impression:         Motion-degraded study.     No obvious fracture or traumatic malalignment.           This report was signed and finalized on 12/29/2024 12:14 PM by Rubin West.       CT Head Without Contrast [[id number]] Collected: 12/29/24 1205     Updated: 12/29/24 1211    Narrative:      Exam: CT HEAD WO CONTRAST- 12/29/2024 10:51 AM     HISTORY: Altered mental status     DOSE LENGTH PRODUCT: 741.46 mGy.cm mGy cm. Automated exposure control  was also utilized to decrease patient  radiation dose.     Technique:  Helically acquired CT of the brain without IV contrast was performed.  Sagittal and coronal reformations are also provided for review. Soft  tissue and bone kernels are available for interpretation.     Comparison: 8/25/2024.     Findings:     Ventricles and extra-axial CSF spaces are normal in size.     No intraparenchymal or extra-axial hemorrhage.     Gray-white matter differentiation is preserved.     Orbits are grossly unremarkable. Paranasal sinuses are grossly clear.  Mastoid air cells are grossly clear.     No suspicious calvarial or extracranial soft tissue abnormality.       Impression:      Impression:       No acute intracranial abnormality.     This report was signed and finalized on 12/29/2024 12:08 PM by Rubin West.               Assessment/Plan       Suspected anoxic brain injury: Supportive care.  I have explained to the family that it can be difficult to prognosticate on the first evaluation of the patient.    I have explained to the family the positive prognostic signs including opening and closing eyes on command, giving the healthcare providers a thumbs up or a thumbs down on command.    I have explained to the family the poor prognostic or negative prognostic signs including decorticate posturing, seizures, increased brain edema on imaging, instability of vital signs up to and including cardiac arrest while being monitored, finger grabbing/making a fist, eyes appearing to cry on command, extremities moving without conscious response to commands. I have explained that many of the signs and patient actions which they may witness can be related to brainstem reflexes and NOT positive signs. Our minimum requirements, for a potential recovery, includes opening and closing their eyes on command and giving us a thumbs up or thumbs down on command. Any other signs or symptoms that the family may witness which are not related to following commands or are very  purposeful (like reaching for the endotracheal tube) are unlikely to be positive in this scenario.    I have explained that in the first 24 hours we will watch the vital signs, get additional tests, and potentially discuss with other consultants regarding the patient's condition. If the patient remains stable we generally wait an additional 24 hours (total of 48 hours) and reassess patient's stability and their ability to respond to commands. If the patient remains stable but is not responding we wait an additional 24 hours (total of approximately 72 hours) in order to determine their ability to follow commands. Generally, at 72 hours, the ability to prognosticate is much more precise. And we can provide surrogate decision makers with additional information regarding the outcome which is expected, from a medical standpoint.    In these situations I try very hard not to take away all hope, to provide surrogate decision makers with adequate information, and to avoid using medical jargon or complicate the patient's scenario beyond what the family can understand. I answer questions and provide them with additional information if requested. I provide access to additional consultants if necessary and order additional testing if requested and appropriate.    In these emotionally charged and difficult situations, family members often hear what they want to hear either positive or negative. I have explained that our team will readdress the patient's clinical status in approximately 24 hours and at that time we may make additional recommendations based on new information which has been provided by our observation of the patient over the previous 24 hours.    Poor prognostic signs (for all patients) generally include prolonged downtime, chronic medical conditions, subsequent loss of vital signs or loss of circulation while the patient is being transported or brought to the hospital, initial imaging which is concerning for  anoxic brain injury early in the course, seizures during the initial evaluation of the patient, lack of appropriate reflexes in the early evaluation of the patient among others.    This case has the following prognostic indicators at this time: Decorticate posturing.  Unknown downtime.  No witness.    Apparently someone named ED that the patient is familiar with she has been there and other times when he is collapsed.  Ex-Wife is going to try to reach out to her.    Leukocytosis: Likely aspiration pneumonia.  On broad-spectrum antibiotics narrow down as possible.  Karen from respiratory culture pending.  Suggestive of aspiration event.  Rhabdomyolysis with : Follow.  Diabetes: Glucose control.  Possible suicidal ideation per ex-wife.  Supportive care.  If survives will need psychiatric evaluation.  Unlikely to survive.  Cocaine abuse: Recommend cocaine cessation if he survives which is not likely.  Proteinuria and hematuria: Follow.  Reported history of bladder cancer.  May need urinary evaluation if survives.  VTE Prophylaxis: Lovenox 40 subcu daily.    Currently full code.  Ex-wife wants to meet again tomorrow to discuss.  Code Status and Medical Interventions: CPR (Attempt to Resuscitate); Full Support   Ordered at: 12/29/24 1625     Code Status (Patient has no pulse and is not breathing):    CPR (Attempt to Resuscitate)     Medical Interventions (Patient has pulse or is breathing):    Full Support   Prognosis: Very poor.    Total critical care time: 110 minutes    Due to a high probability of clinically significant, life threatening deterioration, the patient required my highest level of preparedness to intervene emergently and I personally spent this critical care time directly and personally managing the patient.     This critical care time included obtaining a history, examining the patient, pulse oximetry and vital sign review, ordering and review of studies, arranging urgent treatment with development  of a management plan with the multidisciplinary team, evaluation of patient's response to treatment, frequent reassessment, and discussions with other providers.    This critical care time was performed to assess and manage the high probability of imminent, life-threatening deterioration that could result in multi-organ failure. It was exclusive of separately billable procedures and treating other patients and teaching time.    Please see the rest of the note for further information on patient assessment and treatment.    Part of this note may be an electronic transcription/translation of spoken language to printed text using the Dragon Dictation System    Mando Olivo MD  12/30/2024 at 17:48 CST  Pulmonary, Critical Care  [facility]  Please call with any questions  (Cell [phone])

## 2024-12-30 NOTE — PROGRESS NOTES
Pharmacy Dosing Service  Automatic IV to PO Conversion  Famotidine    Assessment/Action/Plan:  Patient has enteral route available. Famotidine 20 mg IV every 12 hours has been changed to famotidine 20 mg per OG every 12 hours.       Subjective:  Ld Green is a 64 y.o. male who meets the following criteria for IV to PO therapy conversion      Objective:  Diet Order   Procedures    NPO Diet NPO Type: Strict NPO       Active Medications    Current Facility-Administered Medications:     amLODIPine (NORVASC) tablet 10 mg, 10 mg, Nasogastric, Nightly, Nicola Hare PA-C    atorvastatin (LIPITOR) tablet 40 mg, 40 mg, Nasogastric, Nightly, Nicola Hare PA-C    sennosides-docusate (PERICOLACE) 8.6-50 MG per tablet 2 tablet, 2 tablet, Nasogastric, BID **AND** polyethylene glycol (MIRALAX) packet 17 g, 17 g, Nasogastric, Daily PRN **AND** bisacodyl (DULCOLAX) EC tablet 5 mg, 5 mg, Oral, Daily PRN **AND** bisacodyl (DULCOLAX) suppository 10 mg, 10 mg, Rectal, Daily PRN, Nicola Hare PA-C    budesonide (PULMICORT) nebulizer solution 0.5 mg, 0.5 mg, Nebulization, BID - RT, Cesar Alvarado MD, 0.5 mg at 12/30/24 0657    chlorhexidine (PERIDEX) 0.12 % solution 15 mL, 15 mL, Mouth/Throat, Q12H, Pravin Quinones APRN, 15 mL at 12/30/24 0806    Chlorhexidine Gluconate Cloth 2 % pads 1 Application, 1 Application, Topical, Q24H, Pravin Quinones APRN, 1 Application at 12/30/24 0307    clopidogrel (PLAVIX) tablet 75 mg, 75 mg, Nasogastric, Nightly, Nicola Hare PA-C    dextrose 5 % and sodium chloride 0.9 % infusion, 100 mL/hr, Intravenous, Continuous, Cesar Alvarado MD, Last Rate: 100 mL/hr at 12/30/24 0650, 100 mL/hr at 12/30/24 0650    Enoxaparin Sodium (LOVENOX) syringe 40 mg, 40 mg, Subcutaneous, Daily, Pravin Quinones APRN, 40 mg at 12/30/24 0806    famotidine (PEPCID) tablet 20 mg, 20 mg, Oral, Q12H, Nicola Hare PA-C    [Held by provider] fluorometholone (EFLONE) 0.1 % ophthalmic suspension 2  drop, 2 drop, Left Eye, 4x Daily, Nicola Hare PA-C    ipratropium-albuterol (DUO-NEB) nebulizer solution 3 mL, 3 mL, Nebulization, 4x Daily - RT, Mando Olivo MD    lisinopril (PRINIVIL,ZESTRIL) tablet 40 mg, 40 mg, Nasogastric, Daily, Nicola Hare PA-C    metoprolol tartrate (LOPRESSOR) tablet 100 mg, 100 mg, Nasogastric, Q12H, Nicola Hare PA-C    Morphine sulfate (PF) injection 2 mg, 2 mg, Intravenous, Q2H PRN, Cesar Alvarado MD, 2 mg at 12/30/24 0925    mupirocin (BACTROBAN) 2 % nasal ointment 1 Application, 1 Application, Each Nare, BID, Pravin Quinones APRN, 1 Application at 12/30/24 0806    nitroglycerin (NITROSTAT) SL tablet 0.4 mg, 0.4 mg, Sublingual, Q5 Min PRN, Pravin Quinones APRN    Pharmacy to Dose Zosyn, , Not Applicable, Continuous PRN, Pravin Quinones APRN    piperacillin-tazobactam (ZOSYN) 3.375 g IVPB in 100 mL NS MBP (CD), 3.375 g, Intravenous, Q8H, Cesar Alvarado MD, 3.375 g at 12/30/24 0517    propofol (DIPRIVAN) infusion 10 mg/mL 100 mL, 5-50 mcg/kg/min, Intravenous, Titrated, Imer Valenzuela MD, Stopped at 12/30/24 0725    sodium chloride 0.9 % flush 10 mL, 10 mL, Intravenous, Q12H, Pravin Quinones APRN, 10 mL at 12/30/24 0806    sodium chloride 0.9 % flush 10 mL, 10 mL, Intravenous, PRN, Pravin Quinones APRN    sodium chloride 0.9 % infusion 40 mL, 40 mL, Intravenous, PRN, Pravin Quinones APRN    vancomycin IVPB 1500 mg in 0.9% NaCl (Premix) 500 mL, 1,500 mg, Intravenous, Q24H, Cesar Alvarado MD Shawn A Kinsey, PharmD  12/30/2411:17 CST

## 2024-12-31 PROBLEM — G93.1 ANOXIC BRAIN INJURY: Status: ACTIVE | Noted: 2024-01-01

## 2024-12-31 NOTE — PLAN OF CARE
Goal Outcome Evaluation:Wean from ventilator                                             Nostril Rim Text: The closure involved the nostril rim.

## 2024-12-31 NOTE — PLAN OF CARE
Goal Outcome Evaluation:  Plan of Care Reviewed With: patient        Progress: no change  Outcome Evaluation: On Vent. Remains off sedation. Tylenol admnistered for T max of 100.8. Neuro: Withdraws to pain (decorticate posturing) and eyes deviate to upper right at times. Cardio: converted to SR 2148. RR and HR increase with turns/suctioning, and eyes open at times. Accessory muscle use more significant when disturbed. Morphine given x2 during night. : Good UO, dark yellow. GI: Diabetasource increased as ordered. Residuals <5cc.

## 2024-12-31 NOTE — PROGRESS NOTES
[facility] Intensivist Services    Date of Admission: 12/29/2024  Date of Note: 12/31/24  Primary Care Physician: Xiomara Neal APRN   LOS: 2 days     History   Next of Kin:  Primary Emergency Contact: Fe Suresh   Code Status: Code Status and Medical Interventions: CPR (Attempt to Resuscitate); Full Support    He is a 64 y.o. male smoker (reported as COPD but no PFTS--so restrictive lung disease from obesity with overlap syndrome from obstructive sleep apnea would be more likely), chronic drug user, diabetes mellitus most recent A1c 5.4 admitted 12/29/2024 with Acute respiratory failure with hypoxia who also  has a past medical history of Anxiety, CAD, Bipolar disorder, obesity with prior Ozempic use, Hx Bladder cancer with current hematuria (concerning), Colon polyps, Depression, Diabetes mellitus, Hemorrhoids, Hyperlipidemia, Hypertension, Nephrolithiasis: 5 mm right renal stone, reportedly 35 mm right renal cyst, right hydrocele, prior L5-S1 discectomy with pedicle screws, thoracic stimulator leads noted and Sleep apnea noncompliant with TYRELL care per ex-wife: A previous notes state that he had a BiPAP machine available to him.     Patient apparently lives in a low income housing complex called [facility] and was found down and unresponsive: reportedly comatose.  Patient was recently hospitalized August 2024 for altered mental status where he was found down at home with low glucose where he was approximately 10 hours down: Discharged and told not to use insulin. He was also admitted 7/14/2024 for hypoglycemia with a similar presentation where he had used cocaine and then became unconscious--bit his tongue.  Patient arrived in our emergency department 12/29/2024 unresponsive with GCS of 6.  Intubated.  No history available.  Patient had cocaine and TCA positive urine.  His ex-wife, who is his power of  from a 1995 document--discussed with hospital--states that  he did not want to be on life support.  She feels like he has really had a poor quality of life recently.  He has chronically suffered from depression, bipolar disorder.  History of bladder cancer.  Wife says that he may have been getting drugs from his cousins.  She thinks he probably overdosed accidentally at home.  Patient currently decorticate posturing.     Poison control thinks his TCA positive may be cross-reactivity with Flexeril or his Auvelity depression meds--they did not think it was a TCA overdose.     12/30: I took over care.  Some question about who the power of  was.  Clarified with legal at the hospital.  Patient's ex-wife has a power of  from 1995 which is considered still valid.  She is the only person available and intermittently present at the bedside.    12/31: Patient has minimal protection with arm drop on left.  Mental status has not improved.  Upgoing Babinski's bilaterally.  Some superior eye deviation intermittently.  Strong gag and cough.  Thick secretions and elevated temperature to 38.3 °C overnight.    Allergies     Allergies:   He is allergic to codeine.    Labs   Basic Labs:  CBC:      Lab 12/31/24  0212 12/30/24  0316 12/29/24  1202   WBC 17.52* 20.23* 29.48*   HEMOGLOBIN 13.7 14.4 17.7   HEMATOCRIT 41.7 43.4 50.0   PLATELETS 177 232 308   NEUTROS ABS 12.65* 15.05* 24.89*   IMMATURE GRANS (ABS) 0.16* 0.22* 0.88*   LYMPHS ABS 1.95 2.19 1.21   MONOS ABS 2.66* 2.64* 2.31*   EOS ABS 0.03 0.01 0.00   MCV 93.9 92.9 89.8       LIVER FUNCTION AND COAG TESTS:      Lab 12/31/24  0212 12/30/24  0316 12/29/24  1202   TOTAL PROTEIN 5.9* 6.2 7.4   ALBUMIN 3.3* 3.7 4.5   GLOBULIN 2.6 2.5 2.9   ALT (SGPT) 15 16 22   AST (SGOT) 25 27 26   BILIRUBIN 1.2 1.6* 1.6*   ALK PHOS 108 126* 165*   PROTIME  --   --  13.2   INR  --   --  0.96       ABG:      Lab 12/31/24  0333 12/31/24  0212 12/30/24  0404 12/30/24  0316 12/29/24  1455 12/29/24  1303 12/29/24  1202 12/29/24  1150   PH,  ARTERIAL 7.434  --  7.412  --  7.339* 7.259*  --  7.365   PO2 ART 81.3*  --  77.6*  --  110.0* 71.2*  --  246.0*   PCO2, ARTERIAL 42.1  --  39.7  --  43.3 55.7*  --  40.8   HCO3 ART 28.2*  --  25.3  --  23.3 24.9  --  23.3   ANION GAP  --  6.0  --  13.0  --   --  22.0*  --        LACTATE:      Lab 12/29/24  1202   LACTATE 1.7       CMP:      Lab 12/31/24  0323 12/31/24  0212 12/30/24  0316 12/29/24  1202   SODIUM  --  145 143 142   POTASSIUM  --  3.3* 3.5 3.7   CHLORIDE  --  112* 107 100   BUN  --  17 30* 24*   CREATININE  --  0.66* 1.04 1.15   CALCIUM  --  9.0 8.8 9.4   MAGNESIUM  --  2.0 2.1 1.8   PHOSPHORUS 1.7*  --  4.2  --    GLUCOSE  --  165* 180* 111*   EGFR  --  105.4 80.7 71.5       Diabetic:  A1C Last 3 Results          8/26/2024    04:49   HGBA1C Last 3 Results   Hemoglobin A1C 5.40        Inpatient Medications   Scheduled Meds:amLODIPine, 10 mg, Nasogastric, Nightly  atorvastatin, 40 mg, Nasogastric, Nightly  budesonide, 0.5 mg, Nebulization, BID - RT  chlorhexidine, 15 mL, Mouth/Throat, Q12H  Chlorhexidine Gluconate Cloth, 1 Application, Topical, Q24H  clopidogrel, 75 mg, Nasogastric, Nightly  enoxaparin, 40 mg, Subcutaneous, Daily  famotidine, 20 mg, Nasogastric, Q12H  fluorometholone, 2 drop, Left Eye, 4x Daily  ipratropium-albuterol, 3 mL, Nebulization, 4x Daily - RT  lisinopril, 40 mg, Nasogastric, Daily  metoprolol tartrate, 100 mg, Nasogastric, Q12H  mupirocin, 1 Application, Each Nare, BID  piperacillin-tazobactam, 3.375 g, Intravenous, Q8H  potassium chloride, 40 mEq, Nasogastric, Q4H  senna-docusate sodium, 2 tablet, Nasogastric, BID  sodium chloride, 10 mL, Intravenous, Q12H  vancomycin, 1,500 mg, Intravenous, Q24H      Continuous Infusions:amiodarone, 0.5 mg/min, Last Rate: 0.5 mg/min (12/30/24 2005)  Pharmacy to Dose Zosyn,   propofol, 5-50 mcg/kg/min, Last Rate: Stopped (12/30/24 0725)      PRN Meds:.  acetaminophen    acetaminophen    senna-docusate sodium **AND** polyethylene glycol  "**AND** bisacodyl **AND** bisacodyl    Morphine    nitroglycerin    Pharmacy to Dose Zosyn    Phosphorus Replacement - Follow Nurse / BPA Driven Protocol    Potassium Replacement - Follow Nurse / BPA Driven Protocol    sodium chloride    sodium chloride    I have reviewed the patient's current medications.       Current Antibiotics   Pharmacy to Dose Zosyn  piperacillin-tazobactam (ZOSYN) 3.375 g IVPB in 100 mL NS MBP (CD)  vancomycin - 1.5-0.9 GM/500ML-%    Exam   Vent settings for last 24 hours:  Mode: VC/AC  FiO2 (%):  [30 %-40 %] 40 %  S RR:  [16] 16  S VT:  [500 mL] 500 mL  PEEP/CPAP (cm H2O):  [5 cm H20] 5 cm H20  MAP (cm H2O):  [5.2-7.7] 5.7    Vital signs for last 24 hours:  Temp:  [99.6 °F (37.6 °C)-101.1 °F (38.4 °C)] 101.1 °F (38.4 °C)  Heart Rate:  [] 105  Resp:  [23-34] 29  BP: (120-164)/() 164/86  FiO2 (%):  [30 %-40 %] 40 %    Vitals: His  height is 172.7 cm (68\") and weight is 108 kg (238 lb 8.6 oz). His rectal temperature is 101.1 °F (38.4 °C) (abnormal). His blood pressure is 164/86 and his pulse is 105. His respiration is 29 (abnormal) and oxygen saturation is 93%.     PHYSICAL FINDINGS: SEE VITALS ABOVE  GENERAL APPEARANCE: ° Patient is intubated.  Temperature of 101.1.  HEAD: Injuries: No evidence of a head injury. ° Appearance: Head normocephalic.  NECK: No masses ° Thyroid: ° Not diffusely enlarged.  EYES: General/bilateral: eyes closed. Pupils: ° PERRLA. Sclera: ° Showed no icterus.  THROAT: ETT in place, appears well positioned.  EARS: Hearing: ° patient sedated.  NOSE: General/bilateral: External Deformities: ° No external nose deformities.  LYMPH NODES: No cervical or generalized adenopathy noted.  CHEST: ° Visual inspection revealed no abnormalities.  LUNGS: ° Respiration rhythm and depth was normal. ° Normal breath sounds  ° No wheezing was heard bilaterally. ° No rhonchi were heard. ° No rales/crackles were heard. ° No clubbing noted. On ventilator.  CARDIOVASCULAR: JVD not " increased. Heart Rate And Rhythm: Normal. ° Heart Sounds: No S3/ S4 heard. ° Murmurs: No murmurs were heard.  BACK: No obvious deformity noted.  ABDOMEN: Visual Inspection: Abdomen was not distended. Auscultation: ° Abdominal auscultation revealed no abnormalities. ° Bowel sounds were normal. ° Palpation: ° Abdomen was soft. ° No abdominal tenderness. ° No mass was palpated in the abdomen. ° Soft. °Liver: Not visibly enlarged. Spleen: Not visibly enlarged.  MUSCULOSKELETAL SYSTEM : General/bilateral: ° Sedated.  FEET/EXTREMITIES: General/bilateral: ° No visible swelling of the feet.  NEUROLOGICAL: Intubated ° Gait And Stance: cannot test.  SKIN: ° Very poor self-care.    Intake/Output   Intake/Output this shift:  No intake/output data recorded.    Intake/Output last 3 shifts:  I/O last 3 completed shifts:  In: 4745.4 [I.V.:2964.4; Other:510; NG/GT:376; IV Piggyback:895]  Out: 2225 [Urine:1820; Emesis/NG output:405]    Results and Cultures Review     Result Review:  I have personally reviewed the results from the time of this admission to 12/31/2024 07:26 CST and agree with these findings:  [x]  Laboratory list / accordion  [x]  Microbiology  [x]  Radiology  [x]  EKG/Telemetry   [x]  Cardiology/Vascular   [x]  Pathology    Most notable findings include: Hypophosphatemia, creatinine kinase 263, has decreased from 548 yesterday.  Improving.  White count has improved overnight.  Mixed kapil likely aspiration related on sputum Gram stain.  Respiratory PCR panel negative.  Chest x-ray shows no obvious disease.  QT corrected 464, in range, max 497 12/29.  No recent echo.  No recent pathology.  Culture Data:   Blood Culture   Date Value Ref Range Status   12/29/2024 No growth at 24 hours  Preliminary   12/29/2024 No growth at 24 hours  Preliminary           Assessment/Plan        Suspected anoxic brain injury: Supportive care.  Temperature elevation to 38.1 °C with improving leukocytosis: Likely aspiration pneumonia.  On  broad-spectrum antibiotics narrow down as possible.  Karen from respiratory culture pending.  Suggestive of aspiration event.  X-ray has not yet shown a significant event.  Fevers might also be central.  No other signs of obvious infection.  Will discuss lumbar puncture with ex-wife.  Given previous history of drug overdose, hypoglycemia, altered mental status and being found down 2 times in the last 6 months it is much less likely this is a sudden meningitis event.  Will discuss risk benefits and alternatives with ex-wife.  CT head shows no obvious lesion or hydronephrosis.   Atrial fibrillation rapid ventricular rate: Improved on amiodarone.  Converted back to sinus.  Stop amiodarone today and follow.  Restart amiodarone if worsens or returns.    Rhabdomyolysis with CK improving.  Follow.  Diabetes: Glucose control.  History of hypoglycemia.  Possible suicidal ideation per ex-wife.  Supportive care.  If survives will need psychiatric evaluation.  Unlikely to survive.  Ex-wife feels that he would not want prolonged life support.  Cocaine abuse: Recommend cocaine cessation if he survives which is not likely.  Proteinuria and hematuria: Follow.  Reported history of bladder cancer.  May need urology evaluation if survives.  Nonurgent.  VTE Prophylaxis: Lovenox 40 subcu daily.  To continue.  Feeding electrolyte nutrition: Continuing tube feeds.  Currently full code.  Plan to discuss goals of care with family in the next 24 to 48 hours.  Ex-wife is the power of .    Total critical care time: 30 minutes    Due to a high probability of clinically significant, life threatening deterioration, the patient required my highest level of preparedness to intervene emergently and I personally spent this critical care time directly and personally managing the patient.     This critical care time included obtaining a history, examining the patient, pulse oximetry and vital sign review, ordering and review of studies,  arranging urgent treatment with development of a management plan with the multidisciplinary team, evaluation of patient's response to treatment, frequent reassessment, and discussions with other providers.    This critical care time was performed to assess and manage the high probability of imminent, life-threatening deterioration that could result in multi-organ failure. It was exclusive of separately billable procedures and treating other patients and teaching time.    Please see the rest of the note for further information on patient assessment and treatment.    Part of this note may be an electronic transcription/translation of spoken language to printed text using the Dragon Dictation System    Mando Olivo MD  12/31/2024 at 07:26 CST  Pulmonary, Critical Care  [facility]  Please call with any questions  (cell [phone])

## 2024-12-31 NOTE — PROGRESS NOTES
RT EQUIPMENT DEVICE RELATED - SKIN ASSESSMENT    Dany Score:  Dany Score: 11     RT Medical Equipment/Device:     ETT Linda/Anchorfast    Skin Assessment:      Cheek:  Intact  Lips:  Intact  Mouth:  Intact    Device Skin Pressure Protection:  Skin-to-device areas padded:  Anchorfast    Nurse Notification:  Evelin Dietrich, RRT

## 2024-12-31 NOTE — PROGRESS NOTES
"Pharmacy Dosing Service  Antimicrobial Dosing  Zosyn    Assessment/Action/Plan:  Based on indication and renal function, Zosyn adjusted to 4.5 gm IV every 8 hours. Pharmacy will continue to monitor daily and make further adjustment(s) accordingly.     Subjective:  Ld Green is a 64 y.o. male with a  \"Pharmacy to Dose Zosyn\" consult for the treatment of pneumonia , day 3 of 6 of treatment.    Objective:  Ht: 172.7 cm (68\"); Wt: 108 kg (238 lb 8.6 oz)  Estimated Creatinine Clearance: 136.4 mL/min (A) (by C-G formula based on SCr of 0.66 mg/dL (L)).   Creatinine   Date Value Ref Range Status   12/31/2024 0.66 (L) 0.76 - 1.27 mg/dL Final   12/30/2024 1.04 0.76 - 1.27 mg/dL Final   12/29/2024 1.15 0.76 - 1.27 mg/dL Final   02/14/2022 0.6 0.5 - 1.2 mg/dL Final   12/20/2021 0.8 0.5 - 1.2 mg/dL Final   11/16/2021 0.7 0.5 - 1.2 mg/dL Final      Lab Results   Component Value Date    WBC 17.52 (H) 12/31/2024    WBC 20.23 (H) 12/30/2024    WBC 29.48 (H) 12/29/2024      Baseline culture results:  Microbiology Results (last 10 days)       Procedure Component Value - Date/Time    Chlamydia trachomatis, Neisseria gonorrhoeae, PCR - Swab, Penis [[id number]]  (Normal) Collected: 12/30/24 0839    Lab Status: Final result Specimen: Swab from Penis Updated: 12/30/24 1255     Chlamydia DNA by PCR Not Detected     Neisseria gonorrhoeae by PCR Not Detected    Narrative:      Disclaimer: The Aptima Combo 2 assay is a target amplification nucleic acid probe test that utilizes target capture for the in vitro qualitative detection and differentiation of ribosomal RNA from Chlamydia trachomatis and Neisseria gonorrhoeae to aid in the diagnosis of chlamydial and/or gonococcal urogenital disease.  Cell culture was once considered to be the \"gold standard\" for detection of CT and NG.  Culture is quite specific, but scientific studies have demonstrated that the NAAT DNA probe technologies have higher clinical sensitivities than culture.    " MRSA Screen, PCR (Inpatient) - Swab, Nares [[id number]]  (Normal) Collected: 12/29/24 1753    Lab Status: Final result Specimen: Swab from Nares Updated: 12/29/24 1919     MRSA PCR No MRSA Detected    Narrative:      The negative predictive value of this diagnostic test is high and should only be used to consider de-escalating anti-MRSA therapy. A positive result may indicate colonization with MRSA and must be correlated clinically.    Respiratory Culture - Aspirate, ET Suction [[id number]] Collected: 12/29/24 1507    Lab Status: Preliminary result Specimen: Aspirate from ET Suction Updated: 12/30/24 0850     Gram Stain Moderate (3+) WBCs per low power field      Rare (1+) Epithelial cells per low power field      Moderate (3+) Mixed gram positive kapil      Moderate (3+) Mixed gram negative kapil      Mixed intracellular organisms suggestive of an aspiration event    Blood Culture - Blood, Hand, Digit Right [[id number]]  (Normal) Collected: 12/29/24 1230    Lab Status: Preliminary result Specimen: Blood from Hand, Digit Right Updated: 12/30/24 1245     Blood Culture No growth at 24 hours    Blood Culture - Blood, Arm, Right [[id number]]  (Normal) Collected: 12/29/24 1218    Lab Status: Preliminary result Specimen: Blood from Arm, Right Updated: 12/30/24 1245     Blood Culture No growth at 24 hours    Respiratory Panel PCR w/COVID-19(SARS-CoV-2) N[facility]O/ARACELI/CHRISTINE/[facility]/Z[facility] In-House, NP Swab in UTM/VTM, 2 HR TAT - Swab, Nasopharynx [[id number]]  (Normal) Collected: 12/29/24 1205    Lab Status: Final result Specimen: Swab from Nasopharynx Updated: 12/29/24 1304     ADENOVIRUS, PCR Not Detected     Coronavirus 229E Not Detected     Coronavirus HKU1 Not Detected     Coronavirus NL63 Not Detected     Coronavirus OC43 Not Detected     COVID19 Not Detected     Human Metapneumovirus Not Detected     Human Rhinovirus/Enterovirus Not Detected     Influenza A PCR Not Detected     Influenza B PCR Not Detected     Parainfluenza Virus 1  Not Detected     Parainfluenza Virus 2 Not Detected     Parainfluenza Virus 3 Not Detected     Parainfluenza Virus 4 Not Detected     RSV, PCR Not Detected     Bordetella pertussis pcr Not Detected     Bordetella parapertussis PCR Not Detected     Chlamydophila pneumoniae PCR Not Detected     Mycoplasma pneumo by PCR Not Detected    Narrative:      In the setting of a positive respiratory panel with a viral infection PLUS a negative procalcitonin without other underlying concern for bacterial infection, consider observing off antibiotics or discontinuation of antibiotics and continue supportive care. If the respiratory panel is positive for atypical bacterial infection (Bordetella pertussis, Chlamydophila pneumoniae, or Mycoplasma pneumoniae), consider antibiotic de-escalation to target atypical bacterial infection.            Earnest Good, PharmD  12/31/24 10:57 CST

## 2025-01-01 ENCOUNTER — APPOINTMENT (OUTPATIENT)
Dept: GENERAL RADIOLOGY | Facility: HOSPITAL | Age: 65
End: 2025-01-01
Payer: MEDICARE

## 2025-01-01 ENCOUNTER — APPOINTMENT (OUTPATIENT)
Dept: NEUROLOGY | Facility: HOSPITAL | Age: 65
End: 2025-01-01
Payer: MEDICARE

## 2025-01-01 ENCOUNTER — APPOINTMENT (OUTPATIENT)
Dept: CT IMAGING | Facility: HOSPITAL | Age: 65
End: 2025-01-01
Payer: MEDICARE

## 2025-01-01 LAB
ALBUMIN SERPL-MCNC: 2.8 G/DL (ref 3.5–5.2)
ALBUMIN SERPL-MCNC: 2.8 G/DL (ref 3.5–5.2)
ALBUMIN SERPL-MCNC: 2.9 G/DL (ref 3.5–5.2)
ALBUMIN SERPL-MCNC: 2.9 G/DL (ref 3.5–5.2)
ALBUMIN SERPL-MCNC: 3.1 G/DL (ref 3.5–5.2)
ALBUMIN SERPL-MCNC: 3.1 G/DL (ref 3.5–5.2)
ALBUMIN SERPL-MCNC: 3.3 G/DL (ref 3.5–5.2)
ALBUMIN SERPL-MCNC: 3.5 G/DL (ref 3.5–5.2)
ALBUMIN/GLOB SERPL: 0.8 G/DL
ALBUMIN/GLOB SERPL: 0.9 G/DL
ALBUMIN/GLOB SERPL: 1 G/DL
ALBUMIN/GLOB SERPL: 1 G/DL
ALBUMIN/GLOB SERPL: 1.1 G/DL
ALP SERPL-CCNC: 103 U/L (ref 39–117)
ALP SERPL-CCNC: 104 U/L (ref 39–117)
ALP SERPL-CCNC: 105 U/L (ref 39–117)
ALP SERPL-CCNC: 108 U/L (ref 39–117)
ALP SERPL-CCNC: 117 U/L (ref 39–117)
ALP SERPL-CCNC: 94 U/L (ref 39–117)
ALP SERPL-CCNC: 94 U/L (ref 39–117)
ALP SERPL-CCNC: 98 U/L (ref 39–117)
ALT SERPL W P-5'-P-CCNC: 20 U/L (ref 1–41)
ALT SERPL W P-5'-P-CCNC: 23 U/L (ref 1–41)
ALT SERPL W P-5'-P-CCNC: 25 U/L (ref 1–41)
ALT SERPL W P-5'-P-CCNC: 30 U/L (ref 1–41)
ALT SERPL W P-5'-P-CCNC: 34 U/L (ref 1–41)
ALT SERPL W P-5'-P-CCNC: 39 U/L (ref 1–41)
ANION GAP SERPL CALCULATED.3IONS-SCNC: 5 MMOL/L (ref 5–15)
ANION GAP SERPL CALCULATED.3IONS-SCNC: 7 MMOL/L (ref 5–15)
ANION GAP SERPL CALCULATED.3IONS-SCNC: 7 MMOL/L (ref 5–15)
ANION GAP SERPL CALCULATED.3IONS-SCNC: 8 MMOL/L (ref 5–15)
ANION GAP SERPL CALCULATED.3IONS-SCNC: 9 MMOL/L (ref 5–15)
ARTERIAL PATENCY WRIST A: POSITIVE
AST SERPL-CCNC: 22 U/L (ref 1–40)
AST SERPL-CCNC: 23 U/L (ref 1–40)
AST SERPL-CCNC: 29 U/L (ref 1–40)
AST SERPL-CCNC: 30 U/L (ref 1–40)
AST SERPL-CCNC: 39 U/L (ref 1–40)
AST SERPL-CCNC: 47 U/L (ref 1–40)
AST SERPL-CCNC: 53 U/L (ref 1–40)
AST SERPL-CCNC: 57 U/L (ref 1–40)
ATMOSPHERIC PRESS: 749 MMHG
ATMOSPHERIC PRESS: 756 MMHG
ATMOSPHERIC PRESS: 757 MMHG
ATMOSPHERIC PRESS: 759 MMHG
ATMOSPHERIC PRESS: 761 MMHG
ATMOSPHERIC PRESS: 762 MMHG
ATMOSPHERIC PRESS: 762 MMHG
BACTERIA SPEC AEROBE CULT: NORMAL
BACTERIA SPEC RESP CULT: ABNORMAL
BACTERIA UR QL AUTO: ABNORMAL /HPF
BASE EXCESS BLDA CALC-SCNC: 6.2 MMOL/L (ref 0–2)
BASE EXCESS BLDA CALC-SCNC: 6.7 MMOL/L (ref 0–2)
BASE EXCESS BLDA CALC-SCNC: 7.5 MMOL/L (ref 0–2)
BASE EXCESS BLDA CALC-SCNC: 8.3 MMOL/L (ref 0–2)
BASE EXCESS BLDA CALC-SCNC: 9 MMOL/L (ref 0–2)
BASE EXCESS BLDA CALC-SCNC: 9 MMOL/L (ref 0–2)
BASE EXCESS BLDA CALC-SCNC: 9.4 MMOL/L (ref 0–2)
BASOPHILS # BLD AUTO: 0.09 10*3/MM3 (ref 0–0.2)
BASOPHILS # BLD AUTO: 0.09 10*3/MM3 (ref 0–0.2)
BASOPHILS # BLD AUTO: 0.1 10*3/MM3 (ref 0–0.2)
BASOPHILS # BLD AUTO: 0.11 10*3/MM3 (ref 0–0.2)
BASOPHILS # BLD AUTO: 0.12 10*3/MM3 (ref 0–0.2)
BASOPHILS # BLD AUTO: 0.12 10*3/MM3 (ref 0–0.2)
BASOPHILS # BLD AUTO: 0.17 10*3/MM3 (ref 0–0.2)
BASOPHILS # BLD AUTO: 0.21 10*3/MM3 (ref 0–0.2)
BASOPHILS # BLD MANUAL: 0 10*3/MM3 (ref 0–0.2)
BASOPHILS # BLD MANUAL: 0 10*3/MM3 (ref 0–0.2)
BASOPHILS NFR BLD AUTO: 0.5 % (ref 0–1.5)
BASOPHILS NFR BLD AUTO: 0.6 % (ref 0–1.5)
BASOPHILS NFR BLD AUTO: 0.7 % (ref 0–1.5)
BASOPHILS NFR BLD AUTO: 0.7 % (ref 0–1.5)
BASOPHILS NFR BLD AUTO: 1 % (ref 0–1.5)
BASOPHILS NFR BLD AUTO: 1 % (ref 0–1.5)
BASOPHILS NFR BLD MANUAL: 0 % (ref 0–1.5)
BASOPHILS NFR BLD MANUAL: 0 % (ref 0–1.5)
BDY SITE: ABNORMAL
BILIRUB SERPL-MCNC: 0.5 MG/DL (ref 0–1.2)
BILIRUB SERPL-MCNC: 0.5 MG/DL (ref 0–1.2)
BILIRUB SERPL-MCNC: 0.6 MG/DL (ref 0–1.2)
BILIRUB SERPL-MCNC: 0.6 MG/DL (ref 0–1.2)
BILIRUB SERPL-MCNC: 0.7 MG/DL (ref 0–1.2)
BILIRUB SERPL-MCNC: 0.8 MG/DL (ref 0–1.2)
BILIRUB SERPL-MCNC: 0.9 MG/DL (ref 0–1.2)
BILIRUB SERPL-MCNC: 1.4 MG/DL (ref 0–1.2)
BILIRUB UR QL STRIP: ABNORMAL
BODY TEMPERATURE: 37
BUN SERPL-MCNC: 17 MG/DL (ref 8–23)
BUN SERPL-MCNC: 17 MG/DL (ref 8–23)
BUN SERPL-MCNC: 18 MG/DL (ref 8–23)
BUN SERPL-MCNC: 18 MG/DL (ref 8–23)
BUN SERPL-MCNC: 19 MG/DL (ref 8–23)
BUN SERPL-MCNC: 21 MG/DL (ref 8–23)
BUN SERPL-MCNC: 24 MG/DL (ref 8–23)
BUN SERPL-MCNC: 25 MG/DL (ref 8–23)
BUN/CREAT SERPL: 28.1 (ref 7–25)
BUN/CREAT SERPL: 31.5 (ref 7–25)
BUN/CREAT SERPL: 31.7 (ref 7–25)
BUN/CREAT SERPL: 32.3 (ref 7–25)
BUN/CREAT SERPL: 32.7 (ref 7–25)
BUN/CREAT SERPL: 34 (ref 7–25)
BUN/CREAT SERPL: 40.3 (ref 7–25)
BUN/CREAT SERPL: 44.4 (ref 7–25)
CALCIUM SPEC-SCNC: 8.9 MG/DL (ref 8.6–10.5)
CALCIUM SPEC-SCNC: 9.1 MG/DL (ref 8.6–10.5)
CALCIUM SPEC-SCNC: 9.1 MG/DL (ref 8.6–10.5)
CALCIUM SPEC-SCNC: 9.2 MG/DL (ref 8.6–10.5)
CALCIUM SPEC-SCNC: 9.3 MG/DL (ref 8.6–10.5)
CALCIUM SPEC-SCNC: 9.6 MG/DL (ref 8.6–10.5)
CALCIUM SPEC-SCNC: 9.6 MG/DL (ref 8.6–10.5)
CALCIUM SPEC-SCNC: 9.7 MG/DL (ref 8.6–10.5)
CHLORIDE SERPL-SCNC: 102 MMOL/L (ref 98–107)
CHLORIDE SERPL-SCNC: 103 MMOL/L (ref 98–107)
CHLORIDE SERPL-SCNC: 105 MMOL/L (ref 98–107)
CHLORIDE SERPL-SCNC: 106 MMOL/L (ref 98–107)
CHLORIDE SERPL-SCNC: 109 MMOL/L (ref 98–107)
CK SERPL-CCNC: 143 U/L (ref 20–200)
CLARITY UR: ABNORMAL
CO2 SERPL-SCNC: 28 MMOL/L (ref 22–29)
CO2 SERPL-SCNC: 29 MMOL/L (ref 22–29)
CO2 SERPL-SCNC: 30 MMOL/L (ref 22–29)
CO2 SERPL-SCNC: 30 MMOL/L (ref 22–29)
CO2 SERPL-SCNC: 31 MMOL/L (ref 22–29)
CO2 SERPL-SCNC: 32 MMOL/L (ref 22–29)
CO2 SERPL-SCNC: 33 MMOL/L (ref 22–29)
CO2 SERPL-SCNC: 33 MMOL/L (ref 22–29)
COLOR UR: ABNORMAL
CREAT SERPL-MCNC: 0.52 MG/DL (ref 0.76–1.27)
CREAT SERPL-MCNC: 0.53 MG/DL (ref 0.76–1.27)
CREAT SERPL-MCNC: 0.54 MG/DL (ref 0.76–1.27)
CREAT SERPL-MCNC: 0.54 MG/DL (ref 0.76–1.27)
CREAT SERPL-MCNC: 0.6 MG/DL (ref 0.76–1.27)
CREAT SERPL-MCNC: 0.62 MG/DL (ref 0.76–1.27)
CREAT SERPL-MCNC: 0.64 MG/DL (ref 0.76–1.27)
CREAT SERPL-MCNC: 0.65 MG/DL (ref 0.76–1.27)
DEPRECATED RDW RBC AUTO: 44.7 FL (ref 37–54)
DEPRECATED RDW RBC AUTO: 45.9 FL (ref 37–54)
DEPRECATED RDW RBC AUTO: 46.7 FL (ref 37–54)
DEPRECATED RDW RBC AUTO: 47 FL (ref 37–54)
DEPRECATED RDW RBC AUTO: 47.1 FL (ref 37–54)
DEPRECATED RDW RBC AUTO: 47.2 FL (ref 37–54)
DEPRECATED RDW RBC AUTO: 47.6 FL (ref 37–54)
DEPRECATED RDW RBC AUTO: 48.2 FL (ref 37–54)
DPYD GENE MUT ANL BLD/T: NEGATIVE
EGFRCR SERPLBLD CKD-EPI 2021: 105.9 ML/MIN/1.73
EGFRCR SERPLBLD CKD-EPI 2021: 106.4 ML/MIN/1.73
EGFRCR SERPLBLD CKD-EPI 2021: 107.4 ML/MIN/1.73
EGFRCR SERPLBLD CKD-EPI 2021: 108.5 ML/MIN/1.73
EGFRCR SERPLBLD CKD-EPI 2021: 112 ML/MIN/1.73
EGFRCR SERPLBLD CKD-EPI 2021: 112 ML/MIN/1.73
EGFRCR SERPLBLD CKD-EPI 2021: 112.6 ML/MIN/1.73
EGFRCR SERPLBLD CKD-EPI 2021: 113.3 ML/MIN/1.73
EOSINOPHIL # BLD AUTO: 0.05 10*3/MM3 (ref 0–0.4)
EOSINOPHIL # BLD AUTO: 0.09 10*3/MM3 (ref 0–0.4)
EOSINOPHIL # BLD AUTO: 0.15 10*3/MM3 (ref 0–0.4)
EOSINOPHIL # BLD AUTO: 0.17 10*3/MM3 (ref 0–0.4)
EOSINOPHIL # BLD AUTO: 0.25 10*3/MM3 (ref 0–0.4)
EOSINOPHIL # BLD AUTO: 0.28 10*3/MM3 (ref 0–0.4)
EOSINOPHIL # BLD AUTO: 0.3 10*3/MM3 (ref 0–0.4)
EOSINOPHIL # BLD AUTO: 0.32 10*3/MM3 (ref 0–0.4)
EOSINOPHIL # BLD MANUAL: 0 10*3/MM3 (ref 0–0.4)
EOSINOPHIL # BLD MANUAL: 0.32 10*3/MM3 (ref 0–0.4)
EOSINOPHIL NFR BLD AUTO: 0.3 % (ref 0.3–6.2)
EOSINOPHIL NFR BLD AUTO: 0.5 % (ref 0.3–6.2)
EOSINOPHIL NFR BLD AUTO: 0.9 % (ref 0.3–6.2)
EOSINOPHIL NFR BLD AUTO: 1.1 % (ref 0.3–6.2)
EOSINOPHIL NFR BLD AUTO: 1.4 % (ref 0.3–6.2)
EOSINOPHIL NFR BLD AUTO: 1.5 % (ref 0.3–6.2)
EOSINOPHIL NFR BLD AUTO: 1.5 % (ref 0.3–6.2)
EOSINOPHIL NFR BLD AUTO: 1.9 % (ref 0.3–6.2)
EOSINOPHIL NFR BLD MANUAL: 0 % (ref 0.3–6.2)
EOSINOPHIL NFR BLD MANUAL: 2 % (ref 0.3–6.2)
ERYTHROCYTE [DISTWIDTH] IN BLOOD BY AUTOMATED COUNT: 12.6 % (ref 12.3–15.4)
ERYTHROCYTE [DISTWIDTH] IN BLOOD BY AUTOMATED COUNT: 12.9 % (ref 12.3–15.4)
ERYTHROCYTE [DISTWIDTH] IN BLOOD BY AUTOMATED COUNT: 13 % (ref 12.3–15.4)
ERYTHROCYTE [DISTWIDTH] IN BLOOD BY AUTOMATED COUNT: 13.2 % (ref 12.3–15.4)
ERYTHROCYTE [DISTWIDTH] IN BLOOD BY AUTOMATED COUNT: 13.3 % (ref 12.3–15.4)
ERYTHROCYTE [DISTWIDTH] IN BLOOD BY AUTOMATED COUNT: 13.5 % (ref 12.3–15.4)
FUNGITELL VALUE: <31.25 PG/ML
FUNGUS WND CULT: NORMAL
GLOBULIN UR ELPH-MCNC: 3.1 GM/DL
GLOBULIN UR ELPH-MCNC: 3.2 GM/DL
GLOBULIN UR ELPH-MCNC: 3.5 GM/DL
GLOBULIN UR ELPH-MCNC: 3.6 GM/DL
GLOBULIN UR ELPH-MCNC: 3.7 GM/DL
GLOBULIN UR ELPH-MCNC: 3.8 GM/DL
GLUCOSE BLDC GLUCOMTR-MCNC: 113 MG/DL (ref 70–130)
GLUCOSE BLDC GLUCOMTR-MCNC: 124 MG/DL (ref 70–130)
GLUCOSE BLDC GLUCOMTR-MCNC: 130 MG/DL (ref 70–130)
GLUCOSE BLDC GLUCOMTR-MCNC: 131 MG/DL (ref 70–130)
GLUCOSE BLDC GLUCOMTR-MCNC: 148 MG/DL (ref 70–130)
GLUCOSE BLDC GLUCOMTR-MCNC: 151 MG/DL (ref 70–130)
GLUCOSE BLDC GLUCOMTR-MCNC: 154 MG/DL (ref 70–130)
GLUCOSE BLDC GLUCOMTR-MCNC: 157 MG/DL (ref 70–130)
GLUCOSE BLDC GLUCOMTR-MCNC: 157 MG/DL (ref 70–130)
GLUCOSE BLDC GLUCOMTR-MCNC: 159 MG/DL (ref 70–130)
GLUCOSE BLDC GLUCOMTR-MCNC: 161 MG/DL (ref 70–130)
GLUCOSE BLDC GLUCOMTR-MCNC: 165 MG/DL (ref 70–130)
GLUCOSE BLDC GLUCOMTR-MCNC: 167 MG/DL (ref 70–130)
GLUCOSE BLDC GLUCOMTR-MCNC: 169 MG/DL (ref 70–130)
GLUCOSE BLDC GLUCOMTR-MCNC: 170 MG/DL (ref 70–130)
GLUCOSE BLDC GLUCOMTR-MCNC: 171 MG/DL (ref 70–130)
GLUCOSE BLDC GLUCOMTR-MCNC: 175 MG/DL (ref 70–130)
GLUCOSE BLDC GLUCOMTR-MCNC: 179 MG/DL (ref 70–130)
GLUCOSE BLDC GLUCOMTR-MCNC: 180 MG/DL (ref 70–130)
GLUCOSE BLDC GLUCOMTR-MCNC: 182 MG/DL (ref 70–130)
GLUCOSE BLDC GLUCOMTR-MCNC: 187 MG/DL (ref 70–130)
GLUCOSE BLDC GLUCOMTR-MCNC: 187 MG/DL (ref 70–130)
GLUCOSE BLDC GLUCOMTR-MCNC: 192 MG/DL (ref 70–130)
GLUCOSE BLDC GLUCOMTR-MCNC: 197 MG/DL (ref 70–130)
GLUCOSE BLDC GLUCOMTR-MCNC: 197 MG/DL (ref 70–130)
GLUCOSE BLDC GLUCOMTR-MCNC: 202 MG/DL (ref 70–130)
GLUCOSE BLDC GLUCOMTR-MCNC: 204 MG/DL (ref 70–130)
GLUCOSE BLDC GLUCOMTR-MCNC: 212 MG/DL (ref 70–130)
GLUCOSE SERPL-MCNC: 151 MG/DL (ref 65–99)
GLUCOSE SERPL-MCNC: 162 MG/DL (ref 65–99)
GLUCOSE SERPL-MCNC: 164 MG/DL (ref 65–99)
GLUCOSE SERPL-MCNC: 165 MG/DL (ref 65–99)
GLUCOSE SERPL-MCNC: 177 MG/DL (ref 65–99)
GLUCOSE SERPL-MCNC: 183 MG/DL (ref 65–99)
GLUCOSE SERPL-MCNC: 198 MG/DL (ref 65–99)
GLUCOSE SERPL-MCNC: 206 MG/DL (ref 65–99)
GLUCOSE UR STRIP-MCNC: NEGATIVE MG/DL
GRAM STN SPEC: ABNORMAL
HCO3 BLDA-SCNC: 31.5 MMOL/L (ref 20–26)
HCO3 BLDA-SCNC: 33.2 MMOL/L (ref 20–26)
HCO3 BLDA-SCNC: 33.7 MMOL/L (ref 20–26)
HCO3 BLDA-SCNC: 34.5 MMOL/L (ref 20–26)
HCO3 BLDA-SCNC: 34.9 MMOL/L (ref 20–26)
HCO3 BLDA-SCNC: 35 MMOL/L (ref 20–26)
HCO3 BLDA-SCNC: 35.1 MMOL/L (ref 20–26)
HCT VFR BLD AUTO: 36.8 % (ref 37.5–51)
HCT VFR BLD AUTO: 37.8 % (ref 37.5–51)
HCT VFR BLD AUTO: 38 % (ref 37.5–51)
HCT VFR BLD AUTO: 38 % (ref 37.5–51)
HCT VFR BLD AUTO: 38.8 % (ref 37.5–51)
HCT VFR BLD AUTO: 39.8 % (ref 37.5–51)
HCT VFR BLD AUTO: 42.6 % (ref 37.5–51)
HCT VFR BLD AUTO: 44.2 % (ref 37.5–51)
HGB BLD-MCNC: 11.8 G/DL (ref 13–17.7)
HGB BLD-MCNC: 12 G/DL (ref 13–17.7)
HGB BLD-MCNC: 12.2 G/DL (ref 13–17.7)
HGB BLD-MCNC: 12.3 G/DL (ref 13–17.7)
HGB BLD-MCNC: 12.4 G/DL (ref 13–17.7)
HGB BLD-MCNC: 12.7 G/DL (ref 13–17.7)
HGB BLD-MCNC: 13.8 G/DL (ref 13–17.7)
HGB BLD-MCNC: 14.5 G/DL (ref 13–17.7)
HGB UR QL STRIP.AUTO: ABNORMAL
HYALINE CASTS UR QL AUTO: ABNORMAL /LPF
IMM GRANULOCYTES # BLD AUTO: 0.16 10*3/MM3 (ref 0–0.05)
IMM GRANULOCYTES # BLD AUTO: 0.17 10*3/MM3 (ref 0–0.05)
IMM GRANULOCYTES # BLD AUTO: 0.18 10*3/MM3 (ref 0–0.05)
IMM GRANULOCYTES # BLD AUTO: 0.26 10*3/MM3 (ref 0–0.05)
IMM GRANULOCYTES # BLD AUTO: 0.4 10*3/MM3 (ref 0–0.05)
IMM GRANULOCYTES # BLD AUTO: 0.55 10*3/MM3 (ref 0–0.05)
IMM GRANULOCYTES # BLD AUTO: 0.57 10*3/MM3 (ref 0–0.05)
IMM GRANULOCYTES # BLD AUTO: 0.87 10*3/MM3 (ref 0–0.05)
IMM GRANULOCYTES NFR BLD AUTO: 0.9 % (ref 0–0.5)
IMM GRANULOCYTES NFR BLD AUTO: 1 % (ref 0–0.5)
IMM GRANULOCYTES NFR BLD AUTO: 1 % (ref 0–0.5)
IMM GRANULOCYTES NFR BLD AUTO: 1.7 % (ref 0–0.5)
IMM GRANULOCYTES NFR BLD AUTO: 2.4 % (ref 0–0.5)
IMM GRANULOCYTES NFR BLD AUTO: 3 % (ref 0–0.5)
IMM GRANULOCYTES NFR BLD AUTO: 3.2 % (ref 0–0.5)
IMM GRANULOCYTES NFR BLD AUTO: 4.2 % (ref 0–0.5)
IMP & REVIEW OF LAB RESULTS: NORMAL
INHALED O2 CONCENTRATION: 30 %
INHALED O2 CONCENTRATION: 40 %
INHALED O2 CONCENTRATION: 40 %
INHALED O2 CONCENTRATION: 50 %
INHALED O2 CONCENTRATION: 50 %
KETONES UR QL STRIP: ABNORMAL
LEUKOCYTE ESTERASE UR QL STRIP.AUTO: ABNORMAL
LYMPHOCYTES # BLD AUTO: 1.2 10*3/MM3 (ref 0.7–3.1)
LYMPHOCYTES # BLD AUTO: 1.37 10*3/MM3 (ref 0.7–3.1)
LYMPHOCYTES # BLD AUTO: 1.44 10*3/MM3 (ref 0.7–3.1)
LYMPHOCYTES # BLD AUTO: 1.66 10*3/MM3 (ref 0.7–3.1)
LYMPHOCYTES # BLD AUTO: 1.76 10*3/MM3 (ref 0.7–3.1)
LYMPHOCYTES # BLD AUTO: 1.82 10*3/MM3 (ref 0.7–3.1)
LYMPHOCYTES # BLD AUTO: 1.92 10*3/MM3 (ref 0.7–3.1)
LYMPHOCYTES # BLD AUTO: 1.94 10*3/MM3 (ref 0.7–3.1)
LYMPHOCYTES # BLD MANUAL: 1.1 10*3/MM3 (ref 0.7–3.1)
LYMPHOCYTES # BLD MANUAL: 1.42 10*3/MM3 (ref 0.7–3.1)
LYMPHOCYTES NFR BLD AUTO: 10.2 % (ref 19.6–45.3)
LYMPHOCYTES NFR BLD AUTO: 10.5 % (ref 19.6–45.3)
LYMPHOCYTES NFR BLD AUTO: 7.6 % (ref 19.6–45.3)
LYMPHOCYTES NFR BLD AUTO: 8.2 % (ref 19.6–45.3)
LYMPHOCYTES NFR BLD AUTO: 9.1 % (ref 19.6–45.3)
LYMPHOCYTES NFR BLD AUTO: 9.1 % (ref 19.6–45.3)
LYMPHOCYTES NFR BLD AUTO: 9.4 % (ref 19.6–45.3)
LYMPHOCYTES NFR BLD AUTO: 9.6 % (ref 19.6–45.3)
LYMPHOCYTES NFR BLD MANUAL: 15 % (ref 5–12)
LYMPHOCYTES NFR BLD MANUAL: 9 % (ref 5–12)
Lab: ABNORMAL
Lab: NORMAL
Lab: NORMAL
MAGNESIUM SERPL-MCNC: 2 MG/DL (ref 1.6–2.4)
MAGNESIUM SERPL-MCNC: 2 MG/DL (ref 1.6–2.4)
MAGNESIUM SERPL-MCNC: 2.1 MG/DL (ref 1.6–2.4)
MAGNESIUM SERPL-MCNC: 2.2 MG/DL (ref 1.6–2.4)
MCH RBC QN AUTO: 30.5 PG (ref 26.6–33)
MCH RBC QN AUTO: 30.5 PG (ref 26.6–33)
MCH RBC QN AUTO: 30.7 PG (ref 26.6–33)
MCH RBC QN AUTO: 31.1 PG (ref 26.6–33)
MCH RBC QN AUTO: 31.2 PG (ref 26.6–33)
MCH RBC QN AUTO: 31.4 PG (ref 26.6–33)
MCHC RBC AUTO-ENTMCNC: 31.7 G/DL (ref 31.5–35.7)
MCHC RBC AUTO-ENTMCNC: 31.9 G/DL (ref 31.5–35.7)
MCHC RBC AUTO-ENTMCNC: 32 G/DL (ref 31.5–35.7)
MCHC RBC AUTO-ENTMCNC: 32.1 G/DL (ref 31.5–35.7)
MCHC RBC AUTO-ENTMCNC: 32.1 G/DL (ref 31.5–35.7)
MCHC RBC AUTO-ENTMCNC: 32.4 G/DL (ref 31.5–35.7)
MCHC RBC AUTO-ENTMCNC: 32.4 G/DL (ref 31.5–35.7)
MCHC RBC AUTO-ENTMCNC: 32.8 G/DL (ref 31.5–35.7)
MCV RBC AUTO: 94.8 FL (ref 79–97)
MCV RBC AUTO: 94.9 FL (ref 79–97)
MCV RBC AUTO: 95.7 FL (ref 79–97)
MCV RBC AUTO: 96.2 FL (ref 79–97)
MCV RBC AUTO: 96.4 FL (ref 79–97)
MCV RBC AUTO: 96.8 FL (ref 79–97)
MCV RBC AUTO: 97.2 FL (ref 79–97)
MCV RBC AUTO: 97.7 FL (ref 79–97)
METHANOL SERPL-MCNC: <.01 G/DL (ref 0–0.01)
MODALITY: ABNORMAL
MONOCYTES # BLD AUTO: 2.2 10*3/MM3 (ref 0.1–0.9)
MONOCYTES # BLD AUTO: 2.2 10*3/MM3 (ref 0.1–0.9)
MONOCYTES # BLD AUTO: 2.21 10*3/MM3 (ref 0.1–0.9)
MONOCYTES # BLD AUTO: 2.43 10*3/MM3 (ref 0.1–0.9)
MONOCYTES # BLD AUTO: 2.47 10*3/MM3 (ref 0.1–0.9)
MONOCYTES # BLD AUTO: 2.61 10*3/MM3 (ref 0.1–0.9)
MONOCYTES # BLD AUTO: 2.62 10*3/MM3 (ref 0.1–0.9)
MONOCYTES # BLD AUTO: 2.71 10*3/MM3 (ref 0.1–0.9)
MONOCYTES # BLD: 1.42 10*3/MM3 (ref 0.1–0.9)
MONOCYTES # BLD: 2.75 10*3/MM3 (ref 0.1–0.9)
MONOCYTES NFR BLD AUTO: 12.3 % (ref 5–12)
MONOCYTES NFR BLD AUTO: 12.7 % (ref 5–12)
MONOCYTES NFR BLD AUTO: 13.3 % (ref 5–12)
MONOCYTES NFR BLD AUTO: 14 % (ref 5–12)
MONOCYTES NFR BLD AUTO: 14 % (ref 5–12)
MONOCYTES NFR BLD AUTO: 14.3 % (ref 5–12)
MONOCYTES NFR BLD AUTO: 14.7 % (ref 5–12)
MONOCYTES NFR BLD AUTO: 14.8 % (ref 5–12)
NEUTROPHILS # BLD AUTO: 12.64 10*3/MM3 (ref 1.7–7)
NEUTROPHILS # BLD AUTO: 14.46 10*3/MM3 (ref 1.7–7)
NEUTROPHILS NFR BLD AUTO: 11.74 10*3/MM3 (ref 1.7–7)
NEUTROPHILS NFR BLD AUTO: 11.78 10*3/MM3 (ref 1.7–7)
NEUTROPHILS NFR BLD AUTO: 11.96 10*3/MM3 (ref 1.7–7)
NEUTROPHILS NFR BLD AUTO: 12.81 10*3/MM3 (ref 1.7–7)
NEUTROPHILS NFR BLD AUTO: 13.27 10*3/MM3 (ref 1.7–7)
NEUTROPHILS NFR BLD AUTO: 13.32 10*3/MM3 (ref 1.7–7)
NEUTROPHILS NFR BLD AUTO: 13.61 10*3/MM3 (ref 1.7–7)
NEUTROPHILS NFR BLD AUTO: 14.69 10*3/MM3 (ref 1.7–7)
NEUTROPHILS NFR BLD AUTO: 71.2 % (ref 42.7–76)
NEUTROPHILS NFR BLD AUTO: 71.9 % (ref 42.7–76)
NEUTROPHILS NFR BLD AUTO: 72 % (ref 42.7–76)
NEUTROPHILS NFR BLD AUTO: 72.4 % (ref 42.7–76)
NEUTROPHILS NFR BLD AUTO: 73.3 % (ref 42.7–76)
NEUTROPHILS NFR BLD AUTO: 73.8 % (ref 42.7–76)
NEUTROPHILS NFR BLD AUTO: 74.4 % (ref 42.7–76)
NEUTROPHILS NFR BLD AUTO: 75 % (ref 42.7–76)
NEUTROPHILS NFR BLD MANUAL: 75 % (ref 42.7–76)
NEUTROPHILS NFR BLD MANUAL: 80 % (ref 42.7–76)
NEUTS BAND NFR BLD MANUAL: 4 % (ref 0–5)
NEUTS HYPERSEG # BLD: PRESENT 10*3/UL
NEUTS VAC BLD QL SMEAR: ABNORMAL
NITRITE UR QL STRIP: NEGATIVE
NRBC BLD AUTO-RTO: 0 /100 WBC (ref 0–0.2)
PATHOLOGIST NAME: NORMAL
PCO2 BLDA: 46.9 MM HG (ref 35–45)
PCO2 BLDA: 49.5 MM HG (ref 35–45)
PCO2 BLDA: 50.1 MM HG (ref 35–45)
PCO2 BLDA: 53.5 MM HG (ref 35–45)
PCO2 BLDA: 53.5 MM HG (ref 35–45)
PCO2 BLDA: 53.8 MM HG (ref 35–45)
PCO2 BLDA: 57.4 MM HG (ref 35–45)
PCO2 TEMP ADJ BLD: 46.9 MM HG (ref 35–45)
PCO2 TEMP ADJ BLD: 49.5 MM HG (ref 35–45)
PCO2 TEMP ADJ BLD: 50.1 MM HG (ref 35–45)
PCO2 TEMP ADJ BLD: 53.5 MM HG (ref 35–45)
PCO2 TEMP ADJ BLD: 53.5 MM HG (ref 35–45)
PCO2 TEMP ADJ BLD: 53.8 MM HG (ref 35–45)
PCO2 TEMP ADJ BLD: 57.4 MM HG (ref 35–45)
PEEP RESPIRATORY: 5 CM[H2O]
PH BLDA: 7.38 PH UNITS (ref 7.35–7.45)
PH BLDA: 7.42 PH UNITS (ref 7.35–7.45)
PH BLDA: 7.43 PH UNITS (ref 7.35–7.45)
PH BLDA: 7.44 PH UNITS (ref 7.35–7.45)
PH BLDA: 7.45 PH UNITS (ref 7.35–7.45)
PH UR STRIP.AUTO: 6 [PH] (ref 5–8)
PH, TEMP CORRECTED: 7.38 PH UNITS (ref 7.35–7.45)
PH, TEMP CORRECTED: 7.42 PH UNITS (ref 7.35–7.45)
PH, TEMP CORRECTED: 7.43 PH UNITS (ref 7.35–7.45)
PH, TEMP CORRECTED: 7.44 PH UNITS (ref 7.35–7.45)
PH, TEMP CORRECTED: 7.45 PH UNITS (ref 7.35–7.45)
PHOSPHATE SERPL-MCNC: 2.6 MG/DL (ref 2.5–4.5)
PHOSPHATE SERPL-MCNC: 2.9 MG/DL (ref 2.5–4.5)
PHOSPHATE SERPL-MCNC: 3.4 MG/DL (ref 2.5–4.5)
PHOSPHATE SERPL-MCNC: 3.5 MG/DL (ref 2.5–4.5)
PHOSPHATE SERPL-MCNC: 3.6 MG/DL (ref 2.5–4.5)
PLAT MORPH BLD: NORMAL
PLAT MORPH BLD: NORMAL
PLATELET # BLD AUTO: 181 10*3/MM3 (ref 140–450)
PLATELET # BLD AUTO: 196 10*3/MM3 (ref 140–450)
PLATELET # BLD AUTO: 198 10*3/MM3 (ref 140–450)
PLATELET # BLD AUTO: 202 10*3/MM3 (ref 140–450)
PLATELET # BLD AUTO: 213 10*3/MM3 (ref 140–450)
PLATELET # BLD AUTO: 239 10*3/MM3 (ref 140–450)
PLATELET # BLD AUTO: 253 10*3/MM3 (ref 140–450)
PLATELET # BLD AUTO: 305 10*3/MM3 (ref 140–450)
PMV BLD AUTO: 10.4 FL (ref 6–12)
PMV BLD AUTO: 10.5 FL (ref 6–12)
PMV BLD AUTO: 10.5 FL (ref 6–12)
PMV BLD AUTO: 10.6 FL (ref 6–12)
PMV BLD AUTO: 10.7 FL (ref 6–12)
PMV BLD AUTO: 10.9 FL (ref 6–12)
PMV BLD AUTO: 11 FL (ref 6–12)
PMV BLD AUTO: 11.2 FL (ref 6–12)
PO2 BLD: 166 MM[HG] (ref 0–500)
PO2 BLD: 189 MM[HG] (ref 0–500)
PO2 BLD: 203 MM[HG] (ref 0–500)
PO2 BLD: 212 MM[HG] (ref 0–500)
PO2 BLD: 223 MM[HG] (ref 0–500)
PO2 BLD: 224 MM[HG] (ref 0–500)
PO2 BLD: 396 MM[HG] (ref 0–500)
PO2 BLDA: 198 MM HG (ref 83–108)
PO2 BLDA: 63.5 MM HG (ref 83–108)
PO2 BLDA: 66.4 MM HG (ref 83–108)
PO2 BLDA: 66.9 MM HG (ref 83–108)
PO2 BLDA: 67.2 MM HG (ref 83–108)
PO2 BLDA: 81.2 MM HG (ref 83–108)
PO2 BLDA: 94.5 MM HG (ref 83–108)
PO2 TEMP ADJ BLD: 198 MM HG (ref 83–108)
PO2 TEMP ADJ BLD: 63.5 MM HG (ref 83–108)
PO2 TEMP ADJ BLD: 66.4 MM HG (ref 83–108)
PO2 TEMP ADJ BLD: 66.9 MM HG (ref 83–108)
PO2 TEMP ADJ BLD: 67.2 MM HG (ref 83–108)
PO2 TEMP ADJ BLD: 81.2 MM HG (ref 83–108)
PO2 TEMP ADJ BLD: 94.5 MM HG (ref 83–108)
POTASSIUM SERPL-SCNC: 3.8 MMOL/L (ref 3.5–5.2)
POTASSIUM SERPL-SCNC: 3.8 MMOL/L (ref 3.5–5.2)
POTASSIUM SERPL-SCNC: 4.2 MMOL/L (ref 3.5–5.2)
POTASSIUM SERPL-SCNC: 4.3 MMOL/L (ref 3.5–5.2)
POTASSIUM SERPL-SCNC: 4.4 MMOL/L (ref 3.5–5.2)
POTASSIUM SERPL-SCNC: 4.5 MMOL/L (ref 3.5–5.2)
POTASSIUM SERPL-SCNC: 4.5 MMOL/L (ref 3.5–5.2)
POTASSIUM SERPL-SCNC: 4.8 MMOL/L (ref 3.5–5.2)
PROCALCITONIN SERPL-MCNC: 0.1 NG/ML (ref 0–0.25)
PROT SERPL-MCNC: 6.1 G/DL (ref 6–8.5)
PROT SERPL-MCNC: 6.3 G/DL (ref 6–8.5)
PROT SERPL-MCNC: 6.3 G/DL (ref 6–8.5)
PROT SERPL-MCNC: 6.5 G/DL (ref 6–8.5)
PROT SERPL-MCNC: 6.6 G/DL (ref 6–8.5)
PROT SERPL-MCNC: 6.9 G/DL (ref 6–8.5)
PROT UR QL STRIP: ABNORMAL
QT INTERVAL: 272 MS
QT INTERVAL: 298 MS
QT INTERVAL: 340 MS
QT INTERVAL: 352 MS
QT INTERVAL: 388 MS
QTC INTERVAL: 406 MS
QTC INTERVAL: 421 MS
QTC INTERVAL: 462 MS
QTC INTERVAL: 472 MS
QTC INTERVAL: 535 MS
RBC # BLD AUTO: 3.8 10*6/MM3 (ref 4.14–5.8)
RBC # BLD AUTO: 3.89 10*6/MM3 (ref 4.14–5.8)
RBC # BLD AUTO: 3.93 10*6/MM3 (ref 4.14–5.8)
RBC # BLD AUTO: 3.94 10*6/MM3 (ref 4.14–5.8)
RBC # BLD AUTO: 3.99 10*6/MM3 (ref 4.14–5.8)
RBC # BLD AUTO: 4.16 10*6/MM3 (ref 4.14–5.8)
RBC # BLD AUTO: 4.49 10*6/MM3 (ref 4.14–5.8)
RBC # BLD AUTO: 4.66 10*6/MM3 (ref 4.14–5.8)
RBC # UR STRIP: ABNORMAL /HPF
RBC MORPH BLD: NORMAL
RBC MORPH BLD: NORMAL
REF LAB TEST METHOD: ABNORMAL
REFERENCE VALUE: NORMAL
SAO2 % BLDCOA: 90.9 % (ref 94–99)
SAO2 % BLDCOA: 93.7 % (ref 94–99)
SAO2 % BLDCOA: 94 % (ref 94–99)
SAO2 % BLDCOA: 94.2 % (ref 94–99)
SAO2 % BLDCOA: 96.7 % (ref 94–99)
SAO2 % BLDCOA: 97.7 % (ref 94–99)
SAO2 % BLDCOA: 99.8 % (ref 94–99)
SET MECH RESP RATE: 16
SODIUM SERPL-SCNC: 141 MMOL/L (ref 136–145)
SODIUM SERPL-SCNC: 141 MMOL/L (ref 136–145)
SODIUM SERPL-SCNC: 142 MMOL/L (ref 136–145)
SODIUM SERPL-SCNC: 143 MMOL/L (ref 136–145)
SODIUM SERPL-SCNC: 143 MMOL/L (ref 136–145)
SODIUM SERPL-SCNC: 146 MMOL/L (ref 136–145)
SP GR UR STRIP: >1.03 (ref 1–1.03)
SQUAMOUS #/AREA URNS HPF: ABNORMAL /HPF
UROBILINOGEN UR QL STRIP: ABNORMAL
VARIANT LYMPHS NFR BLD MANUAL: 3 % (ref 0–5)
VARIANT LYMPHS NFR BLD MANUAL: 6 % (ref 19.6–45.3)
VARIANT LYMPHS NFR BLD MANUAL: 6 % (ref 19.6–45.3)
VENTILATOR MODE: AC
VT ON VENT VENT: 450 ML
VT ON VENT VENT: 500 ML
WBC # UR STRIP: ABNORMAL /HPF
WBC MORPH BLD: NORMAL
WBC NRBC COR # BLD AUTO: 15.7 10*3/MM3 (ref 3.4–10.8)
WBC NRBC COR # BLD AUTO: 15.8 10*3/MM3 (ref 3.4–10.8)
WBC NRBC COR # BLD AUTO: 16.64 10*3/MM3 (ref 3.4–10.8)
WBC NRBC COR # BLD AUTO: 17.82 10*3/MM3 (ref 3.4–10.8)
WBC NRBC COR # BLD AUTO: 18.2 10*3/MM3 (ref 3.4–10.8)
WBC NRBC COR # BLD AUTO: 18.31 10*3/MM3 (ref 3.4–10.8)
WBC NRBC COR # BLD AUTO: 18.42 10*3/MM3 (ref 3.4–10.8)
WBC NRBC COR # BLD AUTO: 20.63 10*3/MM3 (ref 3.4–10.8)

## 2025-01-01 PROCEDURE — 25010000002 VANCOMYCIN 10 G RECONSTITUTED SOLUTION: Performed by: INTERNAL MEDICINE

## 2025-01-01 PROCEDURE — 85025 COMPLETE CBC W/AUTO DIFF WBC: CPT | Performed by: NURSE PRACTITIONER

## 2025-01-01 PROCEDURE — 99232 SBSQ HOSP IP/OBS MODERATE 35: CPT | Performed by: INTERNAL MEDICINE

## 2025-01-01 PROCEDURE — 80053 COMPREHEN METABOLIC PANEL: CPT | Performed by: NURSE PRACTITIONER

## 2025-01-01 PROCEDURE — 71045 X-RAY EXAM CHEST 1 VIEW: CPT

## 2025-01-01 PROCEDURE — 83735 ASSAY OF MAGNESIUM: CPT | Performed by: INTERNAL MEDICINE

## 2025-01-01 PROCEDURE — 80053 COMPREHEN METABOLIC PANEL: CPT | Performed by: INTERNAL MEDICINE

## 2025-01-01 PROCEDURE — 63710000001 INSULIN GLARGINE PER 5 UNITS: Performed by: INTERNAL MEDICINE

## 2025-01-01 PROCEDURE — 84100 ASSAY OF PHOSPHORUS: CPT | Performed by: INTERNAL MEDICINE

## 2025-01-01 PROCEDURE — 82948 REAGENT STRIP/BLOOD GLUCOSE: CPT

## 2025-01-01 PROCEDURE — 94799 UNLISTED PULMONARY SVC/PX: CPT

## 2025-01-01 PROCEDURE — 94664 DEMO&/EVAL PT USE INHALER: CPT

## 2025-01-01 PROCEDURE — 99222 1ST HOSP IP/OBS MODERATE 55: CPT | Performed by: INTERNAL MEDICINE

## 2025-01-01 PROCEDURE — 25810000003 SODIUM CHLORIDE 0.9 % SOLUTION: Performed by: INTERNAL MEDICINE

## 2025-01-01 PROCEDURE — 94003 VENT MGMT INPAT SUBQ DAY: CPT

## 2025-01-01 PROCEDURE — 25010000002 CEFEPIME PER 500 MG: Performed by: INTERNAL MEDICINE

## 2025-01-01 PROCEDURE — 25010000002 AMIODARONE IN DEXTROSE 5% 150-4.21 MG/100ML-% SOLUTION: Performed by: INTERNAL MEDICINE

## 2025-01-01 PROCEDURE — 93010 ELECTROCARDIOGRAM REPORT: CPT | Performed by: INTERNAL MEDICINE

## 2025-01-01 PROCEDURE — 25010000002 METOCLOPRAMIDE PER 10 MG: Performed by: NURSE PRACTITIONER

## 2025-01-01 PROCEDURE — 99233 SBSQ HOSP IP/OBS HIGH 50: CPT | Performed by: INTERNAL MEDICINE

## 2025-01-01 PROCEDURE — 36600 WITHDRAWAL OF ARTERIAL BLOOD: CPT

## 2025-01-01 PROCEDURE — 94761 N-INVAS EAR/PLS OXIMETRY MLT: CPT

## 2025-01-01 PROCEDURE — 87205 SMEAR GRAM STAIN: CPT | Performed by: NURSE PRACTITIONER

## 2025-01-01 PROCEDURE — 87147 CULTURE TYPE IMMUNOLOGIC: CPT | Performed by: PHYSICIAN ASSISTANT

## 2025-01-01 PROCEDURE — 25010000002 FENTANYL CITRATE (PF) 2500 MCG/50ML SOLUTION: Performed by: INTERNAL MEDICINE

## 2025-01-01 PROCEDURE — 25010000002 MIDAZOLAM PER 1MG: Performed by: INTERNAL MEDICINE

## 2025-01-01 PROCEDURE — 85007 BL SMEAR W/DIFF WBC COUNT: CPT | Performed by: INTERNAL MEDICINE

## 2025-01-01 PROCEDURE — 87205 SMEAR GRAM STAIN: CPT | Performed by: PHYSICIAN ASSISTANT

## 2025-01-01 PROCEDURE — 85027 COMPLETE CBC AUTOMATED: CPT | Performed by: INTERNAL MEDICINE

## 2025-01-01 PROCEDURE — 99232 SBSQ HOSP IP/OBS MODERATE 35: CPT | Performed by: PSYCHIATRY & NEUROLOGY

## 2025-01-01 PROCEDURE — 82803 BLOOD GASES ANY COMBINATION: CPT

## 2025-01-01 PROCEDURE — 25010000002 MORPHINE SULFATE 10 MG/ML SOLUTION: Performed by: INTERNAL MEDICINE

## 2025-01-01 PROCEDURE — 25010000002 ENOXAPARIN PER 10 MG: Performed by: NURSE PRACTITIONER

## 2025-01-01 PROCEDURE — 99232 SBSQ HOSP IP/OBS MODERATE 35: CPT | Performed by: CLINICAL NURSE SPECIALIST

## 2025-01-01 PROCEDURE — 85025 COMPLETE CBC W/AUTO DIFF WBC: CPT | Performed by: INTERNAL MEDICINE

## 2025-01-01 PROCEDURE — 95822 EEG COMA OR SLEEP ONLY: CPT | Performed by: PSYCHIATRY & NEUROLOGY

## 2025-01-01 PROCEDURE — 84145 PROCALCITONIN (PCT): CPT | Performed by: NURSE PRACTITIONER

## 2025-01-01 PROCEDURE — 25010000002 HYDRALAZINE PER 20 MG: Performed by: INTERNAL MEDICINE

## 2025-01-01 PROCEDURE — 95822 EEG COMA OR SLEEP ONLY: CPT

## 2025-01-01 PROCEDURE — 25010000002 HYALURONIDASE (HUMAN) 150 UNIT/ML SOLUTION 1 ML VIAL: Performed by: INTERNAL MEDICINE

## 2025-01-01 PROCEDURE — 99233 SBSQ HOSP IP/OBS HIGH 50: CPT | Performed by: PSYCHIATRY & NEUROLOGY

## 2025-01-01 PROCEDURE — 99222 1ST HOSP IP/OBS MODERATE 55: CPT | Performed by: CLINICAL NURSE SPECIALIST

## 2025-01-01 PROCEDURE — 93005 ELECTROCARDIOGRAM TRACING: CPT | Performed by: INTERNAL MEDICINE

## 2025-01-01 PROCEDURE — 99233 SBSQ HOSP IP/OBS HIGH 50: CPT | Performed by: CLINICAL NURSE SPECIALIST

## 2025-01-01 PROCEDURE — 25010000002 FUROSEMIDE PER 20 MG: Performed by: NURSE PRACTITIONER

## 2025-01-01 PROCEDURE — 25010000002 FUROSEMIDE PER 20 MG: Performed by: CLINICAL NURSE SPECIALIST

## 2025-01-01 PROCEDURE — 25010000002 ACETAMINOPHEN 10 MG/ML SOLUTION: Performed by: INTERNAL MEDICINE

## 2025-01-01 PROCEDURE — 83735 ASSAY OF MAGNESIUM: CPT | Performed by: NURSE PRACTITIONER

## 2025-01-01 PROCEDURE — 25010000002 KETOROLAC TROMETHAMINE PER 15 MG

## 2025-01-01 PROCEDURE — 63710000001 INSULIN REGULAR HUMAN PER 5 UNITS: Performed by: NURSE PRACTITIONER

## 2025-01-01 PROCEDURE — 87102 FUNGUS ISOLATION CULTURE: CPT | Performed by: PHYSICIAN ASSISTANT

## 2025-01-01 PROCEDURE — 87040 BLOOD CULTURE FOR BACTERIA: CPT | Performed by: INTERNAL MEDICINE

## 2025-01-01 PROCEDURE — 25010000002 AMIODARONE IN DEXTROSE 5% 360-4.14 MG/200ML-% SOLUTION: Performed by: INTERNAL MEDICINE

## 2025-01-01 PROCEDURE — 87449 NOS EACH ORGANISM AG IA: CPT | Performed by: PHYSICIAN ASSISTANT

## 2025-01-01 PROCEDURE — 25010000002 MORPHINE SULFATE 10 MG/ML SOLUTION: Performed by: NURSE PRACTITIONER

## 2025-01-01 PROCEDURE — 82550 ASSAY OF CK (CPK): CPT | Performed by: INTERNAL MEDICINE

## 2025-01-01 PROCEDURE — 70450 CT HEAD/BRAIN W/O DYE: CPT

## 2025-01-01 PROCEDURE — 25010000002 GLYCOPYRROLATE 0.4 MG/2ML SOLUTION: Performed by: CLINICAL NURSE SPECIALIST

## 2025-01-01 PROCEDURE — 25010000002 PIPERACILLIN SOD-TAZOBACTAM PER 1 G: Performed by: INTERNAL MEDICINE

## 2025-01-01 PROCEDURE — 25010000002 HALOPERIDOL LACTATE PER 5 MG: Performed by: CLINICAL NURSE SPECIALIST

## 2025-01-01 PROCEDURE — 87186 SC STD MICRODIL/AGAR DIL: CPT | Performed by: PHYSICIAN ASSISTANT

## 2025-01-01 PROCEDURE — 25010000002 FENTANYL CITRATE (PF) 50 MCG/ML SOLUTION: Performed by: CLINICAL NURSE SPECIALIST

## 2025-01-01 PROCEDURE — 87040 BLOOD CULTURE FOR BACTERIA: CPT | Performed by: NURSE PRACTITIONER

## 2025-01-01 PROCEDURE — 25010000002 MORPHINE PER 10 MG: Performed by: CLINICAL NURSE SPECIALIST

## 2025-01-01 PROCEDURE — 87070 CULTURE OTHR SPECIMN AEROBIC: CPT | Performed by: NURSE PRACTITIONER

## 2025-01-01 PROCEDURE — 87147 CULTURE TYPE IMMUNOLOGIC: CPT | Performed by: NURSE PRACTITIONER

## 2025-01-01 PROCEDURE — 81001 URINALYSIS AUTO W/SCOPE: CPT | Performed by: PHYSICIAN ASSISTANT

## 2025-01-01 PROCEDURE — 87186 SC STD MICRODIL/AGAR DIL: CPT | Performed by: NURSE PRACTITIONER

## 2025-01-01 PROCEDURE — 25810000003 SODIUM CHLORIDE 0.9 % SOLUTION 250 ML FLEX CONT: Performed by: INTERNAL MEDICINE

## 2025-01-01 PROCEDURE — 25010000002 LORAZEPAM PER 2 MG: Performed by: CLINICAL NURSE SPECIALIST

## 2025-01-01 PROCEDURE — 87040 BLOOD CULTURE FOR BACTERIA: CPT | Performed by: PHYSICIAN ASSISTANT

## 2025-01-01 PROCEDURE — 87070 CULTURE OTHR SPECIMN AEROBIC: CPT | Performed by: PHYSICIAN ASSISTANT

## 2025-01-01 PROCEDURE — 25010000002 FENTANYL CITRATE (PF) 50 MCG/ML SOLUTION: Performed by: INTERNAL MEDICINE

## 2025-01-01 RX ORDER — ACETAMINOPHEN 650 MG/1
650 SUPPOSITORY RECTAL EVERY 4 HOURS PRN
Status: DISCONTINUED | OUTPATIENT
Start: 2025-01-01 | End: 2025-01-09 | Stop reason: HOSPADM

## 2025-01-01 RX ORDER — AMIODARONE HYDROCHLORIDE 200 MG/1
200 TABLET ORAL ONCE
Status: COMPLETED | OUTPATIENT
Start: 2025-01-01 | End: 2025-01-01

## 2025-01-01 RX ORDER — LEVOFLOXACIN 750 MG/1
750 TABLET, FILM COATED ORAL EVERY 24 HOURS
Status: DISCONTINUED | OUTPATIENT
Start: 2025-01-01 | End: 2025-01-01

## 2025-01-01 RX ORDER — METOCLOPRAMIDE HYDROCHLORIDE 5 MG/ML
5 INJECTION INTRAMUSCULAR; INTRAVENOUS EVERY 6 HOURS
Status: COMPLETED | OUTPATIENT
Start: 2025-01-01 | End: 2025-01-01

## 2025-01-01 RX ORDER — LORAZEPAM 2 MG/ML
2 INJECTION INTRAMUSCULAR
Status: DISCONTINUED | OUTPATIENT
Start: 2025-01-01 | End: 2025-01-09 | Stop reason: HOSPADM

## 2025-01-01 RX ORDER — KETOROLAC TROMETHAMINE 15 MG/ML
15 INJECTION, SOLUTION INTRAMUSCULAR; INTRAVENOUS ONCE
Status: COMPLETED | OUTPATIENT
Start: 2025-01-01 | End: 2025-01-01

## 2025-01-01 RX ORDER — MORPHINE SULFATE 1 MG/ML
2 INJECTION INTRAVENOUS CONTINUOUS
Status: DISCONTINUED | OUTPATIENT
Start: 2025-01-01 | End: 2025-01-01

## 2025-01-01 RX ORDER — FUROSEMIDE 10 MG/ML
40 INJECTION INTRAMUSCULAR; INTRAVENOUS ONCE
Status: COMPLETED | OUTPATIENT
Start: 2025-01-01 | End: 2025-01-01

## 2025-01-01 RX ORDER — ONDANSETRON 4 MG/1
4 TABLET, ORALLY DISINTEGRATING ORAL EVERY 6 HOURS PRN
Status: DISCONTINUED | OUTPATIENT
Start: 2025-01-01 | End: 2025-01-09 | Stop reason: HOSPADM

## 2025-01-01 RX ORDER — LORAZEPAM 2 MG/ML
1 INJECTION INTRAMUSCULAR
Status: DISCONTINUED | OUTPATIENT
Start: 2025-01-01 | End: 2025-01-09 | Stop reason: HOSPADM

## 2025-01-01 RX ORDER — IPRATROPIUM BROMIDE AND ALBUTEROL SULFATE 2.5; .5 MG/3ML; MG/3ML
3 SOLUTION RESPIRATORY (INHALATION) EVERY 4 HOURS PRN
Status: DISCONTINUED | OUTPATIENT
Start: 2025-01-01 | End: 2025-01-09 | Stop reason: HOSPADM

## 2025-01-01 RX ORDER — FUROSEMIDE 10 MG/ML
20 INJECTION INTRAMUSCULAR; INTRAVENOUS EVERY 6 HOURS PRN
Status: DISCONTINUED | OUTPATIENT
Start: 2025-01-01 | End: 2025-01-09 | Stop reason: HOSPADM

## 2025-01-01 RX ORDER — LORAZEPAM 2 MG/ML
0.5 CONCENTRATE ORAL
Status: DISCONTINUED | OUTPATIENT
Start: 2025-01-01 | End: 2025-01-09 | Stop reason: HOSPADM

## 2025-01-01 RX ORDER — ATROPINE SULFATE 10 MG/ML
2 SOLUTION/ DROPS OPHTHALMIC 2 TIMES DAILY PRN
Status: DISCONTINUED | OUTPATIENT
Start: 2025-01-01 | End: 2025-01-09 | Stop reason: HOSPADM

## 2025-01-01 RX ORDER — FENTANYL CITRATE 50 UG/ML
50 INJECTION, SOLUTION INTRAMUSCULAR; INTRAVENOUS
Status: COMPLETED | OUTPATIENT
Start: 2025-01-01 | End: 2025-01-01

## 2025-01-01 RX ORDER — BISACODYL 10 MG
10 SUPPOSITORY, RECTAL RECTAL DAILY PRN
Status: DISCONTINUED | OUTPATIENT
Start: 2025-01-01 | End: 2025-01-09 | Stop reason: HOSPADM

## 2025-01-01 RX ORDER — LORAZEPAM 2 MG/ML
0.5 INJECTION INTRAMUSCULAR
Status: DISCONTINUED | OUTPATIENT
Start: 2025-01-01 | End: 2025-01-09 | Stop reason: HOSPADM

## 2025-01-01 RX ORDER — LORAZEPAM 2 MG/ML
1 CONCENTRATE ORAL
Status: DISCONTINUED | OUTPATIENT
Start: 2025-01-01 | End: 2025-01-09 | Stop reason: HOSPADM

## 2025-01-01 RX ORDER — LORAZEPAM 1 MG/1
1 TABLET ORAL
Status: DISCONTINUED | OUTPATIENT
Start: 2025-01-01 | End: 2025-01-09 | Stop reason: HOSPADM

## 2025-01-01 RX ORDER — AMIODARONE HYDROCHLORIDE 200 MG/1
200 TABLET ORAL EVERY 12 HOURS
Status: DISCONTINUED | OUTPATIENT
Start: 2025-01-09 | End: 2025-01-01

## 2025-01-01 RX ORDER — LORAZEPAM 0.5 MG/1
0.5 TABLET ORAL
Status: DISCONTINUED | OUTPATIENT
Start: 2025-01-01 | End: 2025-01-09 | Stop reason: HOSPADM

## 2025-01-01 RX ORDER — HALOPERIDOL 5 MG/ML
1 INJECTION INTRAMUSCULAR EVERY 4 HOURS PRN
Status: DISCONTINUED | OUTPATIENT
Start: 2025-01-01 | End: 2025-01-09 | Stop reason: HOSPADM

## 2025-01-01 RX ORDER — DIPHENHYDRAMINE HYDROCHLORIDE 50 MG/ML
25 INJECTION INTRAMUSCULAR; INTRAVENOUS EVERY 6 HOURS PRN
Status: DISCONTINUED | OUTPATIENT
Start: 2025-01-01 | End: 2025-01-09 | Stop reason: HOSPADM

## 2025-01-01 RX ORDER — AMIODARONE HYDROCHLORIDE 200 MG/1
200 TABLET ORAL ONCE
Status: DISCONTINUED | OUTPATIENT
Start: 2025-01-01 | End: 2025-01-01

## 2025-01-01 RX ORDER — PROCHLORPERAZINE MALEATE 10 MG
5 TABLET ORAL EVERY 6 HOURS PRN
Status: DISCONTINUED | OUTPATIENT
Start: 2025-01-01 | End: 2025-01-09 | Stop reason: HOSPADM

## 2025-01-01 RX ORDER — LORAZEPAM 2 MG/ML
2 CONCENTRATE ORAL
Status: DISCONTINUED | OUTPATIENT
Start: 2025-01-01 | End: 2025-01-09 | Stop reason: HOSPADM

## 2025-01-01 RX ORDER — AMIODARONE HYDROCHLORIDE 200 MG/1
200 TABLET ORAL EVERY 8 HOURS
Status: DISCONTINUED | OUTPATIENT
Start: 2025-01-01 | End: 2025-01-01

## 2025-01-01 RX ORDER — DEXTROSE MONOHYDRATE 25 G/50ML
25 INJECTION, SOLUTION INTRAVENOUS
Status: DISCONTINUED | OUTPATIENT
Start: 2025-01-01 | End: 2025-01-01

## 2025-01-01 RX ORDER — HALOPERIDOL 2 MG/ML
1 SOLUTION ORAL EVERY 4 HOURS PRN
Status: DISCONTINUED | OUTPATIENT
Start: 2025-01-01 | End: 2025-01-09 | Stop reason: HOSPADM

## 2025-01-01 RX ORDER — IBUPROFEN 100 MG/5ML
200 SUSPENSION ORAL EVERY 6 HOURS PRN
Status: DISCONTINUED | OUTPATIENT
Start: 2025-01-01 | End: 2025-01-01

## 2025-01-01 RX ORDER — ONDANSETRON 2 MG/ML
4 INJECTION INTRAMUSCULAR; INTRAVENOUS EVERY 6 HOURS PRN
Status: DISCONTINUED | OUTPATIENT
Start: 2025-01-01 | End: 2025-01-09 | Stop reason: HOSPADM

## 2025-01-01 RX ORDER — MORPHINE SULFATE 20 MG/ML
10 SOLUTION ORAL
Status: DISCONTINUED | OUTPATIENT
Start: 2025-01-01 | End: 2025-01-09 | Stop reason: HOSPADM

## 2025-01-01 RX ORDER — VANCOMYCIN/0.9 % SOD CHLORIDE 1.5G/250ML
1500 PLASTIC BAG, INJECTION (ML) INTRAVENOUS EVERY 12 HOURS
Status: DISCONTINUED | OUTPATIENT
Start: 2025-01-01 | End: 2025-01-01

## 2025-01-01 RX ORDER — DIPHENOXYLATE HYDROCHLORIDE AND ATROPINE SULFATE 2.5; .025 MG/1; MG/1
1 TABLET ORAL
Status: DISCONTINUED | OUTPATIENT
Start: 2025-01-01 | End: 2025-01-09 | Stop reason: HOSPADM

## 2025-01-01 RX ORDER — DIPHENHYDRAMINE HCL 25 MG
25 CAPSULE ORAL EVERY 6 HOURS PRN
Status: DISCONTINUED | OUTPATIENT
Start: 2025-01-01 | End: 2025-01-09 | Stop reason: HOSPADM

## 2025-01-01 RX ORDER — AMIODARONE HYDROCHLORIDE 200 MG/1
200 TABLET ORAL DAILY
Status: DISCONTINUED | OUTPATIENT
Start: 2025-01-23 | End: 2025-01-01

## 2025-01-01 RX ORDER — MORPHINE SULFATE 20 MG/ML
20 SOLUTION ORAL
Status: DISCONTINUED | OUTPATIENT
Start: 2025-01-01 | End: 2025-01-09 | Stop reason: HOSPADM

## 2025-01-01 RX ORDER — DIPHENHYDRAMINE HYDROCHLORIDE AND LIDOCAINE HYDROCHLORIDE AND ALUMINUM HYDROXIDE AND MAGNESIUM HYDRO
5 KIT EVERY 4 HOURS PRN
Status: DISCONTINUED | OUTPATIENT
Start: 2025-01-01 | End: 2025-01-09 | Stop reason: HOSPADM

## 2025-01-01 RX ORDER — ECHINACEA PURPUREA EXTRACT 125 MG
2 TABLET ORAL AS NEEDED
Status: DISCONTINUED | OUTPATIENT
Start: 2025-01-01 | End: 2025-01-09 | Stop reason: HOSPADM

## 2025-01-01 RX ORDER — PROCHLORPERAZINE EDISYLATE 5 MG/ML
5 INJECTION INTRAMUSCULAR; INTRAVENOUS EVERY 6 HOURS PRN
Status: DISCONTINUED | OUTPATIENT
Start: 2025-01-01 | End: 2025-01-09 | Stop reason: HOSPADM

## 2025-01-01 RX ORDER — HALOPERIDOL 5 MG/ML
2 INJECTION INTRAMUSCULAR EVERY 4 HOURS PRN
Status: DISCONTINUED | OUTPATIENT
Start: 2025-01-01 | End: 2025-01-09 | Stop reason: HOSPADM

## 2025-01-01 RX ORDER — ACETAMINOPHEN 10 MG/ML
1000 INJECTION, SOLUTION INTRAVENOUS ONCE
Status: COMPLETED | OUTPATIENT
Start: 2025-01-01 | End: 2025-01-01

## 2025-01-01 RX ORDER — HALOPERIDOL 2 MG/ML
2 SOLUTION ORAL EVERY 4 HOURS PRN
Status: DISCONTINUED | OUTPATIENT
Start: 2025-01-01 | End: 2025-01-09 | Stop reason: HOSPADM

## 2025-01-01 RX ORDER — DEXMEDETOMIDINE HYDROCHLORIDE 4 UG/ML
.2-1.5 INJECTION, SOLUTION INTRAVENOUS
Status: DISCONTINUED | OUTPATIENT
Start: 2025-01-01 | End: 2025-01-09 | Stop reason: HOSPADM

## 2025-01-01 RX ORDER — PROCHLORPERAZINE 25 MG
25 SUPPOSITORY, RECTAL RECTAL EVERY 12 HOURS PRN
Status: DISCONTINUED | OUTPATIENT
Start: 2025-01-01 | End: 2025-01-09 | Stop reason: HOSPADM

## 2025-01-01 RX ORDER — NICOTINE POLACRILEX 4 MG
15 LOZENGE BUCCAL
Status: DISCONTINUED | OUTPATIENT
Start: 2025-01-01 | End: 2025-01-01

## 2025-01-01 RX ORDER — ACETAMINOPHEN 160 MG/5ML
650 SOLUTION ORAL EVERY 6 HOURS PRN
Status: DISCONTINUED | OUTPATIENT
Start: 2025-01-01 | End: 2025-01-01

## 2025-01-01 RX ORDER — SCOPOLAMINE 1 MG/3D
1 PATCH, EXTENDED RELEASE TRANSDERMAL
Status: DISCONTINUED | OUTPATIENT
Start: 2025-01-01 | End: 2025-01-09 | Stop reason: HOSPADM

## 2025-01-01 RX ORDER — DEXMEDETOMIDINE HYDROCHLORIDE 4 UG/ML
.2-1.5 INJECTION, SOLUTION INTRAVENOUS
Status: DISCONTINUED | OUTPATIENT
Start: 2025-01-01 | End: 2025-01-01

## 2025-01-01 RX ORDER — LORAZEPAM 1 MG/1
2 TABLET ORAL
Status: DISCONTINUED | OUTPATIENT
Start: 2025-01-01 | End: 2025-01-09 | Stop reason: HOSPADM

## 2025-01-01 RX ORDER — LORAZEPAM 2 MG/ML
2 INJECTION INTRAMUSCULAR
Status: COMPLETED | OUTPATIENT
Start: 2025-01-01 | End: 2025-01-01

## 2025-01-01 RX ORDER — MIDAZOLAM HYDROCHLORIDE 2 MG/2ML
5 INJECTION, SOLUTION INTRAMUSCULAR; INTRAVENOUS ONCE
Status: COMPLETED | OUTPATIENT
Start: 2025-01-01 | End: 2025-01-01

## 2025-01-01 RX ORDER — FUROSEMIDE 10 MG/ML
40 INJECTION INTRAMUSCULAR; INTRAVENOUS ONCE
Status: DISCONTINUED | OUTPATIENT
Start: 2025-01-01 | End: 2025-01-01

## 2025-01-01 RX ORDER — LORAZEPAM 2 MG/ML
1 INJECTION INTRAMUSCULAR ONCE
Status: COMPLETED | OUTPATIENT
Start: 2025-01-01 | End: 2025-01-01

## 2025-01-01 RX ADMIN — ATORVASTATIN CALCIUM 40 MG: 40 TABLET, FILM COATED ORAL at 20:55

## 2025-01-01 RX ADMIN — IPRATROPIUM BROMIDE AND ALBUTEROL SULFATE 3 ML: .5; 3 SOLUTION RESPIRATORY (INHALATION) at 09:58

## 2025-01-01 RX ADMIN — IPRATROPIUM BROMIDE AND ALBUTEROL SULFATE 3 ML: .5; 3 SOLUTION RESPIRATORY (INHALATION) at 19:00

## 2025-01-01 RX ADMIN — FENTANYL CITRATE 200 MCG/HR: 50 INJECTION INTRAVENOUS at 21:56

## 2025-01-01 RX ADMIN — AMLODIPINE BESYLATE 10 MG: 5 TABLET ORAL at 20:46

## 2025-01-01 RX ADMIN — BUDESONIDE INHALATION 0.5 MG: 0.5 SUSPENSION RESPIRATORY (INHALATION) at 19:22

## 2025-01-01 RX ADMIN — DEXMEDETOMIDINE HYDROCHLORIDE 0.2 MCG/KG/HR: 4 INJECTION, SOLUTION INTRAVENOUS at 19:26

## 2025-01-01 RX ADMIN — Medication 1 APPLICATION: at 20:19

## 2025-01-01 RX ADMIN — LEVOFLOXACIN 750 MG: 750 TABLET, FILM COATED ORAL at 20:55

## 2025-01-01 RX ADMIN — MORPHINE SULFATE 6 MG: 4 INJECTION, SOLUTION INTRAMUSCULAR; INTRAVENOUS at 20:46

## 2025-01-01 RX ADMIN — MORPHINE SULFATE 2 MG/HR: 10 INJECTION INTRAVENOUS at 17:59

## 2025-01-01 RX ADMIN — IPRATROPIUM BROMIDE AND ALBUTEROL SULFATE 3 ML: .5; 3 SOLUTION RESPIRATORY (INHALATION) at 13:50

## 2025-01-01 RX ADMIN — AMIODARONE HYDROCHLORIDE 200 MG: 200 TABLET ORAL at 04:26

## 2025-01-01 RX ADMIN — LORAZEPAM 2 MG: 2 INJECTION INTRAMUSCULAR; INTRAVENOUS at 17:23

## 2025-01-01 RX ADMIN — MORPHINE SULFATE 6 MG: 4 INJECTION, SOLUTION INTRAMUSCULAR; INTRAVENOUS at 18:32

## 2025-01-01 RX ADMIN — INSULIN GLARGINE 10 UNITS: 100 INJECTION, SOLUTION SUBCUTANEOUS at 20:21

## 2025-01-01 RX ADMIN — DEXMEDETOMIDINE HYDROCHLORIDE 1.5 MCG/KG/HR: 4 INJECTION, SOLUTION INTRAVENOUS at 21:52

## 2025-01-01 RX ADMIN — ACETAMINOPHEN 650 MG: 650 SOLUTION ORAL at 00:05

## 2025-01-01 RX ADMIN — FENTANYL CITRATE 250 MCG/HR: 50 INJECTION INTRAVENOUS at 18:52

## 2025-01-01 RX ADMIN — MINERAL OIL, WHITE PETROLATUM: .03; .94 OINTMENT OPHTHALMIC at 00:12

## 2025-01-01 RX ADMIN — BUDESONIDE INHALATION 0.5 MG: 0.5 SUSPENSION RESPIRATORY (INHALATION) at 05:55

## 2025-01-01 RX ADMIN — ACETAMINOPHEN 650 MG: 650 SOLUTION ORAL at 17:36

## 2025-01-01 RX ADMIN — FENTANYL CITRATE 50 MCG/HR: 50 INJECTION INTRAVENOUS at 15:23

## 2025-01-01 RX ADMIN — Medication 1 APPLICATION: at 08:36

## 2025-01-01 RX ADMIN — AMIODARONE HYDROCHLORIDE 0.5 MG/MIN: 1.8 INJECTION, SOLUTION INTRAVENOUS at 12:53

## 2025-01-01 RX ADMIN — BUDESONIDE INHALATION 0.5 MG: 0.5 SUSPENSION RESPIRATORY (INHALATION) at 06:04

## 2025-01-01 RX ADMIN — AMLODIPINE BESYLATE 10 MG: 5 TABLET ORAL at 20:55

## 2025-01-01 RX ADMIN — AMIODARONE HYDROCHLORIDE 200 MG: 200 TABLET ORAL at 17:57

## 2025-01-01 RX ADMIN — FENTANYL CITRATE 250 MCG/HR: 50 INJECTION INTRAVENOUS at 02:02

## 2025-01-01 RX ADMIN — ENOXAPARIN SODIUM 40 MG: 100 INJECTION SUBCUTANEOUS at 08:37

## 2025-01-01 RX ADMIN — CLOPIDOGREL BISULFATE 75 MG: 75 TABLET ORAL at 20:21

## 2025-01-01 RX ADMIN — AMIODARONE HYDROCHLORIDE 200 MG: 200 TABLET ORAL at 17:42

## 2025-01-01 RX ADMIN — BUDESONIDE INHALATION 0.5 MG: 0.5 SUSPENSION RESPIRATORY (INHALATION) at 18:33

## 2025-01-01 RX ADMIN — ACETAMINOPHEN 650 MG: 650 SOLUTION ORAL at 23:36

## 2025-01-01 RX ADMIN — METOCLOPRAMIDE 5 MG: 5 INJECTION, SOLUTION INTRAMUSCULAR; INTRAVENOUS at 14:39

## 2025-01-01 RX ADMIN — ATORVASTATIN CALCIUM 40 MG: 40 TABLET, FILM COATED ORAL at 20:21

## 2025-01-01 RX ADMIN — FAMOTIDINE 20 MG: 20 TABLET, FILM COATED ORAL at 20:55

## 2025-01-01 RX ADMIN — Medication 10 ML: at 08:35

## 2025-01-01 RX ADMIN — ACETAMINOPHEN 1000 MG: 10 INJECTION, SOLUTION INTRAVENOUS at 16:04

## 2025-01-01 RX ADMIN — FAMOTIDINE 20 MG: 20 TABLET, FILM COATED ORAL at 09:49

## 2025-01-01 RX ADMIN — METOPROLOL TARTRATE 100 MG: 100 TABLET, FILM COATED ORAL at 20:21

## 2025-01-01 RX ADMIN — ACETAMINOPHEN 650 MG: 650 SOLUTION ORAL at 02:18

## 2025-01-01 RX ADMIN — KETOROLAC TROMETHAMINE 15 MG: 15 INJECTION, SOLUTION INTRAMUSCULAR; INTRAVENOUS at 03:03

## 2025-01-01 RX ADMIN — FLUOROMETHOLONE ACETATE 2 DROP: 1 SUSPENSION/ DROPS OPHTHALMIC at 09:48

## 2025-01-01 RX ADMIN — LORAZEPAM 2 MG: 2 INJECTION INTRAMUSCULAR; INTRAVENOUS at 17:13

## 2025-01-01 RX ADMIN — METOPROLOL TARTRATE 100 MG: 100 TABLET, FILM COATED ORAL at 20:55

## 2025-01-01 RX ADMIN — MORPHINE SULFATE 6 MG: 4 INJECTION, SOLUTION INTRAMUSCULAR; INTRAVENOUS at 16:35

## 2025-01-01 RX ADMIN — LORAZEPAM 2 MG: 2 INJECTION INTRAMUSCULAR; INTRAVENOUS at 20:18

## 2025-01-01 RX ADMIN — BUDESONIDE INHALATION 0.5 MG: 0.5 SUSPENSION RESPIRATORY (INHALATION) at 06:00

## 2025-01-01 RX ADMIN — LORAZEPAM 2 MG: 2 INJECTION INTRAMUSCULAR; INTRAVENOUS at 16:23

## 2025-01-01 RX ADMIN — ENOXAPARIN SODIUM 40 MG: 100 INJECTION SUBCUTANEOUS at 09:20

## 2025-01-01 RX ADMIN — AMLODIPINE BESYLATE 10 MG: 5 TABLET ORAL at 20:42

## 2025-01-01 RX ADMIN — MINERAL OIL, WHITE PETROLATUM: .03; .94 OINTMENT OPHTHALMIC at 20:22

## 2025-01-01 RX ADMIN — MORPHINE SULFATE 20 MG/HR: 10 INJECTION INTRAVENOUS at 19:37

## 2025-01-01 RX ADMIN — METOPROLOL TARTRATE 100 MG: 100 TABLET, FILM COATED ORAL at 09:20

## 2025-01-01 RX ADMIN — FENTANYL CITRATE 250 MCG/HR: 50 INJECTION INTRAVENOUS at 09:07

## 2025-01-01 RX ADMIN — Medication 10 ML: at 20:22

## 2025-01-01 RX ADMIN — FENTANYL CITRATE 250 MCG/HR: 50 INJECTION INTRAVENOUS at 01:30

## 2025-01-01 RX ADMIN — DEXMEDETOMIDINE HYDROCHLORIDE 0.6 MCG/KG/HR: 4 INJECTION, SOLUTION INTRAVENOUS at 09:20

## 2025-01-01 RX ADMIN — DOCUSATE SODIUM 50 MG AND SENNOSIDES 8.6 MG 2 TABLET: 8.6; 5 TABLET, FILM COATED ORAL at 20:22

## 2025-01-01 RX ADMIN — FAMOTIDINE 20 MG: 20 TABLET, FILM COATED ORAL at 08:36

## 2025-01-01 RX ADMIN — HALOPERIDOL LACTATE 2 MG: 5 INJECTION, SOLUTION INTRAMUSCULAR at 17:48

## 2025-01-01 RX ADMIN — ENOXAPARIN SODIUM 40 MG: 100 INJECTION SUBCUTANEOUS at 08:35

## 2025-01-01 RX ADMIN — METOPROLOL TARTRATE 100 MG: 100 TABLET, FILM COATED ORAL at 09:14

## 2025-01-01 RX ADMIN — LEVOFLOXACIN 750 MG: 750 TABLET, FILM COATED ORAL at 20:19

## 2025-01-01 RX ADMIN — IPRATROPIUM BROMIDE AND ALBUTEROL SULFATE 3 ML: .5; 3 SOLUTION RESPIRATORY (INHALATION) at 06:26

## 2025-01-01 RX ADMIN — INSULIN HUMAN 2 UNITS: 100 INJECTION, SOLUTION PARENTERAL at 23:46

## 2025-01-01 RX ADMIN — CHLORHEXIDINE GLUCONATE 0.12% ORAL RINSE 15 ML: 1.2 LIQUID ORAL at 20:42

## 2025-01-01 RX ADMIN — METOPROLOL TARTRATE 100 MG: 100 TABLET, FILM COATED ORAL at 20:22

## 2025-01-01 RX ADMIN — Medication 1 APPLICATION: at 20:42

## 2025-01-01 RX ADMIN — FENTANYL CITRATE 25 MCG: 50 INJECTION, SOLUTION INTRAMUSCULAR; INTRAVENOUS at 06:21

## 2025-01-01 RX ADMIN — CHLORHEXIDINE GLUCONATE 0.12% ORAL RINSE 15 ML: 1.2 LIQUID ORAL at 20:46

## 2025-01-01 RX ADMIN — CHLORHEXIDINE GLUCONATE 1 APPLICATION: 500 CLOTH TOPICAL at 03:56

## 2025-01-01 RX ADMIN — FUROSEMIDE 20 MG: 10 INJECTION, SOLUTION INTRAMUSCULAR; INTRAVENOUS at 17:34

## 2025-01-01 RX ADMIN — LISINOPRIL 40 MG: 20 TABLET ORAL at 08:31

## 2025-01-01 RX ADMIN — DOCUSATE SODIUM 50 MG AND SENNOSIDES 8.6 MG 2 TABLET: 8.6; 5 TABLET, FILM COATED ORAL at 20:55

## 2025-01-01 RX ADMIN — SODIUM CHLORIDE 1500 MG: 9 INJECTION, SOLUTION INTRAVENOUS at 11:22

## 2025-01-01 RX ADMIN — BUDESONIDE INHALATION 0.5 MG: 0.5 SUSPENSION RESPIRATORY (INHALATION) at 06:21

## 2025-01-01 RX ADMIN — SCOLOPAMINE TRANSDERMAL SYSTEM 1 PATCH: 1 PATCH, EXTENDED RELEASE TRANSDERMAL at 19:45

## 2025-01-01 RX ADMIN — AMIODARONE HYDROCHLORIDE 0.5 MG/MIN: 1.8 INJECTION, SOLUTION INTRAVENOUS at 00:13

## 2025-01-01 RX ADMIN — METOPROLOL TARTRATE 100 MG: 100 TABLET, FILM COATED ORAL at 08:31

## 2025-01-01 RX ADMIN — INSULIN GLARGINE 10 UNITS: 100 INJECTION, SOLUTION SUBCUTANEOUS at 20:22

## 2025-01-01 RX ADMIN — AMIODARONE HYDROCHLORIDE 200 MG: 200 TABLET ORAL at 02:30

## 2025-01-01 RX ADMIN — CHLORHEXIDINE GLUCONATE 0.12% ORAL RINSE 15 ML: 1.2 LIQUID ORAL at 09:45

## 2025-01-01 RX ADMIN — Medication 10 ML: at 20:20

## 2025-01-01 RX ADMIN — CLOPIDOGREL BISULFATE 75 MG: 75 TABLET ORAL at 20:31

## 2025-01-01 RX ADMIN — FENTANYL CITRATE 25 MCG: 50 INJECTION, SOLUTION INTRAMUSCULAR; INTRAVENOUS at 12:07

## 2025-01-01 RX ADMIN — AMIODARONE HYDROCHLORIDE 150 MG: 1.5 INJECTION, SOLUTION INTRAVENOUS at 09:06

## 2025-01-01 RX ADMIN — DEXMEDETOMIDINE HYDROCHLORIDE 0.4 MCG/KG/HR: 4 INJECTION, SOLUTION INTRAVENOUS at 03:54

## 2025-01-01 RX ADMIN — HYDRALAZINE HYDROCHLORIDE 10 MG: 20 INJECTION INTRAMUSCULAR; INTRAVENOUS at 12:56

## 2025-01-01 RX ADMIN — DOCUSATE SODIUM 50 MG AND SENNOSIDES 8.6 MG 2 TABLET: 8.6; 5 TABLET, FILM COATED ORAL at 09:46

## 2025-01-01 RX ADMIN — FLUOROMETHOLONE ACETATE 2 DROP: 1 SUSPENSION/ DROPS OPHTHALMIC at 12:50

## 2025-01-01 RX ADMIN — CEFEPIME 2000 MG: 2 INJECTION, POWDER, FOR SOLUTION INTRAVENOUS at 01:31

## 2025-01-01 RX ADMIN — METOPROLOL TARTRATE 100 MG: 100 TABLET, FILM COATED ORAL at 09:48

## 2025-01-01 RX ADMIN — FENTANYL CITRATE 150 MCG/HR: 50 INJECTION INTRAVENOUS at 05:15

## 2025-01-01 RX ADMIN — IPRATROPIUM BROMIDE AND ALBUTEROL SULFATE 3 ML: .5; 3 SOLUTION RESPIRATORY (INHALATION) at 10:24

## 2025-01-01 RX ADMIN — FENTANYL CITRATE 250 MCG/HR: 50 INJECTION INTRAVENOUS at 14:39

## 2025-01-01 RX ADMIN — IPRATROPIUM BROMIDE AND ALBUTEROL SULFATE 3 ML: .5; 3 SOLUTION RESPIRATORY (INHALATION) at 20:02

## 2025-01-01 RX ADMIN — ACETAMINOPHEN 650 MG: 650 SOLUTION ORAL at 17:41

## 2025-01-01 RX ADMIN — FAMOTIDINE 20 MG: 20 TABLET, FILM COATED ORAL at 09:14

## 2025-01-01 RX ADMIN — AMIODARONE HYDROCHLORIDE 200 MG: 200 TABLET ORAL at 12:40

## 2025-01-01 RX ADMIN — FAMOTIDINE 20 MG: 20 TABLET, FILM COATED ORAL at 20:21

## 2025-01-01 RX ADMIN — CHLORHEXIDINE GLUCONATE 0.12% ORAL RINSE 15 ML: 1.2 LIQUID ORAL at 08:37

## 2025-01-01 RX ADMIN — FENTANYL CITRATE 50 MCG: 50 INJECTION, SOLUTION INTRAMUSCULAR; INTRAVENOUS at 17:23

## 2025-01-01 RX ADMIN — AMIODARONE HYDROCHLORIDE 200 MG: 200 TABLET ORAL at 02:18

## 2025-01-01 RX ADMIN — CLOPIDOGREL BISULFATE 75 MG: 75 TABLET ORAL at 20:42

## 2025-01-01 RX ADMIN — BUDESONIDE INHALATION 0.5 MG: 0.5 SUSPENSION RESPIRATORY (INHALATION) at 07:13

## 2025-01-01 RX ADMIN — METOPROLOL TARTRATE 100 MG: 100 TABLET, FILM COATED ORAL at 09:49

## 2025-01-01 RX ADMIN — LORAZEPAM 2 MG: 2 INJECTION INTRAMUSCULAR; INTRAVENOUS at 17:34

## 2025-01-01 RX ADMIN — LEVOFLOXACIN 750 MG: 750 TABLET, FILM COATED ORAL at 20:42

## 2025-01-01 RX ADMIN — INSULIN GLARGINE 10 UNITS: 100 INJECTION, SOLUTION SUBCUTANEOUS at 20:31

## 2025-01-01 RX ADMIN — CEFEPIME 2000 MG: 2 INJECTION, POWDER, FOR SOLUTION INTRAVENOUS at 09:19

## 2025-01-01 RX ADMIN — METOPROLOL TARTRATE 100 MG: 100 TABLET, FILM COATED ORAL at 09:02

## 2025-01-01 RX ADMIN — ACETAMINOPHEN 650 MG: 325 TABLET ORAL at 08:46

## 2025-01-01 RX ADMIN — CLOPIDOGREL BISULFATE 75 MG: 75 TABLET ORAL at 20:55

## 2025-01-01 RX ADMIN — ACETAMINOPHEN 650 MG: 325 TABLET ORAL at 02:32

## 2025-01-01 RX ADMIN — DEXMEDETOMIDINE HYDROCHLORIDE 0.4 MCG/KG/HR: 4 INJECTION, SOLUTION INTRAVENOUS at 12:25

## 2025-01-01 RX ADMIN — Medication 10 ML: at 20:42

## 2025-01-01 RX ADMIN — LORAZEPAM 2 MG: 2 INJECTION INTRAMUSCULAR; INTRAVENOUS at 23:23

## 2025-01-01 RX ADMIN — GLYCOPYRROLATE 0.4 MG: 0.2 INJECTION INTRAMUSCULAR; INTRAVENOUS at 17:34

## 2025-01-01 RX ADMIN — IPRATROPIUM BROMIDE AND ALBUTEROL SULFATE 3 ML: .5; 3 SOLUTION RESPIRATORY (INHALATION) at 09:30

## 2025-01-01 RX ADMIN — LORAZEPAM 2 MG: 2 INJECTION INTRAMUSCULAR; INTRAVENOUS at 16:51

## 2025-01-01 RX ADMIN — FENTANYL CITRATE 50 MCG: 50 INJECTION, SOLUTION INTRAMUSCULAR; INTRAVENOUS at 17:13

## 2025-01-01 RX ADMIN — IPRATROPIUM BROMIDE AND ALBUTEROL SULFATE 3 ML: .5; 3 SOLUTION RESPIRATORY (INHALATION) at 14:51

## 2025-01-01 RX ADMIN — FAMOTIDINE 20 MG: 20 TABLET, FILM COATED ORAL at 09:20

## 2025-01-01 RX ADMIN — Medication 10 ML: at 20:32

## 2025-01-01 RX ADMIN — FENTANYL CITRATE 300 MCG/HR: 50 INJECTION INTRAVENOUS at 04:35

## 2025-01-01 RX ADMIN — IPRATROPIUM BROMIDE AND ALBUTEROL SULFATE 3 ML: .5; 3 SOLUTION RESPIRATORY (INHALATION) at 10:39

## 2025-01-01 RX ADMIN — Medication 10 ML: at 09:20

## 2025-01-01 RX ADMIN — DOCUSATE SODIUM 50 MG AND SENNOSIDES 8.6 MG 2 TABLET: 8.6; 5 TABLET, FILM COATED ORAL at 20:20

## 2025-01-01 RX ADMIN — IPRATROPIUM BROMIDE AND ALBUTEROL SULFATE 3 ML: .5; 3 SOLUTION RESPIRATORY (INHALATION) at 18:45

## 2025-01-01 RX ADMIN — MORPHINE SULFATE 20 MG/HR: 10 INJECTION INTRAVENOUS at 22:02

## 2025-01-01 RX ADMIN — LISINOPRIL 40 MG: 20 TABLET ORAL at 09:02

## 2025-01-01 RX ADMIN — FLUOROMETHOLONE ACETATE 2 DROP: 1 SUSPENSION/ DROPS OPHTHALMIC at 09:02

## 2025-01-01 RX ADMIN — ACETAMINOPHEN 650 MG: 325 TABLET ORAL at 12:55

## 2025-01-01 RX ADMIN — FLUOROMETHOLONE ACETATE 2 DROP: 1 SUSPENSION/ DROPS OPHTHALMIC at 20:43

## 2025-01-01 RX ADMIN — ENOXAPARIN SODIUM 40 MG: 100 INJECTION SUBCUTANEOUS at 08:36

## 2025-01-01 RX ADMIN — FENTANYL CITRATE 50 MCG: 50 INJECTION, SOLUTION INTRAMUSCULAR; INTRAVENOUS at 14:41

## 2025-01-01 RX ADMIN — DEXMEDETOMIDINE HYDROCHLORIDE 0.4 MCG/KG/HR: 4 INJECTION, SOLUTION INTRAVENOUS at 21:36

## 2025-01-01 RX ADMIN — ATORVASTATIN CALCIUM 40 MG: 40 TABLET, FILM COATED ORAL at 20:46

## 2025-01-01 RX ADMIN — FENTANYL CITRATE 300 MCG/HR: 50 INJECTION INTRAVENOUS at 19:59

## 2025-01-01 RX ADMIN — FAMOTIDINE 20 MG: 20 TABLET, FILM COATED ORAL at 08:37

## 2025-01-01 RX ADMIN — FENTANYL CITRATE 25 MCG: 50 INJECTION, SOLUTION INTRAMUSCULAR; INTRAVENOUS at 02:40

## 2025-01-01 RX ADMIN — MORPHINE SULFATE 6 MG: 4 INJECTION, SOLUTION INTRAMUSCULAR; INTRAVENOUS at 17:34

## 2025-01-01 RX ADMIN — LISINOPRIL 40 MG: 20 TABLET ORAL at 08:36

## 2025-01-01 RX ADMIN — FENTANYL CITRATE 50 MCG/HR: 50 INJECTION INTRAVENOUS at 13:47

## 2025-01-01 RX ADMIN — DOCUSATE SODIUM 50 MG AND SENNOSIDES 8.6 MG 2 TABLET: 8.6; 5 TABLET, FILM COATED ORAL at 09:49

## 2025-01-01 RX ADMIN — CHLORHEXIDINE GLUCONATE 1 APPLICATION: 500 CLOTH TOPICAL at 03:40

## 2025-01-01 RX ADMIN — IPRATROPIUM BROMIDE AND ALBUTEROL SULFATE 3 ML: .5; 3 SOLUTION RESPIRATORY (INHALATION) at 05:50

## 2025-01-01 RX ADMIN — BUDESONIDE INHALATION 0.5 MG: 0.5 SUSPENSION RESPIRATORY (INHALATION) at 19:00

## 2025-01-01 RX ADMIN — LORAZEPAM 1 MG: 2 INJECTION INTRAMUSCULAR; INTRAVENOUS at 14:36

## 2025-01-01 RX ADMIN — CHLORHEXIDINE GLUCONATE 1 APPLICATION: 500 CLOTH TOPICAL at 05:00

## 2025-01-01 RX ADMIN — ACETAMINOPHEN 650 MG: 650 SUPPOSITORY RECTAL at 22:10

## 2025-01-01 RX ADMIN — FENTANYL CITRATE 25 MCG: 50 INJECTION, SOLUTION INTRAMUSCULAR; INTRAVENOUS at 05:00

## 2025-01-01 RX ADMIN — ACETAMINOPHEN 650 MG: 325 TABLET ORAL at 08:06

## 2025-01-01 RX ADMIN — IPRATROPIUM BROMIDE AND ALBUTEROL SULFATE 3 ML: .5; 3 SOLUTION RESPIRATORY (INHALATION) at 19:22

## 2025-01-01 RX ADMIN — IPRATROPIUM BROMIDE AND ALBUTEROL SULFATE 3 ML: .5; 3 SOLUTION RESPIRATORY (INHALATION) at 18:50

## 2025-01-01 RX ADMIN — SODIUM CHLORIDE 1250 MG: 9 INJECTION, SOLUTION INTRAVENOUS at 09:01

## 2025-01-01 RX ADMIN — Medication 1 APPLICATION: at 08:35

## 2025-01-01 RX ADMIN — AMIODARONE HYDROCHLORIDE 200 MG: 200 TABLET ORAL at 10:19

## 2025-01-01 RX ADMIN — FENTANYL CITRATE 300 MCG/HR: 50 INJECTION INTRAVENOUS at 04:11

## 2025-01-01 RX ADMIN — AMIODARONE HYDROCHLORIDE 1 MG/MIN: 1.8 INJECTION, SOLUTION INTRAVENOUS at 18:10

## 2025-01-01 RX ADMIN — FENTANYL CITRATE 25 MCG: 50 INJECTION, SOLUTION INTRAMUSCULAR; INTRAVENOUS at 13:20

## 2025-01-01 RX ADMIN — FENTANYL CITRATE 225 MCG/HR: 50 INJECTION INTRAVENOUS at 14:55

## 2025-01-01 RX ADMIN — METOPROLOL TARTRATE 100 MG: 100 TABLET, FILM COATED ORAL at 08:37

## 2025-01-01 RX ADMIN — FAMOTIDINE 20 MG: 20 TABLET, FILM COATED ORAL at 08:31

## 2025-01-01 RX ADMIN — IPRATROPIUM BROMIDE AND ALBUTEROL SULFATE 3 ML: .5; 3 SOLUTION RESPIRATORY (INHALATION) at 14:04

## 2025-01-01 RX ADMIN — AMLODIPINE BESYLATE 10 MG: 5 TABLET ORAL at 20:19

## 2025-01-01 RX ADMIN — AMIODARONE HYDROCHLORIDE 200 MG: 200 TABLET ORAL at 09:34

## 2025-01-01 RX ADMIN — INSULIN GLARGINE 10 UNITS: 100 INJECTION, SOLUTION SUBCUTANEOUS at 20:54

## 2025-01-01 RX ADMIN — Medication 10 ML: at 09:14

## 2025-01-01 RX ADMIN — ATORVASTATIN CALCIUM 40 MG: 40 TABLET, FILM COATED ORAL at 20:42

## 2025-01-01 RX ADMIN — DOCUSATE SODIUM 50 MG AND SENNOSIDES 8.6 MG 2 TABLET: 8.6; 5 TABLET, FILM COATED ORAL at 20:45

## 2025-01-01 RX ADMIN — FLUOROMETHOLONE ACETATE 2 DROP: 1 SUSPENSION/ DROPS OPHTHALMIC at 20:55

## 2025-01-01 RX ADMIN — IPRATROPIUM BROMIDE AND ALBUTEROL SULFATE 3 ML: .5; 3 SOLUTION RESPIRATORY (INHALATION) at 06:00

## 2025-01-01 RX ADMIN — FAMOTIDINE 20 MG: 20 TABLET, FILM COATED ORAL at 20:46

## 2025-01-01 RX ADMIN — MIDAZOLAM HYDROCHLORIDE 5 MG: 1 INJECTION, SOLUTION INTRAMUSCULAR; INTRAVENOUS at 18:44

## 2025-01-01 RX ADMIN — IPRATROPIUM BROMIDE AND ALBUTEROL SULFATE 3 ML: .5; 3 SOLUTION RESPIRATORY (INHALATION) at 19:49

## 2025-01-01 RX ADMIN — Medication 10 ML: at 09:06

## 2025-01-01 RX ADMIN — CHLORHEXIDINE GLUCONATE 0.12% ORAL RINSE 15 ML: 1.2 LIQUID ORAL at 09:49

## 2025-01-01 RX ADMIN — Medication 10 ML: at 08:37

## 2025-01-01 RX ADMIN — Medication 10 ML: at 20:46

## 2025-01-01 RX ADMIN — AMLODIPINE BESYLATE 10 MG: 5 TABLET ORAL at 20:31

## 2025-01-01 RX ADMIN — CHLORHEXIDINE GLUCONATE 0.12% ORAL RINSE 15 ML: 1.2 LIQUID ORAL at 09:20

## 2025-01-01 RX ADMIN — CLOPIDOGREL BISULFATE 75 MG: 75 TABLET ORAL at 20:22

## 2025-01-01 RX ADMIN — FENTANYL CITRATE 25 MCG: 50 INJECTION, SOLUTION INTRAMUSCULAR; INTRAVENOUS at 08:30

## 2025-01-01 RX ADMIN — LORAZEPAM 2 MG: 2 INJECTION INTRAMUSCULAR; INTRAVENOUS at 18:43

## 2025-01-01 RX ADMIN — CLOPIDOGREL BISULFATE 75 MG: 75 TABLET ORAL at 20:46

## 2025-01-01 RX ADMIN — ACETAMINOPHEN 650 MG: 325 TABLET ORAL at 14:26

## 2025-01-01 RX ADMIN — IPRATROPIUM BROMIDE AND ALBUTEROL SULFATE 3 ML: .5; 3 SOLUTION RESPIRATORY (INHALATION) at 14:47

## 2025-01-01 RX ADMIN — Medication 1 APPLICATION: at 09:02

## 2025-01-01 RX ADMIN — BUDESONIDE INHALATION 0.5 MG: 0.5 SUSPENSION RESPIRATORY (INHALATION) at 20:02

## 2025-01-01 RX ADMIN — CHLORHEXIDINE GLUCONATE 0.12% ORAL RINSE 15 ML: 1.2 LIQUID ORAL at 20:20

## 2025-01-01 RX ADMIN — CEFEPIME 2000 MG: 2 INJECTION, POWDER, FOR SOLUTION INTRAVENOUS at 09:14

## 2025-01-01 RX ADMIN — CHLORHEXIDINE GLUCONATE 0.12% ORAL RINSE 15 ML: 1.2 LIQUID ORAL at 08:36

## 2025-01-01 RX ADMIN — FENTANYL CITRATE 25 MCG: 50 INJECTION, SOLUTION INTRAMUSCULAR; INTRAVENOUS at 10:38

## 2025-01-01 RX ADMIN — AMLODIPINE BESYLATE 10 MG: 5 TABLET ORAL at 20:22

## 2025-01-01 RX ADMIN — FLUOROMETHOLONE ACETATE 2 DROP: 1 SUSPENSION/ DROPS OPHTHALMIC at 18:21

## 2025-01-01 RX ADMIN — METOPROLOL TARTRATE 100 MG: 100 TABLET, FILM COATED ORAL at 20:31

## 2025-01-01 RX ADMIN — FENTANYL CITRATE 25 MCG: 50 INJECTION, SOLUTION INTRAMUSCULAR; INTRAVENOUS at 01:23

## 2025-01-01 RX ADMIN — ATORVASTATIN CALCIUM 40 MG: 40 TABLET, FILM COATED ORAL at 20:20

## 2025-01-01 RX ADMIN — INSULIN HUMAN 2 UNITS: 100 INJECTION, SOLUTION PARENTERAL at 12:16

## 2025-01-01 RX ADMIN — Medication 10 ML: at 09:46

## 2025-01-01 RX ADMIN — ENOXAPARIN SODIUM 40 MG: 100 INJECTION SUBCUTANEOUS at 09:45

## 2025-01-01 RX ADMIN — BUDESONIDE INHALATION 0.5 MG: 0.5 SUSPENSION RESPIRATORY (INHALATION) at 05:50

## 2025-01-01 RX ADMIN — AMIODARONE HYDROCHLORIDE 200 MG: 200 TABLET ORAL at 18:04

## 2025-01-01 RX ADMIN — FLUOROMETHOLONE ACETATE 2 DROP: 1 SUSPENSION/ DROPS OPHTHALMIC at 20:20

## 2025-01-01 RX ADMIN — FAMOTIDINE 20 MG: 20 TABLET, FILM COATED ORAL at 09:02

## 2025-01-01 RX ADMIN — IPRATROPIUM BROMIDE AND ALBUTEROL SULFATE 3 ML: .5; 3 SOLUTION RESPIRATORY (INHALATION) at 13:54

## 2025-01-01 RX ADMIN — INSULIN GLARGINE 10 UNITS: 100 INJECTION, SOLUTION SUBCUTANEOUS at 20:46

## 2025-01-01 RX ADMIN — DEXMEDETOMIDINE HYDROCHLORIDE 0.2 MCG/KG/HR: 4 INJECTION, SOLUTION INTRAVENOUS at 03:03

## 2025-01-01 RX ADMIN — AMIODARONE HYDROCHLORIDE 200 MG: 200 TABLET ORAL at 01:31

## 2025-01-01 RX ADMIN — IPRATROPIUM BROMIDE AND ALBUTEROL SULFATE 3 ML: .5; 3 SOLUTION RESPIRATORY (INHALATION) at 06:01

## 2025-01-01 RX ADMIN — METOPROLOL TARTRATE 100 MG: 100 TABLET, FILM COATED ORAL at 08:36

## 2025-01-01 RX ADMIN — FAMOTIDINE 20 MG: 20 TABLET, FILM COATED ORAL at 20:31

## 2025-01-01 RX ADMIN — CHLORHEXIDINE GLUCONATE 1 APPLICATION: 500 CLOTH TOPICAL at 04:26

## 2025-01-01 RX ADMIN — MINERAL OIL, WHITE PETROLATUM: .03; .94 OINTMENT OPHTHALMIC at 02:37

## 2025-01-01 RX ADMIN — ACETAMINOPHEN 650 MG: 650 SOLUTION ORAL at 20:20

## 2025-01-01 RX ADMIN — PIPERACILLIN AND TAZOBACTAM 4.5 G: 4; .5 INJECTION, POWDER, FOR SOLUTION INTRAVENOUS at 13:28

## 2025-01-01 RX ADMIN — ACETAMINOPHEN 650 MG: 650 SOLUTION ORAL at 09:14

## 2025-01-01 RX ADMIN — ACETAMINOPHEN 650 MG: 650 SOLUTION ORAL at 06:20

## 2025-01-01 RX ADMIN — IPRATROPIUM BROMIDE AND ALBUTEROL SULFATE 3 ML: .5; 3 SOLUTION RESPIRATORY (INHALATION) at 13:57

## 2025-01-01 RX ADMIN — AMIODARONE HYDROCHLORIDE 200 MG: 200 TABLET ORAL at 18:20

## 2025-01-01 RX ADMIN — AMIODARONE HYDROCHLORIDE 200 MG: 200 TABLET ORAL at 17:36

## 2025-01-01 RX ADMIN — BUDESONIDE INHALATION 0.5 MG: 0.5 SUSPENSION RESPIRATORY (INHALATION) at 19:49

## 2025-01-01 RX ADMIN — Medication 10 ML: at 09:49

## 2025-01-01 RX ADMIN — BUDESONIDE INHALATION 0.5 MG: 0.5 SUSPENSION RESPIRATORY (INHALATION) at 18:45

## 2025-01-01 RX ADMIN — FAMOTIDINE 20 MG: 20 TABLET, FILM COATED ORAL at 20:22

## 2025-01-01 RX ADMIN — PIPERACILLIN AND TAZOBACTAM 4.5 G: 4; .5 INJECTION, POWDER, FOR SOLUTION INTRAVENOUS at 04:32

## 2025-01-01 RX ADMIN — FUROSEMIDE 40 MG: 10 INJECTION, SOLUTION INTRAVENOUS at 12:07

## 2025-01-01 RX ADMIN — IPRATROPIUM BROMIDE AND ALBUTEROL SULFATE 3 ML: .5; 3 SOLUTION RESPIRATORY (INHALATION) at 06:21

## 2025-01-01 RX ADMIN — POLYETHYLENE GLYCOL 3350 17 G: 17 POWDER, FOR SOLUTION ORAL at 20:20

## 2025-01-01 RX ADMIN — AMLODIPINE BESYLATE 10 MG: 5 TABLET ORAL at 20:21

## 2025-01-01 RX ADMIN — IPRATROPIUM BROMIDE AND ALBUTEROL SULFATE 3 ML: .5; 3 SOLUTION RESPIRATORY (INHALATION) at 10:41

## 2025-01-01 RX ADMIN — ACETAMINOPHEN 650 MG: 650 SOLUTION ORAL at 18:32

## 2025-01-01 RX ADMIN — AMIODARONE HYDROCHLORIDE 200 MG: 200 TABLET ORAL at 12:07

## 2025-01-01 RX ADMIN — IPRATROPIUM BROMIDE AND ALBUTEROL SULFATE 3 ML: .5; 3 SOLUTION RESPIRATORY (INHALATION) at 10:17

## 2025-01-01 RX ADMIN — MORPHINE SULFATE 6 MG: 4 INJECTION, SOLUTION INTRAMUSCULAR; INTRAVENOUS at 22:07

## 2025-01-01 RX ADMIN — METOPROLOL TARTRATE 100 MG: 100 TABLET, FILM COATED ORAL at 20:19

## 2025-01-01 RX ADMIN — AMIODARONE HYDROCHLORIDE 200 MG: 200 TABLET ORAL at 02:29

## 2025-01-01 RX ADMIN — CHLORHEXIDINE GLUCONATE 1 APPLICATION: 500 CLOTH TOPICAL at 03:18

## 2025-01-01 RX ADMIN — FENTANYL CITRATE 50 MCG/HR: 50 INJECTION INTRAVENOUS at 17:30

## 2025-01-01 RX ADMIN — ENOXAPARIN SODIUM 40 MG: 100 INJECTION SUBCUTANEOUS at 09:14

## 2025-01-01 RX ADMIN — FENTANYL CITRATE 25 MCG: 50 INJECTION, SOLUTION INTRAMUSCULAR; INTRAVENOUS at 07:29

## 2025-01-01 RX ADMIN — FENTANYL CITRATE 50 MCG: 50 INJECTION, SOLUTION INTRAMUSCULAR; INTRAVENOUS at 17:34

## 2025-01-01 RX ADMIN — METOCLOPRAMIDE 5 MG: 5 INJECTION, SOLUTION INTRAMUSCULAR; INTRAVENOUS at 20:23

## 2025-01-01 RX ADMIN — AMIODARONE HYDROCHLORIDE 200 MG: 200 TABLET ORAL at 03:03

## 2025-01-01 RX ADMIN — AMIODARONE HYDROCHLORIDE 150 MG: 1.5 INJECTION, SOLUTION INTRAVENOUS at 18:00

## 2025-01-01 RX ADMIN — MINERAL OIL, WHITE PETROLATUM: .03; .94 OINTMENT OPHTHALMIC at 03:40

## 2025-01-01 RX ADMIN — LORAZEPAM 2 MG: 2 INJECTION INTRAMUSCULAR; INTRAVENOUS at 17:03

## 2025-01-01 RX ADMIN — SODIUM CHLORIDE 1250 MG: 9 INJECTION, SOLUTION INTRAVENOUS at 20:54

## 2025-01-01 RX ADMIN — Medication 10 ML: at 20:23

## 2025-01-01 RX ADMIN — IPRATROPIUM BROMIDE AND ALBUTEROL SULFATE 3 ML: .5; 3 SOLUTION RESPIRATORY (INHALATION) at 07:13

## 2025-01-01 RX ADMIN — ACETAMINOPHEN 650 MG: 325 TABLET ORAL at 23:57

## 2025-01-01 RX ADMIN — LORAZEPAM 2 MG: 2 INJECTION INTRAMUSCULAR; INTRAVENOUS at 21:27

## 2025-01-01 RX ADMIN — ACETAMINOPHEN 650 MG: 325 TABLET ORAL at 14:42

## 2025-01-01 RX ADMIN — BUDESONIDE INHALATION 0.5 MG: 0.5 SUSPENSION RESPIRATORY (INHALATION) at 18:50

## 2025-01-01 RX ADMIN — FAMOTIDINE 20 MG: 20 TABLET, FILM COATED ORAL at 09:45

## 2025-01-01 RX ADMIN — CHLORHEXIDINE GLUCONATE 0.12% ORAL RINSE 15 ML: 1.2 LIQUID ORAL at 20:22

## 2025-01-01 RX ADMIN — CLOPIDOGREL BISULFATE 75 MG: 75 TABLET ORAL at 20:19

## 2025-01-01 RX ADMIN — FLUOROMETHOLONE ACETATE 2 DROP: 1 SUSPENSION/ DROPS OPHTHALMIC at 19:28

## 2025-01-01 RX ADMIN — METOPROLOL TARTRATE 100 MG: 100 TABLET, FILM COATED ORAL at 20:46

## 2025-01-01 RX ADMIN — ACETAMINOPHEN 650 MG: 650 SOLUTION ORAL at 08:37

## 2025-01-01 RX ADMIN — ACETAMINOPHEN 650 MG: 650 SOLUTION ORAL at 12:31

## 2025-01-01 RX ADMIN — LORAZEPAM 2 MG: 2 INJECTION INTRAMUSCULAR; INTRAVENOUS at 17:48

## 2025-01-01 RX ADMIN — IPRATROPIUM BROMIDE AND ALBUTEROL SULFATE 3 ML: .5; 3 SOLUTION RESPIRATORY (INHALATION) at 18:33

## 2025-01-01 RX ADMIN — GLYCOPYRROLATE 0.4 MG: 0.2 INJECTION INTRAMUSCULAR; INTRAVENOUS at 14:37

## 2025-01-01 RX ADMIN — HYALURONIDASE (HUMAN RECOMBINANT) 150 UNITS: 150 INJECTION, SOLUTION SUBCUTANEOUS at 10:33

## 2025-01-01 RX ADMIN — FENTANYL CITRATE 50 MCG: 50 INJECTION, SOLUTION INTRAMUSCULAR; INTRAVENOUS at 17:03

## 2025-01-01 RX ADMIN — MINERAL OIL, WHITE PETROLATUM 1 APPLICATION: .03; .94 OINTMENT OPHTHALMIC at 16:27

## 2025-01-01 RX ADMIN — ACETAMINOPHEN 650 MG: 650 SUPPOSITORY RECTAL at 03:55

## 2025-01-01 RX ADMIN — IPRATROPIUM BROMIDE AND ALBUTEROL SULFATE 3 ML: .5; 3 SOLUTION RESPIRATORY (INHALATION) at 05:55

## 2025-01-01 RX ADMIN — BUDESONIDE INHALATION 0.5 MG: 0.5 SUSPENSION RESPIRATORY (INHALATION) at 06:01

## 2025-01-01 RX ADMIN — FLUOROMETHOLONE ACETATE 2 DROP: 1 SUSPENSION/ DROPS OPHTHALMIC at 08:36

## 2025-01-01 RX ADMIN — IPRATROPIUM BROMIDE AND ALBUTEROL SULFATE 3 ML: .5; 3 SOLUTION RESPIRATORY (INHALATION) at 09:45

## 2025-01-01 RX ADMIN — CHLORHEXIDINE GLUCONATE 0.12% ORAL RINSE 15 ML: 1.2 LIQUID ORAL at 09:13

## 2025-01-01 RX ADMIN — BUDESONIDE INHALATION 0.5 MG: 0.5 SUSPENSION RESPIRATORY (INHALATION) at 06:26

## 2025-01-01 RX ADMIN — ENOXAPARIN SODIUM 40 MG: 100 INJECTION SUBCUTANEOUS at 09:48

## 2025-01-01 RX ADMIN — CHLORHEXIDINE GLUCONATE 1 APPLICATION: 500 CLOTH TOPICAL at 04:00

## 2025-01-01 RX ADMIN — ATORVASTATIN CALCIUM 40 MG: 40 TABLET, FILM COATED ORAL at 20:31

## 2025-01-01 RX ADMIN — FENTANYL CITRATE 250 MCG/HR: 50 INJECTION INTRAVENOUS at 04:59

## 2025-01-01 RX ADMIN — ACETAMINOPHEN 650 MG: 325 TABLET ORAL at 06:30

## 2025-01-01 RX ADMIN — INSULIN HUMAN 2 UNITS: 100 INJECTION, SOLUTION PARENTERAL at 05:45

## 2025-01-01 RX ADMIN — CHLORHEXIDINE GLUCONATE 0.12% ORAL RINSE 15 ML: 1.2 LIQUID ORAL at 08:31

## 2025-01-01 RX ADMIN — CEFEPIME 2000 MG: 2 INJECTION, POWDER, FOR SOLUTION INTRAVENOUS at 17:41

## 2025-01-01 RX ADMIN — METOPROLOL TARTRATE 100 MG: 100 TABLET, FILM COATED ORAL at 20:42

## 2025-01-01 RX ADMIN — FLUOROMETHOLONE ACETATE 2 DROP: 1 SUSPENSION/ DROPS OPHTHALMIC at 12:14

## 2025-01-01 RX ADMIN — AMIODARONE HYDROCHLORIDE 200 MG: 200 TABLET ORAL at 09:45

## 2025-01-01 RX ADMIN — CHLORHEXIDINE GLUCONATE 0.12% ORAL RINSE 15 ML: 1.2 LIQUID ORAL at 09:06

## 2025-01-01 RX ADMIN — FAMOTIDINE 20 MG: 20 TABLET, FILM COATED ORAL at 20:42

## 2025-01-01 RX ADMIN — ATORVASTATIN CALCIUM 40 MG: 40 TABLET, FILM COATED ORAL at 20:22

## 2025-01-01 RX ADMIN — Medication 10 ML: at 20:55

## 2025-01-01 RX ADMIN — INSULIN HUMAN 3 UNITS: 100 INJECTION, SOLUTION PARENTERAL at 18:00

## 2025-01-01 RX ADMIN — IPRATROPIUM BROMIDE AND ALBUTEROL SULFATE 3 ML: .5; 3 SOLUTION RESPIRATORY (INHALATION) at 06:04

## 2025-01-01 RX ADMIN — IPRATROPIUM BROMIDE AND ALBUTEROL SULFATE 3 ML: .5; 3 SOLUTION RESPIRATORY (INHALATION) at 10:50

## 2025-01-01 RX ADMIN — IPRATROPIUM BROMIDE AND ALBUTEROL SULFATE 3 ML: .5; 3 SOLUTION RESPIRATORY (INHALATION) at 14:22

## 2025-01-01 RX ADMIN — CHLORHEXIDINE GLUCONATE 0.12% ORAL RINSE 15 ML: 1.2 LIQUID ORAL at 20:31

## 2025-01-01 RX ADMIN — CHLORHEXIDINE GLUCONATE 0.12% ORAL RINSE 15 ML: 1.2 LIQUID ORAL at 20:55

## 2025-01-01 RX ADMIN — FLUOROMETHOLONE ACETATE 2 DROP: 1 SUSPENSION/ DROPS OPHTHALMIC at 12:23

## 2025-01-01 RX ADMIN — FENTANYL CITRATE 50 MCG: 50 INJECTION, SOLUTION INTRAMUSCULAR; INTRAVENOUS at 16:46

## 2025-01-01 RX ADMIN — AMIODARONE HYDROCHLORIDE 200 MG: 200 TABLET ORAL at 09:49

## 2025-01-01 RX ADMIN — ACETAMINOPHEN 650 MG: 650 SOLUTION ORAL at 00:48

## 2025-01-01 RX ADMIN — FAMOTIDINE 20 MG: 20 TABLET, FILM COATED ORAL at 20:19

## 2025-01-01 RX ADMIN — MORPHINE SULFATE 6 MG: 4 INJECTION, SOLUTION INTRAMUSCULAR; INTRAVENOUS at 19:45

## 2025-01-01 RX ADMIN — CHLORHEXIDINE GLUCONATE 1 APPLICATION: 500 CLOTH TOPICAL at 04:32

## 2025-01-01 RX ADMIN — ENOXAPARIN SODIUM 40 MG: 100 INJECTION SUBCUTANEOUS at 09:03

## 2025-01-01 RX ADMIN — CHLORHEXIDINE GLUCONATE 1 APPLICATION: 500 CLOTH TOPICAL at 04:10

## 2025-01-01 NOTE — PROGRESS NOTES
"Pharmacy Dosing Service  Pharmacokinetics  Vancomycin Follow-up Evaluation    Assessment/Action/Plan:  Current Order: Vancomycin 1500 mg IVPB every 12 hours  Current end date:1/7/2025  Levels: no levels ordered at this time  Additional antimicrobial agent(s): Zosyn    Vancomycin 1500 mg iv q12h. Pharmacy will continue to follow daily and adjust dose accordingly.     Subjective:  Ld Green is a 64 y.o. male currently on Vancomycin for the treatment of pneumonia, day 1 of 7 of treatment.    AUC Model Data:  Regimen: 1500 mg IV every 12 hours.  Exposure target: AUC24 (range)400-600 mg/L.hr   AUC24,ss: 537 mg/L.hr  Probability of AUC24 > 400: 71 %  Ctrough,ss: 13.6 mg/L  Probability of Ctrough,ss > 20: 33 %  Probability of nephrotoxicity (Lodise SHARON 2009): 9 %    Objective:  Ht: 172.7 cm (68\"); Wt: 108 kg (238 lb 8.6 oz)  Estimated Creatinine Clearance: 173.2 mL/min (A) (by C-G formula based on SCr of 0.52 mg/dL (L)).   Creatinine   Date Value Ref Range Status   01/01/2025 0.52 (L) 0.76 - 1.27 mg/dL Final   12/31/2024 0.66 (L) 0.76 - 1.27 mg/dL Final   12/30/2024 1.04 0.76 - 1.27 mg/dL Final   02/14/2022 0.6 0.5 - 1.2 mg/dL Final   12/20/2021 0.8 0.5 - 1.2 mg/dL Final   11/16/2021 0.7 0.5 - 1.2 mg/dL Final      Lab Results   Component Value Date    WBC 18.42 (H) 01/01/2025    WBC 17.52 (H) 12/31/2024    WBC 20.23 (H) 12/30/2024       No results found for: \"VANCOPEAK\", \"VANCOTROUGH\", \"VANCORANDOM\"    Culture Results:  Microbiology Results (last 10 days)       Procedure Component Value - Date/Time    Chlamydia trachomatis, Neisseria gonorrhoeae, PCR - Swab, Penis [[id number]]  (Normal) Collected: 12/30/24 0839    Lab Status: Final result Specimen: Swab from Penis Updated: 12/30/24 1255     Chlamydia DNA by PCR Not Detected     Neisseria gonorrhoeae by PCR Not Detected    Narrative:      Disclaimer: The Aptima Combo 2 assay is a target amplification nucleic acid probe test that utilizes target capture for the in " "vitro qualitative detection and differentiation of ribosomal RNA from Chlamydia trachomatis and Neisseria gonorrhoeae to aid in the diagnosis of chlamydial and/or gonococcal urogenital disease.  Cell culture was once considered to be the \"gold standard\" for detection of CT and NG.  Culture is quite specific, but scientific studies have demonstrated that the NAAT DNA probe technologies have higher clinical sensitivities than culture.    MRSA Screen, PCR (Inpatient) - Swab, Nares [[id number]]  (Normal) Collected: 12/29/24 9793    Lab Status: Final result Specimen: Swab from Nares Updated: 12/29/24 1919     MRSA PCR No MRSA Detected    Narrative:      The negative predictive value of this diagnostic test is high and should only be used to consider de-escalating anti-MRSA therapy. A positive result may indicate colonization with MRSA and must be correlated clinically.    Respiratory Culture - Aspirate, ET Suction [[id number]]  (Abnormal)  (Susceptibility) Collected: 12/29/24 1507    Lab Status: Final result Specimen: Aspirate from ET Suction Updated: 01/01/25 1024     Respiratory Culture Moderate growth (3+) Staphylococcus aureus      Light growth (2+) Normal Respiratory Kapil     Gram Stain Moderate (3+) WBCs per low power field      Rare (1+) Epithelial cells per low power field      Moderate (3+) Mixed gram positive kapil      Moderate (3+) Mixed gram negative kapil      Mixed intracellular organisms suggestive of an aspiration event    Susceptibility        Staphylococcus aureus      IMER      Clindamycin 0.25 ug/ml Susceptible      Oxacillin <=0.25 ug/ml Susceptible      Tetracycline <=1 ug/ml Susceptible      Trimethoprim + Sulfamethoxazole <=10 ug/ml Susceptible      Vancomycin <=0.5 ug/ml Susceptible                           Blood Culture - Blood, Hand, Digit Right [[id number]]  (Normal) Collected: 12/29/24 1230    Lab Status: Preliminary result Specimen: Blood from Hand, Digit Right Updated: 12/31/24 1245     " Blood Culture No growth at 2 days    Blood Culture - Blood, Arm, Right [[id number]]  (Normal) Collected: 12/29/24 1218    Lab Status: Preliminary result Specimen: Blood from Arm, Right Updated: 12/31/24 1245     Blood Culture No growth at 2 days    Respiratory Panel PCR w/COVID-19(SARS-CoV-2) [facility]O/ARACELI/CHRISTINE/PAD/[facility]/ZARINA In-House, NP Swab in UTM/VTM, 2 HR TAT - Swab, Nasopharynx [[id number]]  (Normal) Collected: 12/29/24 1205    Lab Status: Final result Specimen: Swab from Nasopharynx Updated: 12/29/24 1304     ADENOVIRUS, PCR Not Detected     Coronavirus 229E Not Detected     Coronavirus HKU1 Not Detected     Coronavirus NL63 Not Detected     Coronavirus OC43 Not Detected     COVID19 Not Detected     Human Metapneumovirus Not Detected     Human Rhinovirus/Enterovirus Not Detected     Influenza A PCR Not Detected     Influenza B PCR Not Detected     Parainfluenza Virus 1 Not Detected     Parainfluenza Virus 2 Not Detected     Parainfluenza Virus 3 Not Detected     Parainfluenza Virus 4 Not Detected     RSV, PCR Not Detected     Bordetella pertussis pcr Not Detected     Bordetella parapertussis PCR Not Detected     Chlamydophila pneumoniae PCR Not Detected     Mycoplasma pneumo by PCR Not Detected    Narrative:      In the setting of a positive respiratory panel with a viral infection PLUS a negative procalcitonin without other underlying concern for bacterial infection, consider observing off antibiotics or discontinuation of antibiotics and continue supportive care. If the respiratory panel is positive for atypical bacterial infection (Bordetella pertussis, Chlamydophila pneumoniae, or Mycoplasma pneumoniae), consider antibiotic de-escalation to target atypical bacterial infection.            Earnest Good, PharmD   01/01/25 10:39 CST

## 2025-01-01 NOTE — PLAN OF CARE
Goal Outcome Evaluation:  Plan of Care Reviewed With: patient        Progress: no change  Outcome Evaluation: On Vent. Off sedation. Tylenol administered for fever. Morphine given x 4 for CPOT 7.  Easily agitated. Responsive to pain only in all ext., decorticate posturing, eyes deviate to upper right at times. Tube feeding at goal with max residuals 60 cc.

## 2025-01-01 NOTE — PROGRESS NOTES
[facility] Intensivist Services    Date of Admission: 12/29/2024  Date of Note: 01/01/25  Primary Care Physician: Xiomara Neal APRN   LOS: 3 days     History   Next of Kin:  Primary Emergency Contact: Fe Suresh   Code Status: Code Status and Medical Interventions: CPR (Attempt to Resuscitate); Full Support    He is a 64 y.o. male smoker (reported as COPD but no PFTS--so restrictive lung disease from obesity with overlap syndrome from obstructive sleep apnea would be more likely), chronic drug user, diabetes mellitus most recent A1c 5.4 admitted 12/29/2024 with Acute respiratory failure with hypoxia who also  has a past medical history of Anxiety, CAD, Bipolar disorder, obesity with prior Ozempic use, Hx Bladder cancer with current hematuria (concerning), Colon polyps, Depression, Diabetes mellitus, Hemorrhoids, Hyperlipidemia, Hypertension, Nephrolithiasis: 5 mm right renal stone, reportedly 35 mm right renal cyst, right hydrocele, prior L5-S1 discectomy with pedicle screws, thoracic stimulator leads noted and Sleep apnea noncompliant with TYRELL care per ex-wife.     Patient apparently lives in a low income housing complex called [facility] and was found down and unresponsive: reportedly comatose.  Patient was recently hospitalized August 2024 for altered mental status where he was found down at home with low glucose where he was approximately 10 hours down: Discharged and told not to use insulin. He was also admitted 7/14/2024 for hypoglycemia with a similar presentation where he had used cocaine and then became unconscious--bit his tongue.  Patient arrived in our emergency department 12/29/2024 unresponsive with GCS of 6.  Intubated.  No history available.  Patient had cocaine and TCA positive urine.  His ex-wife, who is his power of  from a 1995 document--discussed with hospital--states that he did not want to be on life support.  She feels like he has really  had a poor quality of life recently.  He has chronically suffered from depression, bipolar disorder.  History of bladder cancer.  Wife says that he may have been getting drugs from his cousins.  She thinks he probably overdosed accidentally at home.  Patient currently decorticate posturing.     Poison control thinks his TCA positive may be cross-reactivity with Flexeril or his Auvelity depression meds--they did not think it was a TCA overdose.     : I took over care.  Some question about who the power of  was.  Clarified with legal at the hospital.  Patient's ex-wife has a power of  from  which is considered still valid.  She is the only person available and intermittently present at the bedside.     : Patient has minimal protection with arm drop on left.  Mental status has not improved.  Upgoing Babinski's bilaterally.  Some superior eye deviation intermittently.  Strong gag and cough.  Thick secretions and elevated temperature to 38.3 °C overnight.    25: Patient had fentanyl requiremrnts overnight due to work of breathing.  Started on fentanyl drip due to discomfort.  Ex-wife says he would not want to live like this.  She is the only person who shown up to make decisions for him.  We did confirm with our risk department via nursing that she can be a surrogate decision maker in this situation.  She says she is not ready to make him comfort care because she has to reach out to the  in order to get a conservatorship so that she can get control over his finances to pay for his .  She does say that he would not want CPR in this situation.  Patient has persistent fevers.  Wife is not interested in more aggressive diagnostic testing.  She wants to let him be at peace but she needs to get her legal issues answered: Essentially she needs to be able to sell his truck to be able to pay for his .  She says that the patient's girlfriend is driving around in his  truck and living in his home.  She went there and the girlfriend was unable to give any additional history.  Denied any knowledge of what had happened.  Social and Family History     Family History:  family history is not on file. He was adopted.    Tobacco/Social History:  reports that he has been smoking cigarettes. He has never used smokeless tobacco. He reports current alcohol use. He reports current drug use. Drug: Cocaine(coke).    Allergies     Allergies:   He is allergic to codeine.    Labs   Basic Labs:  CBC:      Lab 01/01/25  0316 12/31/24  0212 12/30/24  0316 12/29/24  1202   WBC 18.42* 17.52* 20.23* 29.48*   HEMOGLOBIN 14.5 13.7 14.4 17.7   HEMATOCRIT 44.2 41.7 43.4 50.0   PLATELETS 196 177 232 308   NEUTROS ABS 13.61* 12.65* 15.05* 24.89*   IMMATURE GRANS (ABS) 0.18* 0.16* 0.22* 0.88*   LYMPHS ABS 1.76 1.95 2.19 1.21   MONOS ABS 2.71* 2.66* 2.64* 2.31*   EOS ABS 0.05 0.03 0.01 0.00   MCV 94.8 93.9 92.9 89.8       LIVER FUNCTION AND COAG TESTS:      Lab 01/01/25 0316 12/31/24  0212 12/30/24  0316 12/29/24  1202   TOTAL PROTEIN 6.6 5.9* 6.2 7.4   ALBUMIN 3.5 3.3* 3.7 4.5   GLOBULIN 3.1 2.6 2.5 2.9   ALT (SGPT) 23 15 16 22   AST (SGOT) 30 25 27 26   BILIRUBIN 1.4* 1.2 1.6* 1.6*   ALK PHOS 117 108 126* 165*   PROTIME  --   --   --  13.2   INR  --   --   --  0.96       ABG:      Lab 01/01/25  0332 01/01/25 0316 12/31/24  0333 12/31/24  0212 12/30/24  0404 12/30/24  0316 12/29/24  1455 12/29/24  1303 12/29/24  1202   PH, ARTERIAL 7.436  --  7.434  --  7.412  --  7.339* 7.259*  --    PO2 ART 66.4*  --  81.3*  --  77.6*  --  110.0* 71.2*  --    PCO2, ARTERIAL 46.9*  --  42.1  --  39.7  --  43.3 55.7*  --    HCO3 ART 31.5*  --  28.2*  --  25.3  --  23.3 24.9  --    ANION GAP  --  9.0  --  6.0  --  13.0  --   --  22.0*       LACTATE:      Lab 12/29/24  1202   LACTATE 1.7       CMP:      Lab 01/01/25  0316 12/31/24  1219 12/31/24  0323 12/31/24  0212 12/30/24  0316 12/29/24  1202   SODIUM 146*  --   --  145 143  142   POTASSIUM 3.8 4.3  --  3.3* 3.5 3.7   CHLORIDE 109*  --   --  112* 107 100   BUN 17  --   --  17 30* 24*   CREATININE 0.52*  --   --  0.66* 1.04 1.15   CALCIUM 9.3  --   --  9.0 8.8 9.4   MAGNESIUM 2.0  --   --  2.0 2.1 1.8   PHOSPHORUS 2.6 2.9 1.7*  --  4.2  --    GLUCOSE 165*  --   --  165* 180* 111*   EGFR 113.3  --   --  105.4 80.7 71.5       Diabetic:  A1C Last 3 Results          8/26/2024    04:49   HGBA1C Last 3 Results   Hemoglobin A1C 5.40        Inpatient Medications   Scheduled Meds:amLODIPine, 10 mg, Nasogastric, Nightly  atorvastatin, 40 mg, Nasogastric, Nightly  budesonide, 0.5 mg, Nebulization, BID - RT  chlorhexidine, 15 mL, Mouth/Throat, Q12H  Chlorhexidine Gluconate Cloth, 1 Application, Topical, Q24H  clopidogrel, 75 mg, Nasogastric, Nightly  enoxaparin, 40 mg, Subcutaneous, Daily  famotidine, 20 mg, Nasogastric, Q12H  fluorometholone, 2 drop, Left Eye, 4x Daily  ipratropium-albuterol, 3 mL, Nebulization, 4x Daily - RT  lisinopril, 40 mg, Nasogastric, Daily  metoprolol tartrate, 100 mg, Nasogastric, Q12H  mupirocin, 1 Application, Each Nare, BID  piperacillin-tazobactam, 4.5 g, Intravenous, Q8H  senna-docusate sodium, 2 tablet, Nasogastric, BID  sodium chloride, 10 mL, Intravenous, Q12H  vancomycin, 1,500 mg, Intravenous, Q12H      Continuous Infusions:Pharmacy to Dose Zosyn,   propofol, 5-50 mcg/kg/min, Last Rate: Stopped (12/30/24 0725)      PRN Meds:.  acetaminophen    acetaminophen    senna-docusate sodium **AND** polyethylene glycol **AND** [DISCONTINUED] bisacodyl **AND** bisacodyl    fentaNYL citrate (PF)    hydrALAZINE    nitroglycerin    Pharmacy to Dose Zosyn    Phosphorus Replacement - Follow Nurse / BPA Driven Protocol    Potassium Replacement - Follow Nurse / BPA Driven Protocol    sodium chloride    sodium chloride    I have reviewed the patient's current medications.   Outpatient Medications     Current Outpatient Medications   Medication Instructions    amLODIPine (NORVASC)  "10 mg, Oral, Nightly    atorvastatin (LIPITOR) 40 mg, Oral, Nightly    Cariprazine HCl (VRAYLAR) 1.5 mg, Oral, Daily    clopidogrel (PLAVIX) 75 MG tablet Take 1 tablet by mouth Every Night.    cyclobenzaprine (FLEXERIL) 10 mg, Oral, 3 Times Daily PRN    Dextromethorphan-buPROPion ER (AUVELITY)  MG tablet controlled-release 1 tablet, Oral, 2 Times Daily    eszopiclone (LUNESTA) 3 mg, Oral, Nightly, Take immediately before bedtime    fluorometholone (EFLONE) 0.1 % ophthalmic suspension 2 drops, Left Eye, 4 Times Daily    furosemide (LASIX) 40 mg, Oral, Nightly    isosorbide mononitrate (IMDUR) 60 MG 24 hr tablet Take 1 tablet by mouth Every Night.    lisinopril (PRINIVIL,ZESTRIL) 40 mg, Oral, Daily    metoprolol succinate XL (TOPROL-XL) 200 MG 24 hr tablet Take 1 tablet by mouth Every Night.    naloxone (NARCAN) 4 MG/0.1ML nasal spray 1 spray, Nasal, As Needed, Call 911. Don't prime. [place] in 1 nostril for overdose. Repeat in 2-3 minutes in other nostril if no or minimal breathing/responsiveness.    nitroglycerin (NITROSTAT) 0.4 MG SL tablet Place 1 tablet under the tongue Every 5 (Five) Minutes As Needed for Chest Pain.    oxyCODONE-acetaminophen (PERCOCET)  MG per tablet Take 1 tablet by mouth Every 8 (Eight) Hours.    Ozempic (2 MG/DOSE) 1 mg, Subcutaneous, Weekly       Current Antibiotics   Pharmacy to Dose Zosyn  piperacillin-tazobactam (ZOSYN) 4.5 g IVPB in 100 mL NS MBP (CD)  vancomycin - 1.5-0.9 GM/500ML-%    Exam   Vent settings for last 24 hours:  Mode: VC/AC  FiO2 (%):  [30 %-40 %] 30 %  S RR:  [16] 16  S VT:  [500 mL] 500 mL  PEEP/CPAP (cm H2O):  [5 cm H20] 5 cm H20  MAP (cm H2O):  [2.3-6.4] 3.6    Vital signs for last 24 hours:  Temp:  [99 °F (37.2 °C)-101.8 °F (38.8 °C)] 99 °F (37.2 °C)  Heart Rate:  [] 96  Resp:  [19-38] 23  BP: (122-167)/(65-90) 149/83  FiO2 (%):  [30 %-40 %] 30 %    Vitals: His  height is 172.7 cm (68\") and weight is 108 kg (238 lb 8.6 oz). His rectal temperature is " 99 °F (37.2 °C). His blood pressure is 149/83 and his pulse is 96. His respiration is 23 and oxygen saturation is 95%.     PHYSICAL FINDINGS: SEE VITALS ABOVE  GENERAL APPEARANCE: ° Patient is intubated.  Temperature of 101.1.  HEAD: Injuries: No evidence of a head injury. ° Appearance: Head normocephalic.  NECK: No masses ° Thyroid: ° Not diffusely enlarged.  EYES: General/bilateral: eyes closed. Pupils: ° PERRLA. Sclera: ° Showed no icterus.  THROAT: ETT in place, appears well positioned.  EARS: Hearing: ° patient sedated.  NOSE: General/bilateral: External Deformities: ° No external nose deformities.  LYMPH NODES: No cervical or generalized adenopathy noted.  CHEST: ° Visual inspection revealed no abnormalities.  LUNGS: ° Respiration rhythm and depth was normal. ° Normal breath sounds  ° No wheezing was heard bilaterally. ° No rhonchi were heard. ° No rales/crackles were heard. ° No clubbing noted. On ventilator.  CARDIOVASCULAR: JVD not increased. Heart Rate And Rhythm: Normal. ° Heart Sounds: No S3/ S4 heard. ° Murmurs: No murmurs were heard.  BACK: No obvious deformity noted.  ABDOMEN: Visual Inspection: Abdomen was not distended. Auscultation: ° Abdominal auscultation revealed no abnormalities. ° Bowel sounds were normal. ° Palpation: ° Abdomen was soft. ° No abdominal tenderness. ° No mass was palpated in the abdomen. ° Soft. °Liver: Not visibly enlarged. Spleen: Not visibly enlarged.  MUSCULOSKELETAL SYSTEM : General/bilateral: ° Sedated.  FEET/EXTREMITIES: General/bilateral: ° No visible swelling of the feet.  NEUROLOGICAL: Intubated ° Gait And Stance: cannot test.  SKIN: ° Very poor self-care.    Intake/Output   Intake/Output this shift:  I/O this shift:  In: 716 [Other:146; NG/GT:470; IV Piggyback:100]  Out: 150 [Urine:150]    Intake/Output last 3 shifts:  I/O last 3 completed shifts:  In: 4419.7 [I.V.:896.7; Other:836; NG/GT:1887; IV Piggyback:800]  Out: 2785 [Urine:2780; Emesis/NG output:5]    Results  "and Cultures Review     Result Review:  I have personally reviewed the results from the time of this admission to 1/1/2025 12:42 CST and agree with these findings:  [x]  Laboratory list / accordion  [x]  Microbiology  [x]  Radiology  []  EKG/Telemetry   [x]  Cardiology/Vascular   [x]  Pathology  []  Old records  []  Other:  Most notable findings include: MSSA on sputum      Culture Data:   Blood Culture   Date Value Ref Range Status   12/29/2024 No growth at 2 days  Preliminary   12/29/2024 No growth at 2 days  Preliminary     Respiratory Culture   Date Value Ref Range Status   12/29/2024 Moderate growth (3+) Staphylococcus aureus (A)  Final   12/29/2024 Light growth (2+) Normal Respiratory Karen  Final       Microbiology Results (last 10 days)       Procedure Component Value - Date/Time    Chlamydia trachomatis, Neisseria gonorrhoeae, PCR - Swab, Penis [[id number]]  (Normal) Collected: 12/30/24 0839    Lab Status: Final result Specimen: Swab from Penis Updated: 12/30/24 1255     Chlamydia DNA by PCR Not Detected     Neisseria gonorrhoeae by PCR Not Detected    Narrative:      Disclaimer: The Aptima Combo 2 assay is a target amplification nucleic acid probe test that utilizes target capture for the in vitro qualitative detection and differentiation of ribosomal RNA from Chlamydia trachomatis and Neisseria gonorrhoeae to aid in the diagnosis of chlamydial and/or gonococcal urogenital disease.  Cell culture was once considered to be the \"gold standard\" for detection of CT and NG.  Culture is quite specific, but scientific studies have demonstrated that the NAAT DNA probe technologies have higher clinical sensitivities than culture.    MRSA Screen, PCR (Inpatient) - Swab, Nares [[id number]]  (Normal) Collected: 12/29/24 1753    Lab Status: Final result Specimen: Swab from Nares Updated: 12/29/24 1919     MRSA PCR No MRSA Detected    Narrative:      The negative predictive value of this diagnostic test is high and should " only be used to consider de-escalating anti-MRSA therapy. A positive result may indicate colonization with MRSA and must be correlated clinically.    Respiratory Culture - Aspirate, ET Suction [[id number]]  (Abnormal)  (Susceptibility) Collected: 12/29/24 1507    Lab Status: Final result Specimen: Aspirate from ET Suction Updated: 01/01/25 1024     Respiratory Culture Moderate growth (3+) Staphylococcus aureus      Light growth (2+) Normal Respiratory Kapil     Gram Stain Moderate (3+) WBCs per low power field      Rare (1+) Epithelial cells per low power field      Moderate (3+) Mixed gram positive kapil      Moderate (3+) Mixed gram negative kapil      Mixed intracellular organisms suggestive of an aspiration event    Susceptibility        Staphylococcus aureus      IMER      Clindamycin Susceptible      Oxacillin Susceptible      Tetracycline Susceptible      Trimethoprim + Sulfamethoxazole Susceptible      Vancomycin Susceptible                           Blood Culture - Blood, Hand, Digit Right [[id number]]  (Normal) Collected: 12/29/24 1230    Lab Status: Preliminary result Specimen: Blood from Hand, Digit Right Updated: 12/31/24 1245     Blood Culture No growth at 2 days    Blood Culture - Blood, Arm, Right [[id number]]  (Normal) Collected: 12/29/24 1218    Lab Status: Preliminary result Specimen: Blood from Arm, Right Updated: 12/31/24 1245     Blood Culture No growth at 2 days    Respiratory Panel PCR w/COVID-19(SARS-CoV-2) NACHO/ARACELI/CHRISTINE/PAD/COR/ZARINA In-House, NP Swab in UTM/VTM, 2 HR TAT - Swab, Nasopharynx [[id number]]  (Normal) Collected: 12/29/24 1205    Lab Status: Final result Specimen: Swab from Nasopharynx Updated: 12/29/24 1304     ADENOVIRUS, PCR Not Detected     Coronavirus 229E Not Detected     Coronavirus HKU1 Not Detected     Coronavirus NL63 Not Detected     Coronavirus OC43 Not Detected     COVID19 Not Detected     Human Metapneumovirus Not Detected     Human Rhinovirus/Enterovirus Not Detected      Influenza A PCR Not Detected     Influenza B PCR Not Detected     Parainfluenza Virus 1 Not Detected     Parainfluenza Virus 2 Not Detected     Parainfluenza Virus 3 Not Detected     Parainfluenza Virus 4 Not Detected     RSV, PCR Not Detected     Bordetella pertussis pcr Not Detected     Bordetella parapertussis PCR Not Detected     Chlamydophila pneumoniae PCR Not Detected     Mycoplasma pneumo by PCR Not Detected    Narrative:      In the setting of a positive respiratory panel with a viral infection PLUS a negative procalcitonin without other underlying concern for bacterial infection, consider observing off antibiotics or discontinuation of antibiotics and continue supportive care. If the respiratory panel is positive for atypical bacterial infection (Bordetella pertussis, Chlamydophila pneumoniae, or Mycoplasma pneumoniae), consider antibiotic de-escalation to target atypical bacterial infection.            Radiology     Imaging Results (Last 72 Hours)       Procedure Component Value Units Date/Time    XR Chest 1 View [[id number]] Collected: 12/31/24 0641     Updated: 12/31/24 0648    Narrative:      EXAMINATION: XR CHEST 1 VW-     12/31/2024 2:47 AM     HISTORY: Intubated Patient; J96.00-Acute respiratory failure,  unspecified whether with hypoxia or hypercapnia; R41.82-Altered mental  status, unspecified; F14.10-Cocaine abuse, uncomplicated     A frontal projection of the chest is compared with the previous study  dated 12/13/2024.     The lungs are poorly expanded compared to the previous study.     There is no pleural effusion, pulmonary congestion or pneumothorax.     There is moderate cardiomegaly. There is evidence of prior cardiac  surgery.     An endotracheal tube is in place.     Dorsal column stimulator electrodes are seen projected over the lower  thoracic spine similar to the previous study.     No acute bony abnormality.       Impression:      1. No active cardiopulmonary disease.  2. Moderate  cardiomegaly.  3. Endotracheal tube and dorsal column stimulator electrodes in place.        This report was signed and finalized on 12/31/2024 6:45 AM by Dr. Felix Watson MD.       XR Chest 1 View [[id number]] Collected: 12/30/24 0707     Updated: 12/30/24 0713    Narrative:      EXAMINATION: XR CHEST 1 VW-     12/30/2024 2:15 AM     HISTORY: Intubated Patient; J96.00-Acute respiratory failure,  unspecified whether with hypoxia or hypercapnia; R41.82-Altered mental  status, unspecified; F14.10-Cocaine abuse, uncomplicated     A frontal projection of the chest is compared with the previous study  dated 12/29/2024.     The lungs are moderately well-expanded.     There is mild diffuse haziness of the pulmonary parenchyma, more  pronounced on the right side. This may represent an evolving pulmonary  venous congestion.     There is loculated fluid/pleural thickening along the right upper  lateral chest wall which is moderately more pronounced since the  previous study.     There is no pneumothorax.     Moderate cardiomegaly. There is evidence of prior cardiac surgery.     The endotracheal tube is in place. The distal end of the nasogastric  tube is not included in the field-of-view and not evaluated.     Distal end of dorsal column stimulator electrodes are seen in the lower  thoracic spine similar to the previous study.       Impression:      1. Probable mild pulmonary venous congestion more pronounced on the  right side which may be due to patient's posture/positioning.  2. Moderate cardiomegaly.  3. The distal end of the nasogastric tube is not included in the study  and not evaluated. An image of the upper abdomen may be obtained for  evaluation of position of the nasogastric tube.        This report was signed and finalized on 12/30/2024 7:10 AM by Dr. Felix Watson MD.       XR Abdomen KUB [[id number]] Collected: 12/29/24 1425     Updated: 12/29/24 1431    Narrative:      XR ABDOMEN KUB- 12/29/2024 1:17  "PM     HISTORY: og placement       TECHNIQUE:Single AP view of the abdomen.     COMPARISON: None       Impression:      Findings/impression:     Enteric tube courses below the diaphragm overlying the stomach with the  tip overlying the gastric antrum/body.                 This report was signed and finalized on 12/29/2024 2:28 PM by Rubin West.               Assessment/Plan     Suspected anoxic brain injury: Supportive care.  Ex-wife who is the de facto surrogate because no one else has come to visit the patient states that \"e would not want to live like this.\"  She is going to try to make arrangements for his burial then wants to make him comfort care.  She apparently needs to meet with the  for the [facility] in order to get a conservatorship because her  told her that the financial side of things were unraveled from her power of  with a divorce.  Her ability to be a surrogate, given that she is the only one who is here and knows the patient, still seems to be appropriate.  Fever with positive leuk esterase in the urine: Likely aspiration pneumonia versus central fevers though UTI is also possible but no bacteria were noted on the urinalysis just trace leuk esterase: CT head shows no obvious lesion or hydronephrosis.  Will discontinue Zosyn and switch to Levaquin.  Atrial fibrillation rapid ventricular rate: Improved on amiodarone.  Converted back to sinus.  Stop amiodarone.    Rhabdomyolysis with CK: Resolved.  Diabetes: Glucose control.  History of hypoglycemia.  Possible suicidal ideation per ex-wife.  Supportive care.  If survives will need psychiatric evaluation.  Unlikely to survive.  Ex-wife feels that he would not want prolonged life support.  Cocaine abuse: Recommend cocaine cessation if he survives which is not likely.  Proteinuria and hematuria: Follow.  Reported history of bladder cancer.  May need urology evaluation if survives.  Nonurgent.  VTE Prophylaxis: Lovenox 40 subcu " daily.  To continue.  Feeding electrolyte nutrition: Continuing tube feeds.  Hold IV fluid.  CODE STATUS: DNR..  Plan to discuss goals of care with family in the next 24 to 48 hours.  Ex-wife is the power of .  Hospital risk aware that they are .  At this point she is the only surrogate we have so we are doing it based on best interest of the patient and people who are readily available with knowledge of the patient's wishes and life.    Total critical care time: 110 minutes    Due to a high probability of clinically significant, life threatening deterioration, the patient required my highest level of preparedness to intervene emergently and I personally spent this critical care time directly and personally managing the patient.     This critical care time included obtaining a history, examining the patient, pulse oximetry and vital sign review, ordering and review of studies, arranging urgent treatment with development of a management plan with the multidisciplinary team, evaluation of patient's response to treatment, frequent reassessment, and discussions with other providers.    This critical care time was performed to assess and manage the high probability of imminent, life-threatening deterioration that could result in multi-organ failure. It was exclusive of separately billable procedures and treating other patients and teaching time.    Please see the rest of the note for further information on patient assessment and treatment.    Part of this note may be an electronic transcription/translation of spoken language to printed text using the Dragon Dictation System    Mando Olivo MD  1/1/2025 at 12:42 CST  Pulmonary, Critical Care  [facility]  Please call with any questions  (Cell [phone])

## 2025-01-02 NOTE — PLAN OF CARE
Goal Outcome Evaluation:  Plan of Care Reviewed With: other (see comments) (Rounds)        Progress: no change  Outcome Evaluation: Nutrition follow up. Pt remains on vent support. He is not receiving any propofol sedation. Suspected anoxic brain injury. He cont on TF via OG tube. He is receiving Diabetisource AC at goal rate of 80mL/hour. He is tolerating TF well with minimal residuals. He is receiving accuchecks per TF protocol with the highest BS noted at 197. He is not receiving corrective/SS insulin. He does have a hx of hypoglycemia and A1c of 5.4 in August. No new weight since 12/30, bed not weighing appropriately. TF at current goal rate remains appropriate,  meeting 98% of est'd kcal range and 100% of est'd protein needs. Cont to follow.

## 2025-01-02 NOTE — PROGRESS NOTES
[facility] Intensivist Services  Daily Progress Note    Date of Admission: 12/29/2024  Date of Note: 01/02/25 Time of Note: 07:09 CST  Primary Care Physician: Xiomara Neal APRN  Allergies: He is allergic to codeine.   LOS: 4 days     History and Course in Hospital   Next of Kin:  Primary Emergency Contact: Fe Suresh   Code Status: Code Status and Medical Interventions: No CPR (Do Not Attempt to Resuscitate); Full Support    He is a 64 y.o. male smoker (reported as COPD but no PFTS--so restrictive lung disease from obesity with overlap syndrome from obstructive sleep apnea would be more likely), chronic drug user, diabetes mellitus most recent A1c 5.4 admitted 12/29/2024 with Acute respiratory failure with hypoxia who also  has a past medical history of Anxiety, CAD, Bipolar disorder, obesity with prior Ozempic use, Hx Bladder cancer with current hematuria (concerning), Colon polyps, Depression, Diabetes mellitus, Hemorrhoids, Hyperlipidemia, Hypertension, Nephrolithiasis: 5 mm right renal stone, reportedly 35 mm right renal cyst, right hydrocele, prior L5-S1 discectomy with pedicle screws, thoracic stimulator leads noted and Sleep apnea noncompliant with TYRELL care per ex-wife.     Patient apparently lives in a low income housing complex called [facility] and was found down and unresponsive: reportedly comatose.  Patient was recently hospitalized August 2024 for altered mental status where he was found down at home with low glucose where he was approximately 10 hours down: Discharged and told not to use insulin. He was also admitted 7/14/2024 for hypoglycemia with a similar presentation where he had used cocaine and then became unconscious--bit his tongue.  Patient arrived in our emergency department 12/29/2024 unresponsive with GCS of 6.  Intubated.  No history available.  Patient had cocaine and TCA positive urine.  His ex-wife, who is his power of  from a 1995  document--discussed with hospital--states that he did not want to be on life support.  She feels like he has really had a poor quality of life recently.  He has chronically suffered from depression, bipolar disorder.  History of bladder cancer.  Wife says that he may have been getting drugs from his cousins.  She thinks he probably overdosed accidentally at home.  Patient currently decorticate posturing.     Poison control thinks his TCA positive may be cross-reactivity with Flexeril or his Auvelity depression meds--they did not think it was a TCA overdose.     : I took over care.  Some question about who the power of  was.  Clarified with legal at the hospital.  Patient's ex-wife has a power of  from  which is considered still valid.  She is the only person available and intermittently present at the bedside.     : Patient has minimal protection with arm drop on left.  Mental status has not improved.  Upgoing Babinski's bilaterally.  Some superior eye deviation intermittently.  Strong gag and cough.  Thick secretions and elevated temperature to 38.3 °C overnight.     25: Patient had fentanyl requiremrnts overnight due to work of breathing.  Started on fentanyl drip due to discomfort.  Ex-wife says he would not want to live like this.  She is the only person who shown up to make decisions for him.  We did confirm with our risk department via nursing that she can be a surrogate decision maker in this situation.  She says she is not ready to make him comfort care because she has to reach out to the  in order to get a conservatorship so that she can get control over his finances to pay for his .  She does say that he would not want CPR in this situation.  Patient has persistent fevers.  Wife is not interested in more aggressive diagnostic testing.  She wants to let him be at peace but she needs to get her legal issues answered: Essentially she needs to be able to  "sell his truck to be able to pay for his .  She says that the patient's girlfriend is driving around in his truck and living in his home.  She went there and the girlfriend was unable to give any additional history.  Denied any knowledge of what had happened.    25: EX Wife is trying to reach out to get patient conserved.  At this point patient is no CPR but full medical management otherwise.  Completed paperwork for the patient's ex wife      Intake/Output     Intake/Output last 3 shifts:  I/O last 3 completed shifts:  In: 4483.3 [I.V.:1356.3; Other:714; NG/GT:2213; IV Piggyback:200]  Out:  [Urine:]      Exam   Vent settings for last 24 hours:  Mode: VC/AC  FiO2 (%):  [30 %] 30 %  S RR:  [16] 16  S VT:  [500 mL] 500 mL  PEEP/CPAP (cm H2O):  [5 cm H20] 5 cm H20  MAP (cm H2O):  [2.6-8.7] 2.6    Vital signs for last 24 hours:  Temp:  [98 °F (36.7 °C)-101 °F (38.3 °C)] 100.1 °F (37.8 °C)  Heart Rate:  [] 112  Resp:  [14-22] 20  BP: ()/() 112/61  FiO2 (%):  [30 %] 30 %    Vitals: His  height is 172.7 cm (68\") and weight is 108 kg (238 lb 8.6 oz). His axillary temperature is 100.1 °F (37.8 °C). His blood pressure is 112/61 and his pulse is 112. His respiration is 20 and oxygen saturation is 91%.     PHYSICAL FINDINGS: SEE VITALS ABOVE  GENERAL APPEARANCE: ° Patient is intubated.  Temperature of 100.1.  Slightly lower today than yesterday.  HEAD: Injuries: No evidence of a head injury. ° Appearance: Head normocephalic.  NECK: No masses ° Thyroid: ° Not diffusely enlarged.  EYES: General/bilateral: eyes closed. Pupils: ° PERRLA. Sclera: ° Showed no icterus.  THROAT: ETT in place, appears well positioned.  EARS: Hearing: ° patient sedated.  NOSE: General/bilateral: External Deformities: ° No external nose deformities.  LYMPH NODES: No cervical or generalized adenopathy noted.  CHEST: ° Visual inspection revealed no abnormalities.  LUNGS: ° Respiration rhythm and depth was normal. ° " Normal breath sounds  ° No wheezing was heard bilaterally. ° No rhonchi were heard. ° No rales/crackles were heard. ° No clubbing noted. On ventilator.  CARDIOVASCULAR: JVD not increased. Heart Rate And Rhythm: Normal. ° Heart Sounds: No S3/ S4 heard. ° Murmurs: No murmurs were heard.  BACK: No obvious deformity noted.  ABDOMEN: Visual Inspection: Abdomen was not distended. Auscultation: ° Abdominal auscultation revealed no abnormalities. ° Bowel sounds were normal. ° Palpation: ° Abdomen was soft. ° No abdominal tenderness. ° No mass was palpated in the abdomen. ° Soft. °Liver: Not visibly enlarged. Spleen: Not visibly enlarged.  MUSCULOSKELETAL SYSTEM : General/bilateral: ° Sedated.  FEET/EXTREMITIES: General/bilateral: ° No visible swelling of the feet.  NEUROLOGICAL: Intubated ° Gait And Stance: cannot test.  SKIN: ° Very poor self-care.      Current Antibiotics   levoFLOXacin - 750 MG      Social History     Tobacco/ETOH/Social History:  reports that he has been smoking cigarettes. He has never used smokeless tobacco. He reports current alcohol use. He reports current drug use. Drug: Cocaine(coke).      Results Review     Result Review:  I have personally reviewed the results from the time of this admission to 1/2/2025 07:09 CST and agree with these findings:  [x]  Laboratory list / accordion  [x]  Microbiology  [x]  Radiology  [x]  EKG/Telemetry   [x]  Cardiology/Vascular no new.  []  Pathology  []  Old records  []  Other:  Most notable findings include: Elevated glucose.  MSSA growing from respiratory culture on the 29th.  Staph aureus growing from yesterday.  Will follow.    Assessment/Plan     Suspected anoxic brain injury: Supportive care.  Surrogate ex-wife working on other issues prior to deciding on comfort measures.  Fever with positive leuk esterase in the urine: Likely aspiration pneumonia versus central fevers though UTI is also possible but no bacteria were noted on the urinalysis just trace leuk  esterase: CT head shows no obvious lesion or hydronephrosis.  Discontinue Zosyn and switched yesterday to Levaquin.  Follow again.  Paroxysmal atrial fibrillation.  Second event.  Restarted amiodarone.  Plan to transition to oral today and continue.  Will consider full dose anticoagulation in the next 24 hours based on whether he converts back to sinus.  Rhabdomyolysis with CK: Resolved.  Diabetes: Glucose control.  History of hypoglycemia.  Sugars seem better today.  Possible suicidal ideation per ex-wife.    Cocaine abuse history noted.  Proteinuria and hematuria: Follow. Reported history of bladder cancer.  May need urology evaluation if survives.  Nonurgent.  VTE Prophylaxis: Lovenox 40 subcu daily.  To continue.  Electrolytes: Replace.   Feeding electrolyte nutrition: Continuing tube feeds.   CODE STATUS: DNR..    Disposition: Likely terminal.    Total critical care time: 30 minutes    Due to a high probability of clinically significant, life threatening deterioration, the patient required my highest level of preparedness to intervene emergently and I personally spent this critical care time directly and personally managing the patient.     This critical care time included obtaining a history, examining the patient, pulse oximetry and vital sign review, ordering and review of studies, arranging urgent treatment with development of a management plan with the multidisciplinary team, evaluation of patient's response to treatment, frequent reassessment, and discussions with other providers.    This critical care time was performed to assess and manage the high probability of imminent, life-threatening deterioration that could result in multi-organ failure. It was exclusive of separately billable procedures and treating other patients and teaching time.    Please see the rest of the note for further information on patient assessment and treatment.    Part of this note may be an electronic transcription/translation  of spoken language to printed text using the Dragon Dictation System    Mando Olivo MD  1/2/2025 at 07:09 CST  Pulmonary, Critical Care  [facility]  Please call with any questions  (Cell [phone])          Labs   Basic Labs:  CBC:      Lab 01/02/25  0201 01/01/25 0316 12/31/24  0212 12/30/24 0316 12/29/24  1202   WBC 18.20* 18.42* 17.52* 20.23* 29.48*   HEMOGLOBIN 13.8 14.5 13.7 14.4 17.7   HEMATOCRIT 42.6 44.2 41.7 43.4 50.0   PLATELETS 198 196 177 232 308   NEUTROS ABS 13.32* 13.61* 12.65* 15.05* 24.89*   IMMATURE GRANS (ABS) 0.17* 0.18* 0.16* 0.22* 0.88*   LYMPHS ABS 1.92 1.76 1.95 2.19 1.21   MONOS ABS 2.61* 2.71* 2.66* 2.64* 2.31*   EOS ABS 0.09 0.05 0.03 0.01 0.00   MCV 94.9 94.8 93.9 92.9 89.8       LIVER FUNCTION AND COAG TESTS:      Lab 01/02/25  0201 01/01/25 0316 12/31/24 0212 12/30/24 0316 12/29/24  1202   TOTAL PROTEIN 6.5 6.6 5.9* 6.2 7.4   ALBUMIN 3.3* 3.5 3.3* 3.7 4.5   GLOBULIN 3.2 3.1 2.6 2.5 2.9   ALT (SGPT) 20 23 15 16 22   AST (SGOT) 23 30 25 27 26   BILIRUBIN 0.9 1.4* 1.2 1.6* 1.6*   ALK PHOS 105 117 108 126* 165*   PROTIME  --   --   --   --  13.2   INR  --   --   --   --  0.96       ABG:      Lab 01/02/25  0421 01/02/25  0201 01/01/25  0332 01/01/25 0316 12/31/24  0333 12/31/24 0212 12/30/24  0404 12/30/24 0316 12/29/24  1455 12/29/24  1303 12/29/24  1202   PH, ARTERIAL 7.435  --  7.436  --  7.434  --  7.412  --  7.339*   < >  --    PO2 ART 67.2*  --  66.4*  --  81.3*  --  77.6*  --  110.0*   < >  --    PCO2, ARTERIAL 49.5*  --  46.9*  --  42.1  --  39.7  --  43.3   < >  --    HCO3 ART 33.2*  --  31.5*  --  28.2*  --  25.3  --  23.3   < >  --    ANION GAP  --  8.0  --  9.0  --  6.0  --  13.0  --   --  22.0*    < > = values in this interval not displayed.       LACTATE:      Lab 12/29/24  1202   LACTATE 1.7       CMP:      Lab 01/02/25  0201 01/01/25  0316 12/31/24  1219 12/31/24  0323 12/31/24  0212 12/30/24  0316 12/29/24  1202   SODIUM 143 146*  --   --  145 143  142   POTASSIUM 3.8 3.8 4.3  --  3.3* 3.5 3.7   CHLORIDE 106 109*  --   --  112* 107 100   BUN 17 17  --   --  17 30* 24*   CREATININE 0.54* 0.52*  --   --  0.66* 1.04 1.15   CALCIUM 9.1 9.3  --   --  9.0 8.8 9.4   MAGNESIUM 2.0 2.0  --   --  2.0 2.1 1.8   PHOSPHORUS 2.9 2.6 2.9 1.7*  --  4.2  --    GLUCOSE 198* 165*  --   --  165* 180* 111*   EGFR 112.0 113.3  --   --  105.4 80.7 71.5

## 2025-01-02 NOTE — PLAN OF CARE
Goal Outcome Evaluation:      To wean from ventilator. No changes made today.                                       Dutasteride Male Counseling: Dustasteride Counseling:  I discussed with the patient the risks of use of dutasteride including but not limited to decreased libido, decreased ejaculate volume, and gynecomastia. Women who can become pregnant should not handle medication.  All of the patient's questions and concerns were addressed. Dutasteride Counseling: Dustasteride Counseling:  I discussed with the patient the risks of use of dutasteride including but not limited to decreased libido, decreased ejaculate volume, and gynecomastia. Women who can become pregnant should not handle medication.  All of the patient's questions and concerns were addressed.

## 2025-01-02 NOTE — PROGRESS NOTES
RT EQUIPMENT DEVICE RELATED - SKIN ASSESSMENT    Dany Score:  Dany Score: 13     RT Medical Equipment/Device:     ETT Linda/Anchorfast    Skin Assessment:      Cheek:  Intact  Neck:  Intact  Lips:  Intact    Device Skin Pressure Protection:  Skin-to-device areas padded:  Anchorfast    Nurse Notification:  Evelin Chavarria, CRT

## 2025-01-02 NOTE — PLAN OF CARE
Goal Outcome Evaluation:  Plan of Care Reviewed With: patient        Progress: no change  Outcome Evaluation: Pt remains on ventilator support. FiO2 30%. Afib RVR. Tmax 101. Prn tylenol given x 1 for fever. Fentanyl gtt at 150. Amio gtt at 0.5. EKG completed this morning per order. Urine output 450 ml. WBC 18.2.

## 2025-01-02 NOTE — CASE MANAGEMENT/SOCIAL WORK
Continued Stay Note   [facility]     Patient Name: Ld Green  MRN: [id number]  Today's Date: 1/2/2025    Admit Date: 12/29/2024    Plan: Undetermined   Discharge Plan       Row Name 01/02/25 [id number]       Plan    Plan Undetermined    Plan Comments Pt's ex-wife, Fe, has been to the  to start process for guardianship. Paperwork completed and discussed with Fe. RN having intensivist sign and then Fe will take back to the  to get a court date. Will follow.                   Discharge Codes    No documentation.                       REEMA Ames

## 2025-01-02 NOTE — PROGRESS NOTES
RT EQUIPMENT DEVICE RELATED - SKIN ASSESSMENT    Dany Score:  Dany Score: 12     RT Medical Equipment/Device:     ETT Linda/Anchorfast    Skin Assessment:      Cheek:  Intact  Neck:  Intact  Lips:  Intact  Mouth:  Intact    Device Skin Pressure Protection:  Skin-to-device areas padded:  Anchorfast    Nurse Notification:  Evelin Vick, RRT

## 2025-01-03 NOTE — PLAN OF CARE
Goal Outcome Evaluation: Patient stable on current vent settings. No changes made at this time.    Problem: Mechanical Ventilation Invasive  Goal: Mechanical Ventilation Liberation  Outcome: Not Progressing  Goal: Absence of Device-Related Skin and Tissue Injury  Outcome: Not Progressing  Intervention: Maintain Skin and Tissue Health  Recent Flowsheet Documentation  Taken 1/3/2025 0335 by July Vick RRT  Device Skin Pressure Protection:   skin-to-device areas padded   tubing/devices free from skin contact  Taken 1/2/2025 2243 by July Vick RRT  Device Skin Pressure Protection:   skin-to-device areas padded   tubing/devices free from skin contact  Taken 1/2/2025 2002 by July Vick RRT  Device Skin Pressure Protection:   skin-to-device areas padded   tubing/devices free from skin contact  Goal: Absence of Ventilator-Induced Lung Injury  Outcome: Not Progressing  Intervention: Prevent Ventilator-Associated Pneumonia  Recent Flowsheet Documentation  Taken 1/3/2025 0335 by July Vick RRT  Head of Bed (HOB) Positioning: HOB at 30-45 degrees  Taken 1/2/2025 2243 by July Vick RRT  Head of Bed (HOB) Positioning: HOB at 30-45 degrees  Taken 1/2/2025 2002 by July Vick RRT  Head of Bed (HOB) Positioning: HOB at 30-45 degrees     Problem: Skin Injury Risk Increased  Goal: Skin Health and Integrity  Intervention: Optimize Skin Protection  Recent Flowsheet Documentation  Taken 1/3/2025 0335 by July Vick RRT  Head of Bed (HOB) Positioning: HOB at 30-45 degrees  Taken 1/2/2025 2243 by July Vick RRT  Head of Bed (HOB) Positioning: HOB at 30-45 degrees  Taken 1/2/2025 2002 by July Vick RRT  Head of Bed (HOB) Positioning: HOB at 30-45 degrees     Problem: Sepsis/Septic Shock  Goal: Absence of Infection Signs and Symptoms  Intervention: Promote Recovery  Recent Flowsheet Documentation  Taken 1/3/2025 0335 by July Vick RRT  Airway/Ventilation Management: airway  patency maintained  Taken 1/2/2025 2243 by July Vick RRT  Airway/Ventilation Management:   airway patency maintained   pulmonary hygiene promoted  Taken 1/2/2025 2002 by July Vick RRT  Airway/Ventilation Management:   airway patency maintained   pulmonary hygiene promoted     Problem: Enteral Nutrition  Goal: Absence of Aspiration Signs and Symptoms  Intervention: Minimize Aspiration Risk  Recent Flowsheet Documentation  Taken 1/3/2025 0335 by July Vick RRT  Head of Bed (HOB) Positioning: HOB at 30-45 degrees  Taken 1/2/2025 2243 by July Vick RRT  Head of Bed (HOB) Positioning: HOB at 30-45 degrees  Taken 1/2/2025 2002 by July Vick RRT  Head of Bed (HOB) Positioning: HOB at 30-45 degrees

## 2025-01-03 NOTE — CONSULTS
[facility]    Palliative Care Initial Consult   Attending Physician: Mando Olivo MD  Referring Provider: Mando Olivo MD    Reason for Referral: assistance with clarification of goals of care  Family/Support: ex-spouseFe    Code Status and Medical Interventions: No CPR (Do Not Attempt to Resuscitate); Full Support   Ordered at: 01/01/25 1318     Level Of Support Discussed With:    Next of Kin (If No Surrogate)     Code Status (Patient has no pulse and is not breathing):    No CPR (Do Not Attempt to Resuscitate)     Medical Interventions (Patient has pulse or is breathing):    Full Support     Goals of Care: TBD.    HPI:   64 y.o. male has a past medical history of bladder cancer, cerebral microvascular disease per CT, anxiety, Bipolar disorder, Colon polyp, Coronary artery disease, Depression, Diabetes mellitus, Hyperlipidemia, Hypertension, Nephrolithiasis, tobacco use, and Sleep apnea.  Hospitalization 8/25-8/28 due to altered mental status, leukocytosis, nontraumatic rhabdomyolysis; 7/13-7/14 due to hypoglycemia. Patient presented to [facility] on 12/29/2024 related to found down unresponsive, immediately intubated in the ED and transferred to ICU.  ED workup shows elevated CK, elevated alk phos, leukocytosis.  Respiratory panel negative.  Blood cultures pending.  Respiratory culture positive for Staphylococcus RES.  UDS positive for cocaine and TCA.  CT imaging head shows no acute intercranial abnormality.  Started on broad-spectrum antibiotic, breathing treatments, IV fluids.  Suspected anoxic brain injury, remains on ventilator support.  Biting at ETT tube.  Precedex and fentanyl infusing.  Tube feeds infusing.  Montesinos catheter to bedside.  Leukocytosis trending downward.  Now with anemia.  Chest imaging shows no focal infiltrate or definite pneumonia.  Tmax 101.8, antibiotics broadened.  According to chart review patient's ex-spouse is  attempting to obtain legal guardianship.  Nursing reports court date set for Monday.  No family/friend currently at bedside. Due to the Palliative Care Topics Discussed: palliative care, goals of care, and care options we will establish an advance care plan.   Advance Care Planning   Advance Care Planning Discussion: Spoke with the ex-spouse, Fe via telephone.  She verbalizes she and patient were  over 23 years and have been  over the last 11 years.  States she speaks to patient on a daily basis and has known him over the course of 50 years. Patient does not have children per ex-spouse. She is understandably having difficulty with patient's current situation and has reached out to multiple resources in the apartment complex to try to determine how long patient was down prior to being found.  We explored conversations with physicians.  Despite conversations Fe reports she remains hopeful and that she felt that patient grabbed and released her hand 4 times and opened his eyes when asked the other day when visiting.  States that she did have the opportunity to witness de-escalation of sedation and noted patient was gasping and struggling which was very distressful to see.  We discussed potentially consulting with neurology to provide more insight with potential diagnostics that may assist with medical decision making forward.  Seems to have fair prognostic awareness but has questions with regard to expectations once patient is taken off of ventilator support.  Discussed this would be determined if the goal would be comfort and comfort expectations discussed in the scenario.  Questions encouraged and support given.  Will plan to follow-up once guardianship obtained.     Review of Systems   Unable to perform ROS: Intubated     1- Pain Assessment  CPOT Facial Expression: 0-->relaxed, neutral  CPOT Body Movements: 0-->absence of movements  CPOT Muscle Tension: 0-->relaxed  Ventilator  Compliance/Vocalization: 0-->tolerating ventilator or movement  CPOT Score: 0    Past Medical History:   Diagnosis Date    Anxiety     Atherosclerosis of coronary artery     Bipolar disorder     Bladder cancer     Colon polyp     Coronary artery disease     Depression     Diabetes mellitus     Hemorrhoids     Hyperlipidemia     Hypertension     Nephrolithiasis     Sleep apnea      Past Surgical History:   Procedure Laterality Date    ANGIOPLASTY      with stent placement    BACK SURGERY      BLADDER SURGERY      cancer    CARDIAC CATHETERIZATION      COLONOSCOPY  2015    COLONOSCOPY N/A 2018    Procedure: COLONOSCOPY WITH ANESTHESIA;  Surgeon: Gerson Martini MD;  Location:  [facility];  Service: Gastroenterology    COLONOSCOPY N/A 2021    Procedure: COLONOSCOPY WITH ANESTHESIA;  Surgeon: Gerson Martini MD;  Location:  [facility];  Service: Gastroenterology;  Laterality: N/A;  Pre: Hx Colon Polyps  Post: divertivculosis   Dr. Garza  CO2 Inflation Used    CORONARY ARTERY BYPASS GRAFT      KNEE SURGERY      SHOULDER SURGERY      x 2    SINUS SURGERY       Social History     Socioeconomic History    Marital status:    Tobacco Use    Smoking status: Every Day     Current packs/day: 0.50     Types: Cigarettes    Smokeless tobacco: Never    Tobacco comments:     2 a day   Vaping Use    Vaping status: Never Used   Substance and Sexual Activity    Alcohol use: Yes     Comment: Rarely    Drug use: Yes     Types: Cocaine(coke)    Sexual activity: Defer         Current Facility-Administered Medications:     acetaminophen (TYLENOL) suppository 650 mg, 650 mg, Rectal, Q4H PRN, Mnado Olivo MD, 650 mg at 24 1612    acetaminophen (TYLENOL) tablet 650 mg, 650 mg, Per G Tube, Q6H PRN, Mando Olivo MD, 650 mg at 25 0630    [COMPLETED] amiodarone 150 mg in 100 mL D5W (loading dose), 150 mg, Intravenous, Once, 150 mg at 25 1800 **FOLLOWED BY** [] amiodarone  360 mg in 200 mL D5W infusion, 1 mg/min, Intravenous, Continuous, Last Rate: 33.3 mL/hr at 25 190, 1 mg/min at 25 190 **FOLLOWED BY** [] amiodarone 360 mg in 200 mL D5W infusion, 0.5 mg/min, Intravenous, Continuous, Stopped at 25 182 **FOLLOWED BY** [COMPLETED] amiodarone (PACERONE) tablet 200 mg, 200 mg, Per G Tube, Once, 200 mg at 25 1820 **FOLLOWED BY** amiodarone (PACERONE) tablet 200 mg, 200 mg, Per G Tube, Q8H, 200 mg at 25 0303 **FOLLOWED BY** [START ON 2025] amiodarone (PACERONE) tablet 200 mg, 200 mg, Per G Tube, Q12H **FOLLOWED BY** [START ON 2025] amiodarone (PACERONE) tablet 200 mg, 200 mg, Per G Tube, Daily, Mando Olivo MD    amLODIPine (NORVASC) tablet 10 mg, 10 mg, Nasogastric, Nightly, Nicola Hare PA-C, 10 mg at 25    atorvastatin (LIPITOR) tablet 40 mg, 40 mg, Nasogastric, Nightly, Nicola Hare PA-C, 40 mg at 25    sennosides-docusate (PERICOLACE) 8.6-50 MG per tablet 2 tablet, 2 tablet, Nasogastric, BID, 2 tablet at 24 **AND** polyethylene glycol (MIRALAX) packet 17 g, 17 g, Nasogastric, Daily PRN **AND** [DISCONTINUED] bisacodyl (DULCOLAX) EC tablet 5 mg, 5 mg, Oral, Daily PRN **AND** bisacodyl (DULCOLAX) suppository 10 mg, 10 mg, Rectal, Daily PRN, Mando Olivo MD    budesonide (PULMICORT) nebulizer solution 0.5 mg, 0.5 mg, Nebulization, BID - RT, SensCesar salgado MD, 0.5 mg at 25 0550    chlorhexidine (PERIDEX) 0.12 % solution 15 mL, 15 mL, Mouth/Throat, Q12H, Pravin Quinones APRN, 15 mL at 25 0906    Chlorhexidine Gluconate Cloth 2 % pads 1 Application, 1 Application, Topical, Q24H, Pravin Quinones APRN, 1 Application at 25 0410    clopidogrel (PLAVIX) tablet 75 mg, 75 mg, Nasogastric, Nightly, Nicola Hare PA-C, 75 mg at 25    dexmedetomidine (PRECEDEX) 400 mcg in 100 mL NS infusion, 0.2-1.5 mcg/kg/hr, Intravenous, Titrated, Hernan Gonzalez APRN, Last Rate:  10.8 mL/hr at 01/03/25 0619, 0.4 mcg/kg/hr at 01/03/25 0619    Enoxaparin Sodium (LOVENOX) syringe 40 mg, 40 mg, Subcutaneous, Daily, Pravin Quinones APRN, 40 mg at 01/03/25 0903    famotidine (PEPCID) tablet 20 mg, 20 mg, Nasogastric, Q12H, Nicola Hare PA-C, 20 mg at 01/03/25 0902    fentaNYL 2500 mcg/250 mL NS infusion,  mcg/hr, Intravenous, Titrated, Mando Olivo MD, Last Rate: 25 mL/hr at 01/03/25 0907, 250 mcg/hr at 01/03/25 0907    fluorometholone (EFLONE) 0.1 % ophthalmic suspension 2 drop, 2 drop, Left Eye, 4x Daily, Nicola Hare PA-C, 2 drop at 01/03/25 0902    hyaluronidase 150 units in NS 10 ml syringe, 150 Units, Subcutaneous, PRN, Mando Olivo MD, 150 Units at 01/02/25 1033    hydrALAZINE (APRESOLINE) injection 10 mg, 10 mg, Intravenous, Q6H PRN, Mando Olivo MD, 10 mg at 01/01/25 1256    ipratropium-albuterol (DUO-NEB) nebulizer solution 3 mL, 3 mL, Nebulization, 4x Daily - RT, Mando Olivo MD, 3 mL at 01/03/25 0945    levoFLOXacin (LEVAQUIN) tablet 750 mg, 750 mg, Per G Tube, Q24H, Mando Olivo MD, 750 mg at 01/02/25 2019    lisinopril (PRINIVIL,ZESTRIL) tablet 40 mg, 40 mg, Nasogastric, Daily, Nicola Hare PA-C, 40 mg at 01/03/25 0902    metoprolol tartrate (LOPRESSOR) tablet 100 mg, 100 mg, Nasogastric, Q12H, Nicola Hare PA-C, 100 mg at 01/03/25 0902    nitroglycerin (NITROSTAT) SL tablet 0.4 mg, 0.4 mg, Sublingual, Q5 Min PRN, Pravin Quinones APRN    Phosphorus Replacement - Follow Nurse / BPA Driven Protocol, , Not Applicable, PRN, Hernan Gonzalez APRN    Potassium Replacement - Follow Nurse / BPA Driven Protocol, , Not Applicable, Calros RHODES Matthew, APRN    propofol (DIPRIVAN) infusion 10 mg/mL 100 mL, 5-50 mcg/kg/min, Intravenous, Titrated, Imer Valenzuela MD, Stopped at 12/30/24 0725    sodium chloride 0.9 % flush 10 mL, 10 mL, Intravenous, Q12H, Pravin Quinones APRN, 10 mL at 01/03/25 0906    sodium chloride 0.9 % flush 10 mL, 10 mL,  "Intravenous, PRN, Pravin Quinones APRN, 10 mL at 12/31/24 2201    sodium chloride 0.9 % infusion 40 mL, 40 mL, Intravenous, PRN, Pravin Quinones APRN    vancomycin 1250 mg/250 mL 0.9% NS IVPB ([facility]), 1,250 mg, Intravenous, Q12H, Mando Olivo MD, 1,250 mg at 01/03/25 0901    Allergies   Allergen Reactions    Codeine Nausea Only     I have utilized all available immediate resources to obtain, update, or review the patient's current medications (including all prescriptions, over-the-counter products, herbals, cannabis/cannabidiol products, and vitamin/mineral/dietary (nutritional) supplements) for name, route of administration, type, dose and frequency.      Intake/Output Summary (Last 24 hours) at 1/3/2025 0955  Last data filed at 1/3/2025 0400  Gross per 24 hour   Intake 2052.26 ml   Output 825 ml   Net 1227.26 ml       Physical Exam:    Diagnostics: Reviewed  /74   Pulse 95   Temp (!) 101.3 °F (38.5 °C) (Rectal)   Resp 18   Ht 172.7 cm (68\")   Wt 108 kg (238 lb 8.6 oz)   SpO2 93%   BMI 36.27 kg/m²     Vitals and nursing note reviewed.   Constitutional:       Appearance: Acutely ill-appearing.      Interventions: Sedated and intubated.      Comments: OG tube with tube feeds infusing.  Fentanyl and Precedex infusing.   HENT:      Head: Normocephalic.   Pulmonary:      Effort: Intubated.   Cardiovascular:      Normal rate.   Edema:     Peripheral edema absent.   Abdominal:      Palpations: Abdomen is soft.   Genitourinary:     Comments: Montesinos catheter in place.  Neurological:      Mental Status: Unresponsive.      Comments: Positive gag.  Biting on ETT tube. Sedated     Patient status: Disease state: Controlled with current treatments.  Current Functional status: Palliative Performance Scale Score: Performance 10% based on the following measures: Ambulation: Totally bed bound, Activity and Evidence of Disease: Unable to do any work, extensive evidence of disease, Self-Care: Total care required,  " Intake: Mouth care only, LOC: Drowsy or comatose   Baseline ECOG Status(4) Completely disabled, unable to carry out self-care.  Totally confined to bed or chair.  Nutritional status: Albumin 3.1 Body mass index is 36.27 kg/m².         Hospital Problem List      Anoxic brain injury    Acute respiratory failure with hypoxia    On mechanically assisted ventilation    Cocaine abuse    Positive urine drug screen    Substance abuse    Tobacco use    COPD (chronic obstructive pulmonary disease)    Bipolar disorder    Coronary artery disease involving autologous vein coronary bypass graft without angina pectoris    Essential hypertension    Hyperlipidemia LDL goal <100    Impression/Problem List:    Anoxic brain injury, suspected  Acute respiratory failure with hypoxia requiring ventilator support  History of bladder cancer  Cerebral microvascular disease per CT  Respiratory culture positive for Staphylococcus aureus  Substance use, UDS positive cocaine and TCA  COPD  Coronary artery disease  Hypertension  Hyperlipidemia  Bipolar disorder  Anxiety  Tobacco use     Recommendations/Plan:  1. plan: Goals of care include no CPR/full support interventions.    Family support: The patient lacks significant family support..  Advance Directives: Advance Directive Status: Patient has advance directive, copy in chart   POA/Healthcare surrogate-guardianship pending. julián Miramontesspdat verbalizes she and patient were  over 23 years and have been  over the last 11 years.  States she speaks to patient on a daily basis and has known him over the course of 50 years. Patient does not have children per ex-spouse.    2.  Palliative care encounter  - Prognosis is poor long-term secondary to anoxic brain injury, respiratory failure, history of bladder cancer, cerebral microvascular disease, substance use, and multiple comorbidities.  -Family appears to have fair prognostic awareness.     -Intubated in the ED.  Respiratory panel  negative.  Blood cultures pending.  Respiratory culture positive for Staphylococcus aureus.  UDS positive for cocaine and TCA.   Started on broad-spectrum antibiotic, breathing treatments, IV fluids.   -Suspected anoxic brain injury, remains on ventilator support.    1/2-According to chart review patient's ex-spouse is attempting to obtain legal guardianship.  Nursing reports court date set for Monday.        1/3-continue supportive measures  -Further goals pending guardianship approval  -would anticipate potentially consult with neurology and further diagnostic neuro workup to provide more insight that may assist with medical decision making forward.   -Explored scenario with de-escalation options with goal of comfort per ex-spouse request      Thank you for this consult and allowing us to participate in patient's plan of care. Palliative Care Team will continue to follow patient.     Mariya Calix, APRN  1/3/2025  09:55 CST

## 2025-01-03 NOTE — CONSULTS
20 gauge 2.5 inch USGPIV placed in left forearm with 1 attempt(s).     20 gauge 2.5 inch USGPIV placed in left upper cephalic with 1 attempt(s).

## 2025-01-03 NOTE — PLAN OF CARE
Goal Outcome Evaluation:           Progress: no change  Outcome Evaluation: Pt does not follow commands, posturing and eyes deviated up. NSR/ST 90-110s. ETT. Tmax 101.8, tylenol, toradol given and cooling blanket on pt. Fentanyl gtt, and precedex gtt infusing. UOP adequate. TF at goal, and tolerating. Q2 turn. Safety maintained.         Problem: Mechanical Ventilation Invasive  Goal: Mechanical Ventilation Liberation  Outcome: Not Progressing  Intervention: Promote Extubation and Mechanical Ventilation Liberation  Recent Flowsheet Documentation  Taken 1/3/2025 0400 by Madelaine Contreras RN  Medication Review/Management: medications reviewed  Taken 1/3/2025 0000 by Madelaine Contreras RN  Medication Review/Management: medications reviewed  Taken 1/2/2025 2000 by Madelaine Contreras RN  Medication Review/Management: medications reviewed  Taken 1/2/2025 1900 by Madelaine Contreras RN  Medication Review/Management: medications reviewed  Goal: Absence of Device-Related Skin and Tissue Injury  Outcome: Not Progressing  Intervention: Maintain Skin and Tissue Health  Recent Flowsheet Documentation  Taken 1/3/2025 0400 by Madelaine Contreras RN  Device Skin Pressure Protection:   absorbent pad utilized/changed   adhesive use limited   pressure points protected   skin-to-skin areas padded   tubing/devices free from skin contact  Taken 1/3/2025 0000 by Madelaine Contreras RN  Device Skin Pressure Protection:   absorbent pad utilized/changed   adhesive use limited   positioning supports utilized   pressure points protected   skin-to-skin areas padded   tubing/devices free from skin contact  Taken 1/2/2025 2000 by Madelaine Contreras RN  Device Skin Pressure Protection:   absorbent pad utilized/changed   adhesive use limited   positioning supports utilized   pressure points protected   skin-to-skin areas padded   tubing/devices free from skin contact  Goal: Absence of Ventilator-Induced Lung Injury  Outcome: Not Progressing  Intervention:  Prevent Ventilator-Associated Pneumonia  Recent Flowsheet Documentation  Taken 1/3/2025 0500 by Madelaine Contreras RN  Head of Bed (HOB) Positioning: HOB at 30-45 degrees  Taken 1/3/2025 0400 by Madelaine Contreras RN  Oral Care:   teeth brushed - suction toothbrush   swabbed with antiseptic solution   lip/mouth moisturizer applied  Taken 1/3/2025 0300 by Madelaine Contreras RN  Head of Bed (HOB) Positioning: HOB at 30-45 degrees  Taken 1/3/2025 0100 by Madelaine Contreras RN  Head of Bed (HOB) Positioning: HOB at 30-45 degrees  Taken 1/2/2025 2300 by Madelaine Contreras RN  Head of Bed (HOB) Positioning: HOB at 30-45 degrees  Taken 1/2/2025 2100 by Madelaine Contreras RN  Head of Bed (HOB) Positioning: HOB at 30-45 degrees  Taken 1/2/2025 2000 by Madelaine Contreras RN  Oral Care:   teeth brushed - suction toothbrush   swabbed with antiseptic solution   lip/mouth moisturizer applied  Taken 1/2/2025 1900 by Madelaine Contreras RN  Head of Bed (HOB) Positioning: HOB at 30-45 degrees

## 2025-01-03 NOTE — CASE MANAGEMENT/SOCIAL WORK
Continued Stay Note   Jairo     Patient Name: Ld Green  MRN: [id number]  Today's Date: 1/3/2025    Admit Date: 12/29/2024    Plan: Undetermined   Discharge Plan       Row Name 01/03/25 1123       Plan    Plan Undetermined    Plan Comments Pt's ex-wife Fe is working on getting a court date for guardianship. She wants to be able to make arrangements for pt and take care of any financial aspects prior to making pt comfort care.                   Discharge Codes    No documentation.                       REEMA Ames

## 2025-01-03 NOTE — PROGRESS NOTES
RT EQUIPMENT DEVICE RELATED - SKIN ASSESSMENT    Dany Score:  Dany Score: 13     RT Medical Equipment/Device:     ETT Linda/[employer]fast    Skin Assessment:      Cheek:  Intact  Neck:  Intact  Lips:  Intact  Mouth:  Intact    Device Skin Pressure Protection:  Skin-to-device areas padded:  [employer]fast    Nurse Notification:  Evelin Vick, RRT

## 2025-01-03 NOTE — PLAN OF CARE
Goal Outcome Evaluation:           Progress: no change  Outcome Evaluation: pt continues on vent. iv fentanyl gtt continues. amio gtt dc'd and changed to po. tube feeds at goal and tolerating. MELLSISA following.

## 2025-01-03 NOTE — CONSULTS
INFECTIOUS DISEASES CONSULT NOTE    Patient:  Ld Green 64 y.o. male  ROOM # C002/1  YOB: 1960  MRN: [id number]  CSN:  [id number]  Admit date: 12/29/2024   Admitting Physician: Mando Olivo MD  Primary Care Physician: Xiomara Neal APRN  REFERRING PROVIDER: No ref. provider found    Inpatient Infectious Diseases Consult  Consult performed by: Constanza Dial MD  Consult ordered by: Mando Olivo MD          REASON FOR CONSULTATION : Persistent fevers.  Possibly central versus infectious      HISTORY OF PRESENT ILLNESS: Patient is a 64-year-old gentleman who was evaluated in the emergency department December 29, 2024 after being found unresponsive at the [facility] where he lives.  It was unknown how long the patient had been down and arrived on a nonrebreather.  He was withdrawing to painful stimuli on the right and there is notation that was no obvious seizure activity.    Urine drug screen was positive for cocaine and tricyclic antidepressant.      Patient had no evidence of bleed on CT of the brain.  White count was elevated at 29,000 and lactate level was normal    He remains on fentanyl and Precedex.  Per Maria De Jesus, RN has had decorticate posturing and this is not new.    The patient's ex-wife has financial power of  is trying to work on getting guardianship to make further decisions.    The patient has had persistent fevers and has been on cooling blanket for some time.  Per nursing, if cooling blanket is been turned off it has to be turned back on again to control temperature.    Had respiratory cultures on admission with moderate growth of Staphylococcus aureus that is pan susceptible.  Follow-up cultures from January 1 with scant growth of methicillin susceptible Staphylococcus aureus from respiratory culture.    Blood cultures negative from admission and follow-up on January 1.  MRSA nasal screen negative as well as respiratory panel by PCR.    Zosyn started  December 29 through January 1  Vancomycin dosed once on December 29 and restarted this a.m. January 3  Levofloxacin started evening January 1      Past Medical History:   Diagnosis Date    Anxiety     Atherosclerosis of coronary artery     Bipolar disorder     Bladder cancer     Colon polyp     Coronary artery disease     Depression     Diabetes mellitus     Hemorrhoids     Hyperlipidemia     Hypertension     Nephrolithiasis     Sleep apnea      Past Surgical History:   Procedure Laterality Date    ANGIOPLASTY      with stent placement    BACK SURGERY      BLADDER SURGERY      cancer    CARDIAC CATHETERIZATION      COLONOSCOPY  04/13/2015    COLONOSCOPY N/A 4/30/2018    Procedure: COLONOSCOPY WITH ANESTHESIA;  Surgeon: Gerson Martini MD;  Location:  [facility];  Service: Gastroenterology    COLONOSCOPY N/A 4/8/2021    Procedure: COLONOSCOPY WITH ANESTHESIA;  Surgeon: Gerson Martini MD;  Location:  [facility];  Service: Gastroenterology;  Laterality: N/A;  Pre: Hx Colon Polyps  Post: divertivculosis   Dr. Garza  CO2 Inflation Used    CORONARY ARTERY BYPASS GRAFT      KNEE SURGERY      SHOULDER SURGERY      x 2    SINUS SURGERY       Family History   Adopted: Yes     Social History     Socioeconomic History    Marital status:    Tobacco Use    Smoking status: Every Day     Current packs/day: 0.50     Types: Cigarettes    Smokeless tobacco: Never    Tobacco comments:     2 a day   Vaping Use    Vaping status: Never Used   Substance and Sexual Activity    Alcohol use: Yes     Comment: Rarely    Drug use: Yes     Types: Cocaine(coke)    Sexual activity: Defer       Current Scheduled Medications:   amiodarone, 200 mg, Per G Tube, Q8H   Followed by  [START ON 1/9/2025] amiodarone, 200 mg, Per G Tube, Q12H   Followed by  [START ON 1/23/2025] amiodarone, 200 mg, Per G Tube, Daily  amLODIPine, 10 mg, Nasogastric, Nightly  atorvastatin, 40 mg, Nasogastric, Nightly  budesonide, 0.5 mg,  Nebulization, BID - RT  chlorhexidine, 15 mL, Mouth/Throat, Q12H  Chlorhexidine Gluconate Cloth, 1 Application, Topical, Q24H  clopidogrel, 75 mg, Nasogastric, Nightly  enoxaparin, 40 mg, Subcutaneous, Daily  famotidine, 20 mg, Nasogastric, Q12H  fluorometholone, 2 drop, Left Eye, 4x Daily  ipratropium-albuterol, 3 mL, Nebulization, 4x Daily - RT  levoFLOXacin, 750 mg, Per G Tube, Q24H  lisinopril, 40 mg, Nasogastric, Daily  metoprolol tartrate, 100 mg, Nasogastric, Q12H  mupirocin, 1 Application, Each Nare, BID  senna-docusate sodium, 2 tablet, Nasogastric, BID  sodium chloride, 10 mL, Intravenous, Q12H  vancomycin, 1,250 mg, Intravenous, Q12H          Antibiotics Since Admission  Anti-Infectives (From admission, onward)      Ordered     Dose/Rate Route Frequency Start Stop    01/03/25 0716  vancomycin 1250 mg/250 mL 0.9% NS IVPB ([facility])        Ordering Provider: Mando Olivo MD    1,250 mg  over 75 Minutes Intravenous Every 12 Hours 01/03/25 0815 01/10/25 0759    01/03/25 0711  Pharmacy to dose vancomycin  Status:  Discontinued        Ordering Provider: Mando Olivo MD     Not Applicable Continuous PRN 01/03/25 0711 01/03/25 0742    01/02/25 0814  levoFLOXacin (LEVAQUIN) tablet 750 mg        Ordering Provider: Mando Olivo MD    750 mg Per G Tube Every 24 Hours 01/02/25 2000 01/06/25 1959    01/01/25 1326  levoFLOXacin (LEVAQUIN) tablet 750 mg  Status:  Discontinued        Ordering Provider: Mando Olivo MD    750 mg Oral Every 24 Hours 01/01/25 2000 01/02/25 0814    01/01/25 0944  vancomycin IVPB 1500 mg in 0.9% NaCl (Premix) 500 mL  Status:  Discontinued        Ordering Provider: Mando Olivo MD    1,500 mg  333.3 mL/hr over 90 Minutes Intravenous Every 12 Hours 01/01/25 1030 01/01/25 1322    01/01/25 0939  Pharmacy to dose vancomycin  Status:  Discontinued        Ordering Provider: Nicola Hare PA-C     Not Applicable Continuous PRN 01/01/25 0938 01/01/25 1049    12/31/24 1057   piperacillin-tazobactam (ZOSYN) 4.5 g IVPB in 100 mL NS MBP (CD)  Status:  Discontinued        Ordering Provider: Mando Olivo MD    4.5 g  over 4 Hours Intravenous Every 8 Hours 12/31/24 1300 01/01/25 1326    12/29/24 1700  vancomycin IVPB 1500 mg in 0.9% NaCl (Premix) 500 mL  Status:  Discontinued        Ordering Provider: Cesar Alvarado MD    1,500 mg  333.3 mL/hr over 90 Minutes Intravenous Every 24 Hours 12/30/24 1500 12/31/24 0935    12/29/24 1708  piperacillin-tazobactam (ZOSYN) 3.375 g IVPB in 100 mL NS MBP (CD)  Status:  Discontinued        Ordering Provider: Cesar Alvarado MD    3.375 g  over 4 Hours Intravenous Every 8 Hours 12/29/24 2100 12/31/24 1057    12/29/24 1858  Pharmacy to Dose Zosyn  Status:  Discontinued        Ordering Provider: Cesar Alvarado MD     Not Applicable Continuous PRN 12/29/24 1855 12/29/24 1902    12/29/24 1627  Pharmacy to Dose Zosyn  Status:  Discontinued        Ordering Provider: Pravin Quinones APRN     Not Applicable Continuous PRN 12/29/24 1627 01/01/25 1326    12/29/24 1627  Pharmacy to dose vancomycin  Status:  Discontinued        Ordering Provider: Pravin Quinones APRN     Not Applicable Continuous PRN 12/29/24 1626 12/30/24 0756    12/29/24 1443  vancomycin (VANCOCIN) 2,250 mg in sodium chloride 0.9 % 500 mL IVPB        Ordering Provider: Imer Valenzuela MD    20 mg/kg × 108 kg  142.9 mL/hr over 210 Minutes Intravenous Once 12/29/24 1459 12/29/24 1912    12/29/24 1443  piperacillin-tazobactam (ZOSYN) 3.375 g IVPB in 100 mL NS MBP (CD)        Ordering Provider: Imer Valenzuela MD    3.375 g  over 30 Minutes Intravenous Once 12/29/24 1459 12/29/24 1537            Current PRN Medications:    acetaminophen    acetaminophen    senna-docusate sodium **AND** polyethylene glycol **AND** [DISCONTINUED] bisacodyl **AND** bisacodyl    hyaluronidase 150 units in NS 10 ml syringe    hydrALAZINE    nitroglycerin    Phosphorus Replacement - Follow Nurse /  "[facility] Driven Protocol    Potassium Replacement - Follow Nurse / [facility] Driven Protocol    sodium chloride    sodium chloride      dexmedetomidine, 0.2-1.5 mcg/kg/hr, Last Rate: 0.4 mcg/kg/hr (25)  fentanyl 10 mcg/mL,  mcg/hr, Last Rate: 250 mcg/hr (25 06)  propofol, 5-50 mcg/kg/min, Last Rate: Stopped (24 0725)             Allergies   Allergen Reactions    Codeine Nausea Only           Vital Signs:  /74   Pulse 102   Temp (!) 101.3 °F (38.5 °C) (Rectal)   Resp 20   Ht 172.7 cm (68\")   Wt 108 kg (238 lb 8.6 oz)   SpO2 98%   BMI 36.27 kg/m²   Temp (24hrs), Av.4 °F (38 °C), Min:98.1 °F (36.7 °C), Max:101.8 °F (38.8 °C)      Physical Exam\  General: Patient is an obese gentleman lying in bed in CCU appearing chronically and acutely ill.  Neck: Supple without meningismus  HEENT: Sclera are somewhat injected.  Eyes were open.  ET tube in place.  OG tube in place.  Respiratory: Patient has some increased work of breathing with some accessory muscle use.  FiO2 30%, PEEP of 5, O2 sats 98%  Abdomen: Obese, no mass appreciated  Extremities: Heel pads in place.  Neuro: Sedated on fentanyl and propofol.  Eyes open.  No blinking when I attempted to open eyes further.  Did not stimulate to assess for posturing.      Results Review:    I reviewed the patient's new clinical results.    Lab Results:    CBC:   Lab Results   Lab 24  1202 24  0316 24  0212 25  0316 25  0201 25  0207   WBC 29.48* 20.23* 17.52* 18.42* 18.20* 15.80*   HEMOGLOBIN 17.7 14.4 13.7 14.5 13.8 12.7*   HEMATOCRIT 50.0 43.4 41.7 44.2 42.6 39.8   PLATELETS 308 232 177 196 198 202        AutoDiff:   Lab Results   Lab 25  0316 25  0201 25  0207   NEUTROPHIL % 73.8 73.3 74.4   LYMPHOCYTE % 9.6* 10.5* 9.1*   MONOCYTES % 14.7* 14.3* 14.0*   EOSINOPHIL % 0.3 0.5 0.9   BASOPHIL % 0.6 0.5 0.6   NEUTROS ABS 13.61* 13.32* 11.74*   LYMPHS ABS 1.76 1.92 1.44   MONOS ABS 2.71* 2.61* " 2.21*   EOS ABS 0.05 0.09 0.15   BASOS ABS 0.11 0.09 0.10        Manual Diff:    Lab Results   Lab 01/01/25 0316 01/02/25  0201 01/03/25  0207   NEUTROS ABS 13.61* 13.32* 11.74*        CMP:   Lab Results   Lab 01/01/25 0316 01/02/25  0201 01/03/25  0207   SODIUM 146* 143 142   POTASSIUM 3.8 3.8 4.2   CHLORIDE 109* 106 103   CO2 28.0 29.0 31.0*   BUN 17 17 21   CREATININE 0.52* 0.54* 0.65*   CALCIUM 9.3 9.1 9.1   BILIRUBIN 1.4* 0.9 0.8   ALK PHOS 117 105 98   ALT (SGPT) 23 20 20   AST (SGOT) 30 23 22   GLUCOSE 165* 198* 162*       Lab Results (last 72 hours)       Procedure Component Value Units Date/Time    POC Glucose Once [[id number]]  (Abnormal) Collected: 01/03/25 0532    Specimen: Blood Updated: 01/03/25 0543     Glucose 187 mg/dL      Comment: : [id number] [facility] BreannaMeter ID: [id number]       Blood Gas, Arterial - [[id number]]  (Abnormal) Collected: 01/03/25 0348    Specimen: Arterial Blood Updated: 01/03/25 0353     Site Left Radial     Pedro Pablo's Test Positive     pH, Arterial 7.417 pH units      pCO2, Arterial 53.5 mm Hg      Comment: 83 Value above reference range        pO2, Arterial 66.9 mm Hg      Comment: 84 Value below reference range        HCO3, Arterial 34.5 mmol/L      Comment: 83 Value above reference range        Base Excess, Arterial 8.3 mmol/L      Comment: 83 Value above reference range        O2 Saturation, Arterial 93.7 %      Comment: 84 Value below reference range        Temperature 37.0     Barometric Pressure for Blood Gas 759 mmHg      Modality Ventilator     FIO2 30 %      Ventilator Mode AC     Set Tidal Volume 500.000     Set Mech Resp Rate 16.0     PEEP 5.0     Collected by [id number]     Comment: Meter: [id number]     :  July Vick, RUSSELL        pCO2, Temperature Corrected 53.5 mm Hg      pH, Temp Corrected 7.417 pH Units      pO2, Temperature Corrected 66.9 mm Hg      PO2/FIO2 223    Comprehensive Metabolic Panel [[id number]]  (Abnormal) Collected: 01/03/25 0207     Specimen: Blood Updated: 01/03/25 0248     Glucose 162 mg/dL      BUN 21 mg/dL      Creatinine 0.65 mg/dL      Sodium 142 mmol/L      Potassium 4.2 mmol/L      Chloride 103 mmol/L      CO2 31.0 mmol/L      Calcium 9.1 mg/dL      Total Protein 6.3 g/dL      Albumin 3.1 g/dL      ALT (SGPT) 20 U/L      AST (SGOT) 22 U/L      Alkaline Phosphatase 98 U/L      Total Bilirubin 0.8 mg/dL      Globulin 3.2 gm/dL      A/G Ratio 1.0 g/dL      BUN/Creatinine Ratio 32.3     Anion Gap 8.0 mmol/L      eGFR 105.9 mL/min/1.73     Narrative:      GFR Categories in Chronic Kidney Disease (CKD)      GFR Category          GFR (mL/min/1.73)    Interpretation  G1                     90 or greater         Normal or high (1)  G2                      60-89                Mild decrease (1)  G3a                   45-59                Mild to moderate decrease  G3b                   30-44                Moderate to severe decrease  G4                    15-29                Severe decrease  G5                    14 or less           Kidney failure          (1)In the absence of evidence of kidney disease, neither GFR category G1 or G2 fulfill the criteria for CKD.    eGFR calculation 2021 CKD-EPI creatinine equation, which does not include race as a factor    Magnesium [[id number]]  (Normal) Collected: 01/03/25 0207    Specimen: Blood Updated: 01/03/25 0248     Magnesium 2.1 mg/dL     Phosphorus [[id number]]  (Normal) Collected: 01/03/25 0207    Specimen: Blood Updated: 01/03/25 0248     Phosphorus 3.6 mg/dL     CBC & Differential [[id number]]  (Abnormal) Collected: 01/03/25 0207    Specimen: Blood Updated: 01/03/25 0224    Narrative:      The following orders were created for panel order CBC & Differential.  Procedure                               Abnormality         Status                     ---------                               -----------         ------                     CBC Auto Differential[[id number]]        Abnormal            Final  result                 Please view results for these tests on the individual orders.    CBC Auto Differential [[id number]]  (Abnormal) Collected: 01/03/25 0207    Specimen: Blood Updated: 01/03/25 0224     WBC 15.80 10*3/mm3      RBC 4.16 10*6/mm3      Hemoglobin 12.7 g/dL      Hematocrit 39.8 %      MCV 95.7 fL      MCH 30.5 pg      MCHC 31.9 g/dL      RDW 13.3 %      RDW-SD 47.6 fl      MPV 10.5 fL      Platelets 202 10*3/mm3      Neutrophil % 74.4 %      Lymphocyte % 9.1 %      Monocyte % 14.0 %      Eosinophil % 0.9 %      Basophil % 0.6 %      Immature Grans % 1.0 %      Neutrophils, Absolute 11.74 10*3/mm3      Lymphocytes, Absolute 1.44 10*3/mm3      Monocytes, Absolute 2.21 10*3/mm3      Eosinophils, Absolute 0.15 10*3/mm3      Basophils, Absolute 0.10 10*3/mm3      Immature Grans, Absolute 0.16 10*3/mm3      nRBC 0.0 /100 WBC     POC Glucose Once [[id number]]  (Normal) Collected: 01/02/25 2339    Specimen: Blood Updated: 01/02/25 2350     Glucose 124 mg/dL      Comment: : [id number] Arseniong BreannaMeter ID: [id number]       LABS SCANNED [[id number]] Resulted: 12/29/24     Updated: 01/02/25 1956    POC Glucose Once [[id number]]  (Abnormal) Collected: 01/02/25 1756    Specimen: Blood Updated: 01/02/25 1807     Glucose 171 mg/dL      Comment: : [id number] Arsh Emily AMeter ID: [id number]       Methanol [[id number]] Collected: 12/29/24 1504    Specimen: Blood Updated: 01/02/25 1509     Methanol % <.010 g/dL      Comment:                                 Detection Limit = 0.010       Narrative:      Test(s) 007065-Methanol  was developed and its performance characteristics determined  by [facility]. It has not been cleared or approved by the Food  and Drug Administration.  Performed at:  [address]  [id number]  : Barbie Lassiter MD, Phone:  [phone]    Blood Culture - Blood, Hand, Digit Right [[id number]]  (Normal) Collected: 12/29/24 1230    Specimen:  Blood from Hand, Digit Right Updated: 01/02/25 1245     Blood Culture No growth at 4 days    Blood Culture - Blood, Arm, Right [[id number]]  (Normal) Collected: 12/29/24 1218    Specimen: Blood from Arm, Right Updated: 01/02/25 1245     Blood Culture No growth at 4 days    POC Glucose Once [[id number]]  (Abnormal) Collected: 01/02/25 1113    Specimen: Blood Updated: 01/02/25 1128     Glucose 169 mg/dL      Comment: : [id number] [facility] VivianMeter ID: [id number]       Blood Culture - Blood, Arm, Left [[id number]]  (Normal) Collected: 01/01/25 1051    Specimen: Blood from Arm, Left Updated: 01/02/25 1115     Blood Culture No growth at 24 hours    Blood Culture - Blood, Arm, Right [[id number]]  (Normal) Collected: 01/01/25 1053    Specimen: Blood from Arm, Right Updated: 01/02/25 1100     Blood Culture No growth at 24 hours    Fungitell B-D Glucan [[id number]] Collected: 01/02/25 1038    Specimen: Blood Updated: 01/02/25 1054    Respiratory Culture - Aspirate, ET Suction [[id number]]  (Abnormal) Collected: 01/01/25 1047    Specimen: Aspirate from ET Suction Updated: 01/02/25 0835     Respiratory Culture Scant growth (1+) Staphylococcus aureus      No Normal Respiratory Karen     Gram Stain Many (4+) WBCs per low power field      Few (2+) Epithelial cells per low power field      Rare (1+) Gram positive cocci    POC Glucose Once [[id number]]  (Abnormal) Collected: 01/02/25 0514    Specimen: Blood Updated: 01/02/25 0525     Glucose 197 mg/dL      Comment: : [id number] Nii ([place])  PaigeMeter ID: [id number]       Blood Gas, Arterial - [[id number]]  (Abnormal) Collected: 01/02/25 0421    Specimen: Arterial Blood Updated: 01/02/25 0423     Site Right Radial     Pedro Pablo's Test Positive     pH, Arterial 7.435 pH units      pCO2, Arterial 49.5 mm Hg      Comment: 83 Value above reference range        pO2, Arterial 67.2 mm Hg      Comment: 84 Value below reference range        HCO3, Arterial 33.2 mmol/L      Comment: 83 Value  above reference range        Base Excess, Arterial 7.5 mmol/L      Comment: 83 Value above reference range        O2 Saturation, Arterial 94.0 %      Temperature 37.0     Barometric Pressure for Blood Gas 761 mmHg      Modality Ventilator     FIO2 30 %      Ventilator Mode AC     Set Tidal Volume 500.000     Set [facility] Resp Rate 16.0     PEEP 5.0     Collected by [id number]     Comment: Meter: [id number]     :  July Vick, RUSSELL        pCO2, Temperature Corrected 49.5 mm Hg      pH, Temp Corrected 7.435 pH Units      pO2, Temperature Corrected 67.2 mm Hg      PO2/FIO2 224    Comprehensive Metabolic Panel [[id number]]  (Abnormal) Collected: 01/02/25 0201    Specimen: Blood Updated: 01/02/25 0243     Glucose 198 mg/dL      BUN 17 mg/dL      Creatinine 0.54 mg/dL      Sodium 143 mmol/L      Potassium 3.8 mmol/L      Chloride 106 mmol/L      CO2 29.0 mmol/L      Calcium 9.1 mg/dL      Total Protein 6.5 g/dL      Albumin 3.3 g/dL      ALT (SGPT) 20 U/L      AST (SGOT) 23 U/L      Alkaline Phosphatase 105 U/L      Total Bilirubin 0.9 mg/dL      Globulin 3.2 gm/dL      A/G Ratio 1.0 g/dL      BUN/Creatinine Ratio 31.5     Anion Gap 8.0 mmol/L      eGFR 112.0 mL/min/1.73     Narrative:      GFR Categories in Chronic Kidney Disease (CKD)      GFR Category          GFR (mL/min/1.73)    Interpretation  G1                     90 or greater         Normal or high (1)  G2                      60-89                Mild decrease (1)  G3a                   45-59                Mild to moderate decrease  G3b                   30-44                Moderate to severe decrease  G4                    15-29                Severe decrease  G5                    14 or less           Kidney failure          (1)In the absence of evidence of kidney disease, neither GFR category G1 or G2 fulfill the criteria for CKD.    eGFR calculation 2021 CKD-EPI creatinine equation, which does not include race as a factor    Magnesium [[id number]]   (Normal) Collected: 01/02/25 0201    Specimen: Blood Updated: 01/02/25 0243     Magnesium 2.0 mg/dL     Phosphorus [[id number]]  (Normal) Collected: 01/02/25 0201    Specimen: Blood Updated: 01/02/25 0243     Phosphorus 2.9 mg/dL     CBC & Differential [[id number]]  (Abnormal) Collected: 01/02/25 0201    Specimen: Blood Updated: 01/02/25 0222    Narrative:      The following orders were created for panel order CBC & Differential.  Procedure                               Abnormality         Status                     ---------                               -----------         ------                     CBC Auto Differential[[id number]]        Abnormal            Final result                 Please view results for these tests on the individual orders.    CBC Auto Differential [[id number]]  (Abnormal) Collected: 01/02/25 0201    Specimen: Blood Updated: 01/02/25 0222     WBC 18.20 10*3/mm3      RBC 4.49 10*6/mm3      Hemoglobin 13.8 g/dL      Hematocrit 42.6 %      MCV 94.9 fL      MCH 30.7 pg      MCHC 32.4 g/dL      RDW 13.3 %      RDW-SD 46.7 fl      MPV 10.4 fL      Platelets 198 10*3/mm3      Neutrophil % 73.3 %      Lymphocyte % 10.5 %      Monocyte % 14.3 %      Eosinophil % 0.5 %      Basophil % 0.5 %      Immature Grans % 0.9 %      Neutrophils, Absolute 13.32 10*3/mm3      Lymphocytes, Absolute 1.92 10*3/mm3      Monocytes, Absolute 2.61 10*3/mm3      Eosinophils, Absolute 0.09 10*3/mm3      Basophils, Absolute 0.09 10*3/mm3      Immature Grans, Absolute 0.17 10*3/mm3      nRBC 0.0 /100 WBC     POC Glucose Once [[id number]]  (Normal) Collected: 01/02/25 0034    Specimen: Blood Updated: 01/02/25 0045     Glucose 130 mg/dL      Comment: : [id number] Nii Cox)  PaigeMeter ID: [id number]       POC Glucose Once [[id number]]  (Abnormal) Collected: 01/01/25 1542    Specimen: Blood Updated: 01/01/25 1554     Glucose 157 mg/dL      Comment: : [id number] Arsh Corea ID: [id number]       Urinalysis  With Microscopic If Indicated (No Culture) - Urine, Clean Catch [[id number]]  (Abnormal) Collected: 01/01/25 1056    Specimen: Urine, Clean Catch Updated: 01/01/25 1112     Color, UA Dark Yellow     Appearance, UA Cloudy     pH, UA 6.0     Specific Gravity, UA >1.030     Glucose, UA Negative     Ketones, UA Trace     Bilirubin, UA Small (1+)     Blood, UA Large (3+)     Protein,  mg/dL (2+)     Leuk Esterase, UA Trace     Nitrite, UA Negative     Urobilinogen, UA 4.0 E.U./dL    Urinalysis, Microscopic Only - Urine, Clean Catch [[id number]]  (Abnormal) Collected: 01/01/25 1056    Specimen: Urine, Clean Catch Updated: 01/01/25 1112     RBC, UA Too Numerous to Count /HPF      WBC, UA 6-10 /HPF      Bacteria, UA None Seen /HPF      Squamous Epithelial Cells, UA 0-2 /HPF      Hyaline Casts, UA 3-6 /LPF      Methodology Automated Microscopy    Fungus Culture - Sputum, ET Suction [[id number]] Collected: 01/01/25 1048    Specimen: Sputum from ET Suction Updated: 01/01/25 1053    Respiratory Culture - Aspirate, ET Suction [[id number]]  (Abnormal)  (Susceptibility) Collected: 12/29/24 1507    Specimen: Aspirate from ET Suction Updated: 01/01/25 1024     Respiratory Culture Moderate growth (3+) Staphylococcus aureus      Light growth (2+) Normal Respiratory Kapil     Gram Stain Moderate (3+) WBCs per low power field      Rare (1+) Epithelial cells per low power field      Moderate (3+) Mixed gram positive kapil      Moderate (3+) Mixed gram negative kapil      Mixed intracellular organisms suggestive of an aspiration event    Susceptibility        Staphylococcus aureus      IMER      Clindamycin Susceptible      Oxacillin Susceptible      Tetracycline Susceptible      Trimethoprim + Sulfamethoxazole Susceptible      Vancomycin Susceptible                           POC Glucose Once [[id number]]  (Abnormal) Collected: 01/01/25 0551    Specimen: Blood Updated: 01/01/25 0602     Glucose 182 mg/dL      Comment: : [id number]  Roxana Moody ID: [id number]       Comprehensive Metabolic Panel [[id number]]  (Abnormal) Collected: 01/01/25 0316    Specimen: Blood Updated: 01/01/25 0408     Glucose 165 mg/dL      BUN 17 mg/dL      Creatinine 0.52 mg/dL      Sodium 146 mmol/L      Potassium 3.8 mmol/L      Chloride 109 mmol/L      CO2 28.0 mmol/L      Calcium 9.3 mg/dL      Total Protein 6.6 g/dL      Albumin 3.5 g/dL      ALT (SGPT) 23 U/L      AST (SGOT) 30 U/L      Alkaline Phosphatase 117 U/L      Total Bilirubin 1.4 mg/dL      Globulin 3.1 gm/dL      A/G Ratio 1.1 g/dL      BUN/Creatinine Ratio 32.7     Anion Gap 9.0 mmol/L      eGFR 113.3 mL/min/1.73     Narrative:      GFR Categories in Chronic Kidney Disease (CKD)      GFR Category          GFR (mL/min/1.73)    Interpretation  G1                     90 or greater         Normal or high (1)  G2                      60-89                Mild decrease (1)  G3a                   45-59                Mild to moderate decrease  G3b                   30-44                Moderate to severe decrease  G4                    15-29                Severe decrease  G5                    14 or less           Kidney failure          (1)In the absence of evidence of kidney disease, neither GFR category G1 or G2 fulfill the criteria for CKD.    eGFR calculation 2021 CKD-EPI creatinine equation, which does not include race as a factor    Magnesium [[id number]]  (Normal) Collected: 01/01/25 0316    Specimen: Blood Updated: 01/01/25 0408     Magnesium 2.0 mg/dL     CK [[id number]]  (Normal) Collected: 01/01/25 0316    Specimen: Blood Updated: 01/01/25 0408     Creatine Kinase 143 U/L     Phosphorus [[id number]]  (Normal) Collected: 01/01/25 0316    Specimen: Blood Updated: 01/01/25 0408     Phosphorus 2.6 mg/dL     CBC & Differential [[id number]]  (Abnormal) Collected: 01/01/25 0316    Specimen: Blood Updated: 01/01/25 0348    Narrative:      The following orders were created for panel order CBC &  Differential.  Procedure                               Abnormality         Status                     ---------                               -----------         ------                     CBC Auto Differential[[id number]]        Abnormal            Final result                 Please view results for these tests on the individual orders.    CBC Auto Differential [[id number]]  (Abnormal) Collected: 01/01/25 0316    Specimen: Blood Updated: 01/01/25 0348     WBC 18.42 10*3/mm3      RBC 4.66 10*6/mm3      Hemoglobin 14.5 g/dL      Hematocrit 44.2 %      MCV 94.8 fL      MCH 31.1 pg      MCHC 32.8 g/dL      RDW 13.5 %      RDW-SD 47.2 fl      MPV 10.6 fL      Platelets 196 10*3/mm3      Neutrophil % 73.8 %      Lymphocyte % 9.6 %      Monocyte % 14.7 %      Eosinophil % 0.3 %      Basophil % 0.6 %      Immature Grans % 1.0 %      Neutrophils, Absolute 13.61 10*3/mm3      Lymphocytes, Absolute 1.76 10*3/mm3      Monocytes, Absolute 2.71 10*3/mm3      Eosinophils, Absolute 0.05 10*3/mm3      Basophils, Absolute 0.11 10*3/mm3      Immature Grans, Absolute 0.18 10*3/mm3      nRBC 0.0 /100 WBC     Blood Gas, Arterial - [[id number]]  (Abnormal) Collected: 01/01/25 0332    Specimen: Arterial Blood Updated: 01/01/25 0337     Site Left Radial     Pedro Pablo's Test Positive     pH, Arterial 7.436 pH units      pCO2, Arterial 46.9 mm Hg      Comment: 83 Value above reference range        pO2, Arterial 66.4 mm Hg      Comment: 84 Value below reference range        HCO3, Arterial 31.5 mmol/L      Comment: 83 Value above reference range        Base Excess, Arterial 6.2 mmol/L      Comment: 83 Value above reference range        O2 Saturation, Arterial 94.2 %      Temperature 37.0     Barometric Pressure for Blood Gas 757 mmHg      Modality Ventilator     FIO2 40 %      Ventilator Mode AC     Set Tidal Volume 500.000     Set [facility] Resp Rate 16.0     PEEP 5.0     Collected by [id number]     Comment: Meter: [id number]     :  Thaddeus  Peter, CRT        pCO2, Temperature Corrected 46.9 mm Hg      pH, Temp Corrected 7.436 pH Units      pO2, Temperature Corrected 66.4 mm Hg      PO2/FIO2 166    POC Glucose Once [[id number]]  (Abnormal) Collected: 01/01/25 0023    Specimen: Blood Updated: 01/01/25 0034     Glucose 179 mg/dL      Comment: : [id number] Roxana BethanyMeter ID: [id number]       POC Glucose Once [[id number]]  (Abnormal) Collected: 12/31/24 1857    Specimen: Blood Updated: 12/31/24 1907     Glucose 151 mg/dL      Comment: : [id number] Haverkamp CarrieMeter ID: [id number]       Potassium [[id number]]  (Normal) Collected: 12/31/24 1219    Specimen: Blood Updated: 12/31/24 1248     Potassium 4.3 mmol/L     Phosphorus [[id number]]  (Normal) Collected: 12/31/24 1219    Specimen: Blood Updated: 12/31/24 1248     Phosphorus 2.9 mg/dL     POC Glucose Once [[id number]]  (Abnormal) Collected: 12/31/24 1105    Specimen: Blood Updated: 12/31/24 1115     Glucose 164 mg/dL      Comment: : [id number] Haverkamp CarrieMeter ID: [id number]               Estimated Creatinine Clearance: 138.5 mL/min (A) (by C-G formula based on SCr of 0.65 mg/dL (L)).    Culture Results:    Microbiology Results (last 10 days)       Procedure Component Value - Date/Time    Blood Culture - Blood, Arm, Right [[id number]]  (Normal) Collected: 01/01/25 1053    Lab Status: Preliminary result Specimen: Blood from Arm, Right Updated: 01/02/25 1100     Blood Culture No growth at 24 hours    Blood Culture - Blood, Arm, Left [[id number]]  (Normal) Collected: 01/01/25 1051    Lab Status: Preliminary result Specimen: Blood from Arm, Left Updated: 01/02/25 1115     Blood Culture No growth at 24 hours    Respiratory Culture - Aspirate, ET Suction [[id number]]  (Abnormal) Collected: 01/01/25 1047    Lab Status: Preliminary result Specimen: Aspirate from ET Suction Updated: 01/02/25 0835     Respiratory Culture Scant growth (1+) Staphylococcus aureus      No Normal Respiratory Karen      "Gram Stain Many (4+) WBCs per low power field      Few (2+) Epithelial cells per low power field      Rare (1+) Gram positive cocci    Chlamydia trachomatis, Neisseria gonorrhoeae, PCR - Swab, Penis [[id number]]  (Normal) Collected: 12/30/24 0839    Lab Status: Final result Specimen: Swab from Penis Updated: 12/30/24 1255     Chlamydia DNA by PCR Not Detected     Neisseria gonorrhoeae by PCR Not Detected    Narrative:      Disclaimer: The [employer] Combo 2 assay is a target amplification nucleic acid probe test that utilizes target capture for the in vitro qualitative detection and differentiation of ribosomal RNA from Chlamydia trachomatis and Neisseria gonorrhoeae to aid in the diagnosis of chlamydial and/or gonococcal urogenital disease.  Cell culture was once considered to be the \"gold standard\" for detection of CT and NG.  Culture is quite specific, but scientific studies have demonstrated that the NAAT DNA probe technologies have higher clinical sensitivities than culture.    MRSA Screen, PCR (Inpatient) - Swab, Nares [[id number]]  (Normal) Collected: 12/29/24 1753    Lab Status: Final result Specimen: Swab from Nares Updated: 12/29/24 1919     MRSA PCR No MRSA Detected    Narrative:      The negative predictive value of this diagnostic test is high and should only be used to consider de-escalating anti-MRSA therapy. A positive result may indicate colonization with MRSA and must be correlated clinically.    Respiratory Culture - Aspirate, ET Suction [[id number]]  (Abnormal)  (Susceptibility) Collected: 12/29/24 1507    Lab Status: Final result Specimen: Aspirate from ET Suction Updated: 01/01/25 1024     Respiratory Culture Moderate growth (3+) Staphylococcus aureus      Light growth (2+) Normal Respiratory Kapil     Gram Stain Moderate (3+) WBCs per low power field      Rare (1+) Epithelial cells per low power field      Moderate (3+) Mixed gram positive kapil      Moderate (3+) Mixed gram negative kapil      Mixed " intracellular organisms suggestive of an aspiration event    Susceptibility        Staphylococcus aureus      IMER      Clindamycin Susceptible      Oxacillin Susceptible      Tetracycline Susceptible      Trimethoprim + Sulfamethoxazole Susceptible      Vancomycin Susceptible                           Blood Culture - Blood, Hand, Digit Right [[id number]]  (Normal) Collected: 12/29/24 1230    Lab Status: Preliminary result Specimen: Blood from Hand, Digit Right Updated: 01/02/25 1245     Blood Culture No growth at 4 days    Blood Culture - Blood, Arm, Right [[id number]]  (Normal) Collected: 12/29/24 1218    Lab Status: Preliminary result Specimen: Blood from Arm, Right Updated: 01/02/25 1245     Blood Culture No growth at 4 days    Respiratory Panel PCR w/COVID-19(SARS-CoV-2) NACHO/ARACELI/CHRISTINE/PAD/COR/ZARINA In-House, NP Swab in UTM/VTM, 2 HR TAT - Swab, Nasopharynx [[id number]]  (Normal) Collected: 12/29/24 1205    Lab Status: Final result Specimen: Swab from Nasopharynx Updated: 12/29/24 1304     ADENOVIRUS, PCR Not Detected     Coronavirus 229E Not Detected     Coronavirus HKU1 Not Detected     Coronavirus NL63 Not Detected     Coronavirus OC43 Not Detected     COVID19 Not Detected     Human Metapneumovirus Not Detected     Human Rhinovirus/Enterovirus Not Detected     Influenza A PCR Not Detected     Influenza B PCR Not Detected     Parainfluenza Virus 1 Not Detected     Parainfluenza Virus 2 Not Detected     Parainfluenza Virus 3 Not Detected     Parainfluenza Virus 4 Not Detected     RSV, PCR Not Detected     Bordetella pertussis pcr Not Detected     Bordetella parapertussis PCR Not Detected     Chlamydophila pneumoniae PCR Not Detected     Mycoplasma pneumo by PCR Not Detected    Narrative:      In the setting of a positive respiratory panel with a viral infection PLUS a negative procalcitonin without other underlying concern for bacterial infection, consider observing off antibiotics or discontinuation of antibiotics and  "continue supportive care. If the respiratory panel is positive for atypical bacterial infection (Bordetella pertussis, Chlamydophila pneumoniae, or Mycoplasma pneumoniae), consider antibiotic de-escalation to target atypical bacterial infection.               Radiology:   Imaging Results (Last 72 Hours)       ** No results found for the last 72 hours. **              HOSPITAL PROBLEM LIST:      Anoxic brain injury    Acute respiratory failure with hypoxia    On mechanically assisted ventilation    Cocaine abuse    Positive urine drug screen    Substance abuse    Tobacco use    COPD (chronic obstructive pulmonary disease)    Bipolar disorder    Coronary artery disease involving autologous vein coronary bypass graft without angina pectoris    Essential hypertension    Hyperlipidemia LDL goal <100    Fever that appears rather sustained recently in patient who has been on cooling blanket for unknown amount of time-reportedly days.  He did have some variability in temperatures with 2 recorded in the 98 range.  Difficult to assess if fever curve is increasing over time based on cooling blanket.  Patient is not on pressors, white count overall improving, not requiring increased O2 on the ventilator currently, urinalysis on admission with too numerous to count RBCs and only 3-5 WBCs with repeat UA January 1 also with too numerous to count RBCs and 6-10 WBCs-likely not indicative of infection but a result of all the RBCs.  Respiratory culture with moderate MSSA on admission and follow-up culture with scant Staph aureus-IMER pending.  Previous chest x-rays without any definite pneumonia, however if patient did have occult pneumonia, has been on appropriate antibiotic therapy for isolate isolated.  Blood cultures on admission and repeat January 1 negative, respiratory panel by PCR negative.  Based on presentation, \"neuro\" fever or certainly a possibility    RECOMMENDATION:   Add manual differential to CBC  If follow-up Staph " "aureus isolate from respiratory culture not MRSA, discontinue vancomycin  Continue levofloxacin but consider discontinuation if patient remains febrile, hemodynamically stable and reculture, reevaluate off antibiotic therapy  Discontinue any nonessential medications, although nothing noted that is classic for \"drug fever\"         Constanza Dial MD  01/03/25  08:11 CST        "

## 2025-01-03 NOTE — PROGRESS NOTES
"Pharmacy Dosing Service  Pharmacokinetics  Vancomycin Initial Evaluation  Assessment/Action/Plan:  Loading dose?: none  Current Order: Vancomycin 1250 mg IVPB every 12 hours  Current end date:01/10/25  Levels: first trough - 01/04/25  Additional antimicrobial agent(s): none  MRSA Nasal PCR ordered: No    Vancomycin dosage initiated based on population pharmacokinetic parameters. Pharmacy will continue to follow daily and adjust dose accordingly.     Subjective:  Ld Green is a 64 y.o. male with a Vancomycin \"Pharmacy to Dose\" consult for the treatment of pneumonia , day 1 of 7 of treatment.    AUC Model Data:  Loading dose: N/A  Regimen: 1250 mg IV every 12 hours.  Start time: 12:22 on 01/03/2025  Exposure target: AUC24 (range)400-600 mg/L.hr   AUC24,ss: 484 mg/L.hr  Probability of AUC24 > 400: 64 %  Ctrough,ss: 12.6 mg/L  Probability of Ctrough,ss > 20: 29 %  Probability of nephrotoxicity (Lodise SHARON 2009): 8 %    Objective:  Ht: 172.7 cm (68\"); Wt: 108 kg (238 lb 8.6 oz)  Estimated Creatinine Clearance: 138.5 mL/min (A) (by C-G formula based on SCr of 0.65 mg/dL (L)).   Creatinine   Date Value Ref Range Status   01/03/2025 0.65 (L) 0.76 - 1.27 mg/dL Final   01/02/2025 0.54 (L) 0.76 - 1.27 mg/dL Final   01/01/2025 0.52 (L) 0.76 - 1.27 mg/dL Final   02/14/2022 0.6 0.5 - 1.2 mg/dL Final   12/20/2021 0.8 0.5 - 1.2 mg/dL Final   11/16/2021 0.7 0.5 - 1.2 mg/dL Final      Lab Results   Component Value Date    WBC 15.80 (H) 01/03/2025    WBC 18.20 (H) 01/02/2025    WBC 18.42 (H) 01/01/2025      Baseline culture results:  Microbiology Results (last 10 days)       Procedure Component Value - Date/Time    Blood Culture - Blood, Arm, Right [[id number]]  (Normal) Collected: 01/01/25 1053    Lab Status: Preliminary result Specimen: Blood from Arm, Right Updated: 01/02/25 1100     Blood Culture No growth at 24 hours    Blood Culture - Blood, Arm, Left [[id number]]  (Normal) Collected: 01/01/25 1051    Lab Status: " "Preliminary result Specimen: Blood from Arm, Left Updated: 01/02/25 1115     Blood Culture No growth at 24 hours    Respiratory Culture - Aspirate, ET Suction [[id number]]  (Abnormal) Collected: 01/01/25 1047    Lab Status: Preliminary result Specimen: Aspirate from ET Suction Updated: 01/02/25 0835     Respiratory Culture Scant growth (1+) Staphylococcus aureus      No Normal Respiratory Karen     Gram Stain Many (4+) WBCs per low power field      Few (2+) Epithelial cells per low power field      Rare (1+) Gram positive cocci    Chlamydia trachomatis, Neisseria gonorrhoeae, PCR - Swab, Penis [[id number]]  (Normal) Collected: 12/30/24 0839    Lab Status: Final result Specimen: Swab from Penis Updated: 12/30/24 1255     Chlamydia DNA by PCR Not Detected     Neisseria gonorrhoeae by PCR Not Detected    Narrative:      Disclaimer: The [employer] Combo 2 assay is a target amplification nucleic acid probe test that utilizes target capture for the in vitro qualitative detection and differentiation of ribosomal RNA from Chlamydia trachomatis and Neisseria gonorrhoeae to aid in the diagnosis of chlamydial and/or gonococcal urogenital disease.  Cell culture was once considered to be the \"gold standard\" for detection of CT and NG.  Culture is quite specific, but scientific studies have demonstrated that the NAAT DNA probe technologies have higher clinical sensitivities than culture.    MRSA Screen, PCR (Inpatient) - Swab, Nares [[id number]]  (Normal) Collected: 12/29/24 1753    Lab Status: Final result Specimen: Swab from Nares Updated: 12/29/24 1919     MRSA PCR No MRSA Detected    Narrative:      The negative predictive value of this diagnostic test is high and should only be used to consider de-escalating anti-MRSA therapy. A positive result may indicate colonization with MRSA and must be correlated clinically.    Respiratory Culture - Aspirate, ET Suction [[id number]]  (Abnormal)  (Susceptibility) Collected: 12/29/24 1507    Lab " Status: Final result Specimen: Aspirate from ET Suction Updated: 01/01/25 1024     Respiratory Culture Moderate growth (3+) Staphylococcus aureus      Light growth (2+) Normal Respiratory Kapil     Gram Stain Moderate (3+) WBCs per low power field      Rare (1+) Epithelial cells per low power field      Moderate (3+) Mixed gram positive kapil      Moderate (3+) Mixed gram negative kapil      Mixed intracellular organisms suggestive of an aspiration event    Susceptibility        Staphylococcus aureus      IMER      Clindamycin 0.25 ug/ml Susceptible      Oxacillin <=0.25 ug/ml Susceptible      Tetracycline <=1 ug/ml Susceptible      Trimethoprim + Sulfamethoxazole <=10 ug/ml Susceptible      Vancomycin <=0.5 ug/ml Susceptible                           Blood Culture - Blood, Hand, Digit Right [[id number]]  (Normal) Collected: 12/29/24 1230    Lab Status: Preliminary result Specimen: Blood from Hand, Digit Right Updated: 01/02/25 1245     Blood Culture No growth at 4 days    Blood Culture - Blood, Arm, Right [[id number]]  (Normal) Collected: 12/29/24 1218    Lab Status: Preliminary result Specimen: Blood from Arm, Right Updated: 01/02/25 1245     Blood Culture No growth at 4 days    Respiratory Panel PCR w/COVID-19(SARS-CoV-2) NACHO/ARACELI/CHRISTINE/PAD/COR/ZARINA In-House, NP Swab in UTM/VTM, 2 HR TAT - Swab, Nasopharynx [[id number]]  (Normal) Collected: 12/29/24 1205    Lab Status: Final result Specimen: Swab from Nasopharynx Updated: 12/29/24 1304     ADENOVIRUS, PCR Not Detected     Coronavirus 229E Not Detected     Coronavirus HKU1 Not Detected     Coronavirus NL63 Not Detected     Coronavirus OC43 Not Detected     COVID19 Not Detected     Human Metapneumovirus Not Detected     Human Rhinovirus/Enterovirus Not Detected     Influenza A PCR Not Detected     Influenza B PCR Not Detected     Parainfluenza Virus 1 Not Detected     Parainfluenza Virus 2 Not Detected     Parainfluenza Virus 3 Not Detected     Parainfluenza Virus 4 Not  Detected     RSV, PCR Not Detected     Bordetella pertussis pcr Not Detected     Bordetella parapertussis PCR Not Detected     Chlamydophila pneumoniae PCR Not Detected     Mycoplasma pneumo by PCR Not Detected    Narrative:      In the setting of a positive respiratory panel with a viral infection PLUS a negative procalcitonin without other underlying concern for bacterial infection, consider observing off antibiotics or discontinuation of antibiotics and continue supportive care. If the respiratory panel is positive for atypical bacterial infection (Bordetella pertussis, Chlamydophila pneumoniae, or Mycoplasma pneumoniae), consider antibiotic de-escalation to target atypical bacterial infection.            Daniel Yanes, PharmD  01/03/25 07:16 CST

## 2025-01-03 NOTE — PROGRESS NOTES
[facility] Intensivist Services  Daily Progress Note    Date of Admission: 12/29/2024  Date of Note: 01/03/25 Time of Note: 07:09 CST  Primary Care Physician: Xiomara Neal APRN  Allergies: He is allergic to codeine.   LOS: 5 days     History and Course in Hospital   Next of Kin:  Primary Emergency Contact: KerwinFe   Code Status: Code Status and Medical Interventions: No CPR (Do Not Attempt to Resuscitate); Full Support    He is a 64 y.o. male smoker (reported as COPD but no PFTS--so restrictive lung disease from obesity with overlap syndrome from obstructive sleep apnea would be more likely), chronic drug user, diabetes mellitus most recent A1c 5.4 admitted 12/29/2024 with Acute respiratory failure with hypoxia who also  has a past medical history of Anxiety, CAD, Bipolar disorder, obesity with prior Ozempic use, Hx Bladder cancer with current hematuria (concerning), Colon polyps, Depression, Hemorrhoids, Hyperlipidemia, Hypertension, Nephrolithiasis: 5 mm right renal stone, reportedly 35 mm right renal cyst, right hydrocele, prior L5-S1 discectomy with pedicle screws, thoracic stimulator leads noted and Sleep apnea noncompliant with TYRELL care per ex-wife.     Patient apparently lives in a low income housing complex called [facility] and was found down and unresponsive: reportedly comatose.  Downtime unknown.  Patient arrived in our emergency department 12/29/2024 unresponsive with GCS of 6.  Intubated.  No history available.  Patient had cocaine and TCA positive urine.  His ex-wife, who is his power of  from a 1995 document--discussed with hospital--states that he did not want to be on life support.  She feels like he has really had a poor quality of life recently.  He has chronically suffered from depression, bipolar disorder.  History of bladder cancer.  Wife says that he may have been getting drugs from his cousins.  She thinks he probably overdosed  accidentally at home.  Patient currently decorticate posturing.    Patient was also recently hospitalized with a similar presentation in 2024 for altered mental status where he was found down at home with low glucose where he was approximately 10 hours down: Discharged and told not to use insulin. He was also admitted 2024 for hypoglycemia with a similar presentation where he had used cocaine and then became unconscious--bit his tongue.      Urine drug screen positive for cocaine and TCA.  Poison control thinks his TCA positive may be cross-reactivity with Flexeril or his Auvelity depression meds--they did not think it was a TCA overdose.     : I took over care.  Some question about who the power of  was.  Clarified with legal at the hospital.  Patient's ex-wife has a power of  from  which is considered still valid to designate her as a surrogate decision maker for healthcare because she does know him well and she has been here at the bedside caring about his case.  She is the only person available who has been intermittently present at the bedside.     : Patient has minimal protection with arm drop on left.  Mental status has not improved.  Upgoing Babinski's bilaterally.  Some superior eye deviation intermittently.  Strong gag and cough.  Thick secretions and elevated temperature to 38.3 °C overnight.     25: Patient had fentanyl requiremrnts overnight due to work of breathing.  Started on fentanyl drip due to discomfort.  Ex-wife says he would not want to live like this.  She is the only person who shown up to make decisions for him.  We did confirm with our risk department via nursing that she can be a surrogate decision maker in this situation.  She says she is not ready to make him comfort care because she has to reach out to the  in order to get a conservatorship so that she can get control over his finances to pay for his .  She does say  "that he would not want CPR in this situation.  Patient has persistent fevers.  Wife is not interested in more aggressive diagnostic testing.  She wants to let him be at peace but she needs to get her legal issues answered: Essentially she needs to be able to sell his truck to be able to pay for his .  She says that the patient's girlfriend is driving around in his truck and living in his home.  She went there and the girlfriend was unable to give any additional history.  Denied any knowledge of what had happened.  One of his coworkers came by.  A close friend for many years.  She stated that the ex-wife is very much in contact with the patient and knows him well.  She also spontaneously stated that he would not want to live like this ever.     25: EX Wife is trying to reach out to get patient conserved.  At this point patient is no CPR but full medical management otherwise.  Completed paperwork for the patient's ex wife    1/3/25: Patient continues to spike fevers.  Fungitell and Aspergillus ordered yesterday.  Both still pending.  Consulted ID.  Restarted Vanco due to Staph aureus in the sputum (prior culture grew only MSSA-waiting on this culture to finalize).  MRSA probe previously negative.  Currently on Levaquin and vancomycin.  Await ID consultation.      Intake/Output   Intake/Output this shift:  No intake/output data recorded.    Intake/Output last 3 shifts:  I/O last 3 completed shifts:  In: 4514.5 [I.V.:1596.5; Other:656; NG/GT:2262]  Out: 1475 [Urine:1475]      Exam   Vent settings for last 24 hours:  Mode: VC/AC  FiO2 (%):  [30 %] 30 %  S RR:  [16] 16  S VT:  [500 mL] 500 mL  PEEP/CPAP (cm H2O):  [5 cm H20] 5 cm H20  MAP (cm H2O):  [1.4-7.3] 4.4    Vital signs for last 24 hours:  Temp:  [98.1 °F (36.7 °C)-101.8 °F (38.8 °C)] 101.3 °F (38.5 °C)  Heart Rate:  [] 102  Resp:  [15-24] 20  BP: ()/(56-89) 131/74  FiO2 (%):  [30 %] 30 %    Vitals: His  height is 172.7 cm (68\") and weight " is 108 kg (238 lb 8.6 oz). His rectal temperature is 101.3 °F (38.5 °C) (abnormal). His blood pressure is 131/74 and his pulse is 102. His respiration is 20 and oxygen saturation is 98%.     PHYSICAL FINDINGS: SEE VITALS ABOVE  GENERAL APPEARANCE: ° Patient is intubated.  Temperature of 101.8 charted overnight  HEAD: Injuries: No evidence of a head injury. ° Appearance: Head normocephalic.  NECK: No masses ° Thyroid: ° Not diffusely enlarged.  EYES: General/bilateral: eyes open at sometimes.  Pupils: ° Sluggish.  Eyes unresponsive. Sclera: ° Showed no icterus.  THROAT: ETT in place, appears well positioned.  EARS: Hearing: ° patient sedated.  NOSE: General/bilateral: External Deformities: ° No external nose deformities.  LYMPH NODES: No cervical or generalized adenopathy noted.  CHEST: ° Visual inspection revealed no abnormalities.  LUNGS: ° Respiration rhythm and depth was normal. ° Normal breath sounds  ° No wheezing was heard bilaterally. ° No rhonchi were heard. ° No rales/crackles were heard. ° No clubbing noted. On ventilator.  CARDIOVASCULAR: JVD not increased. Heart Rate And Rhythm: Normal. ° Heart Sounds: No S3/ S4 heard. ° Murmurs: No murmurs were heard.  BACK: No obvious deformity noted.  ABDOMEN: Visual Inspection: Abdomen was not distended. Auscultation: ° Abdominal auscultation revealed no abnormalities. ° Bowel sounds were normal. ° Palpation: ° Abdomen was soft. ° No abdominal tenderness. ° No mass was palpated in the abdomen. ° Soft. °Liver: Not visibly enlarged. Spleen: Not visibly enlarged.  MUSCULOSKELETAL SYSTEM : General/bilateral: ° Sedated.  FEET/EXTREMITIES: General/bilateral: ° No visible swelling of the feet.  NEUROLOGICAL: Intubated ° Gait And Stance: cannot test.  Patient has no protect to threat.  Patient does not follow any commands.  Patient has some decorticate posturing noted with stimulation.  Cannot do much more of an exam neurologically.  Does seem to have a facial protective  "mechanism more on the right than the left--with arm drop.  SKIN: ° Very poor self-care.      Current Antibiotics   levoFLOXacin - 750 MG  vancomycin      Social History     Tobacco/ETOH/Social History:  reports that he has been smoking cigarettes. He has never used smokeless tobacco. He reports current alcohol use. He reports current drug use. Drug: Cocaine(coke).      Results Review     Result Review:  I have personally reviewed the results from the time of this admission to 1/3/2025 07:09 CST and agree with these findings:  [x]  Laboratory list / accordion  [x]  Microbiology  [x]  Radiology  [x]  EKG/Telemetry QTc 472.  QTc max 535.  No evidence of TCA toxicity at this time.  [x]  Cardiology/Vascular no results.  []  Pathology  []  Old records  []  Other:  Most notable findings include: Mildly elevated glucose.  White count improved slightly from yesterday.  Growing Staph aureus from the sputum from 2025--was moderate growth on  and pan susceptible.  Blood cultures so far negative.  Viral  PCR negative.  Gonorrhea and Chlamydia test negative.  Repeat chest x-ray ordered.    Assessment/Plan     Suspected anoxic brain injury: Supportive care.  Surrogate decision maker (only person who we have available) ex-wife working on other issues prior to deciding on comfort measures.  Essentially she needs to get control of his finances so she can pay for his  before he passes.  If he dies she is concerned she will not be able to afford his  and that is really driving what is going on along with our request that we give him 4 to 5 days to see if he makes any kind of recovery given this is the third event in the last few months and the previous 2 times he did recover from very similar situations.  Unfortunately in this situation the third time may be the final time.   Update: Spouse thinks his hand  might be a sign he is still \"in there.\" She wants to continue current care. Of note: His " coworker, who has known him for years (and came to visit) felt he would not want prolonged life support. Await ex-wife's decision.    Fever with only positive leuk esterase in the urine: Likely aspiration pneumonia initially (recent chest x-ray looks pretty normal) versus central fevers though UTI is also possible but no bacteria were noted on the urinalysis just trace leuk esterase: CT head shows no obvious lesion or hydronephrosis.  Discontinued Zosyn and switched 2 days ago to Levaquin.  Still growing staph from the sputum but it is scant compared to moderate growth from 29 December.  Restart vancomycin pending sensitivities.  Consult ID. Might need more invasive studies (Like LP) but appreciate ID involvement.   Paroxysmal atrial fibrillation. This is his Second event with RVR.  Restarted amiodarone.  Patient converted back to sinus.  Continue to amiodarone oral.  Rhabdomyolysis with CK: Resolved.  Diabetes: Glucose control. Sugars less than 200.  Not doing tight control at this time though--likely OK to start with Lantus 10 units QHS tonight.  Possible suicidal ideation per ex-wife.  See previous notes.  Cocaine abuse history noted.  Proteinuria and hematuria: Follow. Reported history of bladder cancer.  May need urology evaluation if survives.  Nonurgent.  Will not consult that service at this time.  VTE Prophylaxis: Lovenox 40 subcu daily.  To continue.  Electrolytes: Replace.   Feeding electrolyte nutrition: Continuing tube feeds.   CODE STATUS: DNR..  Ex-wife wants full medical management but if he has a cardiac arrest she just wants to make sure he is comfortable.  Continue with full medical management at this time.  Disposition: Likely terminal--if ex-wife wants to continue with current management we will probably need to work on tracheostomy sooner rather than later.      Total critical care time: 30 minutes    Due to a high probability of clinically significant, life threatening deterioration, the  patient required my highest level of preparedness to intervene emergently and I personally spent this critical care time directly and personally managing the patient.     This critical care time included obtaining a history, examining the patient, pulse oximetry and vital sign review, ordering and review of studies, arranging urgent treatment with development of a management plan with the multidisciplinary team, evaluation of patient's response to treatment, frequent reassessment, and discussions with other providers.    This critical care time was performed to assess and manage the high probability of imminent, life-threatening deterioration that could result in multi-organ failure. It was exclusive of separately billable procedures and treating other patients and teaching time.    Please see the rest of the note for further information on patient assessment and treatment.    Part of this note may be an electronic transcription/translation of spoken language to printed text using the Dragon Dictation System    Mando Olivo MD  1/3/2025 at 07:09 CST  Pulmonary, Critical Care  [facility]  Please call with any questions  (Cell [phone])          Labs   Basic Labs:  CBC:      Lab 01/03/25 0207 01/02/25 0201 01/01/25 0316 12/31/24 0212 12/30/24  0316   WBC 15.80* 18.20* 18.42* 17.52* 20.23*   HEMOGLOBIN 12.7* 13.8 14.5 13.7 14.4   HEMATOCRIT 39.8 42.6 44.2 41.7 43.4   PLATELETS 202 198 196 177 232   NEUTROS ABS 11.74* 13.32* 13.61* 12.65* 15.05*   IMMATURE GRANS (ABS) 0.16* 0.17* 0.18* 0.16* 0.22*   LYMPHS ABS 1.44 1.92 1.76 1.95 2.19   MONOS ABS 2.21* 2.61* 2.71* 2.66* 2.64*   EOS ABS 0.15 0.09 0.05 0.03 0.01   MCV 95.7 94.9 94.8 93.9 92.9       LIVER FUNCTION AND COAG TESTS:      Lab 01/03/25 0207 01/02/25 0201 01/01/25 0316 12/31/24 0212 12/30/24 0316 12/29/24  1202   TOTAL PROTEIN 6.3 6.5 6.6 5.9* 6.2 7.4   ALBUMIN 3.1* 3.3* 3.5 3.3* 3.7 4.5   GLOBULIN 3.2 3.2 3.1 2.6 2.5 2.9   ALT  (SGPT) 20 20 23 15 16 22   AST (SGOT) 22 23 30 25 27 26   BILIRUBIN 0.8 0.9 1.4* 1.2 1.6* 1.6*   ALK PHOS 98 105 117 108 126* 165*   PROTIME  --   --   --   --   --  13.2   INR  --   --   --   --   --  0.96       ABG:      Lab 01/03/25  0348 01/03/25  0207 01/02/25  0421 01/02/25  0201 01/01/25  0332 01/01/25 0316 12/31/24  0333 12/31/24  0212 12/30/24  0404 12/30/24 0316   PH, ARTERIAL 7.417  --  7.435  --  7.436  --  7.434  --  7.412  --    PO2 ART 66.9*  --  67.2*  --  66.4*  --  81.3*  --  77.6*  --    PCO2, ARTERIAL 53.5*  --  49.5*  --  46.9*  --  42.1  --  39.7  --    HCO3 ART 34.5*  --  33.2*  --  31.5*  --  28.2*  --  25.3  --    ANION GAP  --  8.0  --  8.0  --  9.0  --  6.0  --  13.0       LACTATE:      Lab 12/29/24  1202   LACTATE 1.7       CMP:      Lab 01/03/25  0207 01/02/25  0201 01/01/25 0316 12/31/24  1219 12/31/24  0323 12/31/24  0212 12/30/24 0316   SODIUM 142 143 146*  --   --  145 143   POTASSIUM 4.2 3.8 3.8 4.3  --  3.3* 3.5   CHLORIDE 103 106 109*  --   --  112* 107   BUN 21 17 17  --   --  17 30*   CREATININE 0.65* 0.54* 0.52*  --   --  0.66* 1.04   CALCIUM 9.1 9.1 9.3  --   --  9.0 8.8   MAGNESIUM 2.1 2.0 2.0  --   --  2.0 2.1   PHOSPHORUS 3.6 2.9 2.6 2.9 1.7*  --  4.2   GLUCOSE 162* 198* 165*  --   --  165* 180*   EGFR 105.9 112.0 113.3  --   --  105.4 80.7

## 2025-01-04 NOTE — CONSULTS
Neurology Consult Note    Consult Date: 2025  Referring MD: No ref. provider found  Reason for Consult: ???anoxic brain injury     Patient: Ld Green (64 y.o. male)  MRN: [id number]  : 1960    History of Present Illness:   Ld Green is a 64 y.o. male with PMH substance abuse, depression, DM, bladder CA, chronic back pain with pain stimulator, CAD s/p CABG, HLD, HTN, TYRELL with BiPAP. [place] lives alone. He is . History obtained by medical record as patient is sedated and intubated. No family at bedside at initial exam and cousin later arrives. Currently ex-wife is decision maker and trying to get guardianship with anticpation of this on Monday and today is Saturday.     At any rate, patient was found unresponsive on 2024 for undetermined amount of time. He was in respiratory failure and required intubation. There was mention at some point concern for seizure but no seizure like activity was observed. Since admission, he has had some decerebrate posturing but no rhythmic like movements. CT head on admission no acute process. UDS + cocaine and TCA.    Patient has 2 similar presentations in July and 2024 both related to hypoglycemia. Into the 30-40s.  With one admission, there is mention of possible seizure as patient had tongue biting. Per notes, patient recovered quickly to his baseline. Neurology has been consulted as sedation has been reduced and has been off precedex several hours and is on Fentanyl and when paused minimal response. He does have right gaze preference. He has intact pratik and cough. Patient has had elevated temp for multiple days requiring cooling blanket. On Vancomycin for staphylococcus aureus. ID is following.   Patient seen along with Dr. Scales, tele neurology.       Medical History:   Past Medical/Surgical Hx:  Past Medical History:   Diagnosis Date    Anxiety     Atherosclerosis of coronary artery     Bipolar disorder     Bladder cancer      Colon polyp     Coronary artery disease     Depression     Diabetes mellitus     Hemorrhoids     Hyperlipidemia     Hypertension     Nephrolithiasis     Sleep apnea      Past Surgical History:   Procedure Laterality Date    ANGIOPLASTY      with stent placement    BACK SURGERY      BLADDER SURGERY      cancer    CARDIAC CATHETERIZATION      COLONOSCOPY  04/13/2015    COLONOSCOPY N/A 4/30/2018    Procedure: COLONOSCOPY WITH ANESTHESIA;  Surgeon: Gerson Martini MD;  Location:  [facility];  Service: Gastroenterology    COLONOSCOPY N/A 4/8/2021    Procedure: COLONOSCOPY WITH ANESTHESIA;  Surgeon: Gerson Martini MD;  Location:  [facility];  Service: Gastroenterology;  Laterality: N/A;  Pre: Hx Colon Polyps  Post: divertivculosis   Dr. Garza  CO2 Inflation Used    CORONARY ARTERY BYPASS GRAFT      KNEE SURGERY      SHOULDER SURGERY      x 2    SINUS SURGERY         Medications On Admission:  Medications Prior to Admission   Medication Sig Dispense Refill Last Dose/Taking    amLODIPine (NORVASC) 10 MG tablet Take 1 tablet by mouth Every Night.       atorvastatin (LIPITOR) 40 MG tablet Take 1 tablet by mouth Every Night.       Cariprazine HCl (Vraylar) 1.5 MG capsule capsule Take 1 capsule by mouth Daily.       clopidogrel (PLAVIX) 75 MG tablet Take 1 tablet by mouth Every Night.       cyclobenzaprine (FLEXERIL) 10 MG tablet Take 1 tablet by mouth 3 (Three) Times a Day As Needed for Muscle Spasms.       Dextromethorphan-buPROPion ER (AUVELITY)  MG tablet controlled-release Take 1 tablet by mouth 2 (Two) Times a Day.       eszopiclone (LUNESTA) 3 MG tablet Take 1 tablet by mouth Every Night. Take immediately before bedtime       furosemide (LASIX) 40 MG tablet Take 1 tablet by mouth Every Night.       isosorbide mononitrate (IMDUR) 60 MG 24 hr tablet Take 1 tablet by mouth Every Night.       lisinopril (PRINIVIL,ZESTRIL) 40 MG tablet Take 1 tablet by mouth Daily.       metoprolol succinate XL  (TOPROL-XL) 200 MG 24 hr tablet Take 1 tablet by mouth Every Night.       naloxone (NARCAN) 4 MG/0.1ML nasal spray 1 spray into the nostril(s) as directed by provider As Needed for Opioid Reversal. Call 911. Don't prime. [place] in 1 nostril for overdose. Repeat in 2-3 minutes in other nostril if no or minimal breathing/responsiveness.       nitroglycerin (NITROSTAT) 0.4 MG SL tablet Place 1 tablet under the tongue Every 5 (Five) Minutes As Needed for Chest Pain.       oxyCODONE-acetaminophen (PERCOCET)  MG per tablet Take 1 tablet by mouth Every 8 (Eight) Hours.       Semaglutide, 2 MG/DOSE, (Ozempic, 2 MG/DOSE,) 8 MG/3ML solution pen-injector Inject 1 mg under the skin into the appropriate area as directed 1 (One) Time Per Week.          Current Medications:    Current Facility-Administered Medications:     acetaminophen (TYLENOL) 160 MG/5ML solution 650 mg, 650 mg, Nasogastric, Q6H PRN, Mando Olivo MD, 650 mg at 25 1231    acetaminophen (TYLENOL) suppository 650 mg, 650 mg, Rectal, Q4H PRN, Mando Olivo MD, 650 mg at 25 0355    acetaminophen (TYLENOL) tablet 650 mg, 650 mg, Per G Tube, Q6H PRN, Mando Olivo MD, 650 mg at 25 1255    [COMPLETED] amiodarone 150 mg in 100 mL D5W (loading dose), 150 mg, Intravenous, Once, 150 mg at 25 1800 **FOLLOWED BY** [] amiodarone 360 mg in 200 mL D5W infusion, 1 mg/min, Intravenous, Continuous, Last Rate: 33.3 mL/hr at 25 1908, 1 mg/min at 25 1908 **FOLLOWED BY** [] amiodarone 360 mg in 200 mL D5W infusion, 0.5 mg/min, Intravenous, Continuous, Stopped at 25 1821 **FOLLOWED BY** [COMPLETED] amiodarone (PACERONE) tablet 200 mg, 200 mg, Per G Tube, Once, 200 mg at 25 1820 **FOLLOWED BY** amiodarone (PACERONE) tablet 200 mg, 200 mg, Per G Tube, Q8H, 200 mg at 25 0945 **FOLLOWED BY** [START ON 2025] amiodarone (PACERONE) tablet 200 mg, 200 mg, Per G Tube, Q12H **FOLLOWED BY** [START ON 2025]  amiodarone (PACERONE) tablet 200 mg, 200 mg, Per G Tube, Daily, Mando Olivo MD    amLODIPine (NORVASC) tablet 10 mg, 10 mg, Nasogastric, Nightly, Nicola Hare PA-C, 10 mg at 01/03/25 2055    artificial tears ophthalmic ointment, , Both Eyes, Q1H PRN, Constanza Dial MD    atorvastatin (LIPITOR) tablet 40 mg, 40 mg, Nasogastric, Nightly, Nicola Hare PA-C, 40 mg at 01/03/25 2055    sennosides-docusate (PERICOLACE) 8.6-50 MG per tablet 2 tablet, 2 tablet, Nasogastric, BID, 2 tablet at 01/04/25 0946 **AND** polyethylene glycol (MIRALAX) packet 17 g, 17 g, Nasogastric, Daily PRN **AND** [DISCONTINUED] bisacodyl (DULCOLAX) EC tablet 5 mg, 5 mg, Oral, Daily PRN **AND** bisacodyl (DULCOLAX) suppository 10 mg, 10 mg, Rectal, Daily PRN, Mando Olivo MD    budesonide (PULMICORT) nebulizer solution 0.5 mg, 0.5 mg, Nebulization, BID - RT, Cesar Alvarado MD, 0.5 mg at 01/04/25 0713    chlorhexidine (PERIDEX) 0.12 % solution 15 mL, 15 mL, Mouth/Throat, Q12H, Pravin Quinones APRN, 15 mL at 01/04/25 0945    Chlorhexidine Gluconate Cloth 2 % pads 1 Application, 1 Application, Topical, Q24H, Pravin Quinones APRN, 1 Application at 01/04/25 0500    clopidogrel (PLAVIX) tablet 75 mg, 75 mg, Nasogastric, Nightly, Nicola Hare PA-C, 75 mg at 01/03/25 2055    Enoxaparin Sodium (LOVENOX) syringe 40 mg, 40 mg, Subcutaneous, Daily, Pravin Quinones APRN, 40 mg at 01/04/25 0945    famotidine (PEPCID) tablet 20 mg, 20 mg, Nasogastric, Q12H, Nicola Hare PA-C, 20 mg at 01/04/25 0945    fentaNYL 2500 mcg/250 mL NS infusion,  mcg/hr, Intravenous, Titrated, Mando Olivo MD, Last Rate: 25 mL/hr at 01/04/25 0900, 250 mcg/hr at 01/04/25 0900    hyaluronidase 150 units in NS 10 ml syringe, 150 Units, Subcutaneous, PRN, Mando Olivo MD, 150 Units at 01/02/25 1033    hydrALAZINE (APRESOLINE) injection 10 mg, 10 mg, Intravenous, Q6H PRN, Mando Olivo MD, 10 mg at 01/01/25 1256    ibuprofen  (ADVIL,MOTRIN) 100 MG/5ML suspension 200 mg, 200 mg, Nasogastric, Q6H PRN, Constanza Dial MD    insulin glargine (LANTUS, SEMGLEE) injection 10 Units, 10 Units, Subcutaneous, Nightly, Mando Olivo MD, 10 Units at 01/03/25 2054    ipratropium-albuterol (DUO-NEB) nebulizer solution 3 mL, 3 mL, Nebulization, 4x Daily - RT, Mando Olivo MD, 3 mL at 01/04/25 1017    [Held by provider] lisinopril (PRINIVIL,ZESTRIL) tablet 40 mg, 40 mg, Nasogastric, Daily, Nicola Hare PA-C, 40 mg at 01/03/25 0902    metoprolol tartrate (LOPRESSOR) tablet 100 mg, 100 mg, Nasogastric, Q12H, Nicola Hare PA-C, 100 mg at 01/04/25 0948    nitroglycerin (NITROSTAT) SL tablet 0.4 mg, 0.4 mg, Sublingual, Q5 Min PRN, Pravin uQinones APRN    Phosphorus Replacement - Follow Nurse / BPA Driven Protocol, , Not Applicable, PRN, Hernan Gonzalez APRN    Potassium Replacement - Follow Nurse / BPA Driven Protocol, , Not Applicable, PRMARKOS, Hernan Gonzalez APRN    propofol (DIPRIVAN) infusion 10 mg/mL 100 mL, 5-50 mcg/kg/min, Intravenous, Titrated, Imer Valenzuela MD, Stopped at 12/30/24 0725    sodium chloride 0.9 % flush 10 mL, 10 mL, Intravenous, Q12H, Pravin Quinones APRN, 10 mL at 01/04/25 0946    sodium chloride 0.9 % flush 10 mL, 10 mL, Intravenous, PRN, Pravin Quinones APRN, 10 mL at 12/31/24 2201    sodium chloride 0.9 % infusion 40 mL, 40 mL, Intravenous, PRN, Pravin Quinones APRN     Allergies:  Allergies   Allergen Reactions    Codeine Nausea Only       Social Hx:  Social History     Socioeconomic History    Marital status:    Tobacco Use    Smoking status: Every Day     Current packs/day: 0.50     Types: Cigarettes    Smokeless tobacco: Never    Tobacco comments:     2 a day   Vaping Use    Vaping status: Never Used   Substance and Sexual Activity    Alcohol use: Yes     Comment: Rarely    Drug use: Yes     Types: Cocaine(coke)    Sexual activity: Defer       Family Hx:  Family History   Adopted:  Yes     Physical Examination:   Vital Signs:  Vitals:    01/04/25 1315 01/04/25 1330 01/04/25 1345 01/04/25 1400   BP: 145/88 134/78 131/69 130/73   Pulse: 91 90 89 86   Resp:       Temp:       TempSrc:       SpO2: 98% 98% 97% 95%   Weight:       Height:             General Exam:  Head:  Normocephalic, atraumatic  HEENT:  Neck supple  Abdomen:  Nontender, Nondistended  Extremities:  No signs of peripheral edema  Skin:  No rashes    Neurologic Exam:    Mental Status:    -patient sedated and intubated.  Sedation paused for exam.  Patient eyes are closed when I enter the room and when name called he did open his eyes.  He did not track examiner.     CN II - XII grossly intact. He does have right gaze preference. No dolls eyes. When head is repositioned eyes go midline but does not look to examiner on left.     Motor: (strength out of 5:  1= minimal movement, 2 = movement in plane of gravity, 3 = movement against gravity, 4 = movement against some resistance, 5 = full strength)    -no movement of bilateral upper and lower extremities.     DTR:  -Right   Biceps: 2+ Triceps: 2+ Brachioradialis: 2+   Patella: 2+ Ankle: 2+  Babinski  -Left   Biceps: 2+ Triceps: 2+ Brachioradialis: 2+   Patella: 2+ Ankle: 2+ Babinski    Sensory:  -no withdrawal to noxious stimuli    Coordination:  -no increase tone or tremor noted.  Occasional decerebrate posturing with stimulation.         Recent Diagnostics:   Laboratory Results:   - Reviewed in EMR  Lab Results   Component Value Date    GLUCOSE 183 (H) 01/04/2025    CALCIUM 8.9 01/04/2025     01/04/2025    K 4.5 01/04/2025    CO2 30.0 (H) 01/04/2025     01/04/2025    BUN 24 (H) 01/04/2025    CREATININE 0.54 (L) 01/04/2025    EGFRIFAFRI >59 02/14/2022    EGFRIFNONA >60 02/14/2022    BCR 44.4 (H) 01/04/2025    ANIONGAP 8.0 01/04/2025     Lab Results   Component Value Date    WBC 15.70 (H) 01/04/2025    HGB 11.8 (L) 01/04/2025    HCT 36.8 (L) 01/04/2025    MCV 96.8 01/04/2025      01/04/2025     Lab Results   Component Value Date    PTT 28.6 11/13/2014    INR 0.96 12/29/2024     Lab Results   Component Value Date    TRIG 147 02/14/2022    HDL 37 (L) 02/14/2022    LDL 49 02/14/2022     Lab Results   Component Value Date    HGBA1C 5.40 08/26/2024       Imaging Results:  Imaging Results (Last 24 Hours)       Procedure Component Value Units Date/Time    CT Head Without Contrast [[id number]] Resulted: 01/04/25 1438     Updated: 01/04/25 1432               Assessment & Plan:   Patient 64 y.o. male with PMH substance abuse, depression, DM, bladder CA, chronic back pain with pain stimulator, CAD s/p CABG, HLD, HTN, TYRELL with BiPAP. [place] lives alone. He is . History obtained by medical record as patient is sedated and intubated. No family at bedside at initial exam and cousin later arrives. Currently ex-wife is decision maker and trying to get guardianship with anticpation of this on Monday and today is Saturday.     At any rate, patient was found unresponsive on 12/29/2024 for undetermined amount of time. He was in respiratory failure and required intubation. There was mention at some point concern for seizure but no seizure like activity was observed. Since admission, he has had some decerebrate posturing but no rhythmic like movements. CT head on admission no acute process. UDS + cocaine and TCA.    Patient has 2 similar presentations in July and August 2024 both related to hypoglycemia. Into the 30-40s.  With one admission, there is mention of possible seizure as patient had tongue biting. Per notes, patient recovered quickly to his baseline. Neurology has been consulted as sedation has been reduced and has been off precedex several hours and is on Fentanyl and when paused minimal response. He does have right gaze preference. He has intact pratik and cough. Patient has had elevated temp for multiple days requiring cooling blanket. On Vancomycin for staphylococcus aureus. ID is  following.     Active Hospital Problems    Diagnosis  POA    **Anoxic brain injury [G93.1]  Unknown    Acute respiratory failure with hypoxia [J96.01]  Yes    On mechanically assisted ventilation [Z99.11]  Not Applicable    Cocaine abuse [F14.10]  Unknown    Positive urine drug screen [R82.5]  Unknown    Substance abuse [F19.10]  Unknown    Tobacco use [Z72.0]  Unknown    COPD (chronic obstructive pulmonary disease) [J44.9]  Unknown    Bipolar disorder [F31.9]  Unknown    Coronary artery disease involving autologous vein coronary bypass graft without angina pectoris [I25.810]  Unknown    Essential hypertension [I10]  Unknown    Hyperlipidemia LDL goal <100 [E78.5]  Unknown        Impression:  Acute respiratory failure.  Illicit drug use. UDS + cocaine and TCA.  Chronic back pain with spinal stimulator so unable to have MRI brain.  Tobacco use.  DM uncontrolled with hypoglycemia.      Plan:   CT head today ordered by attending.  Attending has ordered EEG. Ok for this and EEG tech notified. There has been no observed rythmic movements but patient does have right gaze preference and has had some observed posturing.   Updated cousin on exam and testing today and prognosis for functional neurologic testing.  Ex wife trying to obtain guardianship which not able to take place until Monday 1/6/2025.  Supportive care.    Bernie Porter, APRN  01/04/25  14:41 CST    Medical Decision Making    Number/Complexity of Problems  Moderate  1 undiagnosed new problem with uncertain prognosis -   1 acute illness with systemic symptoms -   High  1 acute or chronic illness that poses a threat to life/body function -   high     MDM Data  Moderate - 1/3 categories  Extensive - 2/3 categories    Category 1: 3 of the following  Review of external notes  Review of results  Ordering of each unique test  Independent historian  Category 2:  Independent interpretation of test (ex: imaging)  Category 3:  Discussion of management with another  provider    extensive     Treatment Plan  Moderate - Prescription Drug management  High  Drug therapy requiring intensive monitoring for toxicity  Decision regarding hospitalization or escalation of care  De-escalate care/DNR decisions  high

## 2025-01-04 NOTE — PROGRESS NOTES
1416 Pt off vent for transport to CT.   1447 returned from CT placed back on vent on previous settings Of VC 16 500 + 5 40%.  Pt was ventilated to from and during procedure Via ambu bag with 15 lpm oxygen. ETCO2 maintained 44-47 and ETT tube positioned maintained at th 24 cm gia at the teeth.

## 2025-01-04 NOTE — PROGRESS NOTES
INFECTIOUS DISEASES PROGRESS NOTE    Patient:  Ld Green  YOB: 1960  MRN: [id number]   Admit date: 12/29/2024   Admitting Physician: Mando Olivo MD  Primary Care Physician: Xiomara Neal APRN      Chief Complaint: Unobtainable from patient on ventilator        Interval History: Patient remains off and on cooling blanket.  Also has as needed Tylenol.  Ventilator settings essentially unchanged.    Vancomycin discontinued as Staphylococcus aureus identified as methicillin susceptible.    Had remained on empiric levofloxacin      Allergies:   Allergies   Allergen Reactions    Codeine Nausea Only       Current Scheduled Medications:   amiodarone, 200 mg, Per G Tube, Q8H   Followed by  [START ON 1/9/2025] amiodarone, 200 mg, Per G Tube, Q12H   Followed by  [START ON 1/23/2025] amiodarone, 200 mg, Per G Tube, Daily  amLODIPine, 10 mg, Nasogastric, Nightly  atorvastatin, 40 mg, Nasogastric, Nightly  budesonide, 0.5 mg, Nebulization, BID - RT  chlorhexidine, 15 mL, Mouth/Throat, Q12H  Chlorhexidine Gluconate Cloth, 1 Application, Topical, Q24H  clopidogrel, 75 mg, Nasogastric, Nightly  enoxaparin, 40 mg, Subcutaneous, Daily  famotidine, 20 mg, Nasogastric, Q12H  fluorometholone, 2 drop, Left Eye, 4x Daily  insulin glargine, 10 Units, Subcutaneous, Nightly  ipratropium-albuterol, 3 mL, Nebulization, 4x Daily - RT  levoFLOXacin, 750 mg, Per G Tube, Q24H  [Held by provider] lisinopril, 40 mg, Nasogastric, Daily  metoprolol tartrate, 100 mg, Nasogastric, Q12H  senna-docusate sodium, 2 tablet, Nasogastric, BID  sodium chloride, 10 mL, Intravenous, Q12H      Current PRN Medications:    acetaminophen    acetaminophen    acetaminophen    senna-docusate sodium **AND** polyethylene glycol **AND** [DISCONTINUED] bisacodyl **AND** bisacodyl    hyaluronidase 150 units in NS 10 ml syringe    hydrALAZINE    nitroglycerin    Phosphorus Replacement - Follow Nurse / BPA Driven Protocol    Potassium Replacement -  "Follow Nurse / BPA Driven Protocol    sodium chloride    sodium chloride    dexmedetomidine, 0.2-1.5 mcg/kg/hr, Last Rate: 0.6 mcg/kg/hr (25)  fentanyl 10 mcg/mL,  mcg/hr, Last Rate: 300 mcg/hr (25 043)  propofol, 5-50 mcg/kg/min, Last Rate: Stopped (24 0725)           Objective     Vital Signs:  Temp (24hrs), Av.8 °F (38.2 °C), Min:99.5 °F (37.5 °C), Max:102.1 °F (38.9 °C)      BP 98/55   Pulse 81   Temp 99.5 °F (37.5 °C) (Axillary)   Resp 19   Ht 172.7 cm (68\")   Wt 108 kg (238 lb 8.6 oz)   SpO2 98%   BMI 36.27 kg/m²         Physical Exam:    General: The patient is chronically and acutely ill-appearing lying in bed in the CCU  HEENT: ET tube is in place.  OG tube is in place  GI: Patient tolerating tube feeds.  Currently at 80 mL an hour  Neuro: Patient opens eyes, does not track.  Does not squeeze hands to command.        Results Review:    I reviewed the patient's new clinical results.    Lab Results:    CBC:   Lab Results   Lab 24  1202 24  0316 24  0212 25  0316 25  0201 25  02025  0135   WBC 29.48* 20.23* 17.52* 18.42* 18.20* 15.80* 15.70*   HEMOGLOBIN 17.7 14.4 13.7 14.5 13.8 12.7* 11.8*   HEMATOCRIT 50.0 43.4 41.7 44.2 42.6 39.8 36.8*   PLATELETS 308 232 177 196 198 202 181        AutoDiff:   Lab Results   Lab 25  0201 25  02025  0135   NEUTROPHIL % 73.3 74.4 75.0   LYMPHOCYTE % 10.5* 9.1* 7.6*   MONOCYTES % 14.3* 14.0* 14.0*   EOSINOPHIL % 0.5 0.9 1.1   BASOPHIL % 0.5 0.6 0.6   NEUTROS ABS 13.32* 11.74*  12.64* 11.78*   LYMPHS ABS 1.92 1.44 1.20   MONOS ABS 2.61* 2.21* 2.20*   EOS ABS 0.09 0.15  0.32 0.17   BASOS ABS 0.09 0.10  0.00 0.09        Manual Diff:    Lab Results   Lab 25  0207 25  0135   NEUTROPHIL % 80.0*  --    LYMPHOCYTE % 6.0*  --    MONOCYTES % 9.0  --    NEUTROS ABS 11.74*  12.64* 11.78*   LYMPHS ABS 1.42  --    WBC MORPHOLOGY Normal  --    PLT MORPH Normal  --  "           CMP:   Lab Results   Lab 01/02/25  0201 01/03/25  0207 01/04/25  0135   SODIUM 143 142 141   POTASSIUM 3.8 4.2 4.5   CHLORIDE 106 103 103   CO2 29.0 31.0* 30.0*   BUN 17 21 24*   CREATININE 0.54* 0.65* 0.54*   CALCIUM 9.1 9.1 8.9   BILIRUBIN 0.9 0.8 0.6   ALK PHOS 105 98 94   ALT (SGPT) 20 20 20   AST (SGOT) 23 22 29   GLUCOSE 198* 162* 183*       Estimated Creatinine Clearance: 166.8 mL/min (A) (by C-G formula based on SCr of 0.54 mg/dL (L)).    Culture Results:    Microbiology Results (last 10 days)       Procedure Component Value - Date/Time    Blood Culture - Blood, Arm, Right [[id number]]  (Normal) Collected: 01/01/25 1053    Lab Status: Preliminary result Specimen: Blood from Arm, Right Updated: 01/03/25 1100     Blood Culture No growth at 2 days    Blood Culture - Blood, Arm, Left [[id number]]  (Normal) Collected: 01/01/25 1051    Lab Status: Preliminary result Specimen: Blood from Arm, Left Updated: 01/03/25 1100     Blood Culture No growth at 2 days    Respiratory Culture - Aspirate, ET Suction [[id number]]  (Abnormal)  (Susceptibility) Collected: 01/01/25 1047    Lab Status: Final result Specimen: Aspirate from ET Suction Updated: 01/03/25 0820     Respiratory Culture Scant growth (1+) Staphylococcus aureus      No Normal Respiratory Karen     Gram Stain Many (4+) WBCs per low power field      Few (2+) Epithelial cells per low power field      Rare (1+) Gram positive cocci    Susceptibility        Staphylococcus aureus      IMER      Clindamycin Susceptible      Oxacillin Susceptible      Tetracycline Susceptible      Trimethoprim + Sulfamethoxazole Susceptible      Vancomycin Susceptible                           Chlamydia trachomatis, Neisseria gonorrhoeae, PCR - Swab, Penis [[id number]]  (Normal) Collected: 12/30/24 0839    Lab Status: Final result Specimen: Swab from Penis Updated: 12/30/24 1255     Chlamydia DNA by PCR Not Detected     Neisseria gonorrhoeae by PCR Not Detected    Narrative:       "Disclaimer: The Aptima Combo 2 assay is a target amplification nucleic acid probe test that utilizes target capture for the in vitro qualitative detection and differentiation of ribosomal RNA from Chlamydia trachomatis and Neisseria gonorrhoeae to aid in the diagnosis of chlamydial and/or gonococcal urogenital disease.  Cell culture was once considered to be the \"gold standard\" for detection of CT and NG.  Culture is quite specific, but scientific studies have demonstrated that the NAAT DNA probe technologies have higher clinical sensitivities than culture.    MRSA Screen, PCR (Inpatient) - Swab, Nares [[id number]]  (Normal) Collected: 12/29/24 1753    Lab Status: Final result Specimen: Swab from Nares Updated: 12/29/24 1919     MRSA PCR No MRSA Detected    Narrative:      The negative predictive value of this diagnostic test is high and should only be used to consider de-escalating anti-MRSA therapy. A positive result may indicate colonization with MRSA and must be correlated clinically.    Respiratory Culture - Aspirate, ET Suction [[id number]]  (Abnormal)  (Susceptibility) Collected: 12/29/24 1507    Lab Status: Final result Specimen: Aspirate from ET Suction Updated: 01/01/25 1024     Respiratory Culture Moderate growth (3+) Staphylococcus aureus      Light growth (2+) Normal Respiratory Kapil     Gram Stain Moderate (3+) WBCs per low power field      Rare (1+) Epithelial cells per low power field      Moderate (3+) Mixed gram positive kapil      Moderate (3+) Mixed gram negative kapil      Mixed intracellular organisms suggestive of an aspiration event    Susceptibility        Staphylococcus aureus      IMER      Clindamycin Susceptible      Oxacillin Susceptible      Tetracycline Susceptible      Trimethoprim + Sulfamethoxazole Susceptible      Vancomycin Susceptible                           Blood Culture - Blood, Hand, Digit Right [[id number]]  (Normal) Collected: 12/29/24 1230    Lab Status: Final result " Specimen: Blood from Hand, Digit Right Updated: 01/03/25 1245     Blood Culture No growth at 5 days    Blood Culture - Blood, Arm, Right [[id number]]  (Normal) Collected: 12/29/24 1218    Lab Status: Final result Specimen: Blood from Arm, Right Updated: 01/03/25 1245     Blood Culture No growth at 5 days    Respiratory Panel PCR w/COVID-19(SARS-CoV-2) NACHO/ARACELI/CHRISTINE/PAD/COR/ZARINA In-House, NP Swab in UTM/VTM, 2 HR TAT - Swab, Nasopharynx [[id number]]  (Normal) Collected: 12/29/24 1205    Lab Status: Final result Specimen: Swab from Nasopharynx Updated: 12/29/24 1304     ADENOVIRUS, PCR Not Detected     Coronavirus 229E Not Detected     Coronavirus HKU1 Not Detected     Coronavirus NL63 Not Detected     Coronavirus OC43 Not Detected     COVID19 Not Detected     Human Metapneumovirus Not Detected     Human Rhinovirus/Enterovirus Not Detected     Influenza A PCR Not Detected     Influenza B PCR Not Detected     Parainfluenza Virus 1 Not Detected     Parainfluenza Virus 2 Not Detected     Parainfluenza Virus 3 Not Detected     Parainfluenza Virus 4 Not Detected     RSV, PCR Not Detected     Bordetella pertussis pcr Not Detected     Bordetella parapertussis PCR Not Detected     Chlamydophila pneumoniae PCR Not Detected     Mycoplasma pneumo by PCR Not Detected    Narrative:      In the setting of a positive respiratory panel with a viral infection PLUS a negative procalcitonin without other underlying concern for bacterial infection, consider observing off antibiotics or discontinuation of antibiotics and continue supportive care. If the respiratory panel is positive for atypical bacterial infection (Bordetella pertussis, Chlamydophila pneumoniae, or Mycoplasma pneumoniae), consider antibiotic de-escalation to target atypical bacterial infection.                 Radiology:   Imaging Results (Last 72 Hours)       Procedure Component Value Units Date/Time    XR Chest 1 View [[id number]] Collected: 01/03/25 1036     Updated:  01/03/25 1040    Narrative:      EXAMINATION: XR CHEST 1 VW-     1/3/2025 8:34 AM     HISTORY: Evaluate for pneumonia; J96.00-Acute respiratory failure,  unspecified whether with hypoxia or hypercapnia; R41.82-Altered mental  status, unspecified; F14.10-Cocaine abuse, uncomplicated     1 view chest x-ray.     COMPARISON:  12/31/2024.     Endotracheal tube present with the tip lying 5 cm above the cipriano.     Nasogastric tube extends below the diaphragm into the stomach.  The distal tip is not visualized.  Median sternotomy changes.  Thoracic stimulator leads noted.  Heart size is within normal limits.     There is slight prominence of lower lobe parenchymal markings though  there is no focal consolidation.     No pneumothorax and no sign of heart failure.       Impression:      1. No focal infiltrate or definite pneumonia is seen.           This report was signed and finalized on 1/3/2025 10:37 AM by Dr. Luis Wells MD.                   Active Hospital Problems    Diagnosis     **Anoxic brain injury     Acute respiratory failure with hypoxia     On mechanically assisted ventilation     Cocaine abuse     Positive urine drug screen     Substance abuse     Tobacco use     COPD (chronic obstructive pulmonary disease)     Bipolar disorder     Coronary artery disease involving autologous vein coronary bypass graft without angina pectoris     Essential hypertension     Hyperlipidemia LDL goal <100        IMPRESSION:  Fever-temperature currently 100.1 and cooling blanket has been on monitor only.  Continues to receive as needed Tylenol and intermittently on cooling blanket.  Patient has had methicillin-susceptible Staphylococcus aureus isolated from respiratory culture-moderate growth December 2019 scant growth on repeat January 1.  Patient has received appropriate antibiotic therapy to cover that.  Chest x-ray without any focal infiltrate.  Vent settings essentially unchanged.  Suspect central fever.  No obvious  "medication associated with \"drug\" fever.  Acute respiratory failure with hypoxia-patient remains on the ventilator  Anoxic brain injury-patient found down at [facility]  Leukocytosis-manual differential without any bands yesterday.  Urine drug screen positive for cocaine on admission  Coronary artery disease status post CABG      RECOMMENDATION:   DC levofloxacin  Continue to monitor temperature-cooling blanket on monitor only  As needed Tylenol  If any significant change in status such as hemodynamic instability, persistent increased white count, etc. would reevaluate for infection, reculture and consider empiric antibiotic therapy at that time      Constanza Dial MD  01/04/25  09:13 CST      "

## 2025-01-04 NOTE — PROGRESS NOTES
RT EQUIPMENT DEVICE RELATED - SKIN ASSESSMENT    Dany Score:  Dany Score: 13     RT Medical Equipment/Device:     ETT Linda/Anchorfast    Skin Assessment:      Cheek:  Intact  Lips:  Intact    Device Skin Pressure Protection:  Positioning supports utilized and Skin-to-device areas padded:  Anchorfast    Nurse Notification:  Evelin Springer, RRT

## 2025-01-04 NOTE — PLAN OF CARE
Goal Outcome Evaluation:      Fentanyl at 300. Precedex at 0.8. TF at 80. Posturing noted during suctioning. Will blink and open eyes but not follow commands. Urine output adequate. When Precedex is decreased the patient becomes tachypneic and takes a while to recover.

## 2025-01-04 NOTE — SIGNIFICANT NOTE
01/04/25 0713   Readings   PEEP Intrinsic (cm H2O) 4 cm H2O   Plateau Pressure (cm H2O) 16 cm H2O   Driving Pressure (cm H2O) 11.7 cm H2O   Static Compliance (L/cm H2O) 34   Dynamic Compliance (L/cm H2O) 20 L/cm H2O

## 2025-01-04 NOTE — PROGRESS NOTES
[facility] Intensivist Services  INPATIENT PROGRESS NOTE    Patient Name: Ld Green  Date of Admission: 12/29/2024  Today's Date: 01/04/25  Length of Stay: 6  Primary Care Physician: Xiomara Neal APRN    Subjective   Chief Complaint: Unresponsive  HPI   The patient is a 64-year-old male with a past medical history of smoker, CAD, obesity, type 2 diabetes, depression and bipolar disorder, and HLD who presented to [facility] ED on 12/29/2024 after being found at his apartment unresponsive.  Downtime was unknown.  GCS 6 with questionable seizure-like activity.  He was subsequently intubated and transferred to the CCU for further care. Patient had cocaine and TCA positive urine, however, Poison control thinks that TCA could be cross-reactivity with Flexeril or Auvelity-TCA overdose not felt to be likely per poison control. Of note, the patient has hospitalized in August of 2024 with similar presentation and was found to be hypoglycemic he was informed at that time to stop taking his insulin.  Was additionally admitted 7/20/2024 where he used cocaine and became unconscious bit his tongue.    He has had little improvement in his neurological status-he was decorticate posturing with cough and gag reflex.  His ex-wife, who is his POA, does not feel that he would want prolonged life support. She is activity attempting to get conservatorship.  CT head 12/29/2024 no acute abnormality. He has an upward right gaze.     The patient began to have elevated temperatures starting on 12/31-tmax-102.1. He was initially on Zosyn and vanco, changed to Levaquin to concern for UTI. ID has been consulted-suspicious for central fever as his temperature seems to only response to cooling blanket and acetaminophen..  MSSA in sputum on 12/29 and 1/1. He is without significant ventilator change.    Interval history:  1/4/2025    Tmax temp in the last 24 hours 101.1. He continues on Levaquin for now. He is  currently on Precedex and fentanyl-Precedex discontinued as this can put him at risk for non-infectious fever. Will plan to sedate with propofol and fentanyl. He has not had any improvement in his neurological status-neurology consulted. CT of the head without contrast ordered. Mild ongoing leukocytosis-WBC-15.7. All other labs unchanged. Vent setting unchanged.  He continues to tolerate tube feedings at goal rate.    Review of Systems   Unable to perform ROS: Mental status change      All pertinent negatives and positives are as above. All other systems have been reviewed and are negative unless otherwise stated.     Objective    Temp:  [99.5 °F (37.5 °C)-102.1 °F (38.9 °C)] 100.5 °F (38.1 °C)  Heart Rate:  [] 91  Resp:  [16-20] 17  BP: ()/(39-88) 145/88  FiO2 (%):  [30 %-40 %] 40 %  Physical Exam  Vitals reviewed.   Constitutional:       Appearance: He is obese. He is ill-appearing.      Interventions: He is sedated and intubated.   HENT:      Head: Normocephalic.      Mouth/Throat:      Mouth: Mucous membranes are moist.   Eyes:      Pupils: Pupils are equal, round, and reactive to light.      Comments: Reactive but sluggish pupil bilaterally   Cardiovascular:      Rate and Rhythm: Normal rate and regular rhythm.      Pulses: Normal pulses.      Heart sounds: Normal heart sounds.   Pulmonary:      Effort: Pulmonary effort is normal. He is intubated.      Breath sounds: Examination of the right-upper field reveals rhonchi. Examination of the right-middle field reveals rhonchi. Rhonchi present.   Abdominal:      General: Bowel sounds are normal.      Palpations: Abdomen is soft.   Musculoskeletal:      Right lower leg: No edema.      Left lower leg: No edema.   Skin:     Capillary Refill: Capillary refill takes less than 2 seconds.      Coloration: Skin is pale.   Neurological:      Motor: Weakness present.      Comments: Right upward gaze. Cough and gag present. He does not blink to threat          Results Review:  Lab Results (last 24 hours)       Procedure Component Value Units Date/Time    POC Glucose Once [[id number]]  (Abnormal) Collected: 01/04/25 1158    Specimen: Blood Updated: 01/04/25 1220     Glucose 192 mg/dL      Comment: : [id number] Arsh Emily Corea ID: [id number]       Blood Culture - Blood, Arm, Left [[id number]]  (Normal) Collected: 01/01/25 1051    Specimen: Blood from Arm, Left Updated: 01/04/25 1100     Blood Culture No growth at 3 days    Blood Culture - Blood, Arm, Right [[id number]]  (Normal) Collected: 01/01/25 1053    Specimen: Blood from Arm, Right Updated: 01/04/25 1100     Blood Culture No growth at 3 days    POC Glucose Once [[id number]]  (Abnormal) Collected: 01/04/25 0544    Specimen: Blood Updated: 01/04/25 0555     Glucose 170 mg/dL      Comment: : [id number] Gordy Christopherorier ID: [id number]       Blood Gas, Arterial - [[id number]]  (Abnormal) Collected: 01/04/25 0347    Specimen: Arterial Blood Updated: 01/04/25 0349     Site Left Radial     Pedro Pablo's Test Positive     pH, Arterial 7.377 pH units      pCO2, Arterial 57.4 mm Hg      Comment: 83 Value above reference range        pO2, Arterial 63.5 mm Hg      Comment: 84 Value below reference range        HCO3, Arterial 33.7 mmol/L      Comment: 83 Value above reference range        Base Excess, Arterial 6.7 mmol/L      Comment: 83 Value above reference range        O2 Saturation, Arterial 90.9 %      Comment: 84 Value below reference range        Temperature 37.0     Barometric Pressure for Blood Gas 762 mmHg      Modality Ventilator     FIO2 30 %      Ventilator Mode AC     Set Tidal Volume 500.000     Set [facility] Resp Rate 16.0     PEEP 5.0     Collected by SALLIE     Comment: Meter: [id number]     :  SALLIE        pCO2, Temperature Corrected 57.4 mm Hg      pH, Temp Corrected 7.377 pH Units      pO2, Temperature Corrected 63.5 mm Hg      PO2/FIO2 212    Comprehensive Metabolic Panel  [[id number]]  (Abnormal) Collected: 01/04/25 0135    Specimen: Blood Updated: 01/04/25 0210     Glucose 183 mg/dL      BUN 24 mg/dL      Creatinine 0.54 mg/dL      Sodium 141 mmol/L      Potassium 4.5 mmol/L      Chloride 103 mmol/L      CO2 30.0 mmol/L      Calcium 8.9 mg/dL      Total Protein 6.1 g/dL      Albumin 2.9 g/dL      ALT (SGPT) 20 U/L      AST (SGOT) 29 U/L      Alkaline Phosphatase 94 U/L      Total Bilirubin 0.6 mg/dL      Globulin 3.2 gm/dL      A/G Ratio 0.9 g/dL      BUN/Creatinine Ratio 44.4     Anion Gap 8.0 mmol/L      eGFR 112.0 mL/min/1.73     Narrative:      GFR Categories in Chronic Kidney Disease (CKD)      GFR Category          GFR (mL/min/1.73)    Interpretation  G1                     90 or greater         Normal or high (1)  G2                      60-89                Mild decrease (1)  G3a                   45-59                Mild to moderate decrease  G3b                   30-44                Moderate to severe decrease  G4                    15-29                Severe decrease  G5                    14 or less           Kidney failure          (1)In the absence of evidence of kidney disease, neither GFR category G1 or G2 fulfill the criteria for CKD.    eGFR calculation 2021 CKD-EPI creatinine equation, which does not include race as a factor    Magnesium [[id number]]  (Normal) Collected: 01/04/25 0135    Specimen: Blood Updated: 01/04/25 0210     Magnesium 2.2 mg/dL     Phosphorus [[id number]]  (Normal) Collected: 01/04/25 0135    Specimen: Blood Updated: 01/04/25 0210     Phosphorus 3.4 mg/dL     CBC & Differential [[id number]]  (Abnormal) Collected: 01/04/25 0135    Specimen: Blood Updated: 01/04/25 0145    Narrative:      The following orders were created for panel order CBC & Differential.  Procedure                               Abnormality         Status                     ---------                               -----------         ------                     CBC Auto  Differential[[id number]]        Abnormal            Final result                 Please view results for these tests on the individual orders.    CBC Auto Differential [[id number]]  (Abnormal) Collected: 01/04/25 0135    Specimen: Blood Updated: 01/04/25 0145     WBC 15.70 10*3/mm3      RBC 3.80 10*6/mm3      Hemoglobin 11.8 g/dL      Hematocrit 36.8 %      MCV 96.8 fL      MCH 31.1 pg      MCHC 32.1 g/dL      RDW 13.3 %      RDW-SD 48.2 fl      MPV 10.5 fL      Platelets 181 10*3/mm3      Neutrophil % 75.0 %      Lymphocyte % 7.6 %      Monocyte % 14.0 %      Eosinophil % 1.1 %      Basophil % 0.6 %      Immature Grans % 1.7 %      Neutrophils, Absolute 11.78 10*3/mm3      Lymphocytes, Absolute 1.20 10*3/mm3      Monocytes, Absolute 2.20 10*3/mm3      Eosinophils, Absolute 0.17 10*3/mm3      Basophils, Absolute 0.09 10*3/mm3      Immature Grans, Absolute 0.26 10*3/mm3      nRBC 0.0 /100 WBC     POC Glucose Once [[id number]]  (Abnormal) Collected: 01/04/25 0010    Specimen: Blood Updated: 01/04/25 0021     Glucose 165 mg/dL      Comment: : [id number] Kaminski KatieMeter ID: [id number]                XR Chest 1 View    Result Date: 1/3/2025  1. No focal infiltrate or definite pneumonia is seen.    This report was signed and finalized on 1/3/2025 10:37 AM by Dr. Luis Wells MD.       Result Review:  I have personally reviewed the results from the time of this admission to 1/4/2025 13:56 CST and agree with these findings:  [x]  Laboratory list / accordion  [x]  Microbiology  []  Radiology  []  EKG/Telemetry   [x]  Cardiology/Vascular   []  Pathology  []  Old records  []  Other:  Most notable findings include:   WBC   Date Value Ref Range Status   01/04/2025 15.70 (H) 3.40 - 10.80 10*3/mm3 Final     RBC   Date Value Ref Range Status   01/04/2025 3.80 (L) 4.14 - 5.80 10*6/mm3 Final     Hemoglobin   Date Value Ref Range Status   01/04/2025 11.8 (L) 13.0 - 17.7 g/dL Final     Hematocrit   Date Value Ref Range Status    01/04/2025 36.8 (L) 37.5 - 51.0 % Final     MCV   Date Value Ref Range Status   01/04/2025 96.8 79.0 - 97.0 fL Final     MCH   Date Value Ref Range Status   01/04/2025 31.1 26.6 - 33.0 pg Final     MCHC   Date Value Ref Range Status   01/04/2025 32.1 31.5 - 35.7 g/dL Final     RDW   Date Value Ref Range Status   01/04/2025 13.3 12.3 - 15.4 % Final     RDW-SD   Date Value Ref Range Status   01/04/2025 48.2 37.0 - 54.0 fl Final     MPV   Date Value Ref Range Status   01/04/2025 10.5 6.0 - 12.0 fL Final     Platelets   Date Value Ref Range Status   01/04/2025 181 140 - 450 10*3/mm3 Final     Neutrophil %   Date Value Ref Range Status   01/04/2025 75.0 42.7 - 76.0 % Final     Lymphocyte %   Date Value Ref Range Status   01/04/2025 7.6 (L) 19.6 - 45.3 % Final     Monocyte %   Date Value Ref Range Status   01/04/2025 14.0 (H) 5.0 - 12.0 % Final     Eosinophil %   Date Value Ref Range Status   01/04/2025 1.1 0.3 - 6.2 % Final     Basophil %   Date Value Ref Range Status   01/04/2025 0.6 0.0 - 1.5 % Final     Immature Grans %   Date Value Ref Range Status   01/04/2025 1.7 (H) 0.0 - 0.5 % Final     Neutrophils, Absolute   Date Value Ref Range Status   01/04/2025 11.78 (H) 1.70 - 7.00 10*3/mm3 Final     Lymphocytes, Absolute   Date Value Ref Range Status   01/04/2025 1.20 0.70 - 3.10 10*3/mm3 Final     Monocytes, Absolute   Date Value Ref Range Status   01/04/2025 2.20 (H) 0.10 - 0.90 10*3/mm3 Final     Eosinophils, Absolute   Date Value Ref Range Status   01/04/2025 0.17 0.00 - 0.40 10*3/mm3 Final     Basophils, Absolute   Date Value Ref Range Status   01/04/2025 0.09 0.00 - 0.20 10*3/mm3 Final     Immature Grans, Absolute   Date Value Ref Range Status   01/04/2025 0.26 (H) 0.00 - 0.05 10*3/mm3 Final     nRBC   Date Value Ref Range Status   01/04/2025 0.0 0.0 - 0.2 /100 WBC Final    Basic Metabolic Panel    Sodium Sodium   Date Value Ref Range Status   01/04/2025 141 136 - 145 mmol/L Final   01/03/2025 142 136 - 145 mmol/L  "Final   01/02/2025 143 136 - 145 mmol/L Final      Potassium Potassium   Date Value Ref Range Status   01/04/2025 4.5 3.5 - 5.2 mmol/L Final   01/03/2025 4.2 3.5 - 5.2 mmol/L Final   01/02/2025 3.8 3.5 - 5.2 mmol/L Final      Chloride Chloride   Date Value Ref Range Status   01/04/2025 103 98 - 107 mmol/L Final   01/03/2025 103 98 - 107 mmol/L Final   01/02/2025 106 98 - 107 mmol/L Final      Bicarbonate No results found for: \"PLASMABICARB\"   BUN BUN   Date Value Ref Range Status   01/04/2025 24 (H) 8 - 23 mg/dL Final   01/03/2025 21 8 - 23 mg/dL Final   01/02/2025 17 8 - 23 mg/dL Final      Creatinine Creatinine   Date Value Ref Range Status   01/04/2025 0.54 (L) 0.76 - 1.27 mg/dL Final   01/03/2025 0.65 (L) 0.76 - 1.27 mg/dL Final   01/02/2025 0.54 (L) 0.76 - 1.27 mg/dL Final      Calcium Calcium   Date Value Ref Range Status   01/04/2025 8.9 8.6 - 10.5 mg/dL Final   01/03/2025 9.1 8.6 - 10.5 mg/dL Final   01/02/2025 9.1 8.6 - 10.5 mg/dL Final      Glucose      No components found for: \"GLUCOSE.*\"          Culture Data:   Blood Culture   Date Value Ref Range Status   01/01/2025 No growth at 3 days  Preliminary   01/01/2025 No growth at 3 days  Preliminary   12/29/2024 No growth at 5 days  Final   12/29/2024 No growth at 5 days  Final     Respiratory Culture   Date Value Ref Range Status   01/01/2025 Scant growth (1+) Staphylococcus aureus (A)  Final   01/01/2025 No Normal Respiratory Karen (A)  Final   12/29/2024 Moderate growth (3+) Staphylococcus aureus (A)  Final   12/29/2024 Light growth (2+) Normal Respiratory Karen  Final       I have reviewed the patient's current medications.     Assessment/Plan   Assessment  Active Hospital Problems    Diagnosis     **Anoxic brain injury     Acute respiratory failure with hypoxia     On mechanically assisted ventilation     Cocaine abuse     Positive urine drug screen     Substance abuse     Tobacco use     COPD (chronic obstructive pulmonary disease)     Bipolar disorder " "    Coronary artery disease involving autologous vein coronary bypass graft without angina pectoris     Essential hypertension     Hyperlipidemia LDL goal <100      The patient is a 64-year-old male with a past medical history of smoker, CAD, obesity, type 2 diabetes, depression and bipolar disorder, and HLD who presented to [facility] ED on 12/29/2024 after being found at his apartment unresponsive.  Downtime was unknown.  GCS 6 with questionable seizure-like activity.  He was subsequently intubated and transferred to the CCU for further care. Patient had cocaine and TCA positive urine, however, Poison control thinks that TCA could be cross-reactivity with Flexeril or Auvelity-TCA overdose not felt to be likely per poison control. Of note, the patient has hospitalized in August of 2024 with similar presentation and was found to be hypoglycemic he was informed at that time to stop taking his insulin.  Was additionally admitted 7/20/2024 where he used cocaine and became unconscious bit his tongue. He has had little improvement in his neurological status-he was decorticate posturing with cough and gag reflex.  His ex-wife, who is his POA, does not feel that he would want prolonged life support. She is activity attempting to get conservatorship.  CT head 12/29/2024 no acute abnormality.  The patient began to have elevated temperatures starting on 12/31-tmax-102.1. He was initially on Zosyn and vanco, changed to Levaquin to concern for UTI. ID has been consulted-suspicious for \"neuro fever\" or drug \"fever\".     Treatment Plan  Suspected anoxic brain injury  -Remains intubated  -DNR   -DC precedex  -continue Fentanyl per protocol  -Continue propofol  -CT of the head without contrast  -Consult neurology  -EEG  -POA is actively involved in attempted to obtain guardianship    Acute respiratory failure with hypoxia/mechanical ventilation/COPD  -Continue routine vent orders  -Pulmonary toilet  -Daily ABG's and PRN hypoxia  -Daily " chest x-ray  -Sputum culture + MSSA-Levaquin  -Daily labs    Fever  -ID following-continue Levaquin for now, appreciate ongoing recommendations  -Suspected central fever-neurology consulted  -Group review of med list including pharmD and MD-DC precedex due to concern for exacerbating fever  -Tmax temp in the last 24 hour-101.1  -BC from 12/29 and 1/1/25-NGTD    Type 2 diabetes  -Glucerna TF-tolerating well  -Continue Lantus 10 us at HS  -NPO sliding scale with Q6 hour checks    PAF  -NSR current  -Continue Amiodarone via feeding tube    Substance abuse  -Cocaine and TCA on admit with previous hx of cocaine abuse  -POA currently attempting to obtain guadianship    Rhabdomyolysis with CK  -Resolved      Lovenox-VTE prophylaxis  Pepcid-GI pophylaxis    Total critical care time-46    Electronically signed by REMA Arias on 1/4/2025 at 13:56 CST

## 2025-01-04 NOTE — PLAN OF CARE
Problem: Mechanical Ventilation Invasive  Goal: Mechanical Ventilation Liberation  Outcome: Not Progressing   Goal Outcome Evaluation:   Pt unable to wean from vent Lul following.

## 2025-01-05 NOTE — PLAN OF CARE
Goal Outcome Evaluation:           Progress: no change        Remains on ventilator. Posturing noted when turned or suction. Blink spontaneously often keep eye open majority of this shift. Does not follow command. HR SR 90's. Max temp 101.2 Tylenol given x2 TF at goal rate. Fentanyl at 250 mcg. UOP adequate. Turn reposition every 2hrs . Safety maintained.

## 2025-01-05 NOTE — PROGRESS NOTES
RT EQUIPMENT DEVICE RELATED - SKIN ASSESSMENT    Dany Score:  Dany Score: 12     RT Medical Equipment/Device:     ETT Linda/Anchorfast    Skin Assessment:      Cheek:  Intact  Lips:  Intact  Mouth:  Intact    Device Skin Pressure Protection:  Skin-to-device areas padded:  Anchorfast    Nurse Notification:  Evelin Dietrich, RRT

## 2025-01-05 NOTE — PROGRESS NOTES
Neurology Consult Note    Consult Date: 2025  Referring MD: No ref. provider found  Reason for Consult: ???anoxic brain injury     Patient: Ld Green (64 y.o. male)  MRN: [id number]  : 1960    History of Present Illness:   2025: remains intubated and sedated with Fentanyl 250 mcg. Eyes closed and he opens to his name. He blinks spontaneously. He does not follow commands. No increase tone or posturing noted. No withdrawal to pain. EEG showed slowing consistent with encephalopathy. CT head no acute process. He is not able to have MRI as he has spinal stimulator. He is not breathing over the vent. Ex-wife Fe called on the phone and updated on test results as well as patient wishes and life with functional neurologic outcome. Fe tells me  patient had told her he had no living relatives and was surprised when she heard from his cousin yesterday. Some pratik/cough when suctioned.   Patient seen along with Dr. Scales, tele neurology.       2025:Ld Green is a 64 y.o. male with PMH substance abuse, depression, DM, bladder CA, chronic back pain with pain stimulator, CAD s/p CABG, HLD, HTN, TYRELL with BiPAP. [place] lives alone. He is . History obtained by medical record as patient is sedated and intubated. No family at bedside at initial exam and cousin later arrives. Currently ex-wife is decision maker and trying to get guardianship with anticpation of this on Monday and today is Saturday.     At any rate, patient was found unresponsive on 2024 for undetermined amount of time. He was in respiratory failure and required intubation. There was mention at some point concern for seizure but no seizure like activity was observed. Since admission, he has had some decerebrate posturing but no rhythmic like movements. CT head on admission no acute process. UDS + cocaine and TCA.    Patient has 2 similar presentations in July and 2024 both related to hypoglycemia. Into  the 30-40s.  With one admission, there is mention of possible seizure as patient had tongue biting. Per notes, patient recovered quickly to his baseline. Neurology has been consulted as sedation has been reduced and has been off precedex several hours and is on Fentanyl and when paused minimal response. He does have right gaze preference. He has intact pratik and cough. Patient has had elevated temp for multiple days requiring cooling blanket. On Vancomycin for staphylococcus aureus. ID is following.   Patient seen along with Dr. Scales, tele neurology.       Medical History:   Past Medical/Surgical Hx:  Past Medical History:   Diagnosis Date    Anxiety     Atherosclerosis of coronary artery     Bipolar disorder     Bladder cancer     Colon polyp     Coronary artery disease     Depression     Diabetes mellitus     Hemorrhoids     Hyperlipidemia     Hypertension     Nephrolithiasis     Sleep apnea      Past Surgical History:   Procedure Laterality Date    ANGIOPLASTY      with stent placement    BACK SURGERY      BLADDER SURGERY      cancer    CARDIAC CATHETERIZATION      COLONOSCOPY  04/13/2015    COLONOSCOPY N/A 4/30/2018    Procedure: COLONOSCOPY WITH ANESTHESIA;  Surgeon: Gerson Martini MD;  Location:  [facility];  Service: Gastroenterology    COLONOSCOPY N/A 4/8/2021    Procedure: COLONOSCOPY WITH ANESTHESIA;  Surgeon: Gerson Martini MD;  Location:  [facility];  Service: Gastroenterology;  Laterality: N/A;  Pre: Hx Colon Polyps  Post: divertivculosis   Dr. Garza  CO2 Inflation Used    CORONARY ARTERY BYPASS GRAFT      KNEE SURGERY      SHOULDER SURGERY      x 2    SINUS SURGERY         Medications On Admission:  Medications Prior to Admission   Medication Sig Dispense Refill Last Dose/Taking    amLODIPine (NORVASC) 10 MG tablet Take 1 tablet by mouth Every Night.       atorvastatin (LIPITOR) 40 MG tablet Take 1 tablet by mouth Every Night.       Cariprazine HCl (Vraylar) 1.5 MG capsule  capsule Take 1 capsule by mouth Daily.       clopidogrel (PLAVIX) 75 MG tablet Take 1 tablet by mouth Every Night.       cyclobenzaprine (FLEXERIL) 10 MG tablet Take 1 tablet by mouth 3 (Three) Times a Day As Needed for Muscle Spasms.       Dextromethorphan-buPROPion ER (AUVELITY)  MG tablet controlled-release Take 1 tablet by mouth 2 (Two) Times a Day.       eszopiclone (LUNESTA) 3 MG tablet Take 1 tablet by mouth Every Night. Take immediately before bedtime       furosemide (LASIX) 40 MG tablet Take 1 tablet by mouth Every Night.       isosorbide mononitrate (IMDUR) 60 MG 24 hr tablet Take 1 tablet by mouth Every Night.       lisinopril (PRINIVIL,ZESTRIL) 40 MG tablet Take 1 tablet by mouth Daily.       metoprolol succinate XL (TOPROL-XL) 200 MG 24 hr tablet Take 1 tablet by mouth Every Night.       naloxone (NARCAN) 4 MG/0.1ML nasal spray 1 spray into the nostril(s) as directed by provider As Needed for Opioid Reversal. Call 911. Don't prime. Landis in 1 nostril for overdose. Repeat in 2-3 minutes in other nostril if no or minimal breathing/responsiveness.       nitroglycerin (NITROSTAT) 0.4 MG SL tablet Place 1 tablet under the tongue Every 5 (Five) Minutes As Needed for Chest Pain.       oxyCODONE-acetaminophen (PERCOCET)  MG per tablet Take 1 tablet by mouth Every 8 (Eight) Hours.       Semaglutide, 2 MG/DOSE, (Ozempic, 2 MG/DOSE,) 8 MG/3ML solution pen-injector Inject 1 mg under the skin into the appropriate area as directed 1 (One) Time Per Week.          Current Medications:    Current Facility-Administered Medications:     acetaminophen (TYLENOL) 160 MG/5ML solution 650 mg, 650 mg, Nasogastric, Q6H PRN, Mando Olivo MD, 650 mg at 01/05/25 0218    acetaminophen (TYLENOL) suppository 650 mg, 650 mg, Rectal, Q4H PRN, Mando Olivo MD, 650 mg at 01/04/25 0355    acetaminophen (TYLENOL) tablet 650 mg, 650 mg, Per G Tube, Q6H PRN, Mando Olivo MD, 650 mg at 01/03/25 1255    [COMPLETED]  amiodarone 150 mg in 100 mL D5W (loading dose), 150 mg, Intravenous, Once, 150 mg at 25 1800 **FOLLOWED BY** [] amiodarone 360 mg in 200 mL D5W infusion, 1 mg/min, Intravenous, Continuous, Last Rate: 33.3 mL/hr at 25 1908, 1 mg/min at 25 190 **FOLLOWED BY** [] amiodarone 360 mg in 200 mL D5W infusion, 0.5 mg/min, Intravenous, Continuous, Stopped at 25 **FOLLOWED BY** [COMPLETED] amiodarone (PACERONE) tablet 200 mg, 200 mg, Per G Tube, Once, 200 mg at 25 **FOLLOWED BY** amiodarone (PACERONE) tablet 200 mg, 200 mg, Per G Tube, Q8H, 200 mg at 25 0218 **FOLLOWED BY** [START ON 2025] amiodarone (PACERONE) tablet 200 mg, 200 mg, Per G Tube, Q12H **FOLLOWED BY** [START ON 2025] amiodarone (PACERONE) tablet 200 mg, 200 mg, Per G Tube, Daily, Mando Olivo MD    amLODIPine (NORVASC) tablet 10 mg, 10 mg, Nasogastric, Nightly, Nicola Hare, PA-C, 10 mg at 25    artificial tears ophthalmic ointment, , Both Eyes, Q1H PRN, Constanza Dial MD, Given at 25    atorvastatin (LIPITOR) tablet 40 mg, 40 mg, Nasogastric, Nightly, Nicola Hare PADanaC, 40 mg at 25    sennosides-docusate (PERICOLACE) 8.6-50 MG per tablet 2 tablet, 2 tablet, Nasogastric, BID, 2 tablet at 25 **AND** polyethylene glycol (MIRALAX) packet 17 g, 17 g, Nasogastric, Daily PRN, 17 g at 25 **AND** [DISCONTINUED] bisacodyl (DULCOLAX) EC tablet 5 mg, 5 mg, Oral, Daily PRN **AND** bisacodyl (DULCOLAX) suppository 10 mg, 10 mg, Rectal, Daily PRN, Mando Olivo MD    budesonide (PULMICORT) nebulizer solution 0.5 mg, 0.5 mg, Nebulization, BID - RT, SensarmaCesar MD, 0.5 mg at 25 0621    chlorhexidine (PERIDEX) 0.12 % solution 15 mL, 15 mL, Mouth/Throat, Q12H, Pravin Quinones APRN, 15 mL at 25 202    Chlorhexidine Gluconate Cloth 2 % pads 1 Application, 1 Application, Topical, Q24H, Pravin Quinones APRN, 1  Application at 01/05/25 0340    clopidogrel (PLAVIX) tablet 75 mg, 75 mg, Nasogastric, Nightly, Nicola Hare PA-C, 75 mg at 01/04/25 2021    Enoxaparin Sodium (LOVENOX) syringe 40 mg, 40 mg, Subcutaneous, Daily, Pravin Quinones, APRN, 40 mg at 01/04/25 0945    famotidine (PEPCID) tablet 20 mg, 20 mg, Nasogastric, Q12H, Nicola Hare PA-C, 20 mg at 01/04/25 2021    fentaNYL 2500 mcg/250 mL NS infusion,  mcg/hr, Intravenous, Titrated, Mando Olivo MD, Last Rate: 25 mL/hr at 01/05/25 0459, 250 mcg/hr at 01/05/25 0459    hyaluronidase 150 units in NS 10 ml syringe, 150 Units, Subcutaneous, PRN, Mando Olivo MD, 150 Units at 01/02/25 1033    hydrALAZINE (APRESOLINE) injection 10 mg, 10 mg, Intravenous, Q6H PRN, Mando Olivo MD, 10 mg at 01/01/25 1256    ibuprofen (ADVIL,MOTRIN) 100 MG/5ML suspension 200 mg, 200 mg, Nasogastric, Q6H PRN, Constanza Dial MD    insulin glargine (LANTUS, SEMGLEE) injection 10 Units, 10 Units, Subcutaneous, Nightly, Mando Olivo MD, 10 Units at 01/04/25 2021    ipratropium-albuterol (DUO-NEB) nebulizer solution 3 mL, 3 mL, Nebulization, 4x Daily - RT, Mando Olivo MD, 3 mL at 01/05/25 0621    [Held by provider] lisinopril (PRINIVIL,ZESTRIL) tablet 40 mg, 40 mg, Nasogastric, Daily, Nicola Hare PA-C, 40 mg at 01/03/25 0902    metoprolol tartrate (LOPRESSOR) tablet 100 mg, 100 mg, Nasogastric, Q12H, Nicola Hare PA-C, 100 mg at 01/04/25 2021    nitroglycerin (NITROSTAT) SL tablet 0.4 mg, 0.4 mg, Sublingual, Q5 Min PRN, Pravin Quinones APRN    Phosphorus Replacement - Follow Nurse / BPA Driven Protocol, , Not Applicable, PRN, Hernan Gonzalez APRN    Potassium Replacement - Follow Nurse / BPA Driven Protocol, , Not Applicable, PRN, Hernan Gonzalez APRN    propofol (DIPRIVAN) infusion 10 mg/mL 100 mL, 5-50 mcg/kg/min, Intravenous, Titrated, Imer Valenzuela MD, Stopped at 12/30/24 0725    sodium chloride 0.9 % flush 10 mL, 10 mL,  Intravenous, Q12H, Pravin Quinones, APRN, 10 mL at 01/04/25 2022    sodium chloride 0.9 % flush 10 mL, 10 mL, Intravenous, PRN, Pravin Quinones, APRN, 10 mL at 12/31/24 2201    sodium chloride 0.9 % infusion 40 mL, 40 mL, Intravenous, PRN, QuinonesNicanorPravin, APRN     Allergies:  Allergies   Allergen Reactions    Codeine Nausea Only       Social Hx:  Social History     Socioeconomic History    Marital status:    Tobacco Use    Smoking status: Every Day     Current packs/day: 0.50     Types: Cigarettes    Smokeless tobacco: Never    Tobacco comments:     2 a day   Vaping Use    Vaping status: Never Used   Substance and Sexual Activity    Alcohol use: Yes     Comment: Rarely    Drug use: Yes     Types: Cocaine(coke)    Sexual activity: Defer       Family Hx:  Family History   Adopted: Yes     Physical Examination:   Vital Signs:  Vitals:    01/05/25 0500 01/05/25 0530 01/05/25 0621 01/05/25 0630   BP: 130/75 131/68     BP Location:       Patient Position:       Pulse: 94 94 91 93   Resp:   19    Temp:       TempSrc:       SpO2: 92% 95% 95%    Weight:       Height:             General Exam:  Head:  Normocephalic, atraumatic  HEENT:  Neck supple  Abdomen:  Nontender, Nondistended  Extremities:  No signs of peripheral edema  Skin:  No rashes    Neurologic Exam:    Mental Status:    -patient sedated and intubated.  Sedation paused for exam.  Patient eyes are closed when I enter the room and when name called he did open his eyes.  He did not track examiner.     CN II - XII grossly intact. He does have right gaze preference. No dolls eyes. When head is repositioned eyes go midline but does not look to examiner on left. +corneal reflex. Blinks spontaneously. No blink to threat.     Motor: (strength out of 5:  1= minimal movement, 2 = movement in plane of gravity, 3 = movement against gravity, 4 = movement against some resistance, 5 = full strength)    -no movement of bilateral upper and lower extremities.      DTR:  -Right   Biceps: 2+ Triceps: 2+ Brachioradialis: 2+   Patella: 2+ Ankle: 2+  Babinski  -Left   Biceps: 2+ Triceps: 2+ Brachioradialis: 2+   Patella: 2+ Ankle: 2+ Babinski    Sensory:  -no withdrawal to noxious stimuli    Coordination:  -no increase tone or tremor noted.  Occasional decerebrate posturing with stimulation.         Recent Diagnostics:   Laboratory Results:   - Reviewed in EMR  Lab Results   Component Value Date    GLUCOSE 164 (H) 01/05/2025    CALCIUM 9.2 01/05/2025     01/05/2025    K 4.5 01/05/2025    CO2 32.0 (H) 01/05/2025     01/05/2025    BUN 19 01/05/2025    CREATININE 0.60 (L) 01/05/2025    EGFRIFAFRI >59 02/14/2022    EGFRIFNONA >60 02/14/2022    BCR 31.7 (H) 01/05/2025    ANIONGAP 7.0 01/05/2025     Lab Results   Component Value Date    WBC 16.64 (H) 01/05/2025    HGB 12.2 (L) 01/05/2025    HCT 38.0 01/05/2025    MCV 97.7 (H) 01/05/2025     01/05/2025     Lab Results   Component Value Date    PTT 28.6 11/13/2014    INR 0.96 12/29/2024     Lab Results   Component Value Date    TRIG 147 02/14/2022    HDL 37 (L) 02/14/2022    LDL 49 02/14/2022     Lab Results   Component Value Date    HGBA1C 5.40 08/26/2024       Imaging Results:  Imaging Results (Last 24 Hours)       Procedure Component Value Units Date/Time    XR Chest 1 View [[id number]] Collected: 01/05/25 0756     Updated: 01/05/25 0801    Narrative:      XR CHEST 1 VW-     HISTORY: vent; J96.00-Acute respiratory failure, unspecified whether  with hypoxia or hypercapnia; R41.82-Altered mental status, unspecified;  F14.10-Cocaine abuse, uncomplicated     COMPARISON: 1/3/2025     FINDINGS: Frontal view the chest obtained.     The endotracheal tube tip at the T3 level. Enteric tube descending into  the abdomen. Spinal cord stimulator device. Median sternotomy wires.     Low lung volumes left perihilar opacities concerning for possible  pneumonia. Right basilar ectasis. No radiographic evidence of edema. No  pleural  effusion or pneumothorax.     Pression:  1. Life support lines described above.  2. Left hilar/perihilar opacities concerning for central  consolidation/pneumonia.        This report was signed and finalized on 1/5/2025 7:58 AM by Dr. Flaca Tatum MD.       CT Head Without Contrast [[id number]] Collected: 01/04/25 1438     Updated: 01/04/25 1448    Narrative:      CT HEAD WO CONTRAST- 1/4/2025 1:22 PM     HISTORY: AMS; J96.00-Acute respiratory failure, unspecified whether with  hypoxia or hypercapnia; R41.82-Altered mental status, unspecified;  F14.10-Cocaine abuse, uncomplicated      COMPARISON: 12/29/2024     TOTAL DOSE LENGTH PRODUCT: 1477.29 mGy.cm. Automated exposure control  was also utilized to decrease patient radiation dose.     TECHNIQUE: Axial images of the brain obtained without contrast     FINDINGS: The ventricles remain normal in size and configuration.  Similar mild chronic small vessel ischemic change. There is preservation  of the gray-white matter interfaces without hypodense appearance of the  basal ganglia. Cerebellar tonsils positioned above the foramen magnum.  No significant effacement of the sulci. No intracranial hemorrhage or  mass effect. No acute signs of ischemia. No extra-axial hematoma or  subarachnoid hemorrhage.     Air-fluid level right maxillary sinus. Minimal partial opacification  left mastoid air cells. No skull fracture.       Impression:      1. No acute radiographic abnormality identified. Similar mild chronic  small vessel ischemia. No convincing evidence of cerebral edema.  2. Right maxillary sinusitis. Questionable small left mastoid effusion.     This report was signed and finalized on 1/4/2025 2:44 PM by Dr. Flaca Tatum MD.                  Assessment & Plan:   Patient 64 y.o. male with PMH substance abuse, depression, DM, bladder CA, chronic back pain with pain stimulator, CAD s/p CABG, HLD, HTN, TYRELL with BiPAP. [place] lives alone. He is . History  obtained by medical record as patient is sedated and intubated. No family at bedside at initial exam and cousin later arrives. Currently ex-wife is decision maker and trying to get guardianship with anticpation of this on Monday and today is Saturday.     At any rate, patient was found unresponsive on 12/29/2024 for undetermined amount of time. He was in respiratory failure and required intubation. There was mention at some point concern for seizure but no seizure like activity was observed. Since admission, he has had some decerebrate posturing but no rhythmic like movements. CT head on admission no acute process. UDS + cocaine and TCA.    Patient has 2 similar presentations in July and August 2024 both related to hypoglycemia. Into the 30-40s.  With one admission, there is mention of possible seizure as patient had tongue biting. Per notes, patient recovered quickly to his baseline. Neurology has been consulted as sedation has been reduced and has been off precedex several hours and is on Fentanyl and when paused minimal response. He does have right gaze preference. He has intact pratik and cough. Patient has had elevated temp for multiple days requiring cooling blanket. On Vancomycin for staphylococcus aureus. ID is following.     Active Hospital Problems    Diagnosis  POA    **Anoxic brain injury [G93.1]  Unknown    Acute respiratory failure with hypoxia [J96.01]  Yes    On mechanically assisted ventilation [Z99.11]  Not Applicable    Cocaine abuse [F14.10]  Unknown    Positive urine drug screen [R82.5]  Unknown    Substance abuse [F19.10]  Unknown    Tobacco use [Z72.0]  Unknown    COPD (chronic obstructive pulmonary disease) [J44.9]  Unknown    Bipolar disorder [F31.9]  Unknown    Coronary artery disease involving autologous vein coronary bypass graft without angina pectoris [I25.810]  Unknown    Essential hypertension [I10]  Unknown    Hyperlipidemia LDL goal <100 [E78.5]  Unknown        Impression:  Acute  respiratory failure.  Illicit drug use. UDS + cocaine and TCA.  Chronic back pain with spinal stimulator so unable to have MRI brain.  Tobacco use.  DM uncontrolled with hypoglycemia.      Plan:   1/4/2025: Updated cousin on exam and testing today and prognosis for functional neurologic testing.  1/5/2025, updated Fe, ex wife, on test results and prognosis for functional neurologic outcome.   Ex wife trying to obtain guardianship which not able to take place until Monday 1/6/2025.  Supportive care.  Palliative care is on board to assist with goals of care.     Bernie Porter, APRN  01/05/25  09:44 CST    Medical Decision Making    Number/Complexity of Problems  Moderate  1 undiagnosed new problem with uncertain prognosis -   1 acute illness with systemic symptoms -   High  1 acute or chronic illness that poses a threat to life/body function -   high     MDM Data  Moderate - 1/3 categories  Extensive - 2/3 categories    Category 1: 3 of the following  Review of external notes  Review of results  Ordering of each unique test  Independent historian  Category 2:  Independent interpretation of test (ex: imaging)  Category 3:  Discussion of management with another provider    extensive     Treatment Plan  Moderate - Prescription Drug management  High  Drug therapy requiring intensive monitoring for toxicity  Decision regarding hospitalization or escalation of care  De-escalate care/DNR decisions  high

## 2025-01-05 NOTE — PROGRESS NOTES
INFECTIOUS DISEASES PROGRESS NOTE    Patient:  Ld Green  YOB: 1960  MRN: [id number]   Admit date: 12/29/2024   Admitting Physician: Trevor Davis MD  Primary Care Physician: Xiomara Neal APRN      Chief Complaint: Unobtainable from patient on ventilator        Interval History: Patient still has cooling blanket however it has been on monitor only since before I rounded yesterday.    He has not required any acetaminophen or ibuprofen.    Still posturing per nursing staff    Allergies:   Allergies   Allergen Reactions    Codeine Nausea Only       Current Scheduled Medications:   amiodarone, 200 mg, Per G Tube, Q8H   Followed by  [START ON 1/9/2025] amiodarone, 200 mg, Per G Tube, Q12H   Followed by  [START ON 1/23/2025] amiodarone, 200 mg, Per G Tube, Daily  amLODIPine, 10 mg, Nasogastric, Nightly  atorvastatin, 40 mg, Nasogastric, Nightly  budesonide, 0.5 mg, Nebulization, BID - RT  chlorhexidine, 15 mL, Mouth/Throat, Q12H  Chlorhexidine Gluconate Cloth, 1 Application, Topical, Q24H  clopidogrel, 75 mg, Nasogastric, Nightly  enoxaparin, 40 mg, Subcutaneous, Daily  famotidine, 20 mg, Nasogastric, Q12H  insulin glargine, 10 Units, Subcutaneous, Nightly  ipratropium-albuterol, 3 mL, Nebulization, 4x Daily - RT  [Held by provider] lisinopril, 40 mg, Nasogastric, Daily  metoprolol tartrate, 100 mg, Nasogastric, Q12H  senna-docusate sodium, 2 tablet, Nasogastric, BID  sodium chloride, 10 mL, Intravenous, Q12H      Current PRN Medications:    acetaminophen    acetaminophen    acetaminophen    artificial tears    senna-docusate sodium **AND** polyethylene glycol **AND** [DISCONTINUED] bisacodyl **AND** bisacodyl    hyaluronidase 150 units in NS 10 ml syringe    hydrALAZINE    ibuprofen    nitroglycerin    Phosphorus Replacement - Follow Nurse / BPA Driven Protocol    Potassium Replacement - Follow Nurse / BPA Driven Protocol    sodium chloride    sodium chloride    fentanyl 10 mcg/mL,   "mcg/hr, Last Rate: 250 mcg/hr (25 2349)  propofol, 5-50 mcg/kg/min, Last Rate: Stopped (24 0725)           Objective     Vital Signs:  Temp (24hrs), Av.6 °F (38.1 °C), Min:99.8 °F (37.7 °C), Max:101.2 °F (38.4 °C)      /62   Pulse 91   Temp 100.3 °F (37.9 °C) (Rectal)   Resp 16   Ht 172.7 cm (68\")   Wt 108 kg (238 lb 8.6 oz)   SpO2 95%   BMI 36.27 kg/m²         Physical Exam:    General: Patient is chronically and acutely ill-appearing lying in bed in CCU bed #2  HEENT: ET tube and OG tube is in place  Respiratory-no dyssynchrony noted at bedside.    Results Review:    I reviewed the patient's new clinical results.    Lab Results:    CBC:   Lab Results   Lab 246 24  0212 25  0316 25  02025  02025  0135 25  0111   WBC 20.23* 17.52* 18.42* 18.20* 15.80* 15.70* 16.64*   HEMOGLOBIN 14.4 13.7 14.5 13.8 12.7* 11.8* 12.2*   HEMATOCRIT 43.4 41.7 44.2 42.6 39.8 36.8* 38.0   PLATELETS 232 177 196 198 202 181 213        AutoDiff:   Lab Results   Lab 25  0207 25  0135 25  0111   NEUTROPHIL % 74.4 75.0 72.0   LYMPHOCYTE % 9.1* 7.6* 8.2*   MONOCYTES % 14.0* 14.0* 14.8*   EOSINOPHIL % 0.9 1.1 1.9   BASOPHIL % 0.6 0.6 0.7   NEUTROS ABS 11.74*  12.64* 11.78* 11.96*   LYMPHS ABS 1.44 1.20 1.37   MONOS ABS 2.21* 2.20* 2.47*   EOS ABS 0.15  0.32 0.17 0.32   BASOS ABS 0.10  0.00 0.09 0.12        Manual Diff:    Lab Results   Lab 255 25  011   NEUTROPHIL % 80.0*  --   --    LYMPHOCYTE % 6.0*  --   --    MONOCYTES % 9.0  --   --    NEUTROS ABS 11.74*  12.64* 11.78* 11.96*   LYMPHS ABS 1.42  --   --    WBC MORPHOLOGY Normal  --   --    PLT MORPH Normal  --   --            CMP:   Lab Results   Lab 25  011   SODIUM 142 141 142   POTASSIUM 4.2 4.5 4.5   CHLORIDE 103 103 103   CO2 31.0* 30.0* 32.0*   BUN 21 24* 19   CREATININE 0.65* 0.54* 0.60*   CALCIUM 9.1 8.9 9.2 "   BILIRUBIN 0.8 0.6 0.5   ALK PHOS 98 94 94   ALT (SGPT) 20 20 25   AST (SGOT) 22 29 39   GLUCOSE 162* 183* 164*       Estimated Creatinine Clearance: 150.1 mL/min (A) (by C-G formula based on SCr of 0.6 mg/dL (L)).    Culture Results:    Microbiology Results (last 10 days)       Procedure Component Value - Date/Time    Blood Culture - Blood, Arm, Right [[id number]]  (Normal) Collected: 01/01/25 1053    Lab Status: Preliminary result Specimen: Blood from Arm, Right Updated: 01/05/25 1100     Blood Culture No growth at 4 days    Blood Culture - Blood, Arm, Left [[id number]]  (Normal) Collected: 01/01/25 1051    Lab Status: Preliminary result Specimen: Blood from Arm, Left Updated: 01/05/25 1100     Blood Culture No growth at 4 days    Respiratory Culture - Aspirate, ET Suction [[id number]]  (Abnormal)  (Susceptibility) Collected: 01/01/25 1047    Lab Status: Final result Specimen: Aspirate from ET Suction Updated: 01/03/25 0820     Respiratory Culture Scant growth (1+) Staphylococcus aureus      No Normal Respiratory Karen     Gram Stain Many (4+) WBCs per low power field      Few (2+) Epithelial cells per low power field      Rare (1+) Gram positive cocci    Susceptibility        Staphylococcus aureus      IMER      Clindamycin Susceptible      Oxacillin Susceptible      Tetracycline Susceptible      Trimethoprim + Sulfamethoxazole Susceptible      Vancomycin Susceptible                           Chlamydia trachomatis, Neisseria gonorrhoeae, PCR - Swab, Penis [[id number]]  (Normal) Collected: 12/30/24 0839    Lab Status: Final result Specimen: Swab from Penis Updated: 12/30/24 1255     Chlamydia DNA by PCR Not Detected     Neisseria gonorrhoeae by PCR Not Detected    Narrative:      Disclaimer: The Aptima Combo 2 assay is a target amplification nucleic acid probe test that utilizes target capture for the in vitro qualitative detection and differentiation of ribosomal RNA from Chlamydia trachomatis and Neisseria  "gonorrhoeae to aid in the diagnosis of chlamydial and/or gonococcal urogenital disease.  Cell culture was once considered to be the \"gold standard\" for detection of CT and NG.  Culture is quite specific, but scientific studies have demonstrated that the NAAT DNA probe technologies have higher clinical sensitivities than culture.    MRSA Screen, PCR (Inpatient) - Swab, Nares [[id number]]  (Normal) Collected: 12/29/24 1753    Lab Status: Final result Specimen: Swab from Nares Updated: 12/29/24 1919     MRSA PCR No MRSA Detected    Narrative:      The negative predictive value of this diagnostic test is high and should only be used to consider de-escalating anti-MRSA therapy. A positive result may indicate colonization with MRSA and must be correlated clinically.    Respiratory Culture - Aspirate, ET Suction [[id number]]  (Abnormal)  (Susceptibility) Collected: 12/29/24 1507    Lab Status: Final result Specimen: Aspirate from ET Suction Updated: 01/01/25 1024     Respiratory Culture Moderate growth (3+) Staphylococcus aureus      Light growth (2+) Normal Respiratory Kapil     Gram Stain Moderate (3+) WBCs per low power field      Rare (1+) Epithelial cells per low power field      Moderate (3+) Mixed gram positive kapil      Moderate (3+) Mixed gram negative kapil      Mixed intracellular organisms suggestive of an aspiration event    Susceptibility        Staphylococcus aureus      IMER      Clindamycin Susceptible      Oxacillin Susceptible      Tetracycline Susceptible      Trimethoprim + Sulfamethoxazole Susceptible      Vancomycin Susceptible                           Blood Culture - Blood, Hand, Digit Right [[id number]]  (Normal) Collected: 12/29/24 1230    Lab Status: Final result Specimen: Blood from Hand, Digit Right Updated: 01/03/25 1245     Blood Culture No growth at 5 days    Blood Culture - Blood, Arm, Right [[id number]]  (Normal) Collected: 12/29/24 1218    Lab Status: Final result Specimen: Blood from Arm, " Right Updated: 01/03/25 1245     Blood Culture No growth at 5 days    Respiratory Panel PCR w/COVID-19(SARS-CoV-2) NACHO/ARACELI/CHRISTINE/PAD/COR/ZARINA In-House, NP Swab in UTM/VTM, 2 HR TAT - Swab, Nasopharynx [[id number]]  (Normal) Collected: 12/29/24 1205    Lab Status: Final result Specimen: Swab from Nasopharynx Updated: 12/29/24 1304     ADENOVIRUS, PCR Not Detected     Coronavirus 229E Not Detected     Coronavirus HKU1 Not Detected     Coronavirus NL63 Not Detected     Coronavirus OC43 Not Detected     COVID19 Not Detected     Human Metapneumovirus Not Detected     Human Rhinovirus/Enterovirus Not Detected     Influenza A PCR Not Detected     Influenza B PCR Not Detected     Parainfluenza Virus 1 Not Detected     Parainfluenza Virus 2 Not Detected     Parainfluenza Virus 3 Not Detected     Parainfluenza Virus 4 Not Detected     RSV, PCR Not Detected     Bordetella pertussis pcr Not Detected     Bordetella parapertussis PCR Not Detected     Chlamydophila pneumoniae PCR Not Detected     Mycoplasma pneumo by PCR Not Detected    Narrative:      In the setting of a positive respiratory panel with a viral infection PLUS a negative procalcitonin without other underlying concern for bacterial infection, consider observing off antibiotics or discontinuation of antibiotics and continue supportive care. If the respiratory panel is positive for atypical bacterial infection (Bordetella pertussis, Chlamydophila pneumoniae, or Mycoplasma pneumoniae), consider antibiotic de-escalation to target atypical bacterial infection.                 Radiology:       Portable chest x-ray reviewed.  Sternal wires in place.  Left perihilar opacity noted.  Does not appear appreciably changed per my review when compared to x-ray January 3.    Imaging Results (Last 72 Hours)       Procedure Component Value Units Date/Time    XR Chest 1 View [[id number]] Collected: 01/05/25 0756     Updated: 01/05/25 0801    Narrative:      XR CHEST 1 VW-     HISTORY:  vent; J96.00-Acute respiratory failure, unspecified whether  with hypoxia or hypercapnia; R41.82-Altered mental status, unspecified;  F14.10-Cocaine abuse, uncomplicated     COMPARISON: 1/3/2025     FINDINGS: Frontal view the chest obtained.     The endotracheal tube tip at the T3 level. Enteric tube descending into  the abdomen. Spinal cord stimulator device. Median sternotomy wires.     Low lung volumes left perihilar opacities concerning for possible  pneumonia. Right basilar ectasis. No radiographic evidence of edema. No  pleural effusion or pneumothorax.     Pression:  1. Life support lines described above.  2. Left hilar/perihilar opacities concerning for central  consolidation/pneumonia.        This report was signed and finalized on 1/5/2025 7:58 AM by Dr. Flaca Tatum MD.       CT Head Without Contrast [[id number]] Collected: 01/04/25 1438     Updated: 01/04/25 1448    Narrative:      CT HEAD WO CONTRAST- 1/4/2025 1:22 PM     HISTORY: AMS; J96.00-Acute respiratory failure, unspecified whether with  hypoxia or hypercapnia; R41.82-Altered mental status, unspecified;  F14.10-Cocaine abuse, uncomplicated      COMPARISON: 12/29/2024     TOTAL DOSE LENGTH PRODUCT: 1477.29 mGy.cm. Automated exposure control  was also utilized to decrease patient radiation dose.     TECHNIQUE: Axial images of the brain obtained without contrast     FINDINGS: The ventricles remain normal in size and configuration.  Similar mild chronic small vessel ischemic change. There is preservation  of the gray-white matter interfaces without hypodense appearance of the  basal ganglia. Cerebellar tonsils positioned above the foramen magnum.  No significant effacement of the sulci. No intracranial hemorrhage or  mass effect. No acute signs of ischemia. No extra-axial hematoma or  subarachnoid hemorrhage.     Air-fluid level right maxillary sinus. Minimal partial opacification  left mastoid air cells. No skull fracture.       Impression:       1. No acute radiographic abnormality identified. Similar mild chronic  small vessel ischemia. No convincing evidence of cerebral edema.  2. Right maxillary sinusitis. Questionable small left mastoid effusion.     This report was signed and finalized on 1/4/2025 2:44 PM by Dr. Flaca Tatum MD.       XR Chest 1 View [[id number]] Collected: 01/03/25 1036     Updated: 01/03/25 1040    Narrative:      EXAMINATION: XR CHEST 1 VW-     1/3/2025 8:34 AM     HISTORY: Evaluate for pneumonia; J96.00-Acute respiratory failure,  unspecified whether with hypoxia or hypercapnia; R41.82-Altered mental  status, unspecified; F14.10-Cocaine abuse, uncomplicated     1 view chest x-ray.     COMPARISON:  12/31/2024.     Endotracheal tube present with the tip lying 5 cm above the cipriano.     Nasogastric tube extends below the diaphragm into the stomach.  The distal tip is not visualized.  Median sternotomy changes.  Thoracic stimulator leads noted.  Heart size is within normal limits.     There is slight prominence of lower lobe parenchymal markings though  there is no focal consolidation.     No pneumothorax and no sign of heart failure.       Impression:      1. No focal infiltrate or definite pneumonia is seen.           This report was signed and finalized on 1/3/2025 10:37 AM by Dr. Luis Wells MD.                   Active Hospital Problems    Diagnosis     **Anoxic brain injury     Acute respiratory failure with hypoxia     On mechanically assisted ventilation     Cocaine abuse     Positive urine drug screen     Substance abuse     Tobacco use     COPD (chronic obstructive pulmonary disease)     Bipolar disorder     Coronary artery disease involving autologous vein coronary bypass graft without angina pectoris     Essential hypertension     Hyperlipidemia LDL goal <100        IMPRESSION:  Fever-no definite infectious etiology identified.  Patient did have methicillin-susceptible Staphylococcus aureus isolated from respiratory  cultures on December 29-moderate amount followed up by scant growth January 1.  Patient has had antibiotic treatment that would have covered that pathogen.  Consider drug fever.  Spoke with Daniel Pharm.D. yesterday.  Monitoring temperature off Precedex.  He also mention fentanyl is a possibility and that is a consideration.  Acute respiratory failure with hypoxia-patient remains on the ventilator with essentially stable ventilator settings  Anoxic brain injury-patient found down at [facility].  Neurology following.  Patient continues to exhibit posturing and abnormal gaze.  Leukocytosis-small increase noted today.  Urine drug screen positive for cocaine on admission  Coronary artery disease status post CABG      RECOMMENDATION:   Awaiting patient's ex-wife to obtain guardianship.  Apparently has court date tomorrow.  Per reports the patient's ex-wife and other family members friends all seem to indicate patient would not want to live like this so would expect he would transition to comfort measures  Follow-up blood cultures obtained this morning  Continue to monitor temperature-cooling blanket on monitor only which is an improvement per nursing compared to previous days when I was told they were keeping him on it most of the time and if they paused and placed it on monitor only, they generally had to turn it back on in short amount of time.  As needed Tylenol.  Ibuprofen 200 mg ordered only if temp greater than 102  If any significant change in status such as hemodynamic instability, persistent increased white count, etc. would reevaluate for infection, reculture and consider empiric antibiotic therapy at that time      Constanza Dial MD  01/05/25  12:18 CST

## 2025-01-05 NOTE — PROGRESS NOTES
[facility] Intensivist Services  INPATIENT PROGRESS NOTE    Patient Name: Ld Green  Date of Admission: 12/29/2024  Today's Date: 01/05/25  Length of Stay: 7  Primary Care Physician: Xiomara Neal APRN    Subjective   Chief Complaint: Unresponsive  HPI   The patient is a 64-year-old male with a past medical history of smoker, CAD, obesity, type 2 diabetes, depression and bipolar disorder, and HLD who presented to [facility] ED on 12/29/2024 after being found at his apartment unresponsive.  Downtime was unknown.  GCS 6 with questionable seizure-like activity.  He was subsequently intubated and transferred to the CCU for further care. Patient had cocaine and TCA positive urine, however, Poison control thinks that TCA could be cross-reactivity with Flexeril or Auvelity-TCA overdose not felt to be likely per poison control. Of note, the patient has hospitalized in August of 2024 with similar presentation and was found to be hypoglycemic he was informed at that time to stop taking his insulin.  Was additionally admitted 7/20/2024 where he used cocaine and became unconscious bit his tongue.    He has had little improvement in his neurological status-he was decorticate posturing with cough and gag reflex.  His ex-wife, who is his POA, does not feel that he would want prolonged life support. She is activity attempting to get conservatorship.  CT head 12/29/2024 no acute abnormality. He has an upward right gaze.     The patient began to have elevated temperatures starting on 12/31-tmax-102.1. He was initially on Zosyn and vanco, changed to Levaquin to concern for UTI. ID has been consulted-suspicious for central fever as his temperature seems to only response to cooling blanket and acetaminophen..  MSSA in sputum on 12/29 and 1/1. He is without significant ventilator change.    Interval history:  1/4/2025    Tmax temp in the last 24 hours 101.1. He continues on Levaquin for now. He is  currently on Precedex and fentanyl-Precedex discontinued as this can put him at risk for non-infectious fever. Will plan to sedate with propofol and fentanyl. He has not had any improvement in his neurological status-neurology consulted. CT of the head without contrast ordered. Mild ongoing leukocytosis-WBC-15.7. All other labs unchanged. Vent setting unchanged.  He continues to tolerate tube feedings at goal rate.    1/5/2025  Tmax temp 101.2, consistently above 100 for the last 24 hours-Last dose of levaquin on 1/4/25-Repeat blood culture ordered and pending. Mild increase in WBC-16.6. EEG-slowing c/w encephalopathy. CT of the brain-unchanged.  Remains on fentanyl for sedation. Vent settings remain unchanged. ABG's do show some compensated metabolic alkalosis.     Review of Systems   Unable to perform ROS: Mental status change          Objective    Temp:  [99.8 °F (37.7 °C)-101.2 °F (38.4 °C)] 100.1 °F (37.8 °C)  Heart Rate:  [] 100  Resp:  [16-23] 20  BP: (108-165)/(57-83) 108/68  FiO2 (%):  [40 %] 40 %  Physical Exam  Vitals reviewed.   Constitutional:       Appearance: He is obese. He is ill-appearing.      Interventions: He is sedated and intubated.   HENT:      Head: Normocephalic.      Mouth/Throat:      Mouth: Mucous membranes are moist.   Eyes:      Pupils: Pupils are equal, round, and reactive to light.      Comments: Reactive but sluggish pupil bilaterally   Cardiovascular:      Rate and Rhythm: Normal rate and regular rhythm.      Pulses: Normal pulses.      Heart sounds: Normal heart sounds.   Pulmonary:      Effort: Pulmonary effort is normal. He is intubated.      Breath sounds: Examination of the right-upper field reveals rhonchi. Examination of the right-middle field reveals rhonchi. Rhonchi present.   Abdominal:      General: Bowel sounds are normal.      Palpations: Abdomen is soft.   Musculoskeletal:      Right lower leg: No edema.      Left lower leg: No edema.   Skin:     Capillary  Refill: Capillary refill takes less than 2 seconds.      Coloration: Skin is pale.   Neurological:      Motor: Weakness present.      Comments: Right upward gaze. Cough and gag present. He does not blink to threat. Blinks spontaneously-dose not respond to voice. No purposeful movements         Results Review:  Lab Results (last 24 hours)       Procedure Component Value Units Date/Time    POC Glucose Once [[id number]]  (Abnormal) Collected: 01/05/25 1237    Specimen: Blood Updated: 01/05/25 1248     Glucose 159 mg/dL      Comment: : [id number] Leticia Guerrero ID: [id number]       Blood Culture - Blood, Arm, Left [[id number]]  (Normal) Collected: 01/01/25 1051    Specimen: Blood from Arm, Left Updated: 01/05/25 1100     Blood Culture No growth at 4 days    Blood Culture - Blood, Arm, Right [[id number]]  (Normal) Collected: 01/01/25 1053    Specimen: Blood from Arm, Right Updated: 01/05/25 1100     Blood Culture No growth at 4 days    Blood Culture - Blood, Hand, Left [[id number]] Collected: 01/05/25 0800    Specimen: Blood from Hand, Left Updated: 01/05/25 0818    POC Glucose Once [[id number]]  (Abnormal) Collected: 01/05/25 0555    Specimen: Blood Updated: 01/05/25 0606     Glucose 202 mg/dL      Comment: : [id number] Raymond ID: [id number]       Blood Gas, Arterial - [[id number]]  (Abnormal) Collected: 01/05/25 0335    Specimen: Arterial Blood Updated: 01/05/25 0338     Site Right Radial     Pedro Pablo's Test Positive     pH, Arterial 7.424 pH units      pCO2, Arterial 53.5 mm Hg      Comment: 83 Value above reference range        pO2, Arterial 81.2 mm Hg      Comment: 84 Value below reference range        HCO3, Arterial 35.0 mmol/L      Comment: 83 Value above reference range        Base Excess, Arterial 9.0 mmol/L      Comment: 83 Value above reference range        O2 Saturation, Arterial 96.7 %      Temperature 37.0     Barometric Pressure for Blood Gas 756 mmHg      Modality Ventilator     FIO2  40 %      Ventilator Mode AC     Set Tidal Volume 500.000     Set [facility] Resp Rate 16.0     PEEP 5.0     Collected by SALLIE     Comment: Meter: [id number]     :  SALLIE        pCO2, Temperature Corrected 53.5 mm Hg      pH, Temp Corrected 7.424 pH Units      pO2, Temperature Corrected 81.2 mm Hg      PO2/FIO2 203    Magnesium [[id number]]  (Normal) Collected: 01/05/25 0111    Specimen: Blood Updated: 01/05/25 0208     Magnesium 2.2 mg/dL     Phosphorus [[id number]]  (Normal) Collected: 01/05/25 0111    Specimen: Blood Updated: 01/05/25 0208     Phosphorus 3.5 mg/dL     Comprehensive Metabolic Panel [[id number]]  (Abnormal) Collected: 01/05/25 0111    Specimen: Blood Updated: 01/05/25 0208     Glucose 164 mg/dL      BUN 19 mg/dL      Creatinine 0.60 mg/dL      Sodium 142 mmol/L      Potassium 4.5 mmol/L      Chloride 103 mmol/L      CO2 32.0 mmol/L      Calcium 9.2 mg/dL      Total Protein 6.5 g/dL      Albumin 2.9 g/dL      ALT (SGPT) 25 U/L      AST (SGOT) 39 U/L      Alkaline Phosphatase 94 U/L      Total Bilirubin 0.5 mg/dL      Globulin 3.6 gm/dL      A/G Ratio 0.8 g/dL      BUN/Creatinine Ratio 31.7     Anion Gap 7.0 mmol/L      eGFR 108.5 mL/min/1.73     Narrative:      GFR Categories in Chronic Kidney Disease (CKD)      GFR Category          GFR (mL/min/1.73)    Interpretation  G1                     90 or greater         Normal or high (1)  G2                      60-89                Mild decrease (1)  G3a                   45-59                Mild to moderate decrease  G3b                   30-44                Moderate to severe decrease  G4                    15-29                Severe decrease  G5                    14 or less           Kidney failure          (1)In the absence of evidence of kidney disease, neither GFR category G1 or G2 fulfill the criteria for CKD.    eGFR calculation 2021 CKD-EPI creatinine equation, which does not include race as a factor    CBC & Differential  [[id number]]  (Abnormal) Collected: 01/05/25 0111    Specimen: Blood Updated: 01/05/25 0152    Narrative:      The following orders were created for panel order CBC & Differential.  Procedure                               Abnormality         Status                     ---------                               -----------         ------                     CBC Auto Differential[[id number]]        Abnormal            Final result                 Please view results for these tests on the individual orders.    CBC Auto Differential [[id number]]  (Abnormal) Collected: 01/05/25 0111    Specimen: Blood Updated: 01/05/25 0152     WBC 16.64 10*3/mm3      RBC 3.89 10*6/mm3      Hemoglobin 12.2 g/dL      Hematocrit 38.0 %      MCV 97.7 fL      MCH 31.4 pg      MCHC 32.1 g/dL      RDW 13.2 %      RDW-SD 47.1 fl      MPV 11.2 fL      Platelets 213 10*3/mm3      Neutrophil % 72.0 %      Lymphocyte % 8.2 %      Monocyte % 14.8 %      Eosinophil % 1.9 %      Basophil % 0.7 %      Immature Grans % 2.4 %      Neutrophils, Absolute 11.96 10*3/mm3      Lymphocytes, Absolute 1.37 10*3/mm3      Monocytes, Absolute 2.47 10*3/mm3      Eosinophils, Absolute 0.32 10*3/mm3      Basophils, Absolute 0.12 10*3/mm3      Immature Grans, Absolute 0.40 10*3/mm3      nRBC 0.0 /100 WBC     POC Glucose Once [[id number]]  (Abnormal) Collected: 01/04/25 2323    Specimen: Blood Updated: 01/04/25 2338     Glucose 157 mg/dL      Comment: : [id number] Jose Francisco Shellie MMeter ID: [id number]       POC Glucose Once [[id number]]  (Abnormal) Collected: 01/04/25 1748    Specimen: Blood Updated: 01/04/25 1759     Glucose 148 mg/dL      Comment: : [id number] Arsh Emily AMeter ID: [id number]                CT Head Without Contrast    Result Date: 1/4/2025  1. No acute radiographic abnormality identified. Similar mild chronic small vessel ischemia. No convincing evidence of cerebral edema. 2. Right maxillary sinusitis. Questionable small left mastoid effusion.   This report was signed and finalized on 1/4/2025 2:44 PM by Dr. Flaca Tatum MD.       Result Review:  I have personally reviewed the results from the time of this admission to 1/5/2025 15:24 CST and agree with these findings:  [x]  Laboratory list / accordion  [x]  Microbiology  []  Radiology  []  EKG/Telemetry   [x]  Cardiology/Vascular   []  Pathology  []  Old records  []  Other:  Most notable findings include:   WBC   Date Value Ref Range Status   01/05/2025 16.64 (H) 3.40 - 10.80 10*3/mm3 Final     RBC   Date Value Ref Range Status   01/05/2025 3.89 (L) 4.14 - 5.80 10*6/mm3 Final     Hemoglobin   Date Value Ref Range Status   01/05/2025 12.2 (L) 13.0 - 17.7 g/dL Final     Hematocrit   Date Value Ref Range Status   01/05/2025 38.0 37.5 - 51.0 % Final     MCV   Date Value Ref Range Status   01/05/2025 97.7 (H) 79.0 - 97.0 fL Final     MCH   Date Value Ref Range Status   01/05/2025 31.4 26.6 - 33.0 pg Final     MCHC   Date Value Ref Range Status   01/05/2025 32.1 31.5 - 35.7 g/dL Final     RDW   Date Value Ref Range Status   01/05/2025 13.2 12.3 - 15.4 % Final     RDW-SD   Date Value Ref Range Status   01/05/2025 47.1 37.0 - 54.0 fl Final     MPV   Date Value Ref Range Status   01/05/2025 11.2 6.0 - 12.0 fL Final     Platelets   Date Value Ref Range Status   01/05/2025 213 140 - 450 10*3/mm3 Final     Neutrophil %   Date Value Ref Range Status   01/05/2025 72.0 42.7 - 76.0 % Final     Lymphocyte %   Date Value Ref Range Status   01/05/2025 8.2 (L) 19.6 - 45.3 % Final     Monocyte %   Date Value Ref Range Status   01/05/2025 14.8 (H) 5.0 - 12.0 % Final     Eosinophil %   Date Value Ref Range Status   01/05/2025 1.9 0.3 - 6.2 % Final     Basophil %   Date Value Ref Range Status   01/05/2025 0.7 0.0 - 1.5 % Final     Immature Grans %   Date Value Ref Range Status   01/05/2025 2.4 (H) 0.0 - 0.5 % Final     Neutrophils, Absolute   Date Value Ref Range Status   01/05/2025 11.96 (H) 1.70 - 7.00 10*3/mm3 Final  "    Lymphocytes, Absolute   Date Value Ref Range Status   01/05/2025 1.37 0.70 - 3.10 10*3/mm3 Final     Monocytes, Absolute   Date Value Ref Range Status   01/05/2025 2.47 (H) 0.10 - 0.90 10*3/mm3 Final     Eosinophils, Absolute   Date Value Ref Range Status   01/05/2025 0.32 0.00 - 0.40 10*3/mm3 Final     Basophils, Absolute   Date Value Ref Range Status   01/05/2025 0.12 0.00 - 0.20 10*3/mm3 Final     Immature Grans, Absolute   Date Value Ref Range Status   01/05/2025 0.40 (H) 0.00 - 0.05 10*3/mm3 Final     nRBC   Date Value Ref Range Status   01/05/2025 0.0 0.0 - 0.2 /100 WBC Final    Basic Metabolic Panel    Sodium Sodium   Date Value Ref Range Status   01/05/2025 142 136 - 145 mmol/L Final   01/04/2025 141 136 - 145 mmol/L Final   01/03/2025 142 136 - 145 mmol/L Final      Potassium Potassium   Date Value Ref Range Status   01/05/2025 4.5 3.5 - 5.2 mmol/L Final   01/04/2025 4.5 3.5 - 5.2 mmol/L Final   01/03/2025 4.2 3.5 - 5.2 mmol/L Final      Chloride Chloride   Date Value Ref Range Status   01/05/2025 103 98 - 107 mmol/L Final   01/04/2025 103 98 - 107 mmol/L Final   01/03/2025 103 98 - 107 mmol/L Final      Bicarbonate No results found for: \"PLASMABICARB\"   BUN BUN   Date Value Ref Range Status   01/05/2025 19 8 - 23 mg/dL Final   01/04/2025 24 (H) 8 - 23 mg/dL Final   01/03/2025 21 8 - 23 mg/dL Final      Creatinine Creatinine   Date Value Ref Range Status   01/05/2025 0.60 (L) 0.76 - 1.27 mg/dL Final   01/04/2025 0.54 (L) 0.76 - 1.27 mg/dL Final   01/03/2025 0.65 (L) 0.76 - 1.27 mg/dL Final      Calcium Calcium   Date Value Ref Range Status   01/05/2025 9.2 8.6 - 10.5 mg/dL Final   01/04/2025 8.9 8.6 - 10.5 mg/dL Final   01/03/2025 9.1 8.6 - 10.5 mg/dL Final      Glucose      No components found for: \"GLUCOSE.*\"          Culture Data:   Blood Culture   Date Value Ref Range Status   01/01/2025 No growth at 3 days  Preliminary   01/01/2025 No growth at 3 days  Preliminary   12/29/2024 No growth at 5 days  " Final   12/29/2024 No growth at 5 days  Final     Respiratory Culture   Date Value Ref Range Status   01/01/2025 Scant growth (1+) Staphylococcus aureus (A)  Final   01/01/2025 No Normal Respiratory Karen (A)  Final   12/29/2024 Moderate growth (3+) Staphylococcus aureus (A)  Final   12/29/2024 Light growth (2+) Normal Respiratory Karen  Final       I have reviewed the patient's current medications.     Assessment/Plan   Assessment  Active Hospital Problems    Diagnosis     **Anoxic brain injury     Acute respiratory failure with hypoxia     On mechanically assisted ventilation     Cocaine abuse     Positive urine drug screen     Substance abuse     Tobacco use     COPD (chronic obstructive pulmonary disease)     Bipolar disorder     Coronary artery disease involving autologous vein coronary bypass graft without angina pectoris     Essential hypertension     Hyperlipidemia LDL goal <100      The patient is a 64-year-old male with a past medical history of smoker, CAD, obesity, type 2 diabetes, depression and bipolar disorder, and HLD who presented to [facility] ED on 12/29/2024 after being found at his apartment unresponsive.  Downtime was unknown.  GCS 6 with questionable seizure-like activity.  He was subsequently intubated and transferred to the CCU for further care. Patient had cocaine and TCA positive urine, however, Poison control thinks that TCA could be cross-reactivity with Flexeril or Auvelity-TCA overdose not felt to be likely per poison control. Of note, the patient has hospitalized in August of 2024 with similar presentation and was found to be hypoglycemic he was informed at that time to stop taking his insulin.  Was additionally admitted 7/20/2024 where he used cocaine and became unconscious bit his tongue. He has had little improvement in his neurological status-he was decorticate posturing with cough and gag reflex.  His ex-wife, who is his POA, does not feel that he would want prolonged life support. She  "is activity attempting to get conservatorship.  CT head 12/29/2024 no acute abnormality.  The patient began to have elevated temperatures starting on 12/31-tmax-102.1. He was initially on Zosyn and vanco, changed to Levaquin to concern for UTI, which was d/c'd on 1/4/25. Neuro was consulted-suspicious for \"neuro fever\" or drug \"fever\".     Treatment Plan  Suspected anoxic brain injury  -Remains intubated  -DNR   -continue Fentanyl per protocol  -Continue propofol  -CT of the head without contrast on 1/4-no acute process  -Neurology following-appreciate input and recommendations  -EEG-diffuse slowing consistent with encephalopathy  -POA is actively involved in attempted to obtain guardianship-the patient does not want to live this way per his ex-wife, cousin and friend    Acute respiratory failure with hypoxia/mechanical ventilation/COPD  -Continue routine vent orders  -Pulmonary toilet  -Daily ABG's and PRN hypoxia  -Daily chest x-ray  -Sputum culture + MSSA-Levaquin-dc'd on 1/4  -Daily labs    Fever  -ID following-continue Levaquin for now, appreciate ongoing recommendations  -Suspected central fever-neurology consulted  -Group review of med list including pharmD and MD-LILLY precedex due to concern for exacerbating fever  -Tmax temp in the last 24 hour-101.8  -BC from 12/29 and 1/1/2511-[id number]-[id number] on 1/5 due to persistent fever    Type 2 diabetes  -Glucerna TF-tolerating well  -Continue Lantus 10 us at HS  -NPO sliding scale with Q6 hour checks    PAF  -NSR current  -Continue Amiodarone via feeding tube    Substance abuse  -Cocaine and TCA on admit with previous hx of cocaine abuse  -POA currently attempting to obtain guadianship    Rhabdomyolysis with CK  -Resolved      Lovenox-VTE prophylaxis  Pepcid-GI pophylaxis    Total critical care time-39 minutes    Electronically signed by REMA Arias on 1/5/2025 at 15:24 CST   "

## 2025-01-06 NOTE — PROGRESS NOTES
RT EQUIPMENT DEVICE RELATED - SKIN ASSESSMENT    Dany Score:  Dany Score: 11     RT Medical Equipment/Device:     ETT Linda/Anchorfast    Skin Assessment:      Cheek:  Intact    Device Skin Pressure Protection:  Skin-to-device areas padded:  Anchorfast    Nurse Notification:  Evelin Castorena, CRT

## 2025-01-06 NOTE — PROGRESS NOTES
[facility]  INPATIENT PROGRESS NOTE    Patient Name: Ld Green  Date of Admission: 12/29/2024  Today's Date: 01/06/25  Length of Stay: 8  Primary Care Physician: Xiomara Neal APRN    Subjective   ICU summary:  64-year-old male with bipolar disorder, depression, tobacco abuse-smoker, DM type II, CAD, obesity, admitted to CCU on 12/29/2024 after being found unresponsive at home and ultimately required intubation in the emergency department.  His UDS was noted positive for cocaine and tricyclic's.  Since his admission, patient has had little to no improvement in his neurological exam.  Opens eyes with that fixed upward gaze.  Has cough and gag reflex.  Does not follow commands.  Continues to have fevers.  Concern for central source, response cooling blanket and acetaminophen only.  Has been treated with Zosyn and vancomycin and Levaquin.  Infectious disease consulted without obvious infectious source at this time.  Currently, patient intubated, on vent, on fentanyl infusion, no distress.  Opens eyes to touch.  Does not track, upward fixed gaze.  Not following commands.  BP heart rate stable.      Review of Systems   All pertinent negatives and positives are as above. All other systems have been reviewed and are negative unless otherwise stated.     Objective    Temp:  [100.1 °F (37.8 °C)-101.5 °F (38.6 °C)] 101.1 °F (38.4 °C)  Heart Rate:  [] 83  Resp:  [16-25] 19  BP: (101-166)/(53-92) 101/64  FiO2 (%):  [40 %-50 %] 50 %  Physical Exam  Vitals and nursing note reviewed.   Constitutional:       General: He is not in acute distress.     Interventions: He is intubated.   Eyes:      Pupils: Pupils are equal, round, and reactive to light.      Comments: Upward deviated gaze   Cardiovascular:      Rate and Rhythm: Normal rate and regular rhythm.      Pulses: Normal pulses.      Heart sounds: Normal heart sounds.   Pulmonary:      Effort: He is  intubated.      Comments: Breathsounds rhonchi bilaterally, diminished in bases, nonlabored respirations  Abdominal:      General: There is no distension.      Palpations: Abdomen is soft.   Skin:     General: Skin is warm and dry.   Neurological:      Comments: Positive cough and gag reflex, fixed upward gaze and does not blink to threat, does not track, not following commands         Results Review:  Lab Results (last 24 hours)       Procedure Component Value Units Date/Time    CBC & Differential [[id number]]  (Abnormal) Collected: 01/06/25 0326    Specimen: Blood Updated: 01/06/25 1028    Narrative:      The following orders were created for panel order CBC & Differential.  Procedure                               Abnormality         Status                     ---------                               -----------         ------                     CBC Auto Differential[[id number]]        Abnormal            Final result               Manual Differential (Lab)[[id number]]                        In process                   Please view results for these tests on the individual orders.    Manual Differential (Lab) [[id number]] Collected: 01/06/25 0326    Specimen: Blood Updated: 01/06/25 1028    Procalcitonin [[id number]]  (Normal) Collected: 01/06/25 0207    Specimen: Blood Updated: 01/06/25 0949     Procalcitonin 0.10 ng/mL     Narrative:      As a Marker for Sepsis (Non-Neonates):    1. <0.5 ng/mL represents a low risk of severe sepsis and/or septic shock.  2. >2 ng/mL represents a high risk of severe sepsis and/or septic shock.    As a Marker for Lower Respiratory Tract Infections that require antibiotic therapy:    PCT on Admission    Antibiotic Therapy       6-12 Hrs later    >0.5                Strongly Recommended  >0.25 - <0.5        Recommended   0.1 - 0.25          Discouraged              Remeasure/reassess PCT  <0.1                Strongly Discouraged     Remeasure/reassess PCT    As 28 day mortality risk  "marker: \"Change in Procalcitonin Result\" (>80% or <=80%) if Day 0 (or Day 1) and Day 4 values are available. Refer to http://www.[facility]-pct-calculator.com    Change in PCT <=80%  A decrease of PCT levels below or equal to 80% defines a positive change in PCT test result representing a higher risk for 28-day all-cause mortality of patients diagnosed with severe sepsis for septic shock.    Change in PCT >80%  A decrease of PCT levels of more than 80% defines a negative change in PCT result representing a lower risk for 28-day all-cause mortality of patients diagnosed with severe sepsis or septic shock.       Blood Culture - Blood, Hand, Left [[id number]]  (Normal) Collected: 01/05/25 0800    Specimen: Blood from Hand, Left Updated: 01/06/25 0830     Blood Culture No growth at 24 hours    POC Glucose Once [[id number]]  (Abnormal) Collected: 01/06/25 0514    Specimen: Blood Updated: 01/06/25 0528     Glucose 131 mg/dL      Comment: : [id number] Dwight  LEcinthia ID: [id number]       CBC Auto Differential [[id number]]  (Abnormal) Collected: 01/06/25 0326    Specimen: Blood Updated: 01/06/25 0402     WBC 18.31 10*3/mm3      RBC 3.94 10*6/mm3      Hemoglobin 12.3 g/dL      Hematocrit 38.0 %      MCV 96.4 fL      MCH 31.2 pg      MCHC 32.4 g/dL      RDW 12.9 %      RDW-SD 45.9 fl      MPV 11.0 fL      Platelets 239 10*3/mm3      Neutrophil % 72.4 %      Lymphocyte % 9.1 %      Monocyte % 13.3 %      Eosinophil % 1.5 %      Basophil % 0.7 %      Immature Grans % 3.0 %      Neutrophils, Absolute 13.27 10*3/mm3      Lymphocytes, Absolute 1.66 10*3/mm3      Monocytes, Absolute 2.43 10*3/mm3      Eosinophils, Absolute 0.28 10*3/mm3      Basophils, Absolute 0.12 10*3/mm3      Immature Grans, Absolute 0.55 10*3/mm3      nRBC 0.0 /100 WBC     Blood Gas, Arterial - [[id number]]  (Abnormal) Collected: 01/06/25 0340    Specimen: Arterial Blood Updated: 01/06/25 0342     Site Right Radial     Pedro Pablo's Test Positive     pH, Arterial " 7.451 pH units      Comment: 83 Value above reference range        pCO2, Arterial 50.1 mm Hg      Comment: 83 Value above reference range        pO2, Arterial 198.0 mm Hg      Comment: 83 Value above reference range        HCO3, Arterial 34.9 mmol/L      Comment: 83 Value above reference range        Base Excess, Arterial 9.4 mmol/L      Comment: 83 Value above reference range        O2 Saturation, Arterial 99.8 %      Comment: 83 Value above reference range        Temperature 37.0     Barometric Pressure for Blood Gas 749 mmHg      Modality Ventilator     FIO2 50 %      Ventilator Mode AC     Set Tidal Volume 450.000     Set Mech Resp Rate 16.0     PEEP 5.0     Collected by SALLIE     Comment: Meter: [id number]     :  [email or web address]        pCO2, Temperature Corrected 50.1 mm Hg      pH, Temp Corrected 7.451 pH Units      pO2, Temperature Corrected 198 mm Hg      PO2/FIO2 396    Comprehensive Metabolic Panel [[id number]]  (Abnormal) Collected: 01/06/25 0207    Specimen: Blood Updated: 01/06/25 0258     Glucose 151 mg/dL      BUN 18 mg/dL      Creatinine 0.53 mg/dL      Sodium 143 mmol/L      Potassium 4.8 mmol/L      Comment: Slight hemolysis detected by analyzer. Result may be falsely elevated.        Chloride 105 mmol/L      CO2 30.0 mmol/L      Calcium 9.7 mg/dL      Total Protein 6.5 g/dL      Albumin 2.8 g/dL      ALT (SGPT) 30 U/L      AST (SGOT) 47 U/L      Comment: Slight hemolysis detected by analyzer. Result may be falsely elevated.        Alkaline Phosphatase 103 U/L      Total Bilirubin 0.7 mg/dL      Globulin 3.7 gm/dL      A/G Ratio 0.8 g/dL      BUN/Creatinine Ratio 34.0     Anion Gap 8.0 mmol/L      eGFR 112.6 mL/min/1.73     Narrative:      GFR Categories in Chronic Kidney Disease (CKD)      GFR Category          GFR (mL/min/1.73)    Interpretation  G1                     90 or greater         Normal or high (1)  G2                      60-89                Mild decrease (1)  G3a                    45-59                Mild to moderate decrease  G3b                   30-44                Moderate to severe decrease  G4                    15-29                Severe decrease  G5                    14 or less           Kidney failure          (1)In the absence of evidence of kidney disease, neither GFR category G1 or G2 fulfill the criteria for CKD.    eGFR calculation 2021 CKD-EPI creatinine equation, which does not include race as a factor    POC Glucose Once [[id number]]  (Normal) Collected: 01/05/25 2339    Specimen: Blood Updated: 01/05/25 2350     Glucose 113 mg/dL      Comment: : [id number] Dwight Hou ID: [id number]       POC Glucose Once [[id number]]  (Abnormal) Collected: 01/05/25 1735    Specimen: Blood Updated: 01/05/25 1756     Glucose 154 mg/dL      Comment: : [id number] Amber MuroMeter ID: [id number]       POC Glucose Once [[id number]]  (Abnormal) Collected: 01/05/25 1237    Specimen: Blood Updated: 01/05/25 1248     Glucose 159 mg/dL      Comment: : [id number] Leticia VickMeter ID: [id number]       Blood Culture - Blood, Arm, Left [[id number]]  (Normal) Collected: 01/01/25 1051    Specimen: Blood from Arm, Left Updated: 01/05/25 1100     Blood Culture No growth at 4 days    Blood Culture - Blood, Arm, Right [[id number]]  (Normal) Collected: 01/01/25 1053    Specimen: Blood from Arm, Right Updated: 01/05/25 1100     Blood Culture No growth at 4 days             XR Chest 1 View    Result Date: 1/6/2025  1. A new areas of discoid atelectasis in the left midlung. A new ill-defined opacity in the right lower lung which may represent atelectasis or evolving infiltrate. Further follow-up is recommended. 2. Moderate cardiomegaly. 3. Endotracheal tube in place. The distal end of the nasogastric tube is not visualized and not evaluated.   This report was signed and finalized on 1/6/2025 6:51 AM by Dr. Felix Watson MD.      CT Head Without Contrast    Result Date:  1/4/2025  1. No acute radiographic abnormality identified. Similar mild chronic small vessel ischemia. No convincing evidence of cerebral edema. 2. Right maxillary sinusitis. Questionable small left mastoid effusion.  This report was signed and finalized on 1/4/2025 2:44 PM by Dr. Flaca Tatum MD.       Result Review:  I have personally reviewed the results from the time of this admission to 1/6/2025 10:46 CST and agree with these findings:  [x]  Laboratory list / accordion  [x]  Microbiology  [x]  Radiology  [x]  EKG/Telemetry   []  Cardiology/Vascular   []  Pathology  []  Old records  []  Other:    Culture Data:   Blood Culture   Date Value Ref Range Status   01/05/2025 No growth at 24 hours  Preliminary   01/01/2025 No growth at 4 days  Preliminary   01/01/2025 No growth at 4 days  Preliminary     Respiratory Culture   Date Value Ref Range Status   01/01/2025 Scant growth (1+) Staphylococcus aureus (A)  Final   01/01/2025 No Normal Respiratory Karen (A)  Final       I have reviewed the patient's current medications.     Assessment/Plan   Assessment  Active Hospital Problems    Diagnosis     **Anoxic brain injury     Acute respiratory failure with hypoxia     On mechanically assisted ventilation     Cocaine abuse     Positive urine drug screen     Substance abuse     Tobacco use     COPD (chronic obstructive pulmonary disease)     Bipolar disorder     Coronary artery disease involving autologous vein coronary bypass graft without angina pectoris     Essential hypertension     Hyperlipidemia LDL goal <100    64-year-old male with suspected anoxic brain injury, acute hypoxic respiratory failure, in CCU for critical care management.  Little change in neuroexam.  Neurology following, await their recommendations.  Continue off sedation, fentanyl for analgesia.  Wean fentanyl to patient tolerance.  Persistent fevers, concern for central source as no evidence of infection at this time.  ID following.  Repeat sputum  culture was sent today.  Currently off antibiotics.  Will discontinue Montesinos catheter at this time.    Suspected anoxic brain injury  -Neurology following, inpatient neurologist evaluate today, continue to follow their rec  -Continue off sedation, wean fentanyl to patient tolerance  -Currently DNR, palliative care consulted and following for to assist with goals of care  -Ex-wife currently attempting to obtain guardianship/conservatorship to pursue further goals of care discussions as neuroexam is not changed    Acute hypoxic respiratory failure  -Secondary to neurological status above  -Continue trend ABGs, currently requiring 50% FiO2, adjust vent settings as indicated  -Continue ventilator bundle    Febrile illness  -Central versus infectious  -Leukocytosis has been trending up, send sputum for culture today given purulent aspirate  -Follow cultures, follow infectious disease recommendations for antibiotics  -Follow neurology input, question TCA overdose  -Check procalcitonin today, it was negative further supporting noninfectious etiology  -Remove Montesinos catheter, monitor for retention    DM type II  -Continue diabetic tube feeds  -Continue long-acting insulin  -Blood glucose monitoring and sliding scale insulin every 6 hr    VTE: Lovenox  GI: Famotidine  NTN: Tube feeding  Abx: None currently  Lines/Tubes: Peripheral IVs, OG tube, endotracheal tube, remove Montesinos catheter  CODE STATUS: No CPR, full medical support, palliative care consulted    Disposition: Continue critical care management.  Poor prognosis given neurological exam. Follow ex-wife, Fe, progress with the court regarding conservatorship/guardianship.  She is indicated he would not want prolonged life support.    Total critical care time: Approximately 35 minutes     Due to a high probability of clinically significant, life threatening deterioration, the patient required my highest level of preparedness to intervene emergently and I personally spent  this critical care time directly and personally managing the patient.      This critical care time included obtaining a history; examining the patient; pulse oximetry; ordering and review of studies; arranging urgent treatment with development of a management plan; evaluation of patient's response to treatment; frequent reassessment; and, discussions with other providers.     This critical care time was performed to assess and manage the high probability of imminent, life-threatening deterioration that could result in multi-organ failure. It was exclusive of separately billable procedures and treating other patients and teaching time.     Please see MDM section and the rest of the note for further information on patient assessment and treatment.     Part of this note may be an electronic transcription/translation of spoken language to printed text using the Dragon dictation system       Electronically signed by REMA Sepulveda on 1/6/2025 at 11:24 CST

## 2025-01-06 NOTE — PLAN OF CARE
Problem: Mechanical Ventilation Invasive  Goal: Effective Communication  Outcome: Progressing   Goal Outcome Evaluation:   Wean from ventilator

## 2025-01-06 NOTE — CASE MANAGEMENT/SOCIAL WORK
Continued Stay Note  [facility]     Patient Name: Ld Green  MRN: [id number]  Today's Date: 1/6/2025    Admit Date: 12/29/2024    Plan: Pending   Discharge Plan       Row Name 01/06/25 0918       Plan    Plan Pending    Plan Comments Patient's ex wife, Fe, is in the process of obtaining legal guardianship.  Per chart notes, eventual plan is comfort care.  SW following and will assist as needed.                   Discharge Codes    No documentation.                       DIANA Obregon     Outpatient Care Management Note:    Chart review done  Pt was handed off to this CM in February  Upon chart review, noticed that pt is not a SNP, is [employer]  Pts wife did not consent to calls so case should have been closed  Will close case

## 2025-01-06 NOTE — PROGRESS NOTES
RT EQUIPMENT DEVICE RELATED - SKIN ASSESSMENT    Dany Score:  Dany Score: 11     RT Medical Equipment/Device:     ETT Linda/[employer]    Skin Assessment:      Cheek:  Intact  Lips:  Intact    Device Skin Pressure Protection:  Skin-to skin areas padded    Nurse Notification:  Evelin Guy, RRT

## 2025-01-06 NOTE — PROGRESS NOTES
Neurology Consult Note    Consult Date: 2025  Referring MD: No ref. provider found  Reason for Consult: ???anoxic brain injury     Patient: Ld Green (64 y.o. male)  MRN: [id number]  : 1960    History of Present Illness:     :  He looks worse today overall compared with previous days.  He has increased work of breathing and has an elevated RR despite being intubated and on Fentanyl for vent sync.  Reportedly his ex-wife is attempting to establish guardianship today.  He continues to be febrile.  His Tmax overnight was 101.4.      2025: remains intubated and sedated with Fentanyl 250 mcg. Eyes closed and he opens to his name. He blinks spontaneously. He does not follow commands. No increase tone or posturing noted. No withdrawal to pain. EEG showed slowing consistent with encephalopathy. CT head no acute process. He is not able to have MRI as he has spinal stimulator. He is not breathing over the vent. Ex-wife Fe called on the phone and updated on test results as well as patient wishes and life with functional neurologic outcome. Fe tells me  patient had told her he had no living relatives and was surprised when she heard from his cousin yesterday. Some pratik/cough when suctioned.   Patient seen along with Dr. Scales, tele neurology.       2025:Ld Green is a 64 y.o. male with PMH substance abuse, depression, DM, bladder CA, chronic back pain with pain stimulator, CAD s/p CABG, HLD, HTN, TYRELL with BiPAP. [place] lives alone. He is . History obtained by medical record as patient is sedated and intubated. No family at bedside at initial exam and cousin later arrives. Currently ex-wife is decision maker and trying to get guardianship with anticpation of this on Monday and today is Saturday.     At any rate, patient was found unresponsive on 2024 for undetermined amount of time. He was in respiratory failure and required intubation. There was mention at some  point concern for seizure but no seizure like activity was observed. Since admission, he has had some decerebrate posturing but no rhythmic like movements. CT head on admission no acute process. UDS + cocaine and TCA.    Patient has 2 similar presentations in July and August 2024 both related to hypoglycemia. Into the 30-40s.  With one admission, there is mention of possible seizure as patient had tongue biting. Per notes, patient recovered quickly to his baseline. Neurology has been consulted as sedation has been reduced and has been off precedex several hours and is on Fentanyl and when paused minimal response. He does have right gaze preference. He has intact pratik and cough. Patient has had elevated temp for multiple days requiring cooling blanket. On Vancomycin for staphylococcus aureus. ID is following.   Patient seen along with Dr. Scales, tele neurology.       Medical History:   Past Medical/Surgical Hx:  Past Medical History:   Diagnosis Date    Anxiety     Atherosclerosis of coronary artery     Bipolar disorder     Bladder cancer     Colon polyp     Coronary artery disease     Depression     Diabetes mellitus     Hemorrhoids     Hyperlipidemia     Hypertension     Nephrolithiasis     Sleep apnea      Past Surgical History:   Procedure Laterality Date    ANGIOPLASTY      with stent placement    BACK SURGERY      BLADDER SURGERY      cancer    CARDIAC CATHETERIZATION      COLONOSCOPY  04/13/2015    COLONOSCOPY N/A 4/30/2018    Procedure: COLONOSCOPY WITH ANESTHESIA;  Surgeon: Gerson Martini MD;  Location:  [facility];  Service: Gastroenterology    COLONOSCOPY N/A 4/8/2021    Procedure: COLONOSCOPY WITH ANESTHESIA;  Surgeon: Gerson Martini MD;  Location: [facility];  Service: Gastroenterology;  Laterality: N/A;  Pre: Hx Colon Polyps  Post: divertivculosis   Dr. Garza  CO2 Inflation Used    CORONARY ARTERY BYPASS GRAFT      KNEE SURGERY      SHOULDER SURGERY      x 2    SINUS SURGERY          Medications On Admission:  Medications Prior to Admission   Medication Sig Dispense Refill Last Dose/Taking    amLODIPine (NORVASC) 10 MG tablet Take 1 tablet by mouth Every Night.       atorvastatin (LIPITOR) 40 MG tablet Take 1 tablet by mouth Every Night.       Cariprazine HCl (Vraylar) 1.5 MG capsule capsule Take 1 capsule by mouth Daily.       clopidogrel (PLAVIX) 75 MG tablet Take 1 tablet by mouth Every Night.       cyclobenzaprine (FLEXERIL) 10 MG tablet Take 1 tablet by mouth 3 (Three) Times a Day As Needed for Muscle Spasms.       Dextromethorphan-buPROPion ER (AUVELITY)  MG tablet controlled-release Take 1 tablet by mouth 2 (Two) Times a Day.       eszopiclone (LUNESTA) 3 MG tablet Take 1 tablet by mouth Every Night. Take immediately before bedtime       furosemide (LASIX) 40 MG tablet Take 1 tablet by mouth Every Night.       isosorbide mononitrate (IMDUR) 60 MG 24 hr tablet Take 1 tablet by mouth Every Night.       lisinopril (PRINIVIL,ZESTRIL) 40 MG tablet Take 1 tablet by mouth Daily.       metoprolol succinate XL (TOPROL-XL) 200 MG 24 hr tablet Take 1 tablet by mouth Every Night.       naloxone (NARCAN) 4 MG/0.1ML nasal spray 1 spray into the nostril(s) as directed by provider As Needed for Opioid Reversal. Call 911. Don't prime. [place] in 1 nostril for overdose. Repeat in 2-3 minutes in other nostril if no or minimal breathing/responsiveness.       nitroglycerin (NITROSTAT) 0.4 MG SL tablet Place 1 tablet under the tongue Every 5 (Five) Minutes As Needed for Chest Pain.       oxyCODONE-acetaminophen (PERCOCET)  MG per tablet Take 1 tablet by mouth Every 8 (Eight) Hours.       Semaglutide, 2 MG/DOSE, (Ozempic, 2 MG/DOSE,) 8 MG/3ML solution pen-injector Inject 1 mg under the skin into the appropriate area as directed 1 (One) Time Per Week.          Current Medications:    Current Facility-Administered Medications:     acetaminophen (TYLENOL) 160 MG/5ML solution 650 mg, 650 mg,  Nasogastric, Q6H PRN, Mando Olivo MD, 650 mg at 25 0837    acetaminophen (TYLENOL) suppository 650 mg, 650 mg, Rectal, Q4H PRN, Mando Olivo MD, 650 mg at 25 0355    acetaminophen (TYLENOL) tablet 650 mg, 650 mg, Per G Tube, Q6H PRN, Mando Olivo MD, 650 mg at 25 1255    [COMPLETED] amiodarone 150 mg in 100 mL D5W (loading dose), 150 mg, Intravenous, Once, 150 mg at 25 1800 **FOLLOWED BY** [] amiodarone 360 mg in 200 mL D5W infusion, 1 mg/min, Intravenous, Continuous, Last Rate: 33.3 mL/hr at 25 1908, 1 mg/min at 25 1908 **FOLLOWED BY** [] amiodarone 360 mg in 200 mL D5W infusion, 0.5 mg/min, Intravenous, Continuous, Stopped at 25 182 **FOLLOWED BY** [COMPLETED] amiodarone (PACERONE) tablet 200 mg, 200 mg, Per G Tube, Once, 200 mg at 25 1820 **FOLLOWED BY** amiodarone (PACERONE) tablet 200 mg, 200 mg, Per G Tube, Q8H, 200 mg at 25 0426 **FOLLOWED BY** [START ON 2025] amiodarone (PACERONE) tablet 200 mg, 200 mg, Per G Tube, Q12H **FOLLOWED BY** [START ON 2025] amiodarone (PACERONE) tablet 200 mg, 200 mg, Per G Tube, Daily, Mando Olivo MD    amLODIPine (NORVASC) tablet 10 mg, 10 mg, Nasogastric, Nightly, Nicola Hare PA-C, 10 mg at 25    artificial tears ophthalmic ointment, , Both Eyes, Q1H PRN, Constanza Dial MD, Given at 25 0340    atorvastatin (LIPITOR) tablet 40 mg, 40 mg, Nasogastric, Nightly, Nicola Hare PA-C, 40 mg at 25    sennosides-docusate (PERICOLACE) 8.6-50 MG per tablet 2 tablet, 2 tablet, Nasogastric, BID, 2 tablet at 25 **AND** polyethylene glycol (MIRALAX) packet 17 g, 17 g, Nasogastric, Daily PRN, 17 g at 25 **AND** [DISCONTINUED] bisacodyl (DULCOLAX) EC tablet 5 mg, 5 mg, Oral, Daily PRN **AND** bisacodyl (DULCOLAX) suppository 10 mg, 10 mg, Rectal, Daily PRN, Mando Olivo MD    budesonide (PULMICORT) nebulizer solution 0.5 mg, 0.5  mg, Nebulization, BID - RT, Cesar Alvarado MD, 0.5 mg at 01/06/25 0601    chlorhexidine (PERIDEX) 0.12 % solution 15 mL, 15 mL, Mouth/Throat, Q12H, Pravin Quinones APRN, 15 mL at 01/06/25 0837    Chlorhexidine Gluconate Cloth 2 % pads 1 Application, 1 Application, Topical, Q24H, Pravin Quinones APRN, 1 Application at 01/06/25 0426    clopidogrel (PLAVIX) tablet 75 mg, 75 mg, Nasogastric, Nightly, Nicola Hare PA-C, 75 mg at 01/05/25 2022    Enoxaparin Sodium (LOVENOX) syringe 40 mg, 40 mg, Subcutaneous, Daily, Pravin Quinones APRN, 40 mg at 01/06/25 0837    famotidine (PEPCID) tablet 20 mg, 20 mg, Nasogastric, Q12H, Nicola Hare PA-C, 20 mg at 01/06/25 0837    fentaNYL 2500 mcg/250 mL NS infusion,  mcg/hr, Intravenous, Titrated, Mando Olivo MD, Last Rate: 15 mL/hr at 01/06/25 1000, 150 mcg/hr at 01/06/25 1000    hydrALAZINE (APRESOLINE) injection 10 mg, 10 mg, Intravenous, Q6H PRN, Mando Olivo MD, 10 mg at 01/01/25 1256    ibuprofen (ADVIL,MOTRIN) 100 MG/5ML suspension 200 mg, 200 mg, Nasogastric, Q6H PRN, Constanza Dial MD    insulin glargine (LANTUS, SEMGLEE) injection 10 Units, 10 Units, Subcutaneous, Nightly, Mando Olivo MD, 10 Units at 01/05/25 2022    ipratropium-albuterol (DUO-NEB) nebulizer solution 3 mL, 3 mL, Nebulization, 4x Daily - RT, Mando Olivo MD, 3 mL at 01/06/25 1024    [Held by provider] lisinopril (PRINIVIL,ZESTRIL) tablet 40 mg, 40 mg, Nasogastric, Daily, Nicola Hare PA-C, 40 mg at 01/03/25 0902    metoprolol tartrate (LOPRESSOR) tablet 100 mg, 100 mg, Nasogastric, Q12H, Nicola Hare PA-C, 100 mg at 01/06/25 0837    nitroglycerin (NITROSTAT) SL tablet 0.4 mg, 0.4 mg, Sublingual, Q5 Min PRN, Pravin Quinones APRN    Phosphorus Replacement - Follow Nurse / BPA Driven Protocol, , Not Applicable, PRN, Hernan Gonzalez APRN    Potassium Replacement - Follow Nurse / BPA Driven Protocol, , Not Applicable, PRN, Hernan Gonzalez APRN     propofol (DIPRIVAN) infusion 10 mg/mL 100 mL, 5-50 mcg/kg/min, Intravenous, Titrated, Imer Valenzuela MD, Stopped at 12/30/24 0725    sodium chloride 0.9 % flush 10 mL, 10 mL, Intravenous, Q12H, QuinonesNicanorPravin, APRN, 10 mL at 01/06/25 0837    sodium chloride 0.9 % flush 10 mL, 10 mL, Intravenous, PRN, QuinonesNicanorPravin, APRN, 10 mL at 12/31/24 2201    sodium chloride 0.9 % infusion 40 mL, 40 mL, Intravenous, PRN, Quinones, Pravin, APRN     Allergies:  Allergies   Allergen Reactions    Codeine Nausea Only       Social Hx:  Social History     Socioeconomic History    Marital status:    Tobacco Use    Smoking status: Every Day     Current packs/day: 0.50     Types: Cigarettes    Smokeless tobacco: Never    Tobacco comments:     2 a day   Vaping Use    Vaping status: Never Used   Substance and Sexual Activity    Alcohol use: Yes     Comment: Rarely    Drug use: Yes     Types: Cocaine(coke)    Sexual activity: Defer       Family Hx:  Family History   Adopted: Yes     Physical Examination:   Vital Signs:  Vitals:    01/06/25 1000 01/06/25 1021 01/06/25 1024 01/06/25 1029   BP: 101/64      Pulse: 84 82 85 83   Resp:   18 19   Temp:       TempSrc:       SpO2: 93% 93% 95% 94%   Weight:       Height:             General Exam:  Head:  Normocephalic, atraumatic  HEENT:  Neck supple.  Intubated.  Breathing over the vent  Abdomen:  Nontender, Nondistended  Extremities:  No signs of peripheral edema  Skin:  No rashes    Neurologic Exam:    Mental Status:    -patient sedated and intubated.  Sedation paused for exam.  Eyes occasionally open.  No evidence of tracking today.    Cranial nerve examination reveals intact corneal reflexes, intact pupillary reflexes, and intact gag reflexes.    Motor: (strength out of 5:  1= minimal movement, 2 = movement in plane of gravity, 3 = movement against gravity, 4 = movement against some resistance, 5 = full strength)    -no movement of bilateral upper and lower extremities.      DTR:  -Right   Biceps: 2+ Triceps: 2+ Brachioradialis: 2+   Patella: 2+ Ankle: 2+  Babinski  -Left   Biceps: 2+ Triceps: 2+ Brachioradialis: 2+   Patella: 2+ Ankle: 2+ Babinski      Sensory:  -no withdrawal to noxious stimuli    Coordination:  -no increase tone or tremor noted.        Recent Diagnostics:   Laboratory Results:   - Reviewed in EMR  Lab Results   Component Value Date    GLUCOSE 151 (H) 01/06/2025    CALCIUM 9.7 01/06/2025     01/06/2025    K 4.8 01/06/2025    CO2 30.0 (H) 01/06/2025     01/06/2025    BUN 18 01/06/2025    CREATININE 0.53 (L) 01/06/2025    EGFRIFAFRI >59 02/14/2022    EGFRIFNONA >60 02/14/2022    BCR 34.0 (H) 01/06/2025    ANIONGAP 8.0 01/06/2025     Lab Results   Component Value Date    WBC 18.31 (H) 01/06/2025    HGB 12.3 (L) 01/06/2025    HCT 38.0 01/06/2025    MCV 96.4 01/06/2025     01/06/2025     Lab Results   Component Value Date    PTT 28.6 11/13/2014    INR 0.96 12/29/2024     Lab Results   Component Value Date    TRIG 147 02/14/2022    HDL 37 (L) 02/14/2022    LDL 49 02/14/2022     Lab Results   Component Value Date    HGBA1C 5.40 08/26/2024       Imaging Results:  Imaging Results (Last 24 Hours)       Procedure Component Value Units Date/Time    XR Chest 1 View [[id number]] Collected: 01/06/25 0648     Updated: 01/06/25 0654    Narrative:      EXAMINATION: XR CHEST 1 VW-     1/6/2025 2:20 AM     HISTORY: vent; J96.00-Acute respiratory failure, unspecified whether  with hypoxia or hypercapnia; R41.82-Altered mental status, unspecified;  F14.10-Cocaine abuse, uncomplicated     A frontal projection of the chest is compared with the previous study  dated 1/5/2025.     The lungs are moderately well-expanded.     There is an area of discoid atelectasis in the left midlung which was  not seen in the previous study.     An ill-defined opacity is seen in the right lower lung which was not  seen in the previous study. This may represent an area of atelectasis  or  evolving infiltrate.     There is no pleural effusion, pulmonary congestion or pneumothorax.     There is moderate cardiomegaly. There is evidence of prior cardiac  surgery.     Endotracheal tube is in place. The nasogastric tube is not optimally  visualized or evaluated.     No acute bony abnormality. Postsurgical changes of the clavicle  bilaterally are seen.       Impression:      1. A new areas of discoid atelectasis in the left midlung. A new  ill-defined opacity in the right lower lung which may represent  atelectasis or evolving infiltrate. Further follow-up is recommended.  2. Moderate cardiomegaly.  3. Endotracheal tube in place. The distal end of the nasogastric tube is  not visualized and not evaluated.        This report was signed and finalized on 1/6/2025 6:51 AM by Dr. Felix Watson MD.              Results:  CT of head without contrast on 1/4 shows no acute findings  EEG performed on 1/4 suggest encephalopathy with moderate to severe diffuse cerebral dysfunction with high amplitude delta activity and intermixed alpha and theta activity.  There is no evidence of seizures.    Assessment & Plan:   Patient 64 y.o. male with PMH substance abuse, depression, DM, bladder CA, chronic back pain with pain stimulator, CAD s/p CABG, HLD, HTN, TYRELL with BiPAP. [place] lives alone. He is . History obtained by medical record as patient is sedated and intubated. No family at bedside at initial exam and cousin later arrives. Currently ex-wife is decision maker and trying to get guardianship with anticpation of this on Monday and today is Saturday.     At any rate, patient was found unresponsive on 12/29/2024 for undetermined amount of time. He was in respiratory failure and required intubation. There was mention at some point concern for seizure but no seizure like activity was observed. Since admission, he has had some decerebrate posturing but no rhythmic like movements. CT head on admission no  acute process. UDS + cocaine and TCA.    Patient has 2 similar presentations in July and August 2024 both related to hypoglycemia. Into the 30-40s.  With one admission, there is mention of possible seizure as patient had tongue biting. Per notes, patient recovered quickly to his baseline. Neurology has been consulted as sedation has been reduced and has been off precedex several hours and is on Fentanyl and when paused minimal response. He does have right gaze preference. He has intact pratik and cough. Patient has had elevated temp for multiple days requiring cooling blanket. On Vancomycin for staphylococcus aureus. ID is following.     Active Hospital Problems    Diagnosis  POA    **Anoxic brain injury [G93.1]  Unknown    Acute respiratory failure with hypoxia [J96.01]  Yes    On mechanically assisted ventilation [Z99.11]  Not Applicable    Cocaine abuse [F14.10]  Unknown    Positive urine drug screen [R82.5]  Unknown    Substance abuse [F19.10]  Unknown    Tobacco use [Z72.0]  Unknown    COPD (chronic obstructive pulmonary disease) [J44.9]  Unknown    Bipolar disorder [F31.9]  Unknown    Coronary artery disease involving autologous vein coronary bypass graft without angina pectoris [I25.810]  Unknown    Essential hypertension [I10]  Unknown    Hyperlipidemia LDL goal <100 [E78.5]  Unknown        Impression:  Acute respiratory failure.  Illicit drug use. UDS + cocaine and TCA  No TCA's on home med list  Leukocytosis send ongoing febrile state -ID involved.  Currently no definitive infectious etiology.  Blood cultures remain pending.  Chronic back pain with spinal stimulator so unable to have MRI brain.  Tobacco use.  DM uncontrolled with hypoglycemia.      Plan:   Ex wife trying to obtain guardianship  Supportive care.  Extremely guarded prognosis.  Palliative care is on board to assist with goals of care.     Ole Vegas MD  01/06/25  10:59 CST    Medical Decision Making    Number/Complexity of  Problems  Moderate  1 undiagnosed new problem with uncertain prognosis -   1 acute illness with systemic symptoms -   High  1 acute or chronic illness that poses a threat to life/body function -   high     MDM Data  Moderate - 1/3 categories  Extensive - 2/3 categories    Category 1: 3 of the following  Review of external notes  Review of results  Ordering of each unique test  Independent historian  Category 2:  Independent interpretation of test (ex: imaging)  Category 3:  Discussion of management with another provider    extensive     Treatment Plan  Moderate - Prescription Drug management  High  Drug therapy requiring intensive monitoring for toxicity  Decision regarding hospitalization or escalation of care  De-escalate care/DNR decisions  high

## 2025-01-06 NOTE — SIGNIFICANT NOTE
01/06/25 1026   Readings   Plateau Pressure (cm H2O) 13 cm H2O   Static Compliance (L/cm H2O) 44

## 2025-01-06 NOTE — PROGRESS NOTES
INFECTIOUS DISEASES PROGRESS NOTE    Patient:  Ld Green  YOB: 1960  MRN: [id number]   Admit date: 12/29/2024   Admitting Physician: Trevor Davis MD  Primary Care Physician: Xiomara Neal APRN      Chief Complaint: Unobtainable from patient on ventilator        Interval History: Patient is opening eyes spontaneously.  Still not tracking per nursing.  Cooling blanket remains on monitor only.  Appears patient is receiving 650 of acetaminophen approximately twice daily.  He received once just after midnight for temperature 101.4.  Currently temp 101.2 and patient received acetaminophen again.    Nursing notes patient coughed around ET tube some purulent appearing secretions.  I started to place order for respiratory culture however 1 was placed yesterday evening.  Additionally some purulent drainage noted around urethral catheter.    Remains on fentanyl drip started January 1    Allergies:   Allergies   Allergen Reactions    Codeine Nausea Only       Current Scheduled Medications:   amiodarone, 200 mg, Per G Tube, Q8H   Followed by  [START ON 1/9/2025] amiodarone, 200 mg, Per G Tube, Q12H   Followed by  [START ON 1/23/2025] amiodarone, 200 mg, Per G Tube, Daily  amLODIPine, 10 mg, Nasogastric, Nightly  atorvastatin, 40 mg, Nasogastric, Nightly  budesonide, 0.5 mg, Nebulization, BID - RT  chlorhexidine, 15 mL, Mouth/Throat, Q12H  Chlorhexidine Gluconate Cloth, 1 Application, Topical, Q24H  clopidogrel, 75 mg, Nasogastric, Nightly  enoxaparin, 40 mg, Subcutaneous, Daily  famotidine, 20 mg, Nasogastric, Q12H  insulin glargine, 10 Units, Subcutaneous, Nightly  ipratropium-albuterol, 3 mL, Nebulization, 4x Daily - RT  [Held by provider] lisinopril, 40 mg, Nasogastric, Daily  metoprolol tartrate, 100 mg, Nasogastric, Q12H  senna-docusate sodium, 2 tablet, Nasogastric, BID  sodium chloride, 10 mL, Intravenous, Q12H      Current PRN Medications:    acetaminophen    acetaminophen    acetaminophen    " artificial tears    senna-docusate sodium **AND** polyethylene glycol **AND** [DISCONTINUED] bisacodyl **AND** bisacodyl    hydrALAZINE    ibuprofen    nitroglycerin    Phosphorus Replacement - Follow Nurse / BPA Driven Protocol    Potassium Replacement - Follow Nurse / BPA Driven Protocol    sodium chloride    sodium chloride    fentanyl 10 mcg/mL,  mcg/hr, Last Rate: 200 mcg/hr (25 0826)  propofol, 5-50 mcg/kg/min, Last Rate: Stopped (24 0725)           Objective     Vital Signs:  Temp (24hrs), Av.8 °F (38.2 °C), Min:100.1 °F (37.8 °C), Max:101.5 °F (38.6 °C)      /62   Pulse 90   Temp (!) 101.1 °F (38.4 °C) (Rectal)   Resp 18   Ht 172.7 cm (68\")   Wt 108 kg (238 lb 8.6 oz)   SpO2 95%   BMI 36.27 kg/m²         Physical Exam:    General: Patient is obese male lying in bed on his right side  HEENT: Injected sclera bilaterally.  ET tube is in place  Respiratory: Patient does appear to have some increased work of breathing currently.  Left upper extremity area of phlebitis from IV infiltration improving.  Still some pale erythema and increased warmth  Neuro: I spontaneously open.  Not tracking.  Not squeezing hands to command      Results Review:    I reviewed the patient's new clinical results.    Lab Results:    CBC:   Lab Results   Lab 24  0212 25  0316 25  0201 25  0207 25  01325  01125  0326   WBC 17.52* 18.42* 18.20* 15.80* 15.70* 16.64* 18.31*   HEMOGLOBIN 13.7 14.5 13.8 12.7* 11.8* 12.2* 12.3*   HEMATOCRIT 41.7 44.2 42.6 39.8 36.8* 38.0 38.0   PLATELETS 177 196 198 202 181 213 239        AutoDiff:   Lab Results   Lab 25  0135 25  01125  0326   NEUTROPHIL % 75.0 72.0 72.4   LYMPHOCYTE % 7.6* 8.2* 9.1*   MONOCYTES % 14.0* 14.8* 13.3*   EOSINOPHIL % 1.1 1.9 1.5   BASOPHIL % 0.6 0.7 0.7   NEUTROS ABS 11.78* 11.96* 13.27*   LYMPHS ABS 1.20 1.37 1.66   MONOS ABS 2.20* 2.47* 2.43*   EOS ABS 0.17 0.32 0.28   BASOS " ABS 0.09 0.12 0.12        Manual Diff:    Lab Results   Lab 01/03/25  0207 01/04/25  0135 01/05/25  0111 01/06/25  0326   NEUTROPHIL % 80.0*  --   --   --    LYMPHOCYTE % 6.0*  --   --   --    MONOCYTES % 9.0  --   --   --    NEUTROS ABS 11.74*  12.64* 11.78* 11.96* 13.27*   LYMPHS ABS 1.42  --   --   --    WBC MORPHOLOGY Normal  --   --   --    PLT MORPH Normal  --   --   --            CMP:   Lab Results   Lab 01/04/25  0135 01/05/25  0111 01/06/25  0207   SODIUM 141 142 143   POTASSIUM 4.5 4.5 4.8   CHLORIDE 103 103 105   CO2 30.0* 32.0* 30.0*   BUN 24* 19 18   CREATININE 0.54* 0.60* 0.53*   CALCIUM 8.9 9.2 9.7   BILIRUBIN 0.6 0.5 0.7   ALK PHOS 94 94 103   ALT (SGPT) 20 25 30   AST (SGOT) 29 39 47*   GLUCOSE 183* 164* 151*       Estimated Creatinine Clearance: 169.9 mL/min (A) (by C-G formula based on SCr of 0.53 mg/dL (L)).    Culture Results:    Microbiology Results (last 10 days)       Procedure Component Value - Date/Time    Blood Culture - Blood, Hand, Left [[id number]]  (Normal) Collected: 01/05/25 0800    Lab Status: Preliminary result Specimen: Blood from Hand, Left Updated: 01/06/25 0830     Blood Culture No growth at 24 hours    Blood Culture - Blood, Arm, Right [[id number]]  (Normal) Collected: 01/01/25 1053    Lab Status: Preliminary result Specimen: Blood from Arm, Right Updated: 01/05/25 1100     Blood Culture No growth at 4 days    Blood Culture - Blood, Arm, Left [[id number]]  (Normal) Collected: 01/01/25 1051    Lab Status: Preliminary result Specimen: Blood from Arm, Left Updated: 01/05/25 1100     Blood Culture No growth at 4 days    Respiratory Culture - Aspirate, ET Suction [[id number]]  (Abnormal)  (Susceptibility) Collected: 01/01/25 1047    Lab Status: Final result Specimen: Aspirate from ET Suction Updated: 01/03/25 0820     Respiratory Culture Scant growth (1+) Staphylococcus aureus      No Normal Respiratory Karen     Gram Stain Many (4+) WBCs per low power field      Few (2+) Epithelial  "cells per low power field      Rare (1+) Gram positive cocci    Susceptibility        Staphylococcus aureus      IMER      Clindamycin Susceptible      Oxacillin Susceptible      Tetracycline Susceptible      Trimethoprim + Sulfamethoxazole Susceptible      Vancomycin Susceptible                           Chlamydia trachomatis, Neisseria gonorrhoeae, PCR - Swab, Penis [[id number]]  (Normal) Collected: 12/30/24 0839    Lab Status: Final result Specimen: Swab from Penis Updated: 12/30/24 1255     Chlamydia DNA by PCR Not Detected     Neisseria gonorrhoeae by PCR Not Detected    Narrative:      Disclaimer: The AptOfferboxx Combo 2 assay is a target amplification nucleic acid probe test that utilizes target capture for the in vitro qualitative detection and differentiation of ribosomal RNA from Chlamydia trachomatis and Neisseria gonorrhoeae to aid in the diagnosis of chlamydial and/or gonococcal urogenital disease.  Cell culture was once considered to be the \"gold standard\" for detection of CT and NG.  Culture is quite specific, but scientific studies have demonstrated that the NAAT DNA probe technologies have higher clinical sensitivities than culture.    MRSA Screen, PCR (Inpatient) - Swab, Nares [[id number]]  (Normal) Collected: 12/29/24 1753    Lab Status: Final result Specimen: Swab from Nares Updated: 12/29/24 1919     MRSA PCR No MRSA Detected    Narrative:      The negative predictive value of this diagnostic test is high and should only be used to consider de-escalating anti-MRSA therapy. A positive result may indicate colonization with MRSA and must be correlated clinically.    Respiratory Culture - Aspirate, ET Suction [[id number]]  (Abnormal)  (Susceptibility) Collected: 12/29/24 1507    Lab Status: Final result Specimen: Aspirate from ET Suction Updated: 01/01/25 1024     Respiratory Culture Moderate growth (3+) Staphylococcus aureus      Light growth (2+) Normal Respiratory Karen     Gram Stain Moderate (3+) WBCs " per low power field      Rare (1+) Epithelial cells per low power field      Moderate (3+) Mixed gram positive kapil      Moderate (3+) Mixed gram negative kapil      Mixed intracellular organisms suggestive of an aspiration event    Susceptibility        Staphylococcus aureus      IMER      Clindamycin Susceptible      Oxacillin Susceptible      Tetracycline Susceptible      Trimethoprim + Sulfamethoxazole Susceptible      Vancomycin Susceptible                           Blood Culture - Blood, Hand, Digit Right [[id number]]  (Normal) Collected: 12/29/24 1230    Lab Status: Final result Specimen: Blood from Hand, Digit Right Updated: 01/03/25 1245     Blood Culture No growth at 5 days    Blood Culture - Blood, Arm, Right [[id number]]  (Normal) Collected: 12/29/24 1218    Lab Status: Final result Specimen: Blood from Arm, Right Updated: 01/03/25 1245     Blood Culture No growth at 5 days    Respiratory Panel PCR w/COVID-19(SARS-CoV-2) NACHO/ARACELI/CHRISTINE/PAD/COR/ZARINA In-House, NP Swab in UTM/VTM, 2 HR TAT - Swab, Nasopharynx [[id number]]  (Normal) Collected: 12/29/24 1205    Lab Status: Final result Specimen: Swab from Nasopharynx Updated: 12/29/24 1304     ADENOVIRUS, PCR Not Detected     Coronavirus 229E Not Detected     Coronavirus HKU1 Not Detected     Coronavirus NL63 Not Detected     Coronavirus OC43 Not Detected     COVID19 Not Detected     Human Metapneumovirus Not Detected     Human Rhinovirus/Enterovirus Not Detected     Influenza A PCR Not Detected     Influenza B PCR Not Detected     Parainfluenza Virus 1 Not Detected     Parainfluenza Virus 2 Not Detected     Parainfluenza Virus 3 Not Detected     Parainfluenza Virus 4 Not Detected     RSV, PCR Not Detected     Bordetella pertussis pcr Not Detected     Bordetella parapertussis PCR Not Detected     Chlamydophila pneumoniae PCR Not Detected     Mycoplasma pneumo by PCR Not Detected    Narrative:      In the setting of a positive respiratory panel with a viral infection  PLUS a negative procalcitonin without other underlying concern for bacterial infection, consider observing off antibiotics or discontinuation of antibiotics and continue supportive care. If the respiratory panel is positive for atypical bacterial infection (Bordetella pertussis, Chlamydophila pneumoniae, or Mycoplasma pneumoniae), consider antibiotic de-escalation to target atypical bacterial infection.                 Radiology:         1 view chest x-ray without any definite dense consolidation or air bronchogram.  Small increased infiltrative area noted right lower lung  Imaging Results (Last 72 Hours)       Procedure Component Value Units Date/Time    XR Chest 1 View [[id number]] Collected: 01/06/25 0648     Updated: 01/06/25 0654    Narrative:      EXAMINATION: XR CHEST 1 VW-     1/6/2025 2:20 AM     HISTORY: vent; J96.00-Acute respiratory failure, unspecified whether  with hypoxia or hypercapnia; R41.82-Altered mental status, unspecified;  F14.10-Cocaine abuse, uncomplicated     A frontal projection of the chest is compared with the previous study  dated 1/5/2025.     The lungs are moderately well-expanded.     There is an area of discoid atelectasis in the left midlung which was  not seen in the previous study.     An ill-defined opacity is seen in the right lower lung which was not  seen in the previous study. This may represent an area of atelectasis or  evolving infiltrate.     There is no pleural effusion, pulmonary congestion or pneumothorax.     There is moderate cardiomegaly. There is evidence of prior cardiac  surgery.     Endotracheal tube is in place. The nasogastric tube is not optimally  visualized or evaluated.     No acute bony abnormality. Postsurgical changes of the clavicle  bilaterally are seen.       Impression:      1. A new areas of discoid atelectasis in the left midlung. A new  ill-defined opacity in the right lower lung which may represent  atelectasis or evolving infiltrate. Further  follow-up is recommended.  2. Moderate cardiomegaly.  3. Endotracheal tube in place. The distal end of the nasogastric tube is  not visualized and not evaluated.        This report was signed and finalized on 1/6/2025 6:51 AM by Dr. Felix Watson MD.       XR Chest 1 View [[id number]] Collected: 01/05/25 0756     Updated: 01/05/25 0801    Narrative:      XR CHEST 1 VW-     HISTORY: vent; J96.00-Acute respiratory failure, unspecified whether  with hypoxia or hypercapnia; R41.82-Altered mental status, unspecified;  F14.10-Cocaine abuse, uncomplicated     COMPARISON: 1/3/2025     FINDINGS: Frontal view the chest obtained.     The endotracheal tube tip at the T3 level. Enteric tube descending into  the abdomen. Spinal cord stimulator device. Median sternotomy wires.     Low lung volumes left perihilar opacities concerning for possible  pneumonia. Right basilar ectasis. No radiographic evidence of edema. No  pleural effusion or pneumothorax.     Pression:  1. Life support lines described above.  2. Left hilar/perihilar opacities concerning for central  consolidation/pneumonia.        This report was signed and finalized on 1/5/2025 7:58 AM by Dr. Flaca Tatum MD.       CT Head Without Contrast [[id number]] Collected: 01/04/25 1438     Updated: 01/04/25 1448    Narrative:      CT HEAD WO CONTRAST- 1/4/2025 1:22 PM     HISTORY: AMS; J96.00-Acute respiratory failure, unspecified whether with  hypoxia or hypercapnia; R41.82-Altered mental status, unspecified;  F14.10-Cocaine abuse, uncomplicated      COMPARISON: 12/29/2024     TOTAL DOSE LENGTH PRODUCT: 1477.29 mGy.cm. Automated exposure control  was also utilized to decrease patient radiation dose.     TECHNIQUE: Axial images of the brain obtained without contrast     FINDINGS: The ventricles remain normal in size and configuration.  Similar mild chronic small vessel ischemic change. There is preservation  of the gray-white matter interfaces without hypodense  appearance of the  basal ganglia. Cerebellar tonsils positioned above the foramen magnum.  No significant effacement of the sulci. No intracranial hemorrhage or  mass effect. No acute signs of ischemia. No extra-axial hematoma or  subarachnoid hemorrhage.     Air-fluid level right maxillary sinus. Minimal partial opacification  left mastoid air cells. No skull fracture.       Impression:      1. No acute radiographic abnormality identified. Similar mild chronic  small vessel ischemia. No convincing evidence of cerebral edema.  2. Right maxillary sinusitis. Questionable small left mastoid effusion.     This report was signed and finalized on 1/4/2025 2:44 PM by Dr. Flaca Tatum MD.       XR Chest 1 View [[id number]] Collected: 01/03/25 1036     Updated: 01/03/25 1040    Narrative:      EXAMINATION: XR CHEST 1 VW-     1/3/2025 8:34 AM     HISTORY: Evaluate for pneumonia; J96.00-Acute respiratory failure,  unspecified whether with hypoxia or hypercapnia; R41.82-Altered mental  status, unspecified; F14.10-Cocaine abuse, uncomplicated     1 view chest x-ray.     COMPARISON:  12/31/2024.     Endotracheal tube present with the tip lying 5 cm above the cipriano.     Nasogastric tube extends below the diaphragm into the stomach.  The distal tip is not visualized.  Median sternotomy changes.  Thoracic stimulator leads noted.  Heart size is within normal limits.     There is slight prominence of lower lobe parenchymal markings though  there is no focal consolidation.     No pneumothorax and no sign of heart failure.       Impression:      1. No focal infiltrate or definite pneumonia is seen.           This report was signed and finalized on 1/3/2025 10:37 AM by Dr. Luis Wells MD.                   Active Hospital Problems    Diagnosis     **Anoxic brain injury     Acute respiratory failure with hypoxia     On mechanically assisted ventilation     Cocaine abuse     Positive urine drug screen     Substance abuse     Tobacco  use     COPD (chronic obstructive pulmonary disease)     Bipolar disorder     Coronary artery disease involving autologous vein coronary bypass graft without angina pectoris     Essential hypertension     Hyperlipidemia LDL goal <100        IMPRESSION:  Fever-no definite infectious etiology identified.  There does not appear to be any significant difference in temperature curve when reviewing the past several days.  He did have a few times when he was in the 98 range however per review, he had been on cooling blanket that entire time.  Patient did have methicillin-susceptible Staphylococcus aureus isolated from respiratory cultures on December 29-moderate amount followed up by scant growth January 1.  Patient has had antibiotic treatment that would have covered that pathogen.  Nursing noted some concerning pulmonary secretions with cough around the ET tube.  Appears there was likely concern yesterday as respiratory culture ordered but not yet collected from late yesterday afternoon.  Additionally some urethral drainage noted when she was doing Montesinos catheter care.  Consider drug fever.  Spoke with Daniel, Pharm.D. yesterday.  Monitoring temperature off Precedex.  He also mention fentanyl is a possibility and that is a consideration.  Acute respiratory failure with hypoxia-a threat to life or bodily function.  Patient has required increased FiO2 from 40% to 50% since yesterday.  Blood gas does not appear to correlate with pulse ox  Anoxic brain injury-patient found down at [facility].  Neurology following.  Patient continues to exhibit posturing and abnormal gaze.  Leukocytosis-increased.  Some concern regarding respiratory secretions as well as drainage around urethral catheter  Urine drug screen positive for cocaine on admission  Coronary artery disease status post CABG      RECOMMENDATION:   DC Montesinos?  Assess if was placed secondary to urinary retention .  Recommend obtain respiratory culture that was ordered  yesterday  Add manual differential to CBC  Follow-up on blood cultures obtained January 5  Will order another blood culture today  Consider reinstituting empiric antibiotic therapy  Await patient's ex-wife to obtain guardianship.  Apparently has court date tomorrow.  Per reports the patient's ex-wife and other family members friends all seem to indicate patient would not want to live like this so would expect he would transition to comfort measures  Continue to monitor temperature-I prefer to keep cooling blanket on monitor only to be able to better assess true temperature.      Constanza Dial MD  01/06/25  09:02 CST

## 2025-01-06 NOTE — PROGRESS NOTES
Inpatient Nutrition Services  Tube Feeding Follow-Up  Patient Name:  Ld Green  YOB: 1960  MRN: [id number]  Admit Date:  12/29/2024  Assessment Date:  1/6/2025   Reason for Assessment       Row Name 01/06/25 1006          Reason for Assessment    Reason For Assessment follow-up protocol          Nutrition/Diet History       Row Name 01/06/25 1006          Nutrition/Diet History    Typical Intake (Food/Fluid/EN/PN) TF held last night due to high residual, 1/5 1230, residual= 700mL. TF at 30mL/hr at time of visit, flush at 30mL/hr. UOP clear, yellow. BM 1/5 per RN. POC goal may shift towards comfort care; see SW note today. Based on 72-hr I/O documentation, pt received 1,160mL Diabetisource AC/day (66% EN goal volume) which provided 1392 kcal, 70 g pro, and 1489 enteric fluid intake (free water from formula and flushes). Other fluids provided an additional 1,066mL/day. UOP average 1708mL/d. NG output total 270mL x 72 hr. Glu ranges 113-202, WBC elevated.  1/6; enteric access not visualized. Continues on fentanyl drip with ventilator via OGT. Will continue to follow per protocol.     Enteral Nutrition Regimen Diabetisource AC at 80mL/hour with 30mL/hour free water flushes         Electronically signed by:  Montse Solorio RDN, CHATO  01/06/25 10:09 CST

## 2025-01-06 NOTE — PLAN OF CARE
Problem: Mechanical Ventilation Invasive  Goal: Absence of Ventilator-Induced Lung Injury  Intervention: Prevent Ventilator-Associated Pneumonia  Recent Flowsheet Documentation  Taken 1/5/2025 2355 by Araceli Guy RRT  Head of Bed (\[facility]\"") Positioning: HOB at 30-45 degrees  Taken 1/5/2025 1845 by Araceli Guy RRT  Head of Bed (\[facility]\"") Positioning: HOB at 30 degrees     Problem: Mechanical Ventilation Invasive  Goal: Effective Communication  Outcome: Progressing  Goal: Optimal Device Function  Outcome: Progressing  Intervention: Optimize Device Care and Function  Recent Flowsheet Documentation  Taken 1/5/2025 2355 by Araceli Guy RRT  Airway Safety Measures:   suction at bedside   oxygen flowmeter at bedside   manual resuscitator/mask at bedside  Taken 1/5/2025 1845 by Araceli Guy RRT  Airway Safety Measures:   suction at bedside   oxygen flowmeter at bedside   manual resuscitator/mask at bedside  Goal: Mechanical Ventilation Liberation  Outcome: Progressing  Goal: Optimal Nutrition Delivery  Outcome: Progressing  Goal: Absence of Device-Related Skin and Tissue Injury  Outcome: Progressing  Goal: Absence of Ventilator-Induced Lung Injury  Outcome: Progressing  Intervention: Prevent Ventilator-Associated Pneumonia  Recent Flowsheet Documentation  Taken 1/5/2025 2355 by Araceli Guy RRT  Head of Bed (\[facility]\"") Positioning: HOB at 30-45 degrees  Taken 1/5/2025 1845 by Araceli Guy RRT  Head of Bed (\[facility]\"") Positioning: HOB at 30 degrees     Problem: Skin Injury Risk Increased  Goal: Skin Health and Integrity  Intervention: Optimize Skin Protection  Recent Flowsheet Documentation  Taken 1/5/2025 2355 by Araceli Guy RRT  Head of Bed (\[facility]\"") Positioning: HOB at 30-45 degrees  Taken 1/5/2025 1845 by Araceli Guy RRT  Head of Bed (\[facility]\"") Positioning: HOB at 30 degrees     Problem: Skin Injury Risk Increased  Goal: Skin Health and Integrity  Intervention: Optimize Skin Protection  Recent Flowsheet  Documentation  Taken 1/5/2025 2355 by Araceli Guy RRT  Head of Bed (HOB) Positioning: HOB at 30-45 degrees  Taken 1/5/2025 1845 by Araceli Guy RRT  Head of Bed (HOB) Positioning: HOB at 30 degrees     Problem: Enteral Nutrition  Goal: Absence of Aspiration Signs and Symptoms  Intervention: Minimize Aspiration Risk  Recent Flowsheet Documentation  Taken 1/5/2025 2355 by Araceli Guy RRT  Head of Bed (HOB) Positioning: HOB at 30-45 degrees  Taken 1/5/2025 1845 by Araceli Guy RRT  Head of Bed (HOB) Positioning: HOB at 30 degrees   Goal Outcome Evaluation:

## 2025-01-06 NOTE — PLAN OF CARE
Problem: Mechanical Ventilation Invasive  Goal: Mechanical Ventilation Liberation  Intervention: Promote Extubation and Mechanical Ventilation Liberation  Description: Assess for pain, agitation and delirium regularly, utilizing a validated tool; minimize medication effects that may contribute to agitation, delirium or delay extubation.  Encourage early rehabilitation using therapeutic intervention and functional mobility training to minimize deconditioning, weakness, functional dependence and delirium.  Assess readiness to wake up, breathe, wean and extubate; consider protocol approach to reduce ventilator and intensive care days.  Perform spontaneous awakening trial; adjust medication to minimize effects that may contribute to extubation failure.  Perform SBT (spontaneous breathing trial); consider low inspiratory-pressure support.  Facilitate clustered care and uninterrupted sleep/rest pattern that supports home sleep routine; promote calm environment.  Acknowledge fear and anxiety related to the patient's and support system's experience of prolonged mechanical ventilation; encourage complementary therapies, such as music therapy.  Perform a cuff leak test to predict postextubation risk for swelling or stridor; consider intravenous or inhaled steroids for high-risk patients.  Consider prophylactic noninvasive ventilation after extubation for high-risk patients [e.g., COPD (chronic obstructive pulmonary disease), heart failure, older adults].  Consider the need for a longer-term airway.  Recent Flowsheet Documentation  Taken 1/6/2025 0500 by Sybil Heller RN  Medication Review/Management: medications reviewed  Taken 1/6/2025 0300 by Sybil Heller RN  Medication Review/Management: medications reviewed  Taken 1/6/2025 0200 by Sybil Heller RN  Medication Review/Management: medications reviewed  Taken 1/6/2025 0100 by Sybil Heller RN  Medication Review/Management: medications reviewed  Taken 1/6/2025  0000 by Sybil Heller RN  Medication Review/Management: medications reviewed  Taken 1/5/2025 2300 by Sybil Heller RN  Medication Review/Management: medications reviewed  Taken 1/5/2025 2200 by Sybil Heller RN  Medication Review/Management: medications reviewed  Taken 1/5/2025 2100 by Sybil Heller RN  Medication Review/Management: medications reviewed  Taken 1/5/2025 2000 by Sybil Heller RN  Medication Review/Management: medications reviewed  Taken 1/5/2025 1900 by Sybil Heller RN  Medication Review/Management: medications reviewed  Goal: Absence of Device-Related Skin and Tissue Injury  Intervention: Maintain Skin and Tissue Health  Description: Reposition and resecure endotracheal tube regularly; ensure proper tube location.  Monitor depth of suction catheter advancement to minimize the risk of internal tracheobronchial tissue injury.  Assess skin and mucosal areas around the device frequently.  Monitor tightness of securement device regularly; consider skin barrier protection.  Minimize pressure points and prevent traction on device, using careful positioning, flexible extenders and props.  Assess and monitor for the presence of bleeding that may indicate injury to tracheobronchial tissue. Notify provider for persistent bleeding.  Anticipate adjunct therapy, such as cool mist, racemic epinephrine, corticosteroid or heliox, for symptoms related to airway swelling or stridor after removal of tube.  Recent Flowsheet Documentation  Taken 1/5/2025 2000 by Sybil Heller RN  Device Skin Pressure Protection:   absorbent pad utilized/changed   adhesive use limited   skin-to-skin areas padded   skin-to-device areas padded   tubing/devices free from skin contact  Goal: Absence of Ventilator-Induced Lung Injury  Intervention: Prevent Ventilator-Associated Pneumonia  Description: Assess readiness to extubate; perform sedation interruption and spontaneous breathing trial.  Maintain semirecumbent  position to minimize aspiration risk.  Provide ongoing oral care to reduce pathogens in oral cavity.  Consider the use of antiseptic cloths for daily bathing.  Minimize ventilator circuit breaks; consider use of closed suction device.  Minimize microaspiration risk; consider the use of ultrathin polyurethane tapered endotracheal tubes with subglottic secretion drainage, as well as cuff pressure monitoring.  Assess need for stress ulcer and venous thromboembolism prophylaxis due to increased risk during mechanical ventilation.  Recent Flowsheet Documentation  Taken 1/6/2025 0500 by Sybil Heller RN  Head of Bed ([facility] [place]) Positioning: HOB at 30-45 degrees  Taken 1/6/2025 0400 by Sybil Heller RN  Oral Care:   lip/mouth moisturizer applied   teeth brushed - suction toothbrush   mouth wash rinse   oral rinse provided  Taken 1/6/2025 0300 by Sybil Heller RN  Head of Bed ([facility] [place]) Positioning: HOB at 30-45 degrees  Taken 1/6/2025 0100 by Sybil Heller RN  Head of Bed ([facility] [place]) Positioning: HOB at 30-45 degrees  Taken 1/6/2025 0000 by Sybil Heller RN  VAP Prevention Bundle:   HOB elevation maintained   oral care regularly provided  Oral Care:   lip/mouth moisturizer applied   suction provided   teeth brushed - suction toothbrush   mouth wash rinse  Taken 1/5/2025 2300 by Sybil Heller RN  Head of Bed ([facility] [place]) Positioning: HOB at 30-45 degrees  Taken 1/5/2025 2100 by Sybil Heller RN  Head of Bed ([facility] [place]) Positioning: HOB at 30-45 degrees  Taken 1/5/2025 2000 by Sybil Heller RN  Oral Care:   swabbed with antiseptic solution   suction provided   oral rinse provided   teeth brushed - suction toothbrush  Taken 1/5/2025 1900 by Sybil Heller RN  Head of Bed ([facility] [place]) Positioning: HOB at 30-45 degrees     Problem: Adult Inpatient Plan of Care  Goal: Absence of Hospital-Acquired Illness or Injury  Intervention: Identify and Manage Fall Risk  Description: Perform standard risk assessment on admission using a validated tool  or comprehensive approach appropriate to the patient; reassess fall risk frequently, with change in status or transfer to another level of care.  Communicate risk to interprofessional healthcare team; ensure fall risk visible cue.  Determine need for increased observation, equipment and environmental modification, as well as use of supportive, nonskid footwear.  Adjust safety measures to individual needs and identified risk factors.  Reinforce the importance of active participation with fall risk prevention, safety, and physical activity with the patient and family.  Perform regular intentional rounding to assess need for position change, pain assessment and personal needs, including assistance with toileting.  Recent Flowsheet Documentation  Taken 1/6/2025 0500 by Sybil Heller RN  Safety Promotion/Fall Prevention:   safety round/check completed   fall prevention program maintained  Taken 1/6/2025 0400 by Sybil Heller RN  Safety Promotion/Fall Prevention: safety round/check completed  Taken 1/6/2025 0300 by Sybil Heller RN  Safety Promotion/Fall Prevention: safety round/check completed  Taken 1/6/2025 0200 by Sybil Heller RN  Safety Promotion/Fall Prevention: safety round/check completed  Taken 1/6/2025 0100 by Sybil Heller RN  Safety Promotion/Fall Prevention: safety round/check completed  Taken 1/6/2025 0000 by Sybil Heller RN  Safety Promotion/Fall Prevention:   safety round/check completed   fall prevention program maintained  Taken 1/5/2025 2300 by Sybil Heller RN  Safety Promotion/Fall Prevention:   safety round/check completed   fall prevention program maintained  Taken 1/5/2025 2200 by Sybil Heller RN  Safety Promotion/Fall Prevention: safety round/check completed  Taken 1/5/2025 2100 by Sybil Heller RN  Safety Promotion/Fall Prevention: safety round/check completed  Taken 1/5/2025 2000 by Sybil Heller RN  Safety Promotion/Fall Prevention: safety round/check  completed  Taken 1/5/2025 1900 by Sybil Heller RN  Safety Promotion/Fall Prevention: safety round/check completed  Intervention: Prevent Skin Injury  Description: Perform a screening for skin injury risk, such as pressure or moisture-associated skin damage on admission and at regular intervals throughout hospital stay.  Keep all areas of skin (especially folds) clean and dry.  Maintain adequate skin hydration.  Relieve and redistribute pressure and protect bony prominences and skin at risk for injury; implement measures based on patient-specific risk factors.  Match turning and repositioning schedule to clinical condition.  Encourage weight shift frequently; assist with reposition if unable to complete independently.  Float heels off bed; avoid pressure on the Achilles tendon.  Keep skin free from extended contact with medical devices.  Optimize nutrition and hydration.  Encourage functional activity and mobility, as early as tolerated.  Use aids (e.g., slide boards, mechanical lift) during transfer.  Recent Flowsheet Documentation  Taken 1/6/2025 0500 by Sybil Heller RN  Body Position:   turned   left  Taken 1/6/2025 0300 by Sybil Heller RN  Body Position:   turned   supine  Taken 1/6/2025 0100 by Sybil Heller RN  Body Position:   turned   right  Taken 1/5/2025 2300 by Sybil Heller RN  Body Position:   turned   supine  Taken 1/5/2025 2100 by Sybil Heller RN  Body Position:   turned   left  Taken 1/5/2025 1900 by Sybil Heller RN  Body Position:   turned   right     Problem: Fall Injury Risk  Goal: Absence of Fall and Fall-Related Injury  Intervention: Identify and Manage Contributors  Description: Develop a fall prevention plan, considering patient-centered interventions and family/caregiver involvement; identify and address patient's facilitators and barriers.  Provide reorientation, appropriate sensory stimulation and routines with changes in mental status to decrease risk of  fall.  Promote use of personal vision and auditory aids.  Assess assistance level required for safe and effective self-care; provide support as needed, such as toileting and mobilization. For age 65 and older, implement timed toileting with assistance.  Encourage physical activity, such as performance of mobility and self-care at highest level of patient ability, multicomponent exercise program and provision of appropriate assistive devices.  If fall occurs, assess the severity of injury; implement fall injury protocol. Determine the cause and revise fall injury prevention plan.  Regularly review and advocate for medication adjustment to decrease fall risk; consider administration times, polypharmacy and age.  Balance adequate pain management with potential for oversedation.  Recent Flowsheet Documentation  Taken 1/6/2025 0500 by Sybil Heller RN  Medication Review/Management: medications reviewed  Taken 1/6/2025 0300 by Sybil Heller RN  Medication Review/Management: medications reviewed  Taken 1/6/2025 0200 by Sybil Heller RN  Medication Review/Management: medications reviewed  Taken 1/6/2025 0100 by Sybil Heller RN  Medication Review/Management: medications reviewed  Taken 1/6/2025 0000 by Sybil Heller RN  Medication Review/Management: medications reviewed  Taken 1/5/2025 2300 by Sybil Heller RN  Medication Review/Management: medications reviewed  Taken 1/5/2025 2200 by Sybil Heller RN  Medication Review/Management: medications reviewed  Taken 1/5/2025 2100 by Sybil Heller RN  Medication Review/Management: medications reviewed  Taken 1/5/2025 2000 by Sybil Heller RN  Medication Review/Management: medications reviewed  Taken 1/5/2025 1900 by Sybil Heller RN  Medication Review/Management: medications reviewed  Intervention: Promote Injury-Free Environment  Description: Provide a safe, barrier-free environment that encourages independent activity.  Keep care area uncluttered and  well-lighted.  Determine need for increased observation or monitoring.  Avoid use of devices that minimize mobility, such as restraints or indwelling urinary catheter.  Recent Flowsheet Documentation  Taken 1/6/2025 0500 by Sybil Heller RN  Safety Promotion/Fall Prevention:   safety round/check completed   fall prevention program maintained  Taken 1/6/2025 0400 by Sybil Heller RN  Safety Promotion/Fall Prevention: safety round/check completed  Taken 1/6/2025 0300 by Sybil Heller RN  Safety Promotion/Fall Prevention: safety round/check completed  Taken 1/6/2025 0200 by Sybil Heller RN  Safety Promotion/Fall Prevention: safety round/check completed  Taken 1/6/2025 0100 by Sybil Heller RN  Safety Promotion/Fall Prevention: safety round/check completed  Taken 1/6/2025 0000 by Sybil Heller RN  Safety Promotion/Fall Prevention:   safety round/check completed   fall prevention program maintained  Taken 1/5/2025 2300 by Sybil Heller RN  Safety Promotion/Fall Prevention:   safety round/check completed   fall prevention program maintained  Taken 1/5/2025 2200 by Sybil Heller RN  Safety Promotion/Fall Prevention: safety round/check completed  Taken 1/5/2025 2100 by Sybil Heller RN  Safety Promotion/Fall Prevention: safety round/check completed  Taken 1/5/2025 2000 by Sybil Heller RN  Safety Promotion/Fall Prevention: safety round/check completed  Taken 1/5/2025 1900 by Sybil Heller RN  Safety Promotion/Fall Prevention: safety round/check completed     Problem: Skin Injury Risk Increased  Goal: Skin Health and Integrity  Intervention: Optimize Skin Protection  Description: Perform a full pressure injury risk assessment, as indicated by screening, upon admission to care unit.  Reassess skin (full inspection and injury risk, including skin temperature, consistency and color) frequently (e.g., scheduled interval, with change in condition) to provide optimal early detection and  prevention.  Maintain adequate tissue perfusion (e.g., encourage fluid balance; avoid crossing legs, constrictive clothing or devices) to promote tissue oxygenation.  Maintain head of bed at lowest degree of elevation tolerated, considering medical condition and other restrictions. Use positioning supports to prevent sliding and friction. Consider low friction textiles.  Avoid positioning onto an area that remains reddened or on bony prominences.  Minimize incontinence and moisture (e.g., toileting schedule; moisture-wicking pad, diaper or incontinence collection device; skin moisture barrier).  Cleanse skin promptly and gently, when soiled, utilizing a pH-balanced cleanser.  Relieve and redistribute pressure (e.g., scheduled position changes, weight shifts, use of support surface, medical device repositioning, protective dressing application, use of positioning device, microclimate control, use of pressure-injury-monitor  Encourage increased activity, such as sitting in a chair at the bedside or early mobilization, when able to tolerate. Avoid prolonged sitting.  Recent Flowsheet Documentation  Taken 1/6/2025 0500 by Sybil Heller RN  Head of Bed ([facility]) Positioning: HOB at 30-45 degrees  Taken 1/6/2025 0400 by Sybil Heller RN  Activity Management: bedrest  Taken 1/6/2025 0300 by Sybil Heller RN  Head of Bed ([facility]) Positioning: HOB at 30-45 degrees  Taken 1/6/2025 0100 by Sybil Heller RN  Head of Bed ([facility]) Positioning: HOB at 30-45 degrees  Taken 1/5/2025 2300 by Sybil Heller RN  Head of Bed ([facility]) Positioning: HOB at 30-45 degrees  Taken 1/5/2025 2100 by Sybil Heller RN  Head of Bed ([facility]) Positioning: HOB at 30-45 degrees  Taken 1/5/2025 2000 by Sybil Heller RN  Activity Management: bedrest  Taken 1/5/2025 1900 by Sybil Heller RN  Head of Bed ([facility]) Positioning: HOB at 30-45 degrees     Problem: Restraint, Nonviolent  Goal: Absence of Harm or Injury  Intervention: Protect Skin and Joint  Integrity  Description: Frequently assess restraint application site for skin integrity, edema and perfusion distal to the site; document findings.  Consider protective skin barrier with risk of skin injury.  Release and replace restraint at regular intervals.  Assist with frequent joint range of motion activity.  Recent Flowsheet Documentation  Taken 1/6/2025 0500 by Sybil Heller RN  Body Position:   turned   left  Taken 1/6/2025 0300 by Sybil Heller RN  Body Position:   turned   supine  Taken 1/6/2025 0100 by Sybil Heller RN  Body Position:   turned   right  Taken 1/5/2025 2300 by Sybil Heller RN  Body Position:   turned   supine  Taken 1/5/2025 2100 by Sybil Heller RN  Body Position:   turned   left  Taken 1/5/2025 1900 by Sybil Heller RN  Body Position:   turned   right     Problem: Sepsis/Septic Shock  Goal: Absence of Infection Signs and Symptoms  Intervention: Promote Recovery  Description: Encourage pulmonary hygiene, such as cough-enhancement and airway-clearance techniques, that may include use of incentive spirometry, deep breathing and cough.  Encourage early rehabilitation and physical activity to optimize functional ability and activity tolerance, as well as minimize delirium.  Promote energy conservation; minimize oxygen demand and consumption by adjusting environment, decreasing stimulation, maintaining normothermia and treating pain.  Optimize fluid balance, nutrition intake, sleep and glycemic control to maintain tissue perfusion and enhance immune response.  Recent Flowsheet Documentation  Taken 1/6/2025 0400 by Sybil Heller RN  Activity Management: bedrest  Taken 1/5/2025 2000 by Sybil Heller RN  Activity Management: bedrest     Problem: Enteral Nutrition  Goal: Absence of Aspiration Signs and Symptoms  Intervention: Minimize Aspiration Risk  Description: Elevate head of bed to a semi-recumbent position.  Provide routine oral care with antimicrobial solution to  reduce risk of infection.  Promote continuous gastric feedings; advocate for postpyloric feeding if higher aspiration risk.  Confirm placement of gastric or gastroenteric access device prior to initiation of feedings and with adjustments.  Fredis tube at exit site at time of initial x-ray; monitor exit site for tube migration.  Monitor for feeding intolerance (e.g., abdominal distension, gastric residual volume, nausea and vomiting).  Recent Flowsheet Documentation  Taken 1/6/2025 0500 by Sybil Heller RN  Head of Bed ([facility]) Positioning: HOB at 30-45 degrees  Taken 1/6/2025 0400 by Sybil Heller RN  Oral Care:   lip/mouth moisturizer applied   teeth brushed - suction toothbrush   mouth wash rinse   oral rinse provided  Taken 1/6/2025 0300 by Sybil Heller RN  Head of Bed ([facility]) Positioning: HOB at 30-45 degrees  Taken 1/6/2025 0100 by Sybil Heller RN  Head of Bed ([facility]) Positioning: HOB at 30-45 degrees  Taken 1/6/2025 0000 by Sybil Heller RN  Oral Care:   lip/mouth moisturizer applied   suction provided   teeth brushed - suction toothbrush   mouth wash rinse  Taken 1/5/2025 2300 by Sybil Heller RN  Head of Bed ([facility]) Positioning: HOB at 30-45 degrees  Taken 1/5/2025 2100 by Sybil Heller RN  Head of Bed ([facility]) Positioning: HOB at 30-45 degrees  Taken 1/5/2025 2000 by Sybil Heller RN  Oral Care:   swabbed with antiseptic solution   suction provided   oral rinse provided   teeth brushed - suction toothbrush  Taken 1/5/2025 1900 by Sybil Heller RN  Head of Bed ([facility]) Positioning: HOB at 30-45 degrees     Problem: Skin Injury Risk Increased  Goal: Skin Health and Integrity  Intervention: Optimize Skin Protection  Description: Perform a full pressure injury risk assessment, as indicated by screening, upon admission to care unit.  Reassess skin (full inspection and injury risk, including skin temperature, consistency and color) frequently (e.g., scheduled interval, with change in condition) to  provide optimal early detection and prevention.  Maintain adequate tissue perfusion (e.g., encourage fluid balance; avoid crossing legs, constrictive clothing or devices) to promote tissue oxygenation.  Maintain head of bed at lowest degree of elevation tolerated, considering medical condition and other restrictions. Use positioning supports to prevent sliding and friction. Consider low friction textiles.  Avoid positioning onto an area that remains reddened or on bony prominences.  Minimize incontinence and moisture (e.g., toileting schedule; moisture-wicking pad, diaper or incontinence collection device; skin moisture barrier).  Cleanse skin promptly and gently, when soiled, utilizing a pH-balanced cleanser.  Relieve and redistribute pressure (e.g., scheduled position changes, weight shifts, use of support surface, medical device repositioning, protective dressing application, use of positioning device, microclimate control, use of pressure-injury-monitor  Encourage increased activity, such as sitting in a chair at the bedside or early mobilization, when able to tolerate. Avoid prolonged sitting.  Recent Flowsheet Documentation  Taken 1/6/2025 0500 by Sybil Heller RN  Head of Bed ([facility]) Positioning: HOB at 30-45 degrees  Taken 1/6/2025 0400 by Sybil Heller RN  Activity Management: bedrest  Taken 1/6/2025 0300 by Sybil Heller RN  Head of Bed ([facility]) Positioning: HOB at 30-45 degrees  Taken 1/6/2025 0100 by Sybil Heller RN  Head of Bed ([facility]) Positioning: HOB at 30-45 degrees  Taken 1/5/2025 2300 by Sybil Heller RN  Head of Bed ([facility]) Positioning: HOB at 30-45 degrees  Taken 1/5/2025 2100 by Sybil Heller RN  Head of Bed ([facility]) Positioning: HOB at 30-45 degrees  Taken 1/5/2025 2000 by Sybil Heller RN  Activity Management: bedrest  Taken 1/5/2025 1900 by Sybil Heller RN  Head of Bed ([facility]) Positioning: HOB at 30-45 degrees     Problem: Fall Injury Risk  Goal: Absence of Fall and Fall-Related  Injury  Intervention: Identify and Manage Contributors  Description: Develop a fall prevention plan, considering patient-centered interventions and family/caregiver involvement; identify and address patient's facilitators and barriers.  Provide reorientation, appropriate sensory stimulation and routines with changes in mental status to decrease risk of fall.  Promote use of personal vision and auditory aids.  Assess assistance level required for safe and effective self-care; provide support as needed, such as toileting and mobilization. For age 65 and older, implement timed toileting with assistance.  Encourage physical activity, such as performance of mobility and self-care at highest level of patient ability, multicomponent exercise program and provision of appropriate assistive devices.  If fall occurs, assess the severity of injury; implement fall injury protocol. Determine the cause and revise fall injury prevention plan.  Regularly review and advocate for medication adjustment to decrease fall risk; consider administration times, polypharmacy and age.  Balance adequate pain management with potential for oversedation.  Recent Flowsheet Documentation  Taken 1/6/2025 0500 by Sybil Heller RN  Medication Review/Management: medications reviewed  Taken 1/6/2025 0300 by Syibl Heller RN  Medication Review/Management: medications reviewed  Taken 1/6/2025 0200 by Sybil Heller RN  Medication Review/Management: medications reviewed  Taken 1/6/2025 0100 by Sybil Heller RN  Medication Review/Management: medications reviewed  Taken 1/6/2025 0000 by Sybil Heller RN  Medication Review/Management: medications reviewed  Taken 1/5/2025 2300 by Sybil Heller RN  Medication Review/Management: medications reviewed  Taken 1/5/2025 2200 by Sybil Heller RN  Medication Review/Management: medications reviewed  Taken 1/5/2025 2100 by Sybil Heller RN  Medication Review/Management: medications reviewed  Taken  1/5/2025 2000 by Sybil Heller RN  Medication Review/Management: medications reviewed  Taken 1/5/2025 1900 by Sybil Heller RN  Medication Review/Management: medications reviewed  Intervention: Promote Injury-Free Environment  Description: Provide a safe, barrier-free environment that encourages independent activity.  Keep care area uncluttered and well-lighted.  Determine need for increased observation or monitoring.  Avoid use of devices that minimize mobility, such as restraints or indwelling urinary catheter.  Recent Flowsheet Documentation  Taken 1/6/2025 0500 by Sybil Heller RN  Safety Promotion/Fall Prevention:   safety round/check completed   fall prevention program maintained  Taken 1/6/2025 0400 by Sybil Heller RN  Safety Promotion/Fall Prevention: safety round/check completed  Taken 1/6/2025 0300 by Sybil Heller RN  Safety Promotion/Fall Prevention: safety round/check completed  Taken 1/6/2025 0200 by Sybil Heller RN  Safety Promotion/Fall Prevention: safety round/check completed  Taken 1/6/2025 0100 by Sybil Heller RN  Safety Promotion/Fall Prevention: safety round/check completed  Taken 1/6/2025 0000 by Sybil Heller RN  Safety Promotion/Fall Prevention:   safety round/check completed   fall prevention program maintained  Taken 1/5/2025 2300 by Sybil Heller RN  Safety Promotion/Fall Prevention:   safety round/check completed   fall prevention program maintained  Taken 1/5/2025 2200 by Sybil Heller RN  Safety Promotion/Fall Prevention: safety round/check completed  Taken 1/5/2025 2100 by Sybil Heller RN  Safety Promotion/Fall Prevention: safety round/check completed  Taken 1/5/2025 2000 by Sybil Heller RN  Safety Promotion/Fall Prevention: safety round/check completed  Taken 1/5/2025 1900 by Sybil Heller RN  Safety Promotion/Fall Prevention: safety round/check completed     Problem: Mechanical Ventilation Invasive  Goal: Optimal Device  Function  Intervention: Optimize Device Care and Function  Description: Maintain head of bed elevation with regular position changes to minimize ventilation-perfusion mismatch and breathlessness.  Provide oral care regularly with subglottic suction to reduce the risk of infection; perform prior to cuff deflation or tube manipulation.  Assess tube size, depth, location and securement frequently to minimize the risk of tube displacement; confirm placement with radiography or ultrasonography.  Facilitate regular mechanical ventilator and humidification equipment checks to ensure proper function; monitor and manage ventilator and alarm settings.  Provide humidification and evaluate need for suctioning to minimize risk of airway obstruction; regularly replace closed suction equipment.  Perform ongoing device and stoma care to prevent infection; minimize excessive moisture around device; ensure tracheostomy inner cannula or single lumen device is cleaned or replaced regularly to prevent obstruction from secretions.  Monitor and manage cuff pressure routinely, if present; deflate cuff when not clinically indicated.  Maintain readily available emergency equipment that includes appropriate-sized manual resuscitation bag, mask, suction equipment, cleaning supplies and replacement airway devices.  If displacement occurs, provide oxygen to the nose, mouth and stoma. Notify provider.  Recent Flowsheet Documentation  Taken 1/6/2025 0400 by Sybil Heller, RN  Oral Care:   lip/mouth moisturizer applied   teeth brushed - suction toothbrush   mouth wash rinse   oral rinse provided  Taken 1/6/2025 0000 by Sybil Heller, RN  Oral Care:   lip/mouth moisturizer applied   suction provided   teeth brushed - suction toothbrush   mouth wash rinse  Taken 1/5/2025 2000 by Sybil Heller, RN  Oral Care:   swabbed with antiseptic solution   suction provided   oral rinse provided   teeth brushed - suction toothbrush  Goal: Mechanical  Ventilation Liberation  Intervention: Promote Extubation and Mechanical Ventilation Liberation  Description: Assess for pain, agitation and delirium regularly, utilizing a validated tool; minimize medication effects that may contribute to agitation, delirium or delay extubation.  Encourage early rehabilitation using therapeutic intervention and functional mobility training to minimize deconditioning, weakness, functional dependence and delirium.  Assess readiness to wake up, breathe, wean and extubate; consider protocol approach to reduce ventilator and intensive care days.  Perform spontaneous awakening trial; adjust medication to minimize effects that may contribute to extubation failure.  Perform SBT (spontaneous breathing trial); consider low inspiratory-pressure support.  Facilitate clustered care and uninterrupted sleep/rest pattern that supports home sleep routine; promote calm environment.  Acknowledge fear and anxiety related to the patient's and support system's experience of prolonged mechanical ventilation; encourage complementary therapies, such as music therapy.  Perform a cuff leak test to predict postextubation risk for swelling or stridor; consider intravenous or inhaled steroids for high-risk patients.  Consider prophylactic noninvasive ventilation after extubation for high-risk patients [e.g., COPD (chronic obstructive pulmonary disease), heart failure, older adults].  Consider the need for a longer-term airway.  Recent Flowsheet Documentation  Taken 1/6/2025 0500 by Sybil Heller RN  Medication Review/Management: medications reviewed  Taken 1/6/2025 0300 by Sybil Heller RN  Medication Review/Management: medications reviewed  Taken 1/6/2025 0200 by Sybil Heller RN  Medication Review/Management: medications reviewed  Taken 1/6/2025 0100 by Sybil Heller RN  Medication Review/Management: medications reviewed  Taken 1/6/2025 0000 by Sybil Heller RN  Medication Review/Management:  medications reviewed  Taken 1/5/2025 2300 by Sybil Heller RN  Medication Review/Management: medications reviewed  Taken 1/5/2025 2200 by Sybil Heller RN  Medication Review/Management: medications reviewed  Taken 1/5/2025 2100 by Sybil Heller RN  Medication Review/Management: medications reviewed  Taken 1/5/2025 2000 by Sybil Heller RN  Medication Review/Management: medications reviewed  Taken 1/5/2025 1900 by Sybil Heller RN  Medication Review/Management: medications reviewed  Goal: Absence of Device-Related Skin and Tissue Injury  Intervention: Maintain Skin and Tissue Health  Description: Reposition and resecure endotracheal tube regularly; ensure proper tube location.  Monitor depth of suction catheter advancement to minimize the risk of internal tracheobronchial tissue injury.  Assess skin and mucosal areas around the device frequently.  Monitor tightness of securement device regularly; consider skin barrier protection.  Minimize pressure points and prevent traction on device, using careful positioning, flexible extenders and props.  Assess and monitor for the presence of bleeding that may indicate injury to tracheobronchial tissue. Notify provider for persistent bleeding.  Anticipate adjunct therapy, such as cool mist, racemic epinephrine, corticosteroid or heliox, for symptoms related to airway swelling or stridor after removal of tube.  Recent Flowsheet Documentation  Taken 1/5/2025 2000 by Sybil Heller RN  Device Skin Pressure Protection:   absorbent pad utilized/changed   adhesive use limited   skin-to-skin areas padded   skin-to-device areas padded   tubing/devices free from skin contact  Goal: Absence of Ventilator-Induced Lung Injury  Intervention: Prevent Ventilator-Associated Pneumonia  Description: Assess readiness to extubate; perform sedation interruption and spontaneous breathing trial.  Maintain semirecumbent position to minimize aspiration risk.  Provide ongoing oral care  to reduce pathogens in oral cavity.  Consider the use of antiseptic cloths for daily bathing.  Minimize ventilator circuit breaks; consider use of closed suction device.  Minimize microaspiration risk; consider the use of ultrathin polyurethane tapered endotracheal tubes with subglottic secretion drainage, as well as cuff pressure monitoring.  Assess need for stress ulcer and venous thromboembolism prophylaxis due to increased risk during mechanical ventilation.  Recent Flowsheet Documentation  Taken 1/6/2025 0500 by Sybil Heller RN  Head of Bed ([facility]) Positioning: HOB at 30-45 degrees  Taken 1/6/2025 0400 by Sybil Heller RN  Oral Care:   lip/mouth moisturizer applied   teeth brushed - suction toothbrush   mouth wash rinse   oral rinse provided  Taken 1/6/2025 0300 by Sybil Heller RN  Head of Bed ([facility]) Positioning: HOB at 30-45 degrees  Taken 1/6/2025 0100 by Sybil Heller RN  Head of Bed ([facility]) Positioning: HOB at 30-45 degrees  Taken 1/6/2025 0000 by Sybil Heller RN  VAP Prevention Bundle:   HOB elevation maintained   oral care regularly provided  Oral Care:   lip/mouth moisturizer applied   suction provided   teeth brushed - suction toothbrush   mouth wash rinse  Taken 1/5/2025 2300 by Sybil Heller RN  Head of Bed ([facility]) Positioning: HOB at 30-45 degrees  Taken 1/5/2025 2100 by Sybil Heller RN  Head of Bed ([facility]) Positioning: HOB at 30-45 degrees  Taken 1/5/2025 2000 by Sybil Heller RN  Oral Care:   swabbed with antiseptic solution   suction provided   oral rinse provided   teeth brushed - suction toothbrush  Taken 1/5/2025 1900 by Sybil Heller RN  Head of Bed ([facility]) Positioning: HOB at 30-45 degrees   Goal Outcome Evaluation:

## 2025-01-06 NOTE — PLAN OF CARE
Goal Outcome Evaluation:            Sinus tach one teens.  TF remains off r/t previously high residual.  T max 101.5.  Turned q 2.  FiO2 increased to 50%.  Montesinos changed per order.

## 2025-01-06 NOTE — PROGRESS NOTES
[facility]    Palliative Care Daily Progress Note   Chief complaint-follow up support for patient/family    Code Status:   Code Status and Medical Interventions: No CPR (Do Not Attempt to Resuscitate); Full Support   Ordered at: 01/01/25 131     Level Of Support Discussed With:    Next of Kin (If No Surrogate)     Code Status (Patient has no pulse and is not breathing):    No CPR (Do Not Attempt to Resuscitate)     Medical Interventions (Patient has pulse or is breathing):    Full Support      Advanced Directives: Advance Directive Status: Patient has advance directive, copy requested   Goals of Care: Ongoing.     S: Medical record reviewed. Events noted.  Neurology consulted and repeat CT head 1/4 shows similar mild chronic small vessel ischemia.  No convincing evidence of cerebral edema.  Right maxillary sinusitis.  EEG suggest encephalopathy of unspecific etiology consistent with moderate to severe diffuse cerebral dysfunction and processes that can diffusely affect cerebral including toxic, metabolic, post hypoxic, multifocal, or degenerative conditions, as well as sedation.  No seizures noted.  Noted to have ongoing temperature receiving Tylenol and nursing utilizing cooling blanket.  Worsening leukocytosis.  Anemia stable.  Respiratory culture and blood cultures.  Infectious disease following, off antibiotics at this time.  Remains intubated on ventilator support.  Fentanyl for sedation.  Awaiting ex-spouse to establish guardianship.  Nursing spoke with patient's ex-spouse today and anticipating court date this afternoon.  No family at bedside.     Review of Systems   Unable to perform ROS: Intubated     Pain Assessment  CPOT and PAINAD Scales: CPOT (Critical-Care Pain Observation Tool)  CPOT Facial Expression: 0-->relaxed, neutral  CPOT Body Movements: 0-->absence of movements  CPOT Muscle Tension: 0-->relaxed  Ventilator Compliance/Vocalization:  0-->tolerating ventilator or movement  CPOT Score: 0    O:     Intake/Output Summary (Last 24 hours) at 2025 1411  Last data filed at 2025 1239  Gross per 24 hour   Intake 1105.96 ml   Output 1200 ml   Net -94.04 ml       Diagnostics and current medications: Reviewed.      Current Facility-Administered Medications:     acetaminophen (TYLENOL) 160 MG/5ML solution 650 mg, 650 mg, Nasogastric, Q6H PRN, Mando Olivo MD, 650 mg at 25 0837    acetaminophen (TYLENOL) suppository 650 mg, 650 mg, Rectal, Q4H PRN, Mando Olivo MD, 650 mg at 25 0355    acetaminophen (TYLENOL) tablet 650 mg, 650 mg, Per G Tube, Q6H PRN, Mando Olivo MD, 650 mg at 25 1255    [COMPLETED] amiodarone 150 mg in 100 mL D5W (loading dose), 150 mg, Intravenous, Once, 150 mg at 25 1800 **FOLLOWED BY** [] amiodarone 360 mg in 200 mL D5W infusion, 1 mg/min, Intravenous, Continuous, Last Rate: 33.3 mL/hr at 25 1908, 1 mg/min at 25 1908 **FOLLOWED BY** [] amiodarone 360 mg in 200 mL D5W infusion, 0.5 mg/min, Intravenous, Continuous, Stopped at 25 1821 **FOLLOWED BY** [COMPLETED] amiodarone (PACERONE) tablet 200 mg, 200 mg, Per G Tube, Once, 200 mg at 25 1820 **FOLLOWED BY** amiodarone (PACERONE) tablet 200 mg, 200 mg, Per G Tube, Q8H, 200 mg at 25 1240 **FOLLOWED BY** [START ON 2025] amiodarone (PACERONE) tablet 200 mg, 200 mg, Per G Tube, Q12H **FOLLOWED BY** [START ON 2025] amiodarone (PACERONE) tablet 200 mg, 200 mg, Per G Tube, Daily, Mando Olivo MD    amLODIPine (NORVASC) tablet 10 mg, 10 mg, Nasogastric, Nightly, Nicola Hare PA-C, 10 mg at 25    artificial tears ophthalmic ointment, , Both Eyes, Q1H PRN, Constanza Dial MD, Given at 25    atorvastatin (LIPITOR) tablet 40 mg, 40 mg, Nasogastric, Nightly, Nicola Hare PA-C, 40 mg at 25    sennosides-docusate (PERICOLACE) 8.6-50 MG per tablet 2 tablet, 2  tablet, Nasogastric, BID, 2 tablet at 01/05/25 2022 **AND** polyethylene glycol (MIRALAX) packet 17 g, 17 g, Nasogastric, Daily PRN, 17 g at 01/04/25 2020 **AND** [DISCONTINUED] bisacodyl (DULCOLAX) EC tablet 5 mg, 5 mg, Oral, Daily PRN **AND** bisacodyl (DULCOLAX) suppository 10 mg, 10 mg, Rectal, Daily PRN, Mando Olivo MD    budesonide (PULMICORT) nebulizer solution 0.5 mg, 0.5 mg, Nebulization, BID - RT, Cesar Alvarado MD, 0.5 mg at 01/06/25 0601    chlorhexidine (PERIDEX) 0.12 % solution 15 mL, 15 mL, Mouth/Throat, Q12H, Pravin Quinones APRN, 15 mL at 01/06/25 0837    Chlorhexidine Gluconate Cloth 2 % pads 1 Application, 1 Application, Topical, Q24H, Pravin Quinones APRN, 1 Application at 01/06/25 0426    clopidogrel (PLAVIX) tablet 75 mg, 75 mg, Nasogastric, Nightly, Nicola Hare PA-C, 75 mg at 01/05/25 2022    Enoxaparin Sodium (LOVENOX) syringe 40 mg, 40 mg, Subcutaneous, Daily, Pravin Quinones APRN, 40 mg at 01/06/25 0837    famotidine (PEPCID) tablet 20 mg, 20 mg, Nasogastric, Q12H, Nicola Hare PA-C, 20 mg at 01/06/25 0837    fentaNYL 2500 mcg/250 mL NS infusion,  mcg/hr, Intravenous, Titrated, Mando Olivo MD, Last Rate: 7.5 mL/hr at 01/06/25 1244, 75 mcg/hr at 01/06/25 1244    hydrALAZINE (APRESOLINE) injection 10 mg, 10 mg, Intravenous, Q6H PRN, Mando Olivo MD, 10 mg at 01/01/25 1256    ibuprofen (ADVIL,MOTRIN) 100 MG/5ML suspension 200 mg, 200 mg, Nasogastric, Q6H PRN, Constanza Dial MD    insulin glargine (LANTUS, SEMGLEE) injection 10 Units, 10 Units, Subcutaneous, Nightly, Mando Olivo MD, 10 Units at 01/05/25 2022    ipratropium-albuterol (DUO-NEB) nebulizer solution 3 mL, 3 mL, Nebulization, 4x Daily - RT, Mando Olivo MD, 3 mL at 01/06/25 1350    [Held by provider] lisinopril (PRINIVIL,ZESTRIL) tablet 40 mg, 40 mg, Nasogastric, Daily, Nicola Hare PA-C, 40 mg at 01/03/25 0902    metoprolol tartrate (LOPRESSOR) tablet 100 mg, 100 mg,  "Nasogastric, Q12H, Nicola Hare PA-C, 100 mg at 01/06/25 0837    nitroglycerin (NITROSTAT) SL tablet 0.4 mg, 0.4 mg, Sublingual, Q5 Min PRN, Pravin Quinones APRN    Phosphorus Replacement - Follow Nurse / BPA Driven Protocol, , Not Applicable, PRN, Hernan Gonzalez APRN    Potassium Replacement - Follow Nurse / BPA Driven Protocol, , Not Applicable, PRN, Hernan Gonzalez APRN    propofol (DIPRIVAN) infusion 10 mg/mL 100 mL, 5-50 mcg/kg/min, Intravenous, Titrated, Imer Valenzuela MD, Stopped at 12/30/24 0725    sodium chloride 0.9 % flush 10 mL, 10 mL, Intravenous, Q12H, Pravin Quinones APRN, 10 mL at 01/06/25 0837    sodium chloride 0.9 % flush 10 mL, 10 mL, Intravenous, PRN, Pravin Quinones APRN, 10 mL at 12/31/24 2201    sodium chloride 0.9 % infusion 40 mL, 40 mL, Intravenous, PRN, Pravin Quinones APRN    Allergies   Allergen Reactions    Codeine Nausea Only     I have utilized all available immediate resources to obtain, update, or review the patient's current medications (including all prescriptions, over-the-counter products, herbals, cannabis/cannabidiol products, and vitamin/mineral/dietary (nutritional) supplements) for name, route of administration, type, dose and frequency.    A:    /74   Pulse 100   Temp 100.3 °F (37.9 °C) (Rectal)   Resp 25   Ht 172.7 cm (68\")   Wt 108 kg (238 lb 8.6 oz)   SpO2 97%   BMI 36.27 kg/m²     Vitals and nursing note reviewed.   Constitutional:       Appearance: Acutely ill-appearing.      Interventions: Sedated and intubated.      Comments: OG tube with tube feeds infusing.  Fentanyl infusing.   HENT:      Head: Normocephalic.   Pulmonary:      Effort: Intubated.  Increased pulmonary effort, tachypnea, discussed with nursing.  Cardiovascular:      Normal rate.   Edema:     Peripheral edema absent.   Abdominal:      Palpations: Abdomen is soft.   Genitourinary:     Comments: Montesinos catheter in place.  Neurological:      Mental Status: " Unresponsive.      Comments: Positive gag.  Eyes open spontaneously.  Positive corneal reflex.  Does not blink to threat.  Sedated      Patient status: Disease state: Controlled with current treatments.  Current Functional status: Palliative Performance Scale Score: Performance 10% based on the following measures: Ambulation: Totally bed bound, Activity and Evidence of Disease: Unable to do any work, extensive evidence of disease, Self-Care: Total care required,  Intake: Mouth care only, LOC: Drowsy or comatose   Baseline ECOG Status(4) Completely disabled, unable to carry out self-care.  Totally confined to bed or chair.  Nutritional status: Albumin 2.8 Body mass index is 36.27 kg/m².      Hospital Problem List      Anoxic brain injury    Acute respiratory failure with hypoxia    On mechanically assisted ventilation    Cocaine abuse    Positive urine drug screen    Substance abuse    Tobacco use    COPD (chronic obstructive pulmonary disease)    Bipolar disorder    Coronary artery disease involving autologous vein coronary bypass graft without angina pectoris    Essential hypertension    Hyperlipidemia LDL goal <100     Impression/Problem List:     Anoxic brain injury, suspected  Acute respiratory failure with hypoxia requiring ventilator support  History of bladder cancer  Cerebral microvascular disease per CT  Respiratory culture positive for Staphylococcus aureus  Substance use, UDS positive cocaine and TCA  Hypoalbuminemia  COPD  Coronary artery disease  Hypertension  Hyperlipidemia  Bipolar disorder  Anxiety  Tobacco use      Recommendations/Plan:  1. plan: Goals of care include no CPR/full support interventions.     Family support: The patient lacks significant family support..  Advance Directives: Advance Directive Status: Patient has advance directive, copy in chart   POA/Healthcare surrogate-guardianship pending. paris Miramontes verbalizes she and patient were  over 23 years and have been   over the last 11 years.  States she speaks to patient on a daily basis and has known him over the course of 50 years. Patient does not have children per ex-spouse.     2.  Palliative care encounter  - Prognosis is poor long-term secondary to anoxic brain injury, respiratory failure, history of bladder cancer, cerebral microvascular disease, substance use, and multiple comorbidities.  -Family appears to have fair prognostic awareness.      -Intubated in the ED.  Respiratory panel negative.  Blood cultures pending.  Respiratory culture positive for Staphylococcus aureus.  UDS positive for cocaine and TCA.   Started on broad-spectrum antibiotic, breathing treatments, IV fluids.   -Suspected anoxic brain injury, remains on ventilator support.    1/2-According to chart review patient's ex-spouse is attempting to obtain legal guardianship.  Nursing reports court date set for Monday 1/4-Neurology consulted and repeat CT head and EEG completed-No seizures noted.  Notes extremely guarded prognosis.  1/6-Noted to have ongoing temperature receiving Tylenol and nursing utilizing cooling blanket.  Respiratory culture and blood cultures.   -Infectious disease following, off antibiotics at this time.    -Awaiting ex-spouse to establish guardianship.       1/3-continue supportive measures  -Further goals pending guardianship approval  -would anticipate potentially consult with neurology and further diagnostic neuro workup to provide more insight that may assist with medical decision making forward.   -Explored scenario with de-escalation options with goal of comfort per ex-spouse request    1/6-continue supportive measures  -Awaiting ex-spouse to establish guardianship.  -Neurology notes extremely guarded prognosis      Thank you for allowing us to participate in patient's plan of care. Palliative Care Team will continue to follow patient.     Mariya Calix, APRN  1/6/2025  14:11 CST

## 2025-01-06 NOTE — NURSING NOTE
De [place] Cruz cath exchange.  16 fr de la cruz cath inserted per sterile technique with NADIA Chew.  Urine returned.  Bulb inflated with 10 ml sterile saline.  Pt tolerated without difficulty.

## 2025-01-07 NOTE — PROGRESS NOTES
INFECTIOUS DISEASES PROGRESS NOTE    Patient:  Ld Green  YOB: 1960  MRN: [id number]   Admit date: 12/29/2024   Admitting Physician: Trevor Davis MD  Primary Care Physician: Xiomara Neal APRN      Chief Complaint: Unobtainable from patient on ventilator      Interval History: Patient continues to have temp around the 101 range.  Cooling blanket has not been turned back on per NADIA Holley and remains on monitor only    Respiratory Gram stain with mixed intracellular organisms      Allergies:   Allergies   Allergen Reactions    Codeine Nausea Only       Current Scheduled Medications:   amiodarone, 200 mg, Per G Tube, Q8H   Followed by  [START ON 1/9/2025] amiodarone, 200 mg, Per G Tube, Q12H   Followed by  [START ON 1/23/2025] amiodarone, 200 mg, Per G Tube, Daily  amLODIPine, 10 mg, Nasogastric, Nightly  atorvastatin, 40 mg, Nasogastric, Nightly  budesonide, 0.5 mg, Nebulization, BID - RT  chlorhexidine, 15 mL, Mouth/Throat, Q12H  Chlorhexidine Gluconate Cloth, 1 Application, Topical, Q24H  clopidogrel, 75 mg, Nasogastric, Nightly  enoxaparin, 40 mg, Subcutaneous, Daily  famotidine, 20 mg, Nasogastric, Q12H  insulin glargine, 10 Units, Subcutaneous, Nightly  ipratropium-albuterol, 3 mL, Nebulization, 4x Daily - RT  [Held by provider] lisinopril, 40 mg, Nasogastric, Daily  metoprolol tartrate, 100 mg, Nasogastric, Q12H  senna-docusate sodium, 2 tablet, Nasogastric, BID  sodium chloride, 10 mL, Intravenous, Q12H      Current PRN Medications:    acetaminophen    acetaminophen    acetaminophen    artificial tears    senna-docusate sodium **AND** polyethylene glycol **AND** [DISCONTINUED] bisacodyl **AND** bisacodyl    hydrALAZINE    ibuprofen    nitroglycerin    Phosphorus Replacement - Follow Nurse / BPA Driven Protocol    Potassium Replacement - Follow Nurse / BPA Driven Protocol    sodium chloride    sodium chloride    fentanyl 10 mcg/mL,  mcg/hr, Last Rate: 300 mcg/hr (01/07/25  "0411)  propofol, 5-50 mcg/kg/min, Last Rate: Stopped (24 0725)           Objective     Vital Signs:  Temp (24hrs), Av.6 °F (38.1 °C), Min:100.3 °F (37.9 °C), Max:100.9 °F (38.3 °C)      /75   Pulse 96   Temp (!) 100.8 °F (38.2 °C)   Resp 17   Ht 172.7 cm (68\")   Wt 108 kg (238 lb 8.6 oz)   SpO2 95%   BMI 36.27 kg/m²         Physical Exam:    General: Patient ill-appearing lying in bed.  Somewhat diaphoretic.  Temp 101.1  HEENT: Sclera somewhat injected but improved.  Neuro: Eyes were open and he was not tracking.  He did spontaneously blink but not to threat.  Did not squeeze hands to command.    Results Review:    I reviewed the patient's new clinical results.    Lab Results:    CBC:   Lab Results   Lab 25  0316 25  0201 25  0207 25  0135 25  0111 25  0326 25  0211   WBC 18.42* 18.20* 15.80* 15.70* 16.64* 18.31* 17.82*   HEMOGLOBIN 14.5 13.8 12.7* 11.8* 12.2* 12.3* 12.0*   HEMATOCRIT 44.2 42.6 39.8 36.8* 38.0 38.0 37.8   PLATELETS 196 198 202 181 213 239 253        AutoDiff:   Lab Results   Lab 25  0111 25  0326 25  0211   NEUTROPHIL % 72.0 72.4 71.9   LYMPHOCYTE % 8.2* 9.1* 10.2*   MONOCYTES % 14.8* 13.3* 12.3*   EOSINOPHIL % 1.9 1.5 1.4   BASOPHIL % 0.7 0.7 1.0   NEUTROS ABS 11.96* 13.27*  14.46* 12.81*   LYMPHS ABS 1.37 1.66 1.82   MONOS ABS 2.47* 2.43* 2.20*   EOS ABS 0.32 0.28  0.00 0.25   BASOS ABS 0.12 0.12  0.00 0.17        Manual Diff:    Lab Results   Lab 25  0207 25  0135 25  0326 25  0211   NEUTROPHIL % 80.0*  --  75.0  --    LYMPHOCYTE % 6.0*  --  6.0*  --    MONOCYTES % 9.0  --  15.0*  --    BANDS %  --   --  4.0  --    NEUTROS ABS 11.74*  12.64*   < > 13.27*  14.46* 12.81*   LYMPHS ABS 1.42  --  1.10  --    WBC MORPHOLOGY Normal  --   --   --    PLT MORPH Normal  --  Normal  --     < > = values in this interval not displayed.           CMP:   Lab Results   Lab 25  0111 25  0207 " 01/07/25  0211   SODIUM 142 143 141   POTASSIUM 4.5 4.8 4.3   CHLORIDE 103 105 103   CO2 32.0* 30.0* 33.0*   BUN 19 18 18   CREATININE 0.60* 0.53* 0.64*   CALCIUM 9.2 9.7 9.6   BILIRUBIN 0.5 0.7 0.6   ALK PHOS 94 103 104   ALT (SGPT) 25 30 34   AST (SGOT) 39 47* 53*   GLUCOSE 164* 151* 177*       Estimated Creatinine Clearance: 140.7 mL/min (A) (by C-G formula based on SCr of 0.64 mg/dL (L)).    Culture Results:    Microbiology Results (last 10 days)       Procedure Component Value - Date/Time    Respiratory Culture - Sputum, ET Suction [[id number]] Collected: 01/06/25 1248    Lab Status: Preliminary result Specimen: Sputum from ET Suction Updated: 01/06/25 1356     Gram Stain Many (4+) WBCs per low power field      No Epithelial cells per low power field      Moderate (3+) Mixed gram positive kapil      Few (2+) Gram negative bacilli      Mixed intracellular organisms suggestive of an aspiration event    Blood Culture - Blood, Hand, Left [[id number]]  (Normal) Collected: 01/05/25 0800    Lab Status: Preliminary result Specimen: Blood from Hand, Left Updated: 01/06/25 0830     Blood Culture No growth at 24 hours    Blood Culture - Blood, Arm, Right [[id number]]  (Normal) Collected: 01/01/25 1053    Lab Status: Final result Specimen: Blood from Arm, Right Updated: 01/06/25 1100     Blood Culture No growth at 5 days    Blood Culture - Blood, Arm, Left [[id number]]  (Normal) Collected: 01/01/25 1051    Lab Status: Final result Specimen: Blood from Arm, Left Updated: 01/06/25 1115     Blood Culture No growth at 5 days    Fungus Culture - Sputum, ET Suction [[id number]] Collected: 01/01/25 1048    Lab Status: Preliminary result Specimen: Sputum from ET Suction Updated: 01/06/25 1100     Fungus Culture No fungus isolated at less than 1 week    Respiratory Culture - Aspirate, ET Suction [[id number]]  (Abnormal)  (Susceptibility) Collected: 01/01/25 1047    Lab Status: Final result Specimen: Aspirate from ET Suction Updated:  "01/03/25 0820     Respiratory Culture Scant growth (1+) Staphylococcus aureus      No Normal Respiratory Karen     Gram Stain Many (4+) WBCs per low power field      Few (2+) Epithelial cells per low power field      Rare (1+) Gram positive cocci    Susceptibility        Staphylococcus aureus      IMER      Clindamycin Susceptible      Oxacillin Susceptible      Tetracycline Susceptible      Trimethoprim + Sulfamethoxazole Susceptible      Vancomycin Susceptible                           Chlamydia trachomatis, Neisseria gonorrhoeae, PCR - Swab, Penis [[id number]]  (Normal) Collected: 12/30/24 0839    Lab Status: Final result Specimen: Swab from Penis Updated: 12/30/24 1255     Chlamydia DNA by PCR Not Detected     Neisseria gonorrhoeae by PCR Not Detected    Narrative:      Disclaimer: The Aptima Combo 2 assay is a target amplification nucleic acid probe test that utilizes target capture for the in vitro qualitative detection and differentiation of ribosomal RNA from Chlamydia trachomatis and Neisseria gonorrhoeae to aid in the diagnosis of chlamydial and/or gonococcal urogenital disease.  Cell culture was once considered to be the \"gold standard\" for detection of CT and NG.  Culture is quite specific, but scientific studies have demonstrated that the NAAT DNA probe technologies have higher clinical sensitivities than culture.    MRSA Screen, PCR (Inpatient) - Swab, Nares [[id number]]  (Normal) Collected: 12/29/24 1753    Lab Status: Final result Specimen: Swab from Nares Updated: 12/29/24 1919     MRSA PCR No MRSA Detected    Narrative:      The negative predictive value of this diagnostic test is high and should only be used to consider de-escalating anti-MRSA therapy. A positive result may indicate colonization with MRSA and must be correlated clinically.    Respiratory Culture - Aspirate, ET Suction [[id number]]  (Abnormal)  (Susceptibility) Collected: 12/29/24 1507    Lab Status: Final result Specimen: Aspirate " from ET Suction Updated: 01/01/25 1024     Respiratory Culture Moderate growth (3+) Staphylococcus aureus      Light growth (2+) Normal Respiratory Kapil     Gram Stain Moderate (3+) WBCs per low power field      Rare (1+) Epithelial cells per low power field      Moderate (3+) Mixed gram positive kapil      Moderate (3+) Mixed gram negative kapil      Mixed intracellular organisms suggestive of an aspiration event    Susceptibility        Staphylococcus aureus      IMER      Clindamycin Susceptible      Oxacillin Susceptible      Tetracycline Susceptible      Trimethoprim + Sulfamethoxazole Susceptible      Vancomycin Susceptible                           Blood Culture - Blood, Hand, Digit Right [[id number]]  (Normal) Collected: 12/29/24 1230    Lab Status: Final result Specimen: Blood from Hand, Digit Right Updated: 01/03/25 1245     Blood Culture No growth at 5 days    Blood Culture - Blood, Arm, Right [[id number]]  (Normal) Collected: 12/29/24 1218    Lab Status: Final result Specimen: Blood from Arm, Right Updated: 01/03/25 1245     Blood Culture No growth at 5 days    Respiratory Panel PCR w/COVID-19(SARS-CoV-2) NACHO/ARACELI/CHRISTINE/PAD/COR/ZARINA In-House, NP Swab in UTM/VTM, 2 HR TAT - Swab, Nasopharynx [[id number]]  (Normal) Collected: 12/29/24 1205    Lab Status: Final result Specimen: Swab from Nasopharynx Updated: 12/29/24 1304     ADENOVIRUS, PCR Not Detected     Coronavirus 229E Not Detected     Coronavirus HKU1 Not Detected     Coronavirus NL63 Not Detected     Coronavirus OC43 Not Detected     COVID19 Not Detected     Human Metapneumovirus Not Detected     Human Rhinovirus/Enterovirus Not Detected     Influenza A PCR Not Detected     Influenza B PCR Not Detected     Parainfluenza Virus 1 Not Detected     Parainfluenza Virus 2 Not Detected     Parainfluenza Virus 3 Not Detected     Parainfluenza Virus 4 Not Detected     RSV, PCR Not Detected     Bordetella pertussis pcr Not Detected     Bordetella parapertussis PCR  Not Detected     Chlamydophila pneumoniae PCR Not Detected     Mycoplasma pneumo by PCR Not Detected    Narrative:      In the setting of a positive respiratory panel with a viral infection PLUS a negative procalcitonin without other underlying concern for bacterial infection, consider observing off antibiotics or discontinuation of antibiotics and continue supportive care. If the respiratory panel is positive for atypical bacterial infection (Bordetella pertussis, Chlamydophila pneumoniae, or Mycoplasma pneumoniae), consider antibiotic de-escalation to target atypical bacterial infection.                 Radiology:           Imaging Results (Last 72 Hours)       Procedure Component Value Units Date/Time    XR Chest 1 View [[id number]] Collected: 01/06/25 0648     Updated: 01/06/25 0654    Narrative:      EXAMINATION: XR CHEST 1 VW-     1/6/2025 2:20 AM     HISTORY: vent; J96.00-Acute respiratory failure, unspecified whether  with hypoxia or hypercapnia; R41.82-Altered mental status, unspecified;  F14.10-Cocaine abuse, uncomplicated     A frontal projection of the chest is compared with the previous study  dated 1/5/2025.     The lungs are moderately well-expanded.     There is an area of discoid atelectasis in the left midlung which was  not seen in the previous study.     An ill-defined opacity is seen in the right lower lung which was not  seen in the previous study. This may represent an area of atelectasis or  evolving infiltrate.     There is no pleural effusion, pulmonary congestion or pneumothorax.     There is moderate cardiomegaly. There is evidence of prior cardiac  surgery.     Endotracheal tube is in place. The nasogastric tube is not optimally  visualized or evaluated.     No acute bony abnormality. Postsurgical changes of the clavicle  bilaterally are seen.       Impression:      1. A new areas of discoid atelectasis in the left midlung. A new  ill-defined opacity in the right lower lung which may  represent  atelectasis or evolving infiltrate. Further follow-up is recommended.  2. Moderate cardiomegaly.  3. Endotracheal tube in place. The distal end of the nasogastric tube is  not visualized and not evaluated.        This report was signed and finalized on 1/6/2025 6:51 AM by Dr. Felix Watson MD.       XR Chest 1 View [[id number]] Collected: 01/05/25 0756     Updated: 01/05/25 0801    Narrative:      XR CHEST 1 VW-     HISTORY: vent; J96.00-Acute respiratory failure, unspecified whether  with hypoxia or hypercapnia; R41.82-Altered mental status, unspecified;  F14.10-Cocaine abuse, uncomplicated     COMPARISON: 1/3/2025     FINDINGS: Frontal view the chest obtained.     The endotracheal tube tip at the T3 level. Enteric tube descending into  the abdomen. Spinal cord stimulator device. Median sternotomy wires.     Low lung volumes left perihilar opacities concerning for possible  pneumonia. Right basilar ectasis. No radiographic evidence of edema. No  pleural effusion or pneumothorax.     Pression:  1. Life support lines described above.  2. Left hilar/perihilar opacities concerning for central  consolidation/pneumonia.        This report was signed and finalized on 1/5/2025 7:58 AM by Dr. Flaca Tatum MD.       CT Head Without Contrast [[id number]] Collected: 01/04/25 1438     Updated: 01/04/25 1448    Narrative:      CT HEAD WO CONTRAST- 1/4/2025 1:22 PM     HISTORY: AMS; J96.00-Acute respiratory failure, unspecified whether with  hypoxia or hypercapnia; R41.82-Altered mental status, unspecified;  F14.10-Cocaine abuse, uncomplicated      COMPARISON: 12/29/2024     TOTAL DOSE LENGTH PRODUCT: 1477.29 mGy.cm. Automated exposure control  was also utilized to decrease patient radiation dose.     TECHNIQUE: Axial images of the brain obtained without contrast     FINDINGS: The ventricles remain normal in size and configuration.  Similar mild chronic small vessel ischemic change. There is preservation  of the  gray-white matter interfaces without hypodense appearance of the  basal ganglia. Cerebellar tonsils positioned above the foramen magnum.  No significant effacement of the sulci. No intracranial hemorrhage or  mass effect. No acute signs of ischemia. No extra-axial hematoma or  subarachnoid hemorrhage.     Air-fluid level right maxillary sinus. Minimal partial opacification  left mastoid air cells. No skull fracture.       Impression:      1. No acute radiographic abnormality identified. Similar mild chronic  small vessel ischemia. No convincing evidence of cerebral edema.  2. Right maxillary sinusitis. Questionable small left mastoid effusion.     This report was signed and finalized on 1/4/2025 2:44 PM by Dr. Flaca Tatum MD.                   Active Hospital Problems    Diagnosis     **Anoxic brain injury     Acute respiratory failure with hypoxia     On mechanically assisted ventilation     Cocaine abuse     Positive urine drug screen     Substance abuse     Tobacco use     COPD (chronic obstructive pulmonary disease)     Bipolar disorder     Coronary artery disease involving autologous vein coronary bypass graft without angina pectoris     Essential hypertension     Hyperlipidemia LDL goal <100        IMPRESSION:  Fever-follow-up blood cultures remain negative.  There was report of normal-appearing sputum but patient coughed up around ET tube yesterday.  Respiratory culture sent with intracellular organisms noted on Gram stain.  Culture is pending.  Antibiotic review listed below.  Patient has essentially had fever daily since admission.  Montesinos catheter was removed.  Patient has no central line.  The patient did have methicillin-susceptible Staphylococcus aureus isolated from respiratory cultures on December 29-moderate amount.  Subsequent respiratory culture on January 1 had scant growth of methicillin susceptible Staphylococcus aureus.  Patient has had antibiotic treatment that would have covered that  pathogen.  Considered drug fever.  Patient essentially on fentanyl alone currently and fever predated starting that.  Acute respiratory failure with hypoxia-a threat to life or bodily function.  Patient's FiO2 was increased to 50% yesterday but is down to 45% currently.  Anoxic brain injury -patient found down at [facility].  Neurology following.  Patient continues to exhibit posturing and abnormal gaze.  Leukocytosis-ended down slightly today  Urine drug screen positive for cocaine on admission  Coronary artery disease status post CABG      RECOMMENDATION:   Continue to monitor blood cultures obtained January 5 and January 6  Empiric cefepime given Gram stain of respiratory specimen  Follow-up on respiratory culture  Continue to monitor CBC  Continue to monitor temperature curve for any improvement on empiric antibiotics  Continue to monitor temperature-I agree with keeping cooling blanket on monitor only to be able to better assess true temperature.      Antibiotic review  Zosyn started December 29-discontinue January 1   Vanco dosed December 29, December 30, January 1 and again January 3 and discontinued  Levofloxacin started January 1 and discontinued January 3    Constanza Dial MD  01/07/25  08:10 CST

## 2025-01-07 NOTE — PLAN OF CARE
Problem: Skin Injury Risk Increased  Goal: Skin Health and Integrity  Outcome: Not Progressing  Intervention: Optimize Skin Protection  Recent Flowsheet Documentation  Taken 1/6/2025 1800 by Leydi Garcia RN  Activity Management: bedrest  Taken 1/6/2025 1700 by Leydi Garcia RN  Activity Management: bedrest  Head of Bed (HOB) Positioning: HOB at 30-45 degrees  Taken 1/6/2025 1600 by Leydi Garcia RN  Activity Management: bedrest  Pressure Reduction Devices: pressure-redistributing mattress utilized  Taken 1/6/2025 1500 by Leydi Garcia RN  Activity Management: bedrest  Head of Bed (HOB) Positioning: HOB at 30-45 degrees  Taken 1/6/2025 1400 by Leydi Garcia RN  Activity Management: bedrest  Taken 1/6/2025 1300 by Leydi Garica RN  Activity Management: bedrest  Head of Bed (HOB) Positioning: HOB at 30-45 degrees  Taken 1/6/2025 1200 by Leydi Garcia RN  Activity Management: bedrest  Pressure Reduction Techniques: weight shift assistance provided  Pressure Reduction Devices: pressure-redistributing mattress utilized  Skin Protection: transparent dressing maintained  Taken 1/6/2025 1100 by Leydi Garcia RN  Activity Management: bedrest  Head of Bed (HOB) Positioning: HOB at 30-45 degrees  Taken 1/6/2025 1000 by Leydi Garcia RN  Activity Management: bedrest  Taken 1/6/2025 0900 by Leydi Garcia RN  Activity Management: bedrest  Head of Bed (HOB) Positioning: HOB at 30-45 degrees  Taken 1/6/2025 0800 by Leydi Garcia RN  Activity Management: bedrest  Pressure Reduction Techniques:   weight shift assistance provided   heels elevated off bed   pressure points protected  Pressure Reduction Devices: pressure-redistributing mattress utilized  Skin Protection: transparent dressing maintained  Taken 1/6/2025 0700 by Leydi Garcia RN  Activity Management: bedrest  Head of Bed (HOB) Positioning: HOB at 30-45 degrees     Problem: Fall Injury Risk  Goal: Absence of Fall and Fall-Related Injury  Outcome: Not Progressing  Intervention:  Promote Injury-Free Environment  Recent Flowsheet Documentation  Taken 1/6/2025 1800 by Leydi Garcia RN  Safety Promotion/Fall Prevention:   safety round/check completed   fall prevention program maintained  Taken 1/6/2025 1700 by Leydi Garcia RN  Safety Promotion/Fall Prevention:   safety round/check completed   fall prevention program maintained  Taken 1/6/2025 1600 by Leydi Garcia RN  Safety Promotion/Fall Prevention:   safety round/check completed   fall prevention program maintained  Taken 1/6/2025 1500 by Leydi Garcia RN  Safety Promotion/Fall Prevention:   safety round/check completed   fall prevention program maintained  Taken 1/6/2025 1400 by Leydi Garcia RN  Safety Promotion/Fall Prevention:   safety round/check completed   fall prevention program maintained  Taken 1/6/2025 1300 by Leydi Garcia RN  Safety Promotion/Fall Prevention:   safety round/check completed   fall prevention program maintained  Taken 1/6/2025 1200 by Leydi Garcia RN  Safety Promotion/Fall Prevention:   safety round/check completed   fall prevention program maintained  Taken 1/6/2025 1100 by Leydi Garcia RN  Safety Promotion/Fall Prevention:   safety round/check completed   fall prevention program maintained  Taken 1/6/2025 1000 by Leydi Garcia RN  Safety Promotion/Fall Prevention:   safety round/check completed   fall prevention program maintained  Taken 1/6/2025 0900 by Leydi Garcia RN  Safety Promotion/Fall Prevention:   safety round/check completed   fall prevention program maintained  Taken 1/6/2025 0800 by Leydi Garcia RN  Safety Promotion/Fall Prevention:   safety round/check completed   fall prevention program maintained  Taken 1/6/2025 0700 by Leydi Garcia RN  Safety Promotion/Fall Prevention:   safety round/check completed   fall prevention program maintained     Problem: Mechanical Ventilation Invasive  Goal: Effective Communication  Outcome: Not Progressing  Intervention: Ensure Effective Communication  Recent Flowsheet  Documentation  Taken 1/6/2025 1600 by Leydi Garcia RN  Trust Relationship/Rapport:   care explained   choices provided  Diversional Activities: television  Family/Support System Care: support provided  Communication Enhancement Strategies:   one-step directions provided   repetition utilized  Taken 1/6/2025 1200 by Leydi Garcia RN  Trust Relationship/Rapport:   care explained   choices provided  Diversional Activities: television  Family/Support System Care: support provided  Communication Enhancement Strategies:   repetition utilized   one-step directions provided  Taken 1/6/2025 0800 by Leydi Garcia RN  Trust Relationship/Rapport:   choices provided   care explained  Diversional Activities: television  Family/Support System Care: support provided  Communication Enhancement Strategies:   repetition utilized   one-step directions provided  Goal: Optimal Device Function  Outcome: Not Progressing  Intervention: Optimize Device Care and Function  Recent Flowsheet Documentation  Taken 1/6/2025 1600 by Leydi Garcia RN  Oral Care: swabbed with antiseptic solution  Taken 1/6/2025 1200 by Leydi Garcia RN  Oral Care: swabbed with antiseptic solution  Taken 1/6/2025 0800 by Leydi Garcia RN  Oral Care: swabbed with antiseptic solution  Goal: Mechanical Ventilation Liberation  Outcome: Not Progressing  Goal: Optimal Nutrition Delivery  Outcome: Not Progressing  Goal: Absence of Device-Related Skin and Tissue Injury  Outcome: Not Progressing  Intervention: Maintain Skin and Tissue Health  Recent Flowsheet Documentation  Taken 1/6/2025 1600 by Leydi Garcia RN  Device Skin Pressure Protection: tubing/devices free from skin contact  Taken 1/6/2025 1200 by Leydi Garcia RN  Device Skin Pressure Protection: tubing/devices free from skin contact  Taken 1/6/2025 0800 by Leydi Garcia RN  Device Skin Pressure Protection: tubing/devices free from skin contact  Goal: Absence of Ventilator-Induced Lung Injury  Outcome: Not  Progressing  Intervention: Prevent Ventilator-Associated Pneumonia  Recent Flowsheet Documentation  Taken 1/6/2025 1700 by Leydi Garcia RN  Head of Bed ([facility]) Positioning: HOB at 30-45 degrees  Taken 1/6/2025 1600 by Leydi Garcia RN  Oral Care: swabbed with antiseptic solution  Taken 1/6/2025 1500 by Leydi Garcia RN  Head of Bed ([facility]) Positioning: HOB at 30-45 degrees  Taken 1/6/2025 1300 by Leydi Garcia RN  Head of Bed ([facility]) Positioning: HOB at 30-45 degrees  Taken 1/6/2025 1200 by Leydi Garcia RN  Oral Care: swabbed with antiseptic solution  Taken 1/6/2025 1100 by Leydi Garcia RN  Head of Bed ([facility]) Positioning: HOB at 30-45 degrees  Taken 1/6/2025 0900 by Leydi Garcia RN  Head of Bed ([facility]) Positioning: HOB at 30-45 degrees  Taken 1/6/2025 0800 by Leydi Garcia RN  Oral Care: swabbed with antiseptic solution  Taken 1/6/2025 0700 by Leydi Garcia RN  Head of Bed ([facility]) Positioning: HOB at 30-45 degrees     Problem: Enteral Nutrition  Goal: Absence of Aspiration Signs and Symptoms  Outcome: Not Progressing  Intervention: Minimize Aspiration Risk  Recent Flowsheet Documentation  Taken 1/6/2025 1700 by Leydi Garcia RN  Head of Bed ([facility]) Positioning: HOB at 30-45 degrees  Taken 1/6/2025 1600 by Leydi Garcia RN  Oral Care: swabbed with antiseptic solution  Enteral Feeding Safety: placement verified by x-ray  Taken 1/6/2025 1500 by Leydi Garcia RN  Head of Bed ([facility]) Positioning: HOB at 30-45 degrees  Taken 1/6/2025 1300 by Leydi Garcia RN  Head of Bed ([facility]) Positioning: HOB at 30-45 degrees  Taken 1/6/2025 1200 by Leydi Garcia RN  Oral Care: swabbed with antiseptic solution  Enteral Feeding Safety: placement verified by x-ray  Taken 1/6/2025 1100 by Leydi Garcia RN  Head of Bed ([facility]) Positioning: HOB at 30-45 degrees  Taken 1/6/2025 0900 by Radha, Leydi, RN  Head of Bed (HOB) Positioning: HOB at 30-45 degrees  Taken 1/6/2025 0800 by Leydi Garcia RN  Oral Care: swabbed with antiseptic solution  Enteral Feeding Safety: placement  verified by x-ray  Taken 1/6/2025 0700 by Leydi Garcia RN  Head of Bed (HOB) Positioning: HOB at 30-45 degrees  Goal: Safe, Effective Therapy Delivery  Outcome: Not Progressing  Intervention: Prevent Feeding-Related Adverse Events  Recent Flowsheet Documentation  Taken 1/6/2025 1600 by Leydi Garcia RN  Enteral Feeding Safety: placement verified by x-ray  Taken 1/6/2025 1200 by Leydi Garcia RN  Enteral Feeding Safety: placement verified by x-ray  Taken 1/6/2025 0800 by Leydi Garcia RN  Enteral Feeding Safety: placement verified by x-ray  Goal: Feeding Tolerance  Outcome: Not Progressing   Goal Outcome Evaluation:

## 2025-01-07 NOTE — PLAN OF CARE
Goal Outcome Evaluation:  Plan of Care Reviewed With: other (see comments) (Rounds)        Progress: no change  Outcome Evaluation: TF has been advanced. He is now at 80mL/hour of Diabetisource AC and tolerating well with 0-minimal residual noted. Has had elevated BG. SS insulin coverage has been ordered.

## 2025-01-07 NOTE — PROGRESS NOTES
[facility]    Palliative Care Daily Progress Note   Chief complaint-follow up support for patient/family, goals of care    Code Status:   Code Status and Medical Interventions: No CPR (Do Not Attempt to Resuscitate); Full Support   Ordered at: 01/01/25 1318     Level Of Support Discussed With:    Next of Kin (If No Surrogate)     Code Status (Patient has no pulse and is not breathing):    No CPR (Do Not Attempt to Resuscitate)     Medical Interventions (Patient has pulse or is breathing):    Full Support      Advanced Directives: Advance Directive Status: Patient has advance directive, copy in chart   Goals of Care: Ongoing.     S: Medical record reviewed. Events noted.  Neurology following, case discussed with Dr. Vegas, notes extremely guarded prognosis and without significant neuro changes. Noted to have ongoing temperature receiving Tylenol.  Leukocytosis.  Anemia stable.  Respiratory culture and blood cultures pending.  Infectious disease following, off antibiotics at this time.  Remains intubated on ventilator support, Day #10.  Fentanyl for sedation.  Ex-spouse has obtained emergency guardianship.    Advance Care Planning spoke with ex-spouse, Fe via telephone.  We discussed patient's continued need for ventilator support, unfortunately no significant neurologic changes and discussion with neurology with regard to extremely guarded prognosis. Discussed de-escalation with focus of comfort and palliative extubation versus trach, PEG and LTAC placement if aggressive care interventions desired.  Fe reports she has identified a copy of patient's living will documentation dated 12/8/2016 designating patient's wishes for withdraw of life support interventions and not to be a organ or tissue donor.  A copy has been requested.  Fe is deciding whether or not she wishes to be present if decision made to de-escalate care measures to comfort.       Review of  Systems   Unable to perform ROS: Intubated     Pain Assessment  CPOT and PAINAD Scales: CPOT (Critical-Care Pain Observation Tool)  CPOT Facial Expression: 0-->relaxed, neutral  CPOT Body Movements: 0-->absence of movements  CPOT Muscle Tension: 0-->relaxed  Ventilator Compliance/Vocalization: 0-->tolerating ventilator or movement  CPOT Score: 0    O:     Intake/Output Summary (Last 24 hours) at 2025  Last data filed at 2025 0914  Gross per 24 hour   Intake 2347.28 ml   Output 850 ml   Net 1497.28 ml       Diagnostics and current medications: Reviewed.      Current Facility-Administered Medications:     acetaminophen (TYLENOL) 160 MG/5ML solution 650 mg, 650 mg, Nasogastric, Q6H PRN, Mando Olivo MD, 650 mg at 25 0914    acetaminophen (TYLENOL) suppository 650 mg, 650 mg, Rectal, Q4H PRN, Mando Olivo MD, 650 mg at 25 0355    acetaminophen (TYLENOL) tablet 650 mg, 650 mg, Per G Tube, Q6H PRN, Mando Olivo MD, 650 mg at 25 1255    [COMPLETED] amiodarone 150 mg in 100 mL D5W (loading dose), 150 mg, Intravenous, Once, 150 mg at 25 1800 **FOLLOWED BY** [] amiodarone 360 mg in 200 mL D5W infusion, 1 mg/min, Intravenous, Continuous, Last Rate: 33.3 mL/hr at 25 1908, 1 mg/min at 25 1908 **FOLLOWED BY** [] amiodarone 360 mg in 200 mL D5W infusion, 0.5 mg/min, Intravenous, Continuous, Stopped at 25 182 **FOLLOWED BY** [COMPLETED] amiodarone (PACERONE) tablet 200 mg, 200 mg, Per G Tube, Once, 200 mg at 25 1820 **FOLLOWED BY** amiodarone (PACERONE) tablet 200 mg, 200 mg, Per G Tube, Q8H, 200 mg at 25 0230 **FOLLOWED BY** [START ON 2025] amiodarone (PACERONE) tablet 200 mg, 200 mg, Per G Tube, Q12H **FOLLOWED BY** [START ON 2025] amiodarone (PACERONE) tablet 200 mg, 200 mg, Per G Tube, Daily, Mando Olivo MD    amLODIPine (NORVASC) tablet 10 mg, 10 mg, Nasogastric, Nightly, Nicola Hare, JOSÉ, 10 mg at 25     artificial tears ophthalmic ointment, , Both Eyes, Q1H PRN, Constanza Dial MD, Given at 01/07/25 0237    atorvastatin (LIPITOR) tablet 40 mg, 40 mg, Nasogastric, Nightly, Nicola Hare PA-C, 40 mg at 01/06/25 2046    sennosides-docusate (PERICOLACE) 8.6-50 MG per tablet 2 tablet, 2 tablet, Nasogastric, BID, 2 tablet at 01/06/25 2045 **AND** polyethylene glycol (MIRALAX) packet 17 g, 17 g, Nasogastric, Daily PRN, 17 g at 01/04/25 2020 **AND** [DISCONTINUED] bisacodyl (DULCOLAX) EC tablet 5 mg, 5 mg, Oral, Daily PRN **AND** bisacodyl (DULCOLAX) suppository 10 mg, 10 mg, Rectal, Daily PRN, Mando Olivo MD    budesonide (PULMICORT) nebulizer solution 0.5 mg, 0.5 mg, Nebulization, BID - RT, SensarmaCesar MD, 0.5 mg at 01/07/25 0600    cefepime 2000 mg IVPB in 100 mL NS (MBP), 2,000 mg, Intravenous, Once, Constanza Dial MD, 2,000 mg at 01/07/25 0914    cefepime 2000 mg IVPB in 100 mL NS (MBP), 2,000 mg, Intravenous, Q8H, Constanza Dial MD    chlorhexidine (PERIDEX) 0.12 % solution 15 mL, 15 mL, Mouth/Throat, Q12H, Pravin Quinones APRN, 15 mL at 01/07/25 0913    Chlorhexidine Gluconate Cloth 2 % pads 1 Application, 1 Application, Topical, Q24H, Pravin Quinones APRN, 1 Application at 01/07/25 0400    clopidogrel (PLAVIX) tablet 75 mg, 75 mg, Nasogastric, Nightly, Nicola Hare PA-C, 75 mg at 01/06/25 2046    Enoxaparin Sodium (LOVENOX) syringe 40 mg, 40 mg, Subcutaneous, Daily, Pravin Quinones REMA, 40 mg at 01/07/25 0914    famotidine (PEPCID) tablet 20 mg, 20 mg, Nasogastric, Q12H, Nicola Hare PA-C, 20 mg at 01/07/25 0914    fentaNYL 2500 mcg/250 mL NS infusion,  mcg/hr, Intravenous, Titrated, Mando Olivo MD, Last Rate: 30 mL/hr at 01/07/25 0411, 300 mcg/hr at 01/07/25 0411    hydrALAZINE (APRESOLINE) injection 10 mg, 10 mg, Intravenous, Q6H PRN, Mando Olivo MD, 10 mg at 01/01/25 1256    ibuprofen (ADVIL,MOTRIN) 100 MG/5ML suspension 200 mg, 200 mg,  "Nasogastric, Q6H PRN, Constanza Dial MD    insulin glargine (LANTUS, SEMGLEE) injection 10 Units, 10 Units, Subcutaneous, Nightly, Mando Olivo MD, 10 Units at 01/06/25 2046    ipratropium-albuterol (DUO-NEB) nebulizer solution 3 mL, 3 mL, Nebulization, 4x Daily - RT, Mando Olivo MD, 3 mL at 01/07/25 0600    [Held by provider] lisinopril (PRINIVIL,ZESTRIL) tablet 40 mg, 40 mg, Nasogastric, Daily, Nicola Hare PA-C, 40 mg at 01/03/25 0902    metoprolol tartrate (LOPRESSOR) tablet 100 mg, 100 mg, Nasogastric, Q12H, Nicola Hare PA-C, 100 mg at 01/07/25 0914    nitroglycerin (NITROSTAT) SL tablet 0.4 mg, 0.4 mg, Sublingual, Q5 Min PRN, Pravin Quinones APRN    Phosphorus Replacement - Follow Nurse / BPA Driven Protocol, , Not Applicable, PRN, Hernan Gonzalez APRN    Potassium Replacement - Follow Nurse / BPA Driven Protocol, , Not Applicable, PRMARKOS, Hernan Gonzalez APRN    propofol (DIPRIVAN) infusion 10 mg/mL 100 mL, 5-50 mcg/kg/min, Intravenous, Titrated, Imer Valenzuela MD, Stopped at 12/30/24 0725    sodium chloride 0.9 % flush 10 mL, 10 mL, Intravenous, Q12H, Pravin Quinones APRN, 10 mL at 01/07/25 0914    sodium chloride 0.9 % flush 10 mL, 10 mL, Intravenous, PRN, Pravin Quinones APRN, 10 mL at 12/31/24 2201    sodium chloride 0.9 % infusion 40 mL, 40 mL, Intravenous, PRN, Pravin Quinones APRN    Allergies   Allergen Reactions    Codeine Nausea Only     I have utilized all available immediate resources to obtain, update, or review the patient's current medications (including all prescriptions, over-the-counter products, herbals, cannabis/cannabidiol products, and vitamin/mineral/dietary (nutritional) supplements) for name, route of administration, type, dose and frequency.    A:    /80   Pulse 105   Temp (!) 100.8 °F (38.2 °C) (Rectal)   Resp 17   Ht 172.7 cm (68\")   Wt 108 kg (238 lb 8.6 oz)   SpO2 95%   BMI 36.27 kg/m²     Vitals and nursing note reviewed. "   Constitutional:       Appearance: Acutely ill-appearing.      Interventions: Sedated and intubated.      Comments: OG tube with tube feeds infusing.  Fentanyl infusing.   HENT:      Head: Normocephalic.   Pulmonary:      Effort: Intubated.  Increased pulmonary effort, tachypnea  Cardiovascular:      Tachycardia rate.   Edema:     Peripheral edema absent.   Abdominal:      Palpations: Abdomen is soft.   Genitourinary:     Comments: External urinary catheter in place.  Neurological:      Mental Status: Unresponsive.      Comments: Sedated      Patient status: Disease state: Controlled with current treatments.  Current Functional status: Palliative Performance Scale Score: Performance 10% based on the following measures: Ambulation: Totally bed bound, Activity and Evidence of Disease: Unable to do any work, extensive evidence of disease, Self-Care: Total care required,  Intake: Mouth care only, LOC: Drowsy or comatose   Baseline ECOG Status(4) Completely disabled, unable to carry out self-care.  Totally confined to bed or chair.  Nutritional status: Albumin 2.8 Body mass index is 36.27 kg/m².      Hospital Problem List      Anoxic brain injury    Acute respiratory failure with hypoxia    On mechanically assisted ventilation    Cocaine abuse    Positive urine drug screen    Substance abuse    Tobacco use    COPD (chronic obstructive pulmonary disease)    Bipolar disorder    Coronary artery disease involving autologous vein coronary bypass graft without angina pectoris    Essential hypertension    Hyperlipidemia LDL goal <100     Impression/Problem List:     Anoxic brain injury, suspected  Acute respiratory failure with hypoxia requiring ventilator support  History of bladder cancer  Cerebral microvascular disease per CT  Respiratory culture positive for Staphylococcus aureus  Substance use, UDS positive cocaine and TCA  Hypoalbuminemia  COPD  Coronary artery disease  Hypertension  Hyperlipidemia  Bipolar  disorder  Anxiety  Tobacco use      Recommendations/Plan:  1. plan: Goals of care include no CPR/full support interventions.     Family support: The patient lacks significant family support..  Advance Directives: Advance Directive Status: Patient has advance directive, copy in chart   POA/Healthcare surrogate-guardianship pending. Feparis verbalizes she and patient were  over 23 years and have been  over the last 11 years.  States she speaks to patient on a daily basis and has known him over the course of 50 years. Patient does not have children per ex-spouse.     2.  Palliative care encounter  - Prognosis is poor long-term secondary to anoxic brain injury, respiratory failure, history of bladder cancer, cerebral microvascular disease, substance use, and multiple comorbidities.  -Family appears to have fair prognostic awareness.      -Intubated in the ED.  Respiratory panel negative.  Blood cultures pending.  Respiratory culture positive for Staphylococcus aureus.  UDS positive for cocaine and TCA.   Started on broad-spectrum antibiotic, breathing treatments, IV fluids.   -Suspected anoxic brain injury, remains on ventilator support.    1/2-According to chart review patient's ex-spouse is attempting to obtain legal guardianship.  Nursing reports court date set for Monday 1/4-Neurology consulted and repeat CT head and EEG completed-No seizures noted.  Notes extremely guarded prognosis.  1/6-Noted to have ongoing temperature receiving Tylenol and nursing utilizing cooling blanket.  Respiratory culture and blood cultures.   -Infectious disease following, off antibiotics at this time.    -Awaiting ex-spouse to establish guardianship.       1/3-continue supportive measures  -Further goals pending guardianship approval  -would anticipate potentially consult with neurology and further diagnostic neuro workup to provide more insight that may assist with medical decision making forward.   -Explored  scenario with de-escalation options with goal of comfort per ex-spouse request    1/7-continue supportive measures  -Ex-spouse established guardianship 1/6, a copy has been scanned to chart.  -Neurology notes extremely guarded prognosis  -Discussed de-escalation with focus of comfort and palliative extubation versus trach, PEG and LTAC placement if aggressive care interventions desired.  Ex-spouse/guardian leaning toward comfort measures, awaiting final decision.  -Fe, ex-spouse reports she has identified a copy of patient's living will documentation dated 12/8/2016 designating patient's wishes for withdraw of life support interventions and not to be a organ or tissue donor.  A copy has been requested.         Thank you for allowing us to participate in patient's plan of care. Palliative Care Team will continue to follow patient.     Mariya Calix, APRN  1/7/2025  09:29 CST

## 2025-01-07 NOTE — PROGRESS NOTES
[facility]  INPATIENT PROGRESS NOTE    Patient Name: Ld Green  Date of Admission: 12/29/2024  Today's Date: 01/07/25  Length of Stay: 9  Primary Care Physician: Xiomara Neal APRN    Subjective   ICU summary:  64-year-old male with bipolar disorder, depression, tobacco abuse-smoker, DM type II, CAD, obesity, admitted to CCU on 12/29/2024 after being found unresponsive at home and ultimately required intubation in the emergency department.  His UDS was noted positive for cocaine and tricyclic's.  Since his admission, patient has had little to no improvement in his neurological exam.  Opens eyes with that fixed upward gaze.  Has cough and gag reflex.  Does not follow commands.  Continues to have fevers.  Concern for central source, response cooling blanket and acetaminophen only.  Has been treated with Zosyn and vancomycin and Levaquin.  Infectious disease consulted without obvious infectious source at this time.     Interval update:  1/7  With no significant change in neuroexam.  Remains febrile.  WBC trend down slightly.  Gram stain from respiratory culture with mixed gram-positive gram-negative kapil, possible aspiration picture.  Infectious disease started cefepime today.  Ex-wife, Fe, now establish guardianship.  She is also found a copy of living will from December 2016 and will bring copy.    Review of Systems   All pertinent negatives and positives are as above. All other systems have been reviewed and are negative unless otherwise stated.     Objective    Temp:  [100.3 °F (37.9 °C)-100.9 °F (38.3 °C)] 100.6 °F (38.1 °C)  Heart Rate:  [] 81  Resp:  [17-35] 18  BP: (111-175)/(58-94) 130/77  FiO2 (%):  [45 %-50 %] 45 %  Physical Exam  Vitals and nursing note reviewed.   Constitutional:       General: He is not in acute distress.     Interventions: He is intubated.   Eyes:      Pupils: Pupils are equal, round, and reactive to light.       Comments: Right upward deviated gaze   Cardiovascular:      Rate and Rhythm: Normal rate and regular rhythm.      Pulses: Normal pulses.      Heart sounds: Normal heart sounds.   Pulmonary:      Effort: He is intubated.      Comments: Breathsounds rhonchi on right, clear-diminished on left and in bases, nonlabored respirations  Abdominal:      General: There is no distension.      Palpations: Abdomen is soft.   Skin:     General: Skin is warm and dry.   Neurological:      Comments: Positive cough and gag reflex, fixed gaze, does not blink to threat, does not track, not following commands, does not withdraw from pain         Results Review:  Lab Results (last 24 hours)       Procedure Component Value Units Date/Time    Blood Culture - Blood, Arm, Left [[id number]]  (Normal) Collected: 01/06/25 1040    Specimen: Blood from Arm, Left Updated: 01/07/25 1101     Blood Culture No growth at 24 hours    Blood Culture - Blood, Hand, Left [[id number]]  (Normal) Collected: 01/05/25 0800    Specimen: Blood from Hand, Left Updated: 01/07/25 0830     Blood Culture No growth at 2 days    POC Glucose Once [[id number]]  (Abnormal) Collected: 01/07/25 0508    Specimen: Blood Updated: 01/07/25 0520     Glucose 175 mg/dL      Comment: : [id number] [place] WhitneyMeter ID: [id number]       Blood Gas, Arterial - [[id number]]  (Abnormal) Collected: 01/07/25 0341    Specimen: Arterial Blood Updated: 01/07/25 0343     Site Left Radial     Pedro Pablo's Test Positive     pH, Arterial 7.423 pH units      pCO2, Arterial 53.8 mm Hg      Comment: 83 Value above reference range        pO2, Arterial 94.5 mm Hg      HCO3, Arterial 35.1 mmol/L      Comment: 83 Value above reference range        Base Excess, Arterial 9.0 mmol/L      Comment: 83 Value above reference range        O2 Saturation, Arterial 97.7 %      Temperature 37.0     Barometric Pressure for Blood Gas 762 mmHg      Modality Ventilator     FIO2 50 %      Ventilator Mode AC     Set Tidal  Volume 500.000     Set [facility] Resp Rate 16.0     PEEP 5.0     Collected by SALLIE     Comment: Meter: [id number]     :  SALLIE        pCO2, Temperature Corrected 53.8 mm Hg      pH, Temp Corrected 7.423 pH Units      pO2, Temperature Corrected 94.5 mm Hg      PO2/FIO2 189    Comprehensive Metabolic Panel [[id number]]  (Abnormal) Collected: 01/07/25 0211    Specimen: Blood Updated: 01/07/25 0303     Glucose 177 mg/dL      BUN 18 mg/dL      Creatinine 0.64 mg/dL      Sodium 141 mmol/L      Potassium 4.3 mmol/L      Chloride 103 mmol/L      CO2 33.0 mmol/L      Calcium 9.6 mg/dL      Total Protein 6.3 g/dL      Albumin 2.8 g/dL      ALT (SGPT) 34 U/L      AST (SGOT) 53 U/L      Alkaline Phosphatase 104 U/L      Total Bilirubin 0.6 mg/dL      Globulin 3.5 gm/dL      A/G Ratio 0.8 g/dL      BUN/Creatinine Ratio 28.1     Anion Gap 5.0 mmol/L      eGFR 106.4 mL/min/1.73     Narrative:      GFR Categories in Chronic Kidney Disease (CKD)      GFR Category          GFR (mL/min/1.73)    Interpretation  G1                     90 or greater         Normal or high (1)  G2                      60-89                Mild decrease (1)  G3a                   45-59                Mild to moderate decrease  G3b                   30-44                Moderate to severe decrease  G4                    15-29                Severe decrease  G5                    14 or less           Kidney failure          (1)In the absence of evidence of kidney disease, neither GFR category G1 or G2 fulfill the criteria for CKD.    eGFR calculation 2021 CKD-EPI creatinine equation, which does not include race as a factor    Magnesium [[id number]]  (Normal) Collected: 01/07/25 0211    Specimen: Blood Updated: 01/07/25 0303     Magnesium 2.2 mg/dL     CBC & Differential [[id number]]  (Abnormal) Collected: 01/07/25 0211    Specimen: Blood Updated: 01/07/25 0240    Narrative:      The following orders were created for panel order CBC &  Differential.  Procedure                               Abnormality         Status                     ---------                               -----------         ------                     CBC Auto Differential[[id number]]        Abnormal            Final result                 Please view results for these tests on the individual orders.    CBC Auto Differential [[id number]]  (Abnormal) Collected: 01/07/25 0211    Specimen: Blood Updated: 01/07/25 0240     WBC 17.82 10*3/mm3      RBC 3.93 10*6/mm3      Hemoglobin 12.0 g/dL      Hematocrit 37.8 %      MCV 96.2 fL      MCH 30.5 pg      MCHC 31.7 g/dL      RDW 12.6 %      RDW-SD 44.7 fl      MPV 10.9 fL      Platelets 253 10*3/mm3      Neutrophil % 71.9 %      Lymphocyte % 10.2 %      Monocyte % 12.3 %      Eosinophil % 1.4 %      Basophil % 1.0 %      Immature Grans % 3.2 %      Neutrophils, Absolute 12.81 10*3/mm3      Lymphocytes, Absolute 1.82 10*3/mm3      Monocytes, Absolute 2.20 10*3/mm3      Eosinophils, Absolute 0.25 10*3/mm3      Basophils, Absolute 0.17 10*3/mm3      Immature Grans, Absolute 0.57 10*3/mm3      nRBC 0.0 /100 WBC     POC Glucose Once [[id number]]  (Abnormal) Collected: 01/06/25 2316    Specimen: Blood Updated: 01/06/25 2328     Glucose 151 mg/dL      Comment: : [id number] [place] WhitneyMeter ID: [id number]       POC Glucose Once [[id number]]  (Abnormal) Collected: 01/06/25 1800    Specimen: Blood Updated: 01/06/25 1815     Glucose 212 mg/dL      Comment: : [id number] Radha ([place]) SarahMeter ID: [id number]       Respiratory Culture - Sputum, ET Suction [[id number]] Collected: 01/06/25 1248    Specimen: Sputum from ET Suction Updated: 01/06/25 1356     Gram Stain Many (4+) WBCs per low power field      No Epithelial cells per low power field      Moderate (3+) Mixed gram positive kapil      Few (2+) Gram negative bacilli      Mixed intracellular organisms suggestive of an aspiration event    Fungitell B-D Glucan [[id number]] Collected:  01/02/25 1038    Specimen: Blood Updated: 01/06/25 1308     Result Negative     Fungitell Value <31.25 pg/mL      Reference Value Comment     Comment: Negative: <60, Positive: >/=60        Interpretation Notes     Comment: (1,3) Beta-D-Glucan was NOT DETECTED in the  sample. Reasons for negative results could  include the patient being in the early stage of  infection before detectable levels of (1,3)  Beta-D-Glucan are present. Clinical diagnosis  should be made in the context of the patient's  complete medical history.        Clinical Relevance Notes     Comment: The Fungitell test is indicated for presumptive  diagnosis of fungal infection and should be used  in conjunction with other diagnostic procedures.  The test detects glucan from the following  pathogens: Anjelica spp., Acremonium, Aspergillus  spp., Coccidioides immitis, Fusarium spp.,  Histoplasma capsulatum, Trichosporon spp.,  Sporothrix schenckii, Saccharomyces cerevisiae,  and Pneumocystis jiroveci.  This test does not detect certain fungal species  such as Cryptococcus, which produce very low  levels of (1,3)-beta-D-glucan. This test will not  detect the zygomycetes, such as Absidia,  Blastomyces, Mucor, and Rhizopus, which are not  known to produce (1,3)-beta-D-glucan. In  addition, the yeast phase of Blastomyces  dermatitidis produces little (1,3)-beta-D-glucan  and may not be detected by the assay.        Disclaimer Notes     Comment: This test was developed and its performance  characteristics determined by [facility] [employer] [facility]. It has not been cleared or approved  by the US Food and Drug Administration. This  laboratory is certified under the Clinical  Laboratory Improvement Amendments (CLIA) and  licensed by the [place] Department of  Health as qualified to perform high complexity  clinical laboratory testing.        Electronically signed by: Comment     Comment: Sahara Davila       Narrative:      Performed at:  01 - [facility]  [facility]  [address] [place] [address], [place]  [id number]  : Chetan Jarrett PhD, Phone:  [phone]    POC Glucose Once [[id number]]  (Abnormal) Collected: 01/06/25 1241    Specimen: Blood Updated: 01/06/25 1253     Glucose 167 mg/dL      Comment: : [id number] Radha Marion) SarahMeter ID: [id number]                XR Chest 1 View    Result Date: 1/6/2025  1. A new areas of discoid atelectasis in the left midlung. A new ill-defined opacity in the right lower lung which may represent atelectasis or evolving infiltrate. Further follow-up is recommended. 2. Moderate cardiomegaly. 3. Endotracheal tube in place. The distal end of the nasogastric tube is not visualized and not evaluated.   This report was signed and finalized on 1/6/2025 6:51 AM by Dr. Felix Watson MD.       Result Review:  I have personally reviewed the results from the time of this admission to 1/7/2025 12:17 CST and agree with these findings:  [x]  Laboratory list / accordion  [x]  Microbiology  [x]  Radiology  [x]  EKG/Telemetry   []  Cardiology/Vascular   []  Pathology  []  Old records  []  Other:    Culture Data:   Blood Culture   Date Value Ref Range Status   01/05/2025 No growth at 24 hours  Preliminary   01/01/2025 No growth at 4 days  Preliminary   01/01/2025 No growth at 4 days  Preliminary     Respiratory Culture   Date Value Ref Range Status   01/01/2025 Scant growth (1+) Staphylococcus aureus (A)  Final   01/01/2025 No Normal Respiratory Karen (A)  Final       I have reviewed the patient's current medications.     Assessment/Plan   Assessment  Active Hospital Problems    Diagnosis     **Anoxic brain injury     Acute respiratory failure with hypoxia     On mechanically assisted ventilation     Cocaine abuse     Positive urine drug screen     Substance abuse     Tobacco use     COPD (chronic obstructive pulmonary disease)     Bipolar disorder     Coronary artery disease involving autologous vein coronary bypass  graft without angina pectoris     Essential hypertension     Hyperlipidemia LDL goal <100    64-year-old male with suspected anoxic brain injury, acute hypoxic respiratory failure, in CCU for critical care management.  Little to no change in neuroexam.  Neurology following, extremely guarded prognosis. Continue off sedation, continue fentanyl for analgesia.  Persistent fevers, concern for central source . ID following.  Repeat sputum culture Gram stain with mixed kapil, started on cefepime today. Ex-wife, Fe, now established guardian.  She also found a copy of living will from December 2016 and will bring copy.  Appreciate palliative care medicine's assistance with goals of care.    Suspected anoxic brain injury  -Neurology following, very guarded prognosis  -Continue off sedation, wean fentanyl to patient tolerance  -Currently DNR, palliative care consulted and following to assist with goals of care.  Ex-wife is now guardian and also has living will documents from 2016.  She is to bring a copy.  Per report, these indicate he would not want prolonged life support or to be a donor.    Acute hypoxic respiratory failure  -Secondary to neurological status above  -Continue trend ABGs, currently requiring 45% FiO2, adjust vent settings as indicated  -Continue ventilator bundle    Febrile illness  -Central versus infectious  -Leukocytosis with slight trend down today  -Started on cefepime given Gram stain of sputum culture per ID  -Cooling blanket as needed    DM type II  -Continue diabetic tube feeds  -Continue long-acting insulin  -Blood glucose monitoring and add sliding scale insulin every 6 hr    VTE: Lovenox  GI: Famotidine  NTN: Tube feeding  Abx: Cefepime  Lines/Tubes: Peripheral IVs, OG tube, endotracheal tube  CODE STATUS: No CPR, full medical support, palliative care consulted    Disposition: Continue critical care management.  Poor prognosis given neurological exam.  Ex-wife, Fe now guardian.  Follow her  in palliative care medicine's discussions regarding goals of care and potential transition to comfort measures, as well as, patient's living will documents.    Total critical care time: Approximately 35 minutes     Due to a high probability of clinically significant, life threatening deterioration, the patient required my highest level of preparedness to intervene emergently and I personally spent this critical care time directly and personally managing the patient.      This critical care time included obtaining a history; examining the patient; pulse oximetry; ordering and review of studies; arranging urgent treatment with development of a management plan; evaluation of patient's response to treatment; frequent reassessment; and, discussions with other providers.     This critical care time was performed to assess and manage the high probability of imminent, life-threatening deterioration that could result in multi-organ failure. It was exclusive of separately billable procedures and treating other patients and teaching time.     Please see MDM section and the rest of the note for further information on patient assessment and treatment.     Part of this note may be an electronic transcription/translation of spoken language to printed text using the Dragon dictation system       Electronically signed by REMA Sepulveda on 1/7/2025 at 12:44 CST

## 2025-01-07 NOTE — PAYOR COMM NOTE
"REF:  [id number]     [facility]  [place],  [phone]  OR  FAX   [phone]      Ld Green (64 y.o. Male)       Date of Birth   1960    Social Security Number       Address   [address] [place] [address]    Home Phone   [phone]    MRN   [id number]       Synagogue   Other    Marital Status                               Admission Date   12/29/24    Admission Type   Emergency    Admitting Provider       Attending Provider   Trevor Davis MD    Department, Room/Bed   [facility] CARDIAC CARE, C002/1       Discharge Date       Discharge Disposition       Discharge Destination                                 Attending Provider: Trevor Davis MD    Allergies: Codeine    Isolation: None   Infection: None   Code Status: No CPR    Ht: 172.7 cm (68\")   Wt: 108 kg (238 lb 8.6 oz)    Admission Cmt: None   Principal Problem: Anoxic brain injury [G93.1]                   Active Insurance as of 12/29/2024       Primary Coverage       Payor Plan Insurance Group Employer/Plan Group    [facility] MEDICARE REPLACEMENT [facility] MED ADV SNP HMO [id number]       Payor Plan Address Payor Plan Phone Number Payor Plan Fax Number Effective Dates    [address] [address]   1/1/2024 - None Entered    [facility] [id number]         Subscriber Name Subscriber Birth Date Member ID       LD GREEN 1960 [id number]               Secondary Coverage       Payor Plan Insurance Group Employer/Plan Group    [employer]        Payor Plan Address Payor Plan Phone Number Payor Plan Fax Number Effective Dates    [address]   10/1/2014 - None Entered    [facility] [id number]         Subscriber Name Subscriber Birth Date Member ID       LD GREEN 1960 [id number]                     Emergency Contacts        (Rel.) Home Phone Work Phone Mobile Phone    Fe Suresh (Other) -- [phone] "              Vital Signs (last day)       Date/Time Temp Temp src Pulse Resp BP Patient Position SpO2    01/07/25 1200 100.6 (38.1) Rectal 80 -- 131/73 -- 94    01/07/25 1130 -- -- 80 -- 120/72 -- 95    01/07/25 1100 -- -- 81 -- 130/77 -- 91    01/07/25 1030 -- -- 81 -- 126/70 -- 95    01/07/25 1008 -- -- 82 18 -- -- 95    01/07/25 1000 -- -- 84 -- 119/70 -- 95    01/07/25 0958 -- -- 85 19 -- -- 95    01/07/25 0930 -- -- 108 -- 153/85 -- 97    01/07/25 0900 -- -- 105 -- 167/80 -- 95    01/07/25 0830 -- -- 103 -- 157/84 -- 95    01/07/25 0800 100.8 (38.2) Rectal 106 -- 175/87 -- 95 01/07/25 0730 -- -- 106 -- 160/81 -- 95 01/07/25 0700 -- -- 103 -- 167/81 -- 95 01/07/25 0600 -- -- 96 17 140/75 -- 95    01/07/25 0500 -- -- 83 -- 121/58 -- 95    01/07/25 0400 100.8 (38.2) -- 81 -- 118/63 -- 96    01/07/25 0310 -- -- 82 18 -- -- 96    01/07/25 0300 -- -- 82 -- 118/63 -- 96    01/07/25 0230 -- -- 81 -- 116/68 -- --    01/07/25 0200 -- -- 81 -- 111/62 -- 95    01/07/25 0100 -- -- 89 -- 123/70 -- 95    01/07/25 0000 100.5 (38.1) -- 87 -- 120/77 -- 95    01/06/25 2300 -- -- 86 -- 126/76 -- 95    01/06/25 2200 -- -- 102 -- 141/94 -- 95    01/06/25 2100 -- -- 110 -- 153/69 -- 93    01/06/25 2000 100.9 (38.3) Rectal 111 -- 163/77 -- 92    01/06/25 1900 -- -- 114 -- 155/72 -- 92    01/06/25 1850 -- -- -- 35 -- -- --    01/06/25 1830 -- -- 112 -- 158/74 -- 96    01/06/25 1800 -- -- 110 -- 164/83 -- 93    01/06/25 1730 -- -- 106 -- 152/83 -- 92    01/06/25 1700 -- -- 102 -- 137/77 -- 92    01/06/25 1630 -- -- 101 -- 146/75 -- 94    01/06/25 1600 100.3 (37.9) Rectal 100 -- 152/81 -- 92    01/06/25 1530 -- -- 99 -- 143/75 -- 95    01/06/25 1500 -- -- 100 -- 143/76 -- 95    01/06/25 1430 -- -- 103 -- 137/81 -- 95    01/06/25 1400 -- -- 100 -- 146/82 -- 96    01/06/25 1356 -- -- 100 25 -- -- 97    01/06/25 1350 -- -- 94 20 -- -- 96    01/06/25 1330 -- -- 91 -- 128/74 -- 95    01/06/25 1300 -- -- 92 -- 133/72 -- 95    01/06/25  1230 -- -- 93 -- 137/77 -- 93    01/06/25 1200 100.3 (37.9) Rectal 91 -- 142/77 -- 94    01/06/25 1130 -- -- 90 -- 132/78 -- 94    01/06/25 1100 -- -- 88 -- 132/71 -- 94    01/06/25 1030 -- -- 84 -- -- -- 94    01/06/25 1029 -- -- 83 19 131/76 -- 94    01/06/25 1024 -- -- 85 18 -- -- 95    01/06/25 1021 -- -- 82 -- -- -- 93    01/06/25 1000 -- -- 84 -- 101/64 -- 93    01/06/25 0900 -- -- 104 -- 125/74 -- 93    01/06/25 0830 -- -- 109 -- 157/82 -- 94    01/06/25 0800 101.1 (38.4) Rectal 106 -- 148/66 -- 94    01/06/25 0730 -- -- 115 -- 148/53 -- 95    01/06/25 0700 -- -- 105 -- 134/63 -- 93    01/06/25 0610 -- -- 90 18 -- -- 95    01/06/25 0601 -- -- 90 18 114/62 -- 95    01/06/25 0600 -- -- 91 -- 114/62 -- 94    01/06/25 0500 -- -- 93 -- 119/59 -- 95    01/06/25 0400 100.2 (37.9) -- 94 -- 121/74 -- 95    01/06/25 0310 -- -- -- 16 -- -- --    01/06/25 0300 -- -- 89 -- 101/66 -- 94    01/06/25 0200 -- -- 90 -- 118/76 -- 94    01/06/25 0100 -- -- 95 -- 114/69 -- 94    01/06/25 0000 101.4 (38.6) -- 92 -- 108/89 -- 76          Oxygen Therapy (last day)       Date/Time SpO2 Device (Oxygen Therapy) Flow (L/min) (Oxygen Therapy) Oxygen Concentration (%) ETCO2 (mmHg)    01/07/25 1200 94 ventilator -- 45 43    01/07/25 1130 95 -- -- -- 43    01/07/25 1100 91 -- -- -- 45    01/07/25 1030 95 -- -- -- 42    01/07/25 1008 95 ventilator -- 45 44    01/07/25 1000 95 -- -- -- 45    01/07/25 0958 95 ventilator -- 45 44    01/07/25 0930 97 -- -- -- 43    01/07/25 0900 95 -- -- -- 46    01/07/25 0830 95 -- -- 45 44    01/07/25 0800 95 ventilator -- -- 44    01/07/25 0730 95 -- -- -- 47    01/07/25 0700 95 -- -- -- 46    01/07/25 0605 -- -- -- -- 46    01/07/25 0600 95 ventilator -- 45 47    01/07/25 0500 95 -- -- -- 44    01/07/25 0400 96 -- -- -- 45    01/07/25 0310 96 -- -- -- 41    01/07/25 0300 96 -- -- -- 46    01/07/25 0200 95 -- -- -- 42    01/07/25 0100 95 -- -- -- 41    01/07/25 0000 95 -- -- -- 40    01/06/25 2300 95 -- -- --  42    01/06/25 2200 95 -- -- -- 42    01/06/25 2100 93 -- -- -- 42    01/06/25 2000 92 -- -- -- 37    01/06/25 1900 92 -- -- -- 38    01/06/25 1830 96 -- -- -- 32    01/06/25 1800 93 -- -- -- 42    01/06/25 1730 92 -- -- -- 29    01/06/25 1700 92 -- -- -- 33    01/06/25 1630 94 -- -- -- 34    01/06/25 1600 92 ventilator -- 50 35    01/06/25 1530 95 -- -- -- 33    01/06/25 1500 95 -- -- -- 37    01/06/25 1430 95 -- -- -- 36    01/06/25 1400 96 -- -- -- 37    01/06/25 1356 97 ventilator -- 50 12    01/06/25 1352 -- -- -- -- 39    01/06/25 1350 96 ventilator -- 50 38    01/06/25 1330 95 -- -- -- 37    01/06/25 1300 95 -- -- -- 38    01/06/25 1230 93 -- -- -- 36    01/06/25 1200 94 ventilator -- 50 41    01/06/25 1130 94 -- -- -- 40    01/06/25 1100 94 -- -- -- 42    01/06/25 1030 94 -- -- -- 40    01/06/25 1029 94 ventilator -- 50 42    01/06/25 1026 -- -- -- -- 41    01/06/25 1024 95 ventilator -- 50 45    01/06/25 1021 93 -- -- -- 43    01/06/25 1000 93 -- -- -- 45    01/06/25 0900 93 -- -- -- 38    01/06/25 0830 94 -- -- -- 36    01/06/25 0800 94 ventilator -- 50 41    01/06/25 0730 95 -- -- -- 41    01/06/25 0700 93 -- -- -- 45    01/06/25 0610 95 ventilator -- 50 45    01/06/25 0608 -- -- -- -- 45    01/06/25 0601 95 ventilator -- 50 42    01/06/25 0600 94 -- -- -- 42    01/06/25 0500 95 -- -- -- 44    01/06/25 0400 95 -- -- -- 42    01/06/25 0300 94 -- -- -- 41    01/06/25 0200 94 -- -- -- 39    01/06/25 0100 94 -- -- -- 45    01/06/25 0000 76 -- -- -- 33          Intake & Output (last day)         01/06 0701 01/07 0700 01/07 0701 01/08 0700    I.V. (mL/kg) 596.1 (5.5) 169.8 (1.6)    Other 510 90    NG/GT 1141 520    IV Piggyback  100    Total Intake(mL/kg) 2247.1 (20.8) 879.8 (8.1)    Urine (mL/kg/hr) 950 (0.4) 150 (0.3)    Emesis/NG output      Total Output 950 150    Net +1297.1 +729.8          Urine Unmeasured Occurrence 1 x     Stool Unmeasured Occurrence 1 x           Current Facility-Administered  Medications   Medication Dose Route Frequency Provider Last Rate Last Admin    acetaminophen (TYLENOL) 160 MG/5ML solution 650 mg  650 mg Nasogastric Q6H PRN Mando Olivo MD   650 mg at 01/07/25 0914    acetaminophen (TYLENOL) suppository 650 mg  650 mg Rectal Q4H PRN Mando Olivo MD   650 mg at 01/04/25 0355    acetaminophen (TYLENOL) tablet 650 mg  650 mg Per G Tube Q6H PRN Mando Olivo MD   650 mg at 01/03/25 1255    amiodarone (PACERONE) tablet 200 mg  200 mg Per G Tube Q8H Mando Olivo MD   200 mg at 01/07/25 1207    Followed by    [START ON 1/9/2025] amiodarone (PACERONE) tablet 200 mg  200 mg Per G Tube Q12H Mando Olivo MD        Followed by    [START ON 1/23/2025] amiodarone (PACERONE) tablet 200 mg  200 mg Per G Tube Daily Mando Olivo MD        amLODIPine (NORVASC) tablet 10 mg  10 mg Nasogastric Nightly Nicola Hare PA-C   10 mg at 01/06/25 2046    artificial tears ophthalmic ointment   Both Eyes Q1H PRN Constanza Dial MD   Given at 01/07/25 0237    atorvastatin (LIPITOR) tablet 40 mg  40 mg Nasogastric Nightly Nicola Hare PA-C   40 mg at 01/06/25 2046    sennosides-docusate (PERICOLACE) 8.6-50 MG per tablet 2 tablet  2 tablet Nasogastric BID Mando Olivo MD   2 tablet at 01/06/25 2045    And    polyethylene glycol (MIRALAX) packet 17 g  17 g Nasogastric Daily PRN Mando Olivo MD   17 g at 01/04/25 2020    And    bisacodyl (DULCOLAX) suppository 10 mg  10 mg Rectal Daily PRN Mando Olivo MD        budesonide (PULMICORT) nebulizer solution 0.5 mg  0.5 mg Nebulization BID - RT Cesar Alvarado MD   0.5 mg at 01/07/25 0600    cefepime 2000 mg IVPB in 100 mL NS (MBP)  2,000 mg Intravenous Q8H Constanza Dial MD        chlorhexidine (PERIDEX) 0.12 % solution 15 mL  15 mL Mouth/Throat Q12H Pravin Quinones APRN   15 mL at 01/07/25 0913    Chlorhexidine Gluconate Cloth 2 % pads 1 Application  1 Application Topical Q24H Pravin Quinones APRN   1  Application at 01/07/25 0400    clopidogrel (PLAVIX) tablet 75 mg  75 mg Nasogastric Nightly Nicola Hare PA-C   75 mg at 01/06/25 2046    dextrose (D50W) (25 g/50 mL) IV injection 25 g  25 g Intravenous Q15 Min PRN Pravin Quinones APRN        dextrose (GLUTOSE) oral gel 15 g  15 g Oral Q15 Min PRN Pravin Quinones APRN        Enoxaparin Sodium (LOVENOX) syringe 40 mg  40 mg Subcutaneous Daily Pravin Quinones APRN   40 mg at 01/07/25 0914    famotidine (PEPCID) tablet 20 mg  20 mg Nasogastric Q12H Nicola Hare PA-C   20 mg at 01/07/25 0914    fentaNYL 2500 mcg/250 mL NS infusion   mcg/hr Intravenous Titrated Mando Olivo MD 22.5 mL/hr at 01/07/25 0930 225 mcg/hr at 01/07/25 0930    glucagon (GLUCAGEN) injection 1 mg  1 mg Intramuscular Q15 Min PRN Pravin Quinones APRN        hydrALAZINE (APRESOLINE) injection 10 mg  10 mg Intravenous Q6H PRN Mando Olivo MD   10 mg at 01/01/25 1256    ibuprofen (ADVIL,MOTRIN) 100 MG/5ML suspension 200 mg  200 mg Nasogastric Q6H PRN Constanza Dial MD        insulin glargine (LANTUS, SEMGLEE) injection 10 Units  10 Units Subcutaneous Nightly Mando Olivo MD   10 Units at 01/06/25 2046    insulin regular (humuLIN R,novoLIN R) injection 2-7 Units  2-7 Units Subcutaneous Q6H Pravin Quinones APRN   2 Units at 01/07/25 1216    ipratropium-albuterol (DUO-NEB) nebulizer solution 3 mL  3 mL Nebulization 4x Daily - RT Mando Olivo MD   3 mL at 01/07/25 0958    [Held by provider] lisinopril (PRINIVIL,ZESTRIL) tablet 40 mg  40 mg Nasogastric Daily Nicola Hare PA-C   40 mg at 01/03/25 0902    metoprolol tartrate (LOPRESSOR) tablet 100 mg  100 mg Nasogastric Q12H Nicola Hare PA-C   100 mg at 01/07/25 0914    nitroglycerin (NITROSTAT) SL tablet 0.4 mg  0.4 mg Sublingual Q5 Min PRN Pravin Quinones APRN        Phosphorus Replacement - Follow Nurse / BPA Driven Protocol   Not Applicable PRN Hernan Gonzalez APRN        Potassium Replacement -  "Follow Nurse / BPA Driven Protocol   Not Applicable PRN Hernan Gonzalez APRN        propofol (DIPRIVAN) infusion 10 mg/mL 100 mL  5-50 mcg/kg/min Intravenous Titrated Imer Valenzuela MD   Stopped at 12/30/24 0725    sodium chloride 0.9 % flush 10 mL  10 mL Intravenous Q12H Pravin Quinones APRN   10 mL at 01/07/25 0914    sodium chloride 0.9 % flush 10 mL  10 mL Intravenous PRN Pravin Quinones APRN   10 mL at 12/31/24 2201    sodium chloride 0.9 % infusion 40 mL  40 mL Intravenous PRN Pravin Quinones APRN         Orders (last 24 hrs)        Start     Ordered    01/23/25 1831  amiodarone (PACERONE) tablet 200 mg  Daily        Placed in \"Followed by\" Linked Group    01/02/25 0817    01/23/25 1813  amiodarone (PACERONE) tablet 200 mg  Daily,   Status:  Discontinued        Placed in \"Followed by\" Linked Group    01/01/25 1752    01/09/25 1831  amiodarone (PACERONE) tablet 200 mg  Every 12 Hours        Placed in \"Followed by\" Linked Group    01/02/25 0817    01/09/25 1813  amiodarone (PACERONE) tablet 200 mg  Every 12 Hours,   Status:  Discontinued        Placed in \"Followed by\" Linked Group    01/01/25 1752    01/07/25 1730  cefepime 2000 mg IVPB in 100 mL NS (MBP)  Every 8 Hours         01/07/25 0822    01/07/25 1229  POC Glucose Once  PROCEDURE ONCE        Comments: Complete no more than 45 minutes prior to patient eating      01/07/25 1210    01/07/25 1200  POC Glucose Q6H  Every 6 Hours      Comments: Complete no more than 45 minutes prior to patient eating      01/07/25 0934    01/07/25 1200  insulin regular (humuLIN R,novoLIN R) injection 2-7 Units  Every 6 Hours Scheduled         01/07/25 0934    01/07/25 1030  furosemide (LASIX) injection 40 mg  Once         01/07/25 0935    01/07/25 0933  dextrose (GLUTOSE) oral gel 15 g  Every 15 Minutes PRN         01/07/25 0934    01/07/25 0933  dextrose (D50W) (25 g/50 mL) IV injection 25 g  Every 15 Minutes PRN         01/07/25 0934    01/07/25 0933  glucagon " "(GLUCAGEN) injection 1 mg  Every 15 Minutes PRN         01/07/25 0934    01/07/25 0915  cefepime 2000 mg IVPB in 100 mL NS (MBP)  Once         01/07/25 0822    01/07/25 0600  Magnesium  Morning Draw         01/06/25 1804    01/07/25 0600  CBC Auto Differential  PROCEDURE ONCE         01/06/25 2201    01/07/25 0520  POC Glucose Once  PROCEDURE ONCE        Comments: Complete no more than 45 minutes prior to patient eating      01/07/25 0508    01/07/25 0344  Blood Gas, Arterial -  PROCEDURE ONCE         01/07/25 0341    01/06/25 2328  POC Glucose Once  PROCEDURE ONCE        Comments: Complete no more than 45 minutes prior to patient eating      01/06/25 2316    01/06/25 1816  POC Glucose Once  PROCEDURE ONCE        Comments: Complete no more than 45 minutes prior to patient eating      01/06/25 1800    01/06/25 1253  POC Glucose Once  PROCEDURE ONCE        Comments: Complete no more than 45 minutes prior to patient eating      01/06/25 1241    01/05/25 1745  Urinary Catheter Care  Every Shift       01/05/25 1748    01/05/25 0600  CBC & Differential  Daily       01/04/25 1646    01/05/25 0600  Comprehensive Metabolic Panel  Daily       01/04/25 1646    01/04/25 1011  ibuprofen (ADVIL,MOTRIN) 100 MG/5ML suspension 200 mg  Every 6 Hours PRN         01/04/25 1012    01/04/25 1011  artificial tears ophthalmic ointment  Every 1 Hour PRN         01/04/25 1012    01/03/25 2100  insulin glargine (LANTUS, SEMGLEE) injection 10 Units  Nightly         01/03/25 1238    01/03/25 1255  acetaminophen (TYLENOL) 160 MG/5ML solution 650 mg  Every 6 Hours PRN         01/03/25 1256    01/03/25 0231  amiodarone (PACERONE) tablet 200 mg  Every 8 Hours        Placed in \"Followed by\" Linked Group    01/02/25 0817    01/01/25 1752  Monitor QTc  Every Shift       01/01/25 1752    01/01/25 1415  fentaNYL 2500 mcg/250 mL NS infusion  Titrated         01/01/25 1316    12/31/24 2100  sennosides-docusate (PERICOLACE) 8.6-50 MG per tablet 2 tablet  2 " "Times Daily        Placed in \"And\" Linked Group    12/31/24 1055    12/31/24 1054  acetaminophen (TYLENOL) tablet 650 mg  Every 6 Hours PRN         12/31/24 1055    12/31/24 1054  bisacodyl (DULCOLAX) suppository 10 mg  Daily PRN        Placed in \"And\" Linked Group    12/31/24 1055    12/31/24 1054  polyethylene glycol (MIRALAX) packet 17 g  Daily PRN        Placed in \"And\" Linked Group    12/31/24 1055    12/31/24 0738  hydrALAZINE (APRESOLINE) injection 10 mg  Every 6 Hours PRN         12/31/24 0740    12/31/24 0329  Phosphorus Replacement - Follow Nurse / BPA Driven Protocol  As Needed         12/31/24 0330    12/31/24 0329  Potassium Replacement - Follow Nurse / BPA Driven Protocol  As Needed         12/31/24 0330    12/30/24 2100  amLODIPine (NORVASC) tablet 10 mg  Nightly         12/30/24 1100    12/30/24 2100  atorvastatin (LIPITOR) tablet 40 mg  Nightly         12/30/24 1100    12/30/24 2100  clopidogrel (PLAVIX) tablet 75 mg  Nightly         12/30/24 1100    12/30/24 2100  famotidine (PEPCID) tablet 20 mg  Every 12 Hours Scheduled         12/30/24 1117    12/30/24 1549  acetaminophen (TYLENOL) suppository 650 mg  Every 4 Hours PRN         12/30/24 1550    12/30/24 1409  Monitor QTc  Every Shift       12/30/24 1409    12/30/24 1200  [Held by provider]  lisinopril (PRINIVIL,ZESTRIL) tablet 40 mg  Daily        (On hold since Sat 1/4/2025 at 0720 until manually unheld; held by Monika Alicea APRNHold Reason: NPO)    12/30/24 1100    12/30/24 1200  metoprolol tartrate (LOPRESSOR) tablet 100 mg  Every 12 Hours Scheduled         12/30/24 1105    12/30/24 1200  POC Glucose Finger Q6H  Every 6 Hours      Comments: Complete no more than 45 minutes prior to patient eating      12/30/24 1147    12/30/24 1200  Daily Weights  Daily       12/30/24 1159    12/30/24 1158  Tube Feeding Assessment and I/O  Every Shift       12/30/24 1159    12/30/24 1100  ipratropium-albuterol (DUO-NEB) nebulizer solution 3 mL  4 Times " Daily - RT         12/30/24 0939    12/30/24 0400  Chlorhexidine Gluconate Cloth 2 % pads 1 Application  Every 24 Hours         12/29/24 1625    12/29/24 2100  sodium chloride 0.9 % flush 10 mL  Every 12 Hours Scheduled         12/29/24 1625    12/29/24 2100  chlorhexidine (PERIDEX) 0.12 % solution 15 mL  Every 12 Hours Scheduled         12/29/24 1626    12/29/24 2030  budesonide (PULMICORT) nebulizer solution 0.5 mg  2 Times Daily - RT         12/29/24 1858    12/29/24 2000  Oral Care & Teeth Brushing - Intubated Patient  Every 4 Hours      Comments: [place] Teeth at Least 2x/day    12/29/24 1626    12/29/24 1800  Post Extubation: Begin Incentive Spirometry Every 2 Hours While Awake  Every 2 Hours While Awake       12/29/24 1626    12/29/24 1715  Enoxaparin Sodium (LOVENOX) syringe 40 mg  Daily         12/29/24 1625    12/29/24 1700  Vital Signs Every Hour and Per Hospital Policy Based on Patient Condition  Every Hour       12/29/24 1625    12/29/24 1700  Intake & Output  Every Hour       12/29/24 1625    12/29/24 1626  Daily Weights  Daily       12/29/24 1625    12/29/24 1623  Oral Care - Patient Not on NPPV & Not Intubated  Every Shift       12/29/24 1625    12/29/24 1622  nitroglycerin (NITROSTAT) SL tablet 0.4 mg  Every 5 Minutes PRN         12/29/24 1625    12/29/24 1622  sodium chloride 0.9 % flush 10 mL  As Needed         12/29/24 1625    12/29/24 1622  sodium chloride 0.9 % infusion 40 mL  As Needed         12/29/24 1625    12/29/24 1235  propofol (DIPRIVAN) infusion 10 mg/mL 100 mL  Titrated         12/29/24 1219    Unscheduled  Spontaneous Awakening Trial  Daily - SAT       12/29/24 1626    Unscheduled  Spontaneous Breathing Trial  Daily - SBT       12/29/24 1626    Unscheduled  Oxygen Therapy- Nasal Cannula; Titrate 1-6 LPM Per SpO2; 92% or Greater  Continuous PRN       12/29/24 1626    Unscheduled  If Stridor is Noted, Initiate Cool Mist Aerosol Mask and Notify the Provider for Additional Orders  As  Needed       12/29/24 1626    Unscheduled  Fredis & Document Feeding Tube Depth (in cm)  As Needed       12/30/24 1159    Unscheduled  Verify Feeding Tube Placement Upon Insertion & As Needed  As Needed       12/30/24 1159    Unscheduled  Flush Feeding Tube With 30-50mL Water As Needed  As Needed       12/30/24 1159    Unscheduled  Blood Gas, Arterial -  As Needed       12/31/24 0336    Unscheduled  Extravasation Standing Orders - Encourage Active Range of Motion After 48 Hours  As Needed       01/02/25 0918    Unscheduled  Follow Hypoglycemia Standing Orders For Blood Glucose <70 & Notify Provider of Treatment  As Needed      Comments: Follow Hypoglycemia Orders As Outlined in Process Instructions (Open Order Report to View Full Instructions)  Notify Provider Any Time Hypoglycemia Treatment is Administered    01/07/25 0934    --  Cariprazine HCl (Vraylar) 1.5 MG capsule capsule  Daily         12/31/24 0924                     Physician Progress Notes (last 24 hours)        Mariya Calix APRN at 01/07/25 0929                      [facility]    Palliative Care Daily Progress Note   Chief complaint-follow up support for patient/family, goals of care    Code Status:   Code Status and Medical Interventions: No CPR (Do Not Attempt to Resuscitate); Full Support   Ordered at: 01/01/25 1318     Level Of Support Discussed With:    Next of Kin (If No Surrogate)     Code Status (Patient has no pulse and is not breathing):    No CPR (Do Not Attempt to Resuscitate)     Medical Interventions (Patient has pulse or is breathing):    Full Support      Advanced Directives: Advance Directive Status: Patient has advance directive, copy in chart   Goals of Care: Ongoing.     S: Medical record reviewed. Events noted.  Neurology following, case discussed with Dr. Vegas, notes extremely guarded prognosis and without significant neuro changes. Noted to have ongoing temperature receiving Tylenol.   Leukocytosis.  Anemia stable.  Respiratory culture and blood cultures pending.  Infectious disease following, off antibiotics at this time.  Remains intubated on ventilator support, Day #10.  Fentanyl for sedation.  Ex-spouse has obtained emergency guardianship.    Advance Care Planning spoke with ex-spouse, Fe via telephone.  We discussed patient's continued need for ventilator support, unfortunately no significant neurologic changes and discussion with neurology with regard to extremely guarded prognosis. Discussed de-escalation with focus of comfort and palliative extubation versus trach, PEG and LTAC placement if aggressive care interventions desired.  Fe reports she has identified a copy of patient's living will documentation dated 12/8/2016 designating patient's wishes for withdraw of life support interventions and not to be a organ or tissue donor.  A copy has been requested.  Fe is deciding whether or not she wishes to be present if decision made to de-escalate care measures to comfort.      Review of Systems   Unable to perform ROS: Intubated     Pain Assessment  CPOT and PAINAD Scales: CPOT (Critical-Care Pain Observation Tool)  CPOT Facial Expression: 0-->relaxed, neutral  CPOT Body Movements: 0-->absence of movements  CPOT Muscle Tension: 0-->relaxed  Ventilator Compliance/Vocalization: 0-->tolerating ventilator or movement  CPOT Score: 0    O:     Intake/Output Summary (Last 24 hours) at 1/7/2025 0929  Last data filed at 1/7/2025 0914  Gross per 24 hour   Intake 2347.28 ml   Output 850 ml   Net 1497.28 ml       Diagnostics and current medications: Reviewed.      Current Facility-Administered Medications:     acetaminophen (TYLENOL) 160 MG/5ML solution 650 mg, 650 mg, Nasogastric, Q6H PRN, Mando Olivo MD, 650 mg at 01/07/25 0914    acetaminophen (TYLENOL) suppository 650 mg, 650 mg, Rectal, Q4H PRN, Mando Olivo MD, 650 mg at 01/04/25 0355    acetaminophen (TYLENOL) tablet 650 mg, 650  mg, Per G Tube, Q6H PRN, Mando Olivo MD, 650 mg at 25 1255    [COMPLETED] amiodarone 150 mg in 100 mL D5W (loading dose), 150 mg, Intravenous, Once, 150 mg at 25 1800 **FOLLOWED BY** [] amiodarone 360 mg in 200 mL D5W infusion, 1 mg/min, Intravenous, Continuous, Last Rate: 33.3 mL/hr at 25 1908, 1 mg/min at 25 1908 **FOLLOWED BY** [] amiodarone 360 mg in 200 mL D5W infusion, 0.5 mg/min, Intravenous, Continuous, Stopped at 25 182 **FOLLOWED BY** [COMPLETED] amiodarone (PACERONE) tablet 200 mg, 200 mg, Per G Tube, Once, 200 mg at 25 1820 **FOLLOWED BY** amiodarone (PACERONE) tablet 200 mg, 200 mg, Per G Tube, Q8H, 200 mg at 25 0230 **FOLLOWED BY** [START ON 2025] amiodarone (PACERONE) tablet 200 mg, 200 mg, Per G Tube, Q12H **FOLLOWED BY** [START ON 2025] amiodarone (PACERONE) tablet 200 mg, 200 mg, Per G Tube, Daily, Mando Olivo MD    amLODIPine (NORVASC) tablet 10 mg, 10 mg, Nasogastric, Nightly, Nicola Hare PA-C, 10 mg at 25    artificial tears ophthalmic ointment, , Both Eyes, Q1H PRN, Constanza Dial MD, Given at 25    atorvastatin (LIPITOR) tablet 40 mg, 40 mg, Nasogastric, Nightly, Nicola Hare PA-C, 40 mg at 25    sennosides-docusate (PERICOLACE) 8.6-50 MG per tablet 2 tablet, 2 tablet, Nasogastric, BID, 2 tablet at 25 **AND** polyethylene glycol (MIRALAX) packet 17 g, 17 g, Nasogastric, Daily PRN, 17 g at 25 **AND** [DISCONTINUED] bisacodyl (DULCOLAX) EC tablet 5 mg, 5 mg, Oral, Daily PRN **AND** bisacodyl (DULCOLAX) suppository 10 mg, 10 mg, Rectal, Daily PRN, Mando Olivo MD    budesonide (PULMICORT) nebulizer solution 0.5 mg, 0.5 mg, Nebulization, BID - RT, SensarmaCesar MD, 0.5 mg at 25 0600    cefepime 2000 mg IVPB in 100 mL NS (MBP), 2,000 mg, Intravenous, Once, Constanza Dial MD, 2,000 mg at 25 0914    cefepime 2000 mg IVPB in 100  mL NS (MBP), 2,000 mg, Intravenous, Q8H, Constanza Dial MD    chlorhexidine (PERIDEX) 0.12 % solution 15 mL, 15 mL, Mouth/Throat, Q12H, Pravin Quinones APRN, 15 mL at 01/07/25 0913    Chlorhexidine Gluconate Cloth 2 % pads 1 Application, 1 Application, Topical, Q24H, Pravin Quinones APRN, 1 Application at 01/07/25 0400    clopidogrel (PLAVIX) tablet 75 mg, 75 mg, Nasogastric, Nightly, Nicola Hare PA-C, 75 mg at 01/06/25 2046    Enoxaparin Sodium (LOVENOX) syringe 40 mg, 40 mg, Subcutaneous, Daily, Pravin Quinones APRN, 40 mg at 01/07/25 0914    famotidine (PEPCID) tablet 20 mg, 20 mg, Nasogastric, Q12H, Nicola Hare PA-C, 20 mg at 01/07/25 0914    fentaNYL 2500 mcg/250 mL NS infusion,  mcg/hr, Intravenous, Titrated, Mando Olivo MD, Last Rate: 30 mL/hr at 01/07/25 0411, 300 mcg/hr at 01/07/25 0411    hydrALAZINE (APRESOLINE) injection 10 mg, 10 mg, Intravenous, Q6H PRN, Mando Olivo MD, 10 mg at 01/01/25 1256    ibuprofen (ADVIL,MOTRIN) 100 MG/5ML suspension 200 mg, 200 mg, Nasogastric, Q6H PRN, Constanza Dial MD    insulin glargine (LANTUS, SEMGLEE) injection 10 Units, 10 Units, Subcutaneous, Nightly, Mando Olivo MD, 10 Units at 01/06/25 2046    ipratropium-albuterol (DUO-NEB) nebulizer solution 3 mL, 3 mL, Nebulization, 4x Daily - RT, Mando Olivo MD, 3 mL at 01/07/25 0600    [Held by provider] lisinopril (PRINIVIL,ZESTRIL) tablet 40 mg, 40 mg, Nasogastric, Daily, Nicola Hare PA-C, 40 mg at 01/03/25 0902    metoprolol tartrate (LOPRESSOR) tablet 100 mg, 100 mg, Nasogastric, Q12H, Nicola Hare PA-C, 100 mg at 01/07/25 0914    nitroglycerin (NITROSTAT) SL tablet 0.4 mg, 0.4 mg, Sublingual, Q5 Min PRN, Pravin Quinones APRN    Phosphorus Replacement - Follow Nurse / BPA Driven Protocol, , Not Applicable, PRN, Hernan Gonzalez APRN    Potassium Replacement - Follow Nurse / BPA Driven Protocol, , Not Applicable, PRN, Hernan Gonzalez APRN    propofol  "(DIPRIVAN) infusion 10 mg/mL 100 mL, 5-50 mcg/kg/min, Intravenous, Titrated, Imer Valenzuela MD, Stopped at 12/30/24 0725    sodium chloride 0.9 % flush 10 mL, 10 mL, Intravenous, Q12H, Pravin Quinones, APRN, 10 mL at 01/07/25 0914    sodium chloride 0.9 % flush 10 mL, 10 mL, Intravenous, PRN, QuinonesNicanorPravin, APRN, 10 mL at 12/31/24 2201    sodium chloride 0.9 % infusion 40 mL, 40 mL, Intravenous, PRN, Quinones, Pravin, APRN    Allergies   Allergen Reactions    Codeine Nausea Only     I have utilized all available immediate resources to obtain, update, or review the patient's current medications (including all prescriptions, over-the-counter products, herbals, cannabis/cannabidiol products, and vitamin/mineral/dietary (nutritional) supplements) for name, route of administration, type, dose and frequency.    A:    /80   Pulse 105   Temp (!) 100.8 °F (38.2 °C) (Rectal)   Resp 17   Ht 172.7 cm (68\")   Wt 108 kg (238 lb 8.6 oz)   SpO2 95%   BMI 36.27 kg/m²     Vitals and nursing note reviewed.   Constitutional:       Appearance: Acutely ill-appearing.      Interventions: Sedated and intubated.      Comments: OG tube with tube feeds infusing.  Fentanyl infusing.   HENT:      Head: Normocephalic.   Pulmonary:      Effort: Intubated.  Increased pulmonary effort, tachypnea  Cardiovascular:      Tachycardia rate.   Edema:     Peripheral edema absent.   Abdominal:      Palpations: Abdomen is soft.   Genitourinary:     Comments: External urinary catheter in place.  Neurological:      Mental Status: Unresponsive.      Comments: Sedated      Patient status: Disease state: Controlled with current treatments.  Current Functional status: Palliative Performance Scale Score: Performance 10% based on the following measures: Ambulation: Totally bed bound, Activity and Evidence of Disease: Unable to do any work, extensive evidence of disease, Self-Care: Total care required,  Intake: Mouth care only, LOC: Drowsy or " comatose   Baseline ECOG Status(4) Completely disabled, unable to carry out self-care.  Totally confined to bed or chair.  Nutritional status: Albumin 2.8 Body mass index is 36.27 kg/m².      Hospital Problem List      Anoxic brain injury    Acute respiratory failure with hypoxia    On mechanically assisted ventilation    Cocaine abuse    Positive urine drug screen    Substance abuse    Tobacco use    COPD (chronic obstructive pulmonary disease)    Bipolar disorder    Coronary artery disease involving autologous vein coronary bypass graft without angina pectoris    Essential hypertension    Hyperlipidemia LDL goal <100     Impression/Problem List:     Anoxic brain injury, suspected  Acute respiratory failure with hypoxia requiring ventilator support  History of bladder cancer  Cerebral microvascular disease per CT  Respiratory culture positive for Staphylococcus aureus  Substance use, UDS positive cocaine and TCA  Hypoalbuminemia  COPD  Coronary artery disease  Hypertension  Hyperlipidemia  Bipolar disorder  Anxiety  Tobacco use      Recommendations/Plan:  1. plan: Goals of care include no CPR/full support interventions.     Family support: The patient lacks significant family support..  Advance Directives: Advance Directive Status: Patient has advance directive, copy in chart   POA/Healthcare surrogate-guardianship pending. julián Miramontesspouse verbalizes she and patient were  over 23 years and have been  over the last 11 years.  States she speaks to patient on a daily basis and has known him over the course of 50 years. Patient does not have children per ex-spouse.     2.  Palliative care encounter  - Prognosis is poor long-term secondary to anoxic brain injury, respiratory failure, history of bladder cancer, cerebral microvascular disease, substance use, and multiple comorbidities.  -Family appears to have fair prognostic awareness.      -Intubated in the ED.  Respiratory panel negative.  Blood cultures  pending.  Respiratory culture positive for Staphylococcus aureus.  UDS positive for cocaine and TCA.   Started on broad-spectrum antibiotic, breathing treatments, IV fluids.   -Suspected anoxic brain injury, remains on ventilator support.    1/2-According to chart review patient's ex-spouse is attempting to obtain legal guardianship.  Nursing reports court date set for Monday 1/4-Neurology consulted and repeat CT head and EEG completed-No seizures noted.  Notes extremely guarded prognosis.  1/6-Noted to have ongoing temperature receiving Tylenol and nursing utilizing cooling blanket.  Respiratory culture and blood cultures.   -Infectious disease following, off antibiotics at this time.    -Awaiting ex-spouse to establish guardianship.       1/3-continue supportive measures  -Further goals pending guardianship approval  -would anticipate potentially consult with neurology and further diagnostic neuro workup to provide more insight that may assist with medical decision making forward.   -Explored scenario with de-escalation options with goal of comfort per ex-spouse request    1/7-continue supportive measures  -Ex-spouse established guardianship 1/6, a copy has been scanned to chart.  -Neurology notes extremely guarded prognosis  -Discussed de-escalation with focus of comfort and palliative extubation versus trach, PEG and LTAC placement if aggressive care interventions desired.  Ex-spouse/guardian leaning toward comfort measures, awaiting final decision.  -Fe, ex-spouse reports she has identified a copy of patient's living will documentation dated 12/8/2016 designating patient's wishes for withdraw of life support interventions and not to be a organ or tissue donor.  A copy has been requested.         Thank you for allowing us to participate in patient's plan of care. Palliative Care Team will continue to follow patient.     Mariya Calix, APRN  1/7/2025  09:29 CST     Electronically signed by Yogi  REMA Willis at 25 1011       Ole Vegas MD at 25 0912            Neurology Consult Note    Consult Date: 2025  Referring MD: No ref. provider found  Reason for Consult: ???anoxic brain injury     Patient: Ld Green (64 y.o. male)  MRN: [id number]  : 1960    History of Present Illness:     : According to nursing staff the wife did receive guardianship yesterday.  She does not plan on returning to the intensive care unit until later this week.  The patient has increased work of breathing anytime the fentanyl was turned down.  He currently is on fentanyl 250 mcg.  Earlier the nursing staff tell me that he did have withdrawal of the left upper extremity.  No seizure activity reported overnight.  He does continue to have borderline fevers with a Tmax overnight of 100.9 but his white count decreased from 18,000-17,000 today.    :  He looks worse today overall compared with previous days.  He has increased work of breathing and has an elevated RR despite being intubated and on Fentanyl for vent sync.  Reportedly his ex-wife is attempting to establish guardianship today.  He continues to be febrile.  His Tmax overnight was 101.4.      2025: remains intubated and sedated with Fentanyl 250 mcg. Eyes closed and he opens to his name. He blinks spontaneously. He does not follow commands. No increase tone or posturing noted. No withdrawal to pain. EEG showed slowing consistent with encephalopathy. CT head no acute process. He is not able to have MRI as he has spinal stimulator. He is not breathing over the vent. Ex-wife Fe called on the phone and updated on test results as well as patient wishes and life with functional neurologic outcome. Fe tells me  patient had told her he had no living relatives and was surprised when she heard from his cousin yesterday. Some pratik/cough when suctioned.   Patient seen along with Dr. Scales, tele neurology.       2025:Ld Alonso  Peter is a 64 y.o. male with PMH substance abuse, depression, DM, bladder CA, chronic back pain with pain stimulator, CAD s/p CABG, HLD, HTN, TYRELL with BiPAP. Monserrat lives alone. He is . History obtained by medical record as patient is sedated and intubated. No family at bedside at initial exam and cousin later arrives. Currently ex-wife is decision maker and trying to get guardianship with anticpation of this on Monday and today is Saturday.     At any rate, patient was found unresponsive on 12/29/2024 for undetermined amount of time. He was in respiratory failure and required intubation. There was mention at some point concern for seizure but no seizure like activity was observed. Since admission, he has had some decerebrate posturing but no rhythmic like movements. CT head on admission no acute process. UDS + cocaine and TCA.    Patient has 2 similar presentations in July and August 2024 both related to hypoglycemia. Into the 30-40s.  With one admission, there is mention of possible seizure as patient had tongue biting. Per notes, patient recovered quickly to his baseline. Neurology has been consulted as sedation has been reduced and has been off precedex several hours and is on Fentanyl and when paused minimal response. He does have right gaze preference. He has intact pratik and cough. Patient has had elevated temp for multiple days requiring cooling blanket. On Vancomycin for staphylococcus aureus. ID is following.   Patient seen along with Dr. Scales, tele neurology.       Medical History:   Past Medical/Surgical Hx:  Past Medical History:   Diagnosis Date    Anxiety     Atherosclerosis of coronary artery     Bipolar disorder     Bladder cancer     Colon polyp     Coronary artery disease     Depression     Diabetes mellitus     Hemorrhoids     Hyperlipidemia     Hypertension     Nephrolithiasis     Sleep apnea      Past Surgical History:   Procedure Laterality Date    ANGIOPLASTY      with stent  placement    BACK SURGERY      BLADDER SURGERY      cancer    CARDIAC CATHETERIZATION      COLONOSCOPY  04/13/2015    COLONOSCOPY N/A 4/30/2018    Procedure: COLONOSCOPY WITH ANESTHESIA;  Surgeon: Gerson Martini MD;  Location:  [facility];  Service: Gastroenterology    COLONOSCOPY N/A 4/8/2021    Procedure: COLONOSCOPY WITH ANESTHESIA;  Surgeon: Gerson Martini MD;  Location:  [facility];  Service: Gastroenterology;  Laterality: N/A;  Pre: Hx Colon Polyps  Post: divertivculosis   Dr. Garza  CO2 Inflation Used    CORONARY ARTERY BYPASS GRAFT      KNEE SURGERY      SHOULDER SURGERY      x 2    SINUS SURGERY         Medications On Admission:  Medications Prior to Admission   Medication Sig Dispense Refill Last Dose/Taking    amLODIPine (NORVASC) 10 MG tablet Take 1 tablet by mouth Every Night.       atorvastatin (LIPITOR) 40 MG tablet Take 1 tablet by mouth Every Night.       Cariprazine HCl (Vraylar) 1.5 MG capsule capsule Take 1 capsule by mouth Daily.       clopidogrel (PLAVIX) 75 MG tablet Take 1 tablet by mouth Every Night.       cyclobenzaprine (FLEXERIL) 10 MG tablet Take 1 tablet by mouth 3 (Three) Times a Day As Needed for Muscle Spasms.       Dextromethorphan-buPROPion ER (AUVELITY)  MG tablet controlled-release Take 1 tablet by mouth 2 (Two) Times a Day.       eszopiclone (LUNESTA) 3 MG tablet Take 1 tablet by mouth Every Night. Take immediately before bedtime       furosemide (LASIX) 40 MG tablet Take 1 tablet by mouth Every Night.       isosorbide mononitrate (IMDUR) 60 MG 24 hr tablet Take 1 tablet by mouth Every Night.       lisinopril (PRINIVIL,ZESTRIL) 40 MG tablet Take 1 tablet by mouth Daily.       metoprolol succinate XL (TOPROL-XL) 200 MG 24 hr tablet Take 1 tablet by mouth Every Night.       naloxone (NARCAN) 4 MG/0.1ML nasal spray 1 spray into the nostril(s) as directed by provider As Needed for Opioid Reversal. Call 911. Don't prime. Duncan in 1 nostril for overdose.  Repeat in 2-3 minutes in other nostril if no or minimal breathing/responsiveness.       nitroglycerin (NITROSTAT) 0.4 MG SL tablet Place 1 tablet under the tongue Every 5 (Five) Minutes As Needed for Chest Pain.       oxyCODONE-acetaminophen (PERCOCET)  MG per tablet Take 1 tablet by mouth Every 8 (Eight) Hours.       Semaglutide, 2 MG/DOSE, (Ozempic, 2 MG/DOSE,) 8 MG/3ML solution pen-injector Inject 1 mg under the skin into the appropriate area as directed 1 (One) Time Per Week.          Current Medications:    Current Facility-Administered Medications:     acetaminophen (TYLENOL) 160 MG/5ML solution 650 mg, 650 mg, Nasogastric, Q6H PRN, Mando Olivo MD, 650 mg at 25 0005    acetaminophen (TYLENOL) suppository 650 mg, 650 mg, Rectal, Q4H PRN, Mando Olivo MD, 650 mg at 25 0355    acetaminophen (TYLENOL) tablet 650 mg, 650 mg, Per G Tube, Q6H PRN, Mando Olivo MD, 650 mg at 25 1255    [COMPLETED] amiodarone 150 mg in 100 mL D5W (loading dose), 150 mg, Intravenous, Once, 150 mg at 25 1800 **FOLLOWED BY** [] amiodarone 360 mg in 200 mL D5W infusion, 1 mg/min, Intravenous, Continuous, Last Rate: 33.3 mL/hr at 25 1908, 1 mg/min at 25 1908 **FOLLOWED BY** [] amiodarone 360 mg in 200 mL D5W infusion, 0.5 mg/min, Intravenous, Continuous, Stopped at 25 182 **FOLLOWED BY** [COMPLETED] amiodarone (PACERONE) tablet 200 mg, 200 mg, Per G Tube, Once, 200 mg at 25 1820 **FOLLOWED BY** amiodarone (PACERONE) tablet 200 mg, 200 mg, Per G Tube, Q8H, 200 mg at 25 0230 **FOLLOWED BY** [START ON 2025] amiodarone (PACERONE) tablet 200 mg, 200 mg, Per G Tube, Q12H **FOLLOWED BY** [START ON 2025] amiodarone (PACERONE) tablet 200 mg, 200 mg, Per G Tube, Daily, Mando Olivo MD    amLODIPine (NORVASC) tablet 10 mg, 10 mg, Nasogastric, Nightly, Nicola Hare PA-C, 10 mg at 25    artificial tears ophthalmic ointment, , Both Eyes,  Q1H PRN, Constanza Dial MD, Given at 01/07/25 0237    atorvastatin (LIPITOR) tablet 40 mg, 40 mg, Nasogastric, Nightly, Nicola Hare PA-C, 40 mg at 01/06/25 2046    sennosides-docusate (PERICOLACE) 8.6-50 MG per tablet 2 tablet, 2 tablet, Nasogastric, BID, 2 tablet at 01/06/25 2045 **AND** polyethylene glycol (MIRALAX) packet 17 g, 17 g, Nasogastric, Daily PRN, 17 g at 01/04/25 2020 **AND** [DISCONTINUED] bisacodyl (DULCOLAX) EC tablet 5 mg, 5 mg, Oral, Daily PRN **AND** bisacodyl (DULCOLAX) suppository 10 mg, 10 mg, Rectal, Daily PRN, Mando Olivo MD    budesonide (PULMICORT) nebulizer solution 0.5 mg, 0.5 mg, Nebulization, BID - RT, SensCesar salgado MD, 0.5 mg at 01/07/25 0600    cefepime 2000 mg IVPB in 100 mL NS (MBP), 2,000 mg, Intravenous, Once, Constanza Dial MD    cefepime 2000 mg IVPB in 100 mL NS (MBP), 2,000 mg, Intravenous, Q8H, Constanza Dial MD    chlorhexidine (PERIDEX) 0.12 % solution 15 mL, 15 mL, Mouth/Throat, Q12H, Pravin Quinones APRN, 15 mL at 01/06/25 2046    Chlorhexidine Gluconate Cloth 2 % pads 1 Application, 1 Application, Topical, Q24H, Pravin Quinones APRN, 1 Application at 01/07/25 0400    clopidogrel (PLAVIX) tablet 75 mg, 75 mg, Nasogastric, Nightly, Nicola Hare PA-C, 75 mg at 01/06/25 2046    Enoxaparin Sodium (LOVENOX) syringe 40 mg, 40 mg, Subcutaneous, Daily, Pravin Quinones APRN, 40 mg at 01/06/25 0837    famotidine (PEPCID) tablet 20 mg, 20 mg, Nasogastric, Q12H, Nicola Hare PA-C, 20 mg at 01/06/25 2046    fentaNYL 2500 mcg/250 mL NS infusion,  mcg/hr, Intravenous, Titrated, Mando Olivo MD, Last Rate: 30 mL/hr at 01/07/25 0411, 300 mcg/hr at 01/07/25 0411    hydrALAZINE (APRESOLINE) injection 10 mg, 10 mg, Intravenous, Q6H PRN, Mando Olivo MD, 10 mg at 01/01/25 1256    ibuprofen (ADVIL,MOTRIN) 100 MG/5ML suspension 200 mg, 200 mg, Nasogastric, Q6H PRN, Constanza Dial MD    insulin glargine (LANTUS, SEMGLEE)  injection 10 Units, 10 Units, Subcutaneous, Nightly, Mando Olivo MD, 10 Units at 01/06/25 2046    ipratropium-albuterol (DUO-NEB) nebulizer solution 3 mL, 3 mL, Nebulization, 4x Daily - RT, Mando Olivo MD, 3 mL at 01/07/25 0600    [Held by provider] lisinopril (PRINIVIL,ZESTRIL) tablet 40 mg, 40 mg, Nasogastric, Daily, Nicola Hare PA-C, 40 mg at 01/03/25 0902    metoprolol tartrate (LOPRESSOR) tablet 100 mg, 100 mg, Nasogastric, Q12H, Nicola Hare PA-C, 100 mg at 01/06/25 2046    nitroglycerin (NITROSTAT) SL tablet 0.4 mg, 0.4 mg, Sublingual, Q5 Min PRN, Pravin Quinones APRN    Phosphorus Replacement - Follow Nurse / BPA Driven Protocol, , Not Applicable, PRN, Hernan Gonzalez APRN    Potassium Replacement - Follow Nurse / BPA Driven Protocol, , Not Applicable, PRN, Hernan Gonzalez APRN    propofol (DIPRIVAN) infusion 10 mg/mL 100 mL, 5-50 mcg/kg/min, Intravenous, Titrated, Imer Valenzuela MD, Stopped at 12/30/24 0725    sodium chloride 0.9 % flush 10 mL, 10 mL, Intravenous, Q12H, Pravin Quinones APRN, 10 mL at 01/06/25 2046    sodium chloride 0.9 % flush 10 mL, 10 mL, Intravenous, PRN, Pravin Quinones APRN, 10 mL at 12/31/24 2201    sodium chloride 0.9 % infusion 40 mL, 40 mL, Intravenous, PRN, Pravin Quinones APRN     Allergies:  Allergies   Allergen Reactions    Codeine Nausea Only       Social Hx:  Social History     Socioeconomic History    Marital status:    Tobacco Use    Smoking status: Every Day     Current packs/day: 0.50     Types: Cigarettes    Smokeless tobacco: Never    Tobacco comments:     2 a day   Vaping Use    Vaping status: Never Used   Substance and Sexual Activity    Alcohol use: Yes     Comment: Rarely    Drug use: Yes     Types: Cocaine(coke)    Sexual activity: Defer       Family Hx:  Family History   Adopted: Yes     Physical Examination:   Vital Signs:  Vitals:    01/07/25 0700 01/07/25 0730 01/07/25 0800 01/07/25 0830   BP: 167/81 160/81 175/87  157/84   Pulse: 103 106 106 103   Resp:       Temp:       TempSrc:       SpO2: 95% 95% 95% 95%   Weight:       Height:             General Exam:  Head:  Normocephalic, atraumatic  HEENT:  Neck supple.  Intubated.  Breathing over the vent  Abdomen:  Nontender, Nondistended  Extremities:  No signs of peripheral edema  Skin:  No rashes    Neurologic Exam:    Mental Status:    -patient sedated and intubated.  Sedation paused for exam.  Mental status: The patient is eyes are open but does not respond to any verbal or noxious stimulation  Cranial nerves: Pupils are reactive bilaterally.  Corneal reflexes intact.  No blink to threat bilaterally.  No nystagmus.  Does not track examiner around room    Motor: (strength out of 5:  1= minimal movement, 2 = movement in plane of gravity, 3 = movement against gravity, 4 = movement against some resistance, 5 = full strength)    -no movement of bilateral upper and lower extremities.   -Currently has some increased tone developing in all 4 extremities.  DTR:  -Right   Biceps: 2+ Triceps: 2+ Brachioradialis: 2+   Patella: 2+ Ankle: 2+  Babinski  -Left   Biceps: 2+ Triceps: 2+ Brachioradialis: 2+   Patella: 2+ Ankle: 2+ Babinski      Sensory:  -Reportedly had withdrawal of the left upper extremity to noxious stim earlier in the day but currently does not have this.    Coordination:  -no increase tone or tremor noted.        Recent Diagnostics:   Laboratory Results:   - Reviewed in EMR  Lab Results   Component Value Date    GLUCOSE 177 (H) 01/07/2025    CALCIUM 9.6 01/07/2025     01/07/2025    K 4.3 01/07/2025    CO2 33.0 (H) 01/07/2025     01/07/2025    BUN 18 01/07/2025    CREATININE 0.64 (L) 01/07/2025    EGFRIFAFRI >59 02/14/2022    EGFRIFNONA >60 02/14/2022    BCR 28.1 (H) 01/07/2025    ANIONGAP 5.0 01/07/2025     Lab Results   Component Value Date    WBC 17.82 (H) 01/07/2025    HGB 12.0 (L) 01/07/2025    HCT 37.8 01/07/2025    MCV 96.2 01/07/2025     01/07/2025      Lab Results   Component Value Date    PTT 28.6 11/13/2014    INR 0.96 12/29/2024     Lab Results   Component Value Date    TRIG 147 02/14/2022    HDL 37 (L) 02/14/2022    LDL 49 02/14/2022     Lab Results   Component Value Date    HGBA1C 5.40 08/26/2024       Imaging Results:  Imaging Results (Last 24 Hours)       ** No results found for the last 24 hours. **           Results:  CT of head without contrast on 1/4 shows no acute findings  EEG performed on 1/4 suggest encephalopathy with moderate to severe diffuse cerebral dysfunction with high amplitude delta activity and intermixed alpha and theta activity.  There is no evidence of seizures.    Assessment & Plan:   Patient 64 y.o. male with PMH substance abuse, depression, DM, bladder CA, chronic back pain with pain stimulator, CAD s/p CABG, HLD, HTN, TYRELL with BiPAP. Paitnet lives alone. He is . History obtained by medical record as patient is sedated and intubated. No family at bedside at initial exam and cousin later arrives. Currently ex-wife is decision maker and trying to get guardianship with anticpation of this on Monday and today is Saturday.     At any rate, patient was found unresponsive on 12/29/2024 for undetermined amount of time. He was in respiratory failure and required intubation. There was mention at some point concern for seizure but no seizure like activity was observed. Since admission, he has had some decerebrate posturing but no rhythmic like movements. CT head on admission no acute process. UDS + cocaine and TCA.    Patient has 2 similar presentations in July and August 2024 both related to hypoglycemia. Into the 30-40s.  With one admission, there is mention of possible seizure as patient had tongue biting. Per notes, patient recovered quickly to his baseline. Neurology has been consulted as sedation has been reduced and has been off precedex several hours and is on Fentanyl and when paused minimal response. He does have right  gaze preference. He has intact pratik and cough. Patient has had elevated temp for multiple days requiring cooling blanket. On Vancomycin for staphylococcus aureus. ID is following.     Active Hospital Problems    Diagnosis  POA    **Anoxic brain injury [G93.1]  Unknown    Acute respiratory failure with hypoxia [J96.01]  Yes    On mechanically assisted ventilation [Z99.11]  Not Applicable    Cocaine abuse [F14.10]  Unknown    Positive urine drug screen [R82.5]  Unknown    Substance abuse [F19.10]  Unknown    Tobacco use [Z72.0]  Unknown    COPD (chronic obstructive pulmonary disease) [J44.9]  Unknown    Bipolar disorder [F31.9]  Unknown    Coronary artery disease involving autologous vein coronary bypass graft without angina pectoris [I25.810]  Unknown    Essential hypertension [I10]  Unknown    Hyperlipidemia LDL goal <100 [E78.5]  Unknown        Impression:  Acute respiratory failure.  Illicit drug use. UDS + cocaine and TCA  No TCA's on home med list  Leukocytosis send ongoing febrile state -ID involved.  Currently no definitive infectious etiology.  Blood cultures remain pending.  Chronic back pain with spinal stimulator so unable to have MRI brain.  Tobacco use.  DM uncontrolled with hypoglycemia.      Plan:   Ex-wife established guardianship  Will call wife today and discuss prognosis  No significant neuro changes to suggest a better prognosis  Extremely guarded prognosis    Ole Vegas MD  01/07/25  09:12 CST    Medical Decision Making    Number/Complexity of Problems  Moderate  1 undiagnosed new problem with uncertain prognosis -   1 acute illness with systemic symptoms -   High  1 acute or chronic illness that poses a threat to life/body function -   high     MDM Data  Moderate - 1/3 categories  Extensive - 2/3 categories    Category 1: 3 of the following  Review of external notes  Review of results  Ordering of each unique test  Independent historian  Category 2:  Independent interpretation of test  (ex: imaging)  Category 3:  Discussion of management with another provider    extensive     Treatment Plan  Moderate - Prescription Drug management  High  Drug therapy requiring intensive monitoring for toxicity  Decision regarding hospitalization or escalation of care  De-escalate care/DNR decisions  high        Electronically signed by Ole Vegas MD at 01/07/25 0921       Constanza Dial MD at 01/07/25 0810          INFECTIOUS DISEASES PROGRESS NOTE    Patient:  Ld Green  YOB: 1960  MRN: [id number]   Admit date: 12/29/2024   Admitting Physician: Trevor Davis MD  Primary Care Physician: Xiomara Neal APRN      Chief Complaint: Unobtainable from patient on ventilator      Interval History: Patient continues to have temp around the 101 range.  Cooling blanket has not been turned back on per NADIA Holley and remains on monitor only    Respiratory Gram stain with mixed intracellular organisms      Allergies:   Allergies   Allergen Reactions    Codeine Nausea Only       Current Scheduled Medications:   amiodarone, 200 mg, Per G Tube, Q8H   Followed by  [START ON 1/9/2025] amiodarone, 200 mg, Per G Tube, Q12H   Followed by  [START ON 1/23/2025] amiodarone, 200 mg, Per G Tube, Daily  amLODIPine, 10 mg, Nasogastric, Nightly  atorvastatin, 40 mg, Nasogastric, Nightly  budesonide, 0.5 mg, Nebulization, BID - RT  chlorhexidine, 15 mL, Mouth/Throat, Q12H  Chlorhexidine Gluconate Cloth, 1 Application, Topical, Q24H  clopidogrel, 75 mg, Nasogastric, Nightly  enoxaparin, 40 mg, Subcutaneous, Daily  famotidine, 20 mg, Nasogastric, Q12H  insulin glargine, 10 Units, Subcutaneous, Nightly  ipratropium-albuterol, 3 mL, Nebulization, 4x Daily - RT  [Held by provider] lisinopril, 40 mg, Nasogastric, Daily  metoprolol tartrate, 100 mg, Nasogastric, Q12H  senna-docusate sodium, 2 tablet, Nasogastric, BID  sodium chloride, 10 mL, Intravenous, Q12H      Current PRN Medications:    acetaminophen     "acetaminophen    acetaminophen    artificial tears    senna-docusate sodium **AND** polyethylene glycol **AND** [DISCONTINUED] bisacodyl **AND** bisacodyl    hydrALAZINE    ibuprofen    nitroglycerin    Phosphorus Replacement - Follow Nurse / BPA Driven Protocol    Potassium Replacement - Follow Nurse / BPA Driven Protocol    sodium chloride    sodium chloride    fentanyl 10 mcg/mL,  mcg/hr, Last Rate: 300 mcg/hr (25 041)  propofol, 5-50 mcg/kg/min, Last Rate: Stopped (24 07)           Objective     Vital Signs:  Temp (24hrs), Av.6 °F (38.1 °C), Min:100.3 °F (37.9 °C), Max:100.9 °F (38.3 °C)      /75   Pulse 96   Temp (!) 100.8 °F (38.2 °C)   Resp 17   Ht 172.7 cm (68\")   Wt 108 kg (238 lb 8.6 oz)   SpO2 95%   BMI 36.27 kg/m²         Physical Exam:    General: Patient ill-appearing lying in bed.  Somewhat diaphoretic.  Temp 101.1  HEENT: Sclera somewhat injected but improved.  Neuro: Eyes were open and he was not tracking.  He did spontaneously blink but not to threat.  Did not squeeze hands to command.    Results Review:    I reviewed the patient's new clinical results.    Lab Results:    CBC:   Lab Results   Lab 25  0316 25  0201 25  0207 25  0135 25  0111 25  0326 25  021   WBC 18.42* 18.20* 15.80* 15.70* 16.64* 18.31* 17.82*   HEMOGLOBIN 14.5 13.8 12.7* 11.8* 12.2* 12.3* 12.0*   HEMATOCRIT 44.2 42.6 39.8 36.8* 38.0 38.0 37.8   PLATELETS 196 198 202 181 213 239 253        AutoDiff:   Lab Results   Lab 25  0111 25  0326 25  0211   NEUTROPHIL % 72.0 72.4 71.9   LYMPHOCYTE % 8.2* 9.1* 10.2*   MONOCYTES % 14.8* 13.3* 12.3*   EOSINOPHIL % 1.9 1.5 1.4   BASOPHIL % 0.7 0.7 1.0   NEUTROS ABS 11.96* 13.27*  14.46* 12.81*   LYMPHS ABS 1.37 1.66 1.82   MONOS ABS 2.47* 2.43* 2.20*   EOS ABS 0.32 0.28  0.00 0.25   BASOS ABS 0.12 0.12  0.00 0.17        Manual Diff:    Lab Results   Lab 25  0207 25  0135 " 01/06/25  0326 01/07/25  0211   NEUTROPHIL % 80.0*  --  75.0  --    LYMPHOCYTE % 6.0*  --  6.0*  --    MONOCYTES % 9.0  --  15.0*  --    BANDS %  --   --  4.0  --    NEUTROS ABS 11.74*  12.64*   < > 13.27*  14.46* 12.81*   LYMPHS ABS 1.42  --  1.10  --    WBC MORPHOLOGY Normal  --   --   --    PLT MORPH Normal  --  Normal  --     < > = values in this interval not displayed.           CMP:   Lab Results   Lab 01/05/25  0111 01/06/25  0207 01/07/25 0211   SODIUM 142 143 141   POTASSIUM 4.5 4.8 4.3   CHLORIDE 103 105 103   CO2 32.0* 30.0* 33.0*   BUN 19 18 18   CREATININE 0.60* 0.53* 0.64*   CALCIUM 9.2 9.7 9.6   BILIRUBIN 0.5 0.7 0.6   ALK PHOS 94 103 104   ALT (SGPT) 25 30 34   AST (SGOT) 39 47* 53*   GLUCOSE 164* 151* 177*       Estimated Creatinine Clearance: 140.7 mL/min (A) (by C-G formula based on SCr of 0.64 mg/dL (L)).    Culture Results:    Microbiology Results (last 10 days)       Procedure Component Value - Date/Time    Respiratory Culture - Sputum, ET Suction [[id number]] Collected: 01/06/25 1248    Lab Status: Preliminary result Specimen: Sputum from ET Suction Updated: 01/06/25 1356     Gram Stain Many (4+) WBCs per low power field      No Epithelial cells per low power field      Moderate (3+) Mixed gram positive kapil      Few (2+) Gram negative bacilli      Mixed intracellular organisms suggestive of an aspiration event    Blood Culture - Blood, Hand, Left [[id number]]  (Normal) Collected: 01/05/25 0800    Lab Status: Preliminary result Specimen: Blood from Hand, Left Updated: 01/06/25 0830     Blood Culture No growth at 24 hours    Blood Culture - Blood, Arm, Right [[id number]]  (Normal) Collected: 01/01/25 1053    Lab Status: Final result Specimen: Blood from Arm, Right Updated: 01/06/25 1100     Blood Culture No growth at 5 days    Blood Culture - Blood, Arm, Left [[id number]]  (Normal) Collected: 01/01/25 1051    Lab Status: Final result Specimen: Blood from Arm, Left Updated: 01/06/25 3488      "Blood Culture No growth at 5 days    Fungus Culture - Sputum, ET Suction [[id number]] Collected: 01/01/25 1048    Lab Status: Preliminary result Specimen: Sputum from ET Suction Updated: 01/06/25 1100     Fungus Culture No fungus isolated at less than 1 week    Respiratory Culture - Aspirate, ET Suction [[id number]]  (Abnormal)  (Susceptibility) Collected: 01/01/25 1047    Lab Status: Final result Specimen: Aspirate from ET Suction Updated: 01/03/25 0820     Respiratory Culture Scant growth (1+) Staphylococcus aureus      No Normal Respiratory Karen     Gram Stain Many (4+) WBCs per low power field      Few (2+) Epithelial cells per low power field      Rare (1+) Gram positive cocci    Susceptibility        Staphylococcus aureus      IMER      Clindamycin Susceptible      Oxacillin Susceptible      Tetracycline Susceptible      Trimethoprim + Sulfamethoxazole Susceptible      Vancomycin Susceptible                           Chlamydia trachomatis, Neisseria gonorrhoeae, PCR - Swab, Penis [[id number]]  (Normal) Collected: 12/30/24 0839    Lab Status: Final result Specimen: Swab from Penis Updated: 12/30/24 1255     Chlamydia DNA by PCR Not Detected     Neisseria gonorrhoeae by PCR Not Detected    Narrative:      Disclaimer: The Aptima Combo 2 assay is a target amplification nucleic acid probe test that utilizes target capture for the in vitro qualitative detection and differentiation of ribosomal RNA from Chlamydia trachomatis and Neisseria gonorrhoeae to aid in the diagnosis of chlamydial and/or gonococcal urogenital disease.  Cell culture was once considered to be the \"gold standard\" for detection of CT and NG.  Culture is quite specific, but scientific studies have demonstrated that the NAAT DNA probe technologies have higher clinical sensitivities than culture.    MRSA Screen, PCR (Inpatient) - Swab, Nares [[id number]]  (Normal) Collected: 12/29/24 8262    Lab Status: Final result Specimen: Swab from Nares Updated: " 12/29/24 1919     MRSA PCR No MRSA Detected    Narrative:      The negative predictive value of this diagnostic test is high and should only be used to consider de-escalating anti-MRSA therapy. A positive result may indicate colonization with MRSA and must be correlated clinically.    Respiratory Culture - Aspirate, ET Suction [[id number]]  (Abnormal)  (Susceptibility) Collected: 12/29/24 1507    Lab Status: Final result Specimen: Aspirate from ET Suction Updated: 01/01/25 1024     Respiratory Culture Moderate growth (3+) Staphylococcus aureus      Light growth (2+) Normal Respiratory Kapil     Gram Stain Moderate (3+) WBCs per low power field      Rare (1+) Epithelial cells per low power field      Moderate (3+) Mixed gram positive kapil      Moderate (3+) Mixed gram negative kapil      Mixed intracellular organisms suggestive of an aspiration event    Susceptibility        Staphylococcus aureus      IMER      Clindamycin Susceptible      Oxacillin Susceptible      Tetracycline Susceptible      Trimethoprim + Sulfamethoxazole Susceptible      Vancomycin Susceptible                           Blood Culture - Blood, Hand, Digit Right [[id number]]  (Normal) Collected: 12/29/24 1230    Lab Status: Final result Specimen: Blood from Hand, Digit Right Updated: 01/03/25 1245     Blood Culture No growth at 5 days    Blood Culture - Blood, Arm, Right [[id number]]  (Normal) Collected: 12/29/24 1218    Lab Status: Final result Specimen: Blood from Arm, Right Updated: 01/03/25 1245     Blood Culture No growth at 5 days    Respiratory Panel PCR w/COVID-19(SARS-CoV-2) NACHO/ARACELI/CHRISTINE/PAD/COR/ZARINA In-House, NP Swab in UTM/VTM, 2 HR TAT - Swab, Nasopharynx [[id number]]  (Normal) Collected: 12/29/24 1205    Lab Status: Final result Specimen: Swab from Nasopharynx Updated: 12/29/24 1304     ADENOVIRUS, PCR Not Detected     Coronavirus 229E Not Detected     Coronavirus HKU1 Not Detected     Coronavirus NL63 Not Detected     Coronavirus OC43 Not  Detected     COVID19 Not Detected     Human Metapneumovirus Not Detected     Human Rhinovirus/Enterovirus Not Detected     Influenza A PCR Not Detected     Influenza B PCR Not Detected     Parainfluenza Virus 1 Not Detected     Parainfluenza Virus 2 Not Detected     Parainfluenza Virus 3 Not Detected     Parainfluenza Virus 4 Not Detected     RSV, PCR Not Detected     Bordetella pertussis pcr Not Detected     Bordetella parapertussis PCR Not Detected     Chlamydophila pneumoniae PCR Not Detected     Mycoplasma pneumo by PCR Not Detected    Narrative:      In the setting of a positive respiratory panel with a viral infection PLUS a negative procalcitonin without other underlying concern for bacterial infection, consider observing off antibiotics or discontinuation of antibiotics and continue supportive care. If the respiratory panel is positive for atypical bacterial infection (Bordetella pertussis, Chlamydophila pneumoniae, or Mycoplasma pneumoniae), consider antibiotic de-escalation to target atypical bacterial infection.                 Radiology:           Imaging Results (Last 72 Hours)       Procedure Component Value Units Date/Time    XR Chest 1 View [[id number]] Collected: 01/06/25 0648     Updated: 01/06/25 0654    Narrative:      EXAMINATION: XR CHEST 1 VW-     1/6/2025 2:20 AM     HISTORY: vent; J96.00-Acute respiratory failure, unspecified whether  with hypoxia or hypercapnia; R41.82-Altered mental status, unspecified;  F14.10-Cocaine abuse, uncomplicated     A frontal projection of the chest is compared with the previous study  dated 1/5/2025.     The lungs are moderately well-expanded.     There is an area of discoid atelectasis in the left midlung which was  not seen in the previous study.     An ill-defined opacity is seen in the right lower lung which was not  seen in the previous study. This may represent an area of atelectasis or  evolving infiltrate.     There is no pleural effusion, pulmonary  congestion or pneumothorax.     There is moderate cardiomegaly. There is evidence of prior cardiac  surgery.     Endotracheal tube is in place. The nasogastric tube is not optimally  visualized or evaluated.     No acute bony abnormality. Postsurgical changes of the clavicle  bilaterally are seen.       Impression:      1. A new areas of discoid atelectasis in the left midlung. A new  ill-defined opacity in the right lower lung which may represent  atelectasis or evolving infiltrate. Further follow-up is recommended.  2. Moderate cardiomegaly.  3. Endotracheal tube in place. The distal end of the nasogastric tube is  not visualized and not evaluated.        This report was signed and finalized on 1/6/2025 6:51 AM by Dr. Felix Watson MD.       XR Chest 1 View [[id number]] Collected: 01/05/25 0756     Updated: 01/05/25 0801    Narrative:      XR CHEST 1 VW-     HISTORY: vent; J96.00-Acute respiratory failure, unspecified whether  with hypoxia or hypercapnia; R41.82-Altered mental status, unspecified;  F14.10-Cocaine abuse, uncomplicated     COMPARISON: 1/3/2025     FINDINGS: Frontal view the chest obtained.     The endotracheal tube tip at the T3 level. Enteric tube descending into  the abdomen. Spinal cord stimulator device. Median sternotomy wires.     Low lung volumes left perihilar opacities concerning for possible  pneumonia. Right basilar ectasis. No radiographic evidence of edema. No  pleural effusion or pneumothorax.     Pression:  1. Life support lines described above.  2. Left hilar/perihilar opacities concerning for central  consolidation/pneumonia.        This report was signed and finalized on 1/5/2025 7:58 AM by Dr. Flaca Tatum MD.       CT Head Without Contrast [[id number]] Collected: 01/04/25 1438     Updated: 01/04/25 1448    Narrative:      CT HEAD WO CONTRAST- 1/4/2025 1:22 PM     HISTORY: AMS; J96.00-Acute respiratory failure, unspecified whether with  hypoxia or hypercapnia;  R41.82-Altered mental status, unspecified;  F14.10-Cocaine abuse, uncomplicated      COMPARISON: 12/29/2024     TOTAL DOSE LENGTH PRODUCT: 1477.29 mGy.cm. Automated exposure control  was also utilized to decrease patient radiation dose.     TECHNIQUE: Axial images of the brain obtained without contrast     FINDINGS: The ventricles remain normal in size and configuration.  Similar mild chronic small vessel ischemic change. There is preservation  of the gray-white matter interfaces without hypodense appearance of the  basal ganglia. Cerebellar tonsils positioned above the foramen magnum.  No significant effacement of the sulci. No intracranial hemorrhage or  mass effect. No acute signs of ischemia. No extra-axial hematoma or  subarachnoid hemorrhage.     Air-fluid level right maxillary sinus. Minimal partial opacification  left mastoid air cells. No skull fracture.       Impression:      1. No acute radiographic abnormality identified. Similar mild chronic  small vessel ischemia. No convincing evidence of cerebral edema.  2. Right maxillary sinusitis. Questionable small left mastoid effusion.     This report was signed and finalized on 1/4/2025 2:44 PM by Dr. Flaca Tatum MD.                   Active Hospital Problems    Diagnosis     **Anoxic brain injury     Acute respiratory failure with hypoxia     On mechanically assisted ventilation     Cocaine abuse     Positive urine drug screen     Substance abuse     Tobacco use     COPD (chronic obstructive pulmonary disease)     Bipolar disorder     Coronary artery disease involving autologous vein coronary bypass graft without angina pectoris     Essential hypertension     Hyperlipidemia LDL goal <100        IMPRESSION:  Fever-follow-up blood cultures remain negative.  There was report of normal-appearing sputum but patient coughed up around ET tube yesterday.  Respiratory culture sent with intracellular organisms noted on Gram stain.  Culture is pending.  Antibiotic  review listed below.  Patient has essentially had fever daily since admission.  Montesinos catheter was removed.  Patient has no central line.  The patient did have methicillin-susceptible Staphylococcus aureus isolated from respiratory cultures on December 29-moderate amount.  Subsequent respiratory culture on January 1 had scant growth of methicillin susceptible Staphylococcus aureus.  Patient has had antibiotic treatment that would have covered that pathogen.  Considered drug fever.  Patient essentially on fentanyl alone currently and fever predated starting that.  Acute respiratory failure with hypoxia-a threat to life or bodily function.  Patient's FiO2 was increased to 50% yesterday but is down to 45% currently.  Anoxic brain injury -patient found down at [facility].  Neurology following.  Patient continues to exhibit posturing and abnormal gaze.  Leukocytosis-ended down slightly today  Urine drug screen positive for cocaine on admission  Coronary artery disease status post CABG      RECOMMENDATION:   Continue to monitor blood cultures obtained January 5 and January 6  Empiric cefepime given Gram stain of respiratory specimen  Follow-up on respiratory culture  Continue to monitor CBC  Continue to monitor temperature curve for any improvement on empiric antibiotics  Continue to monitor temperature-I agree with keeping cooling blanket on monitor only to be able to better assess true temperature.      Antibiotic review  Zosyn started December 29-discontinue January 1   Vanco dosed December 29, December 30, January 1 and again January 3 and discontinued  Levofloxacin started January 1 and discontinued January 3    Constanza Dial MD  01/07/25  08:10 CST        Electronically signed by Constanza Dial MD at 01/07/25 0817       Mariya Calix APRN at 01/06/25 1411                      [facility]    Palliative Care Daily Progress Note   Chief complaint-follow up  support for patient/family    Code Status:   Code Status and Medical Interventions: No CPR (Do Not Attempt to Resuscitate); Full Support   Ordered at: 01/01/25 1318     Level Of Support Discussed With:    Next of Kin (If No Surrogate)     Code Status (Patient has no pulse and is not breathing):    No CPR (Do Not Attempt to Resuscitate)     Medical Interventions (Patient has pulse or is breathing):    Full Support      Advanced Directives: Advance Directive Status: Patient has advance directive, copy requested   Goals of Care: Ongoing.     S: Medical record reviewed. Events noted.  Neurology consulted and repeat CT head 1/4 shows similar mild chronic small vessel ischemia.  No convincing evidence of cerebral edema.  Right maxillary sinusitis.  EEG suggest encephalopathy of unspecific etiology consistent with moderate to severe diffuse cerebral dysfunction and processes that can diffusely affect cerebral including toxic, metabolic, post hypoxic, multifocal, or degenerative conditions, as well as sedation.  No seizures noted.  Noted to have ongoing temperature receiving Tylenol and nursing utilizing cooling blanket.  Worsening leukocytosis.  Anemia stable.  Respiratory culture and blood cultures.  Infectious disease following, off antibiotics at this time.  Remains intubated on ventilator support.  Fentanyl for sedation.  Awaiting ex-spouse to establish guardianship.  Nursing spoke with patient's ex-spouse today and anticipating court date this afternoon.  No family at bedside.     Review of Systems   Unable to perform ROS: Intubated     Pain Assessment  CPOT and PAINAD Scales: CPOT (Critical-Care Pain Observation Tool)  CPOT Facial Expression: 0-->relaxed, neutral  CPOT Body Movements: 0-->absence of movements  CPOT Muscle Tension: 0-->relaxed  Ventilator Compliance/Vocalization: 0-->tolerating ventilator or movement  CPOT Score: 0    O:     Intake/Output Summary (Last 24 hours) at 1/6/2025 1411  Last data filed at  2025 1239  Gross per 24 hour   Intake 1105.96 ml   Output 1200 ml   Net -94.04 ml       Diagnostics and current medications: Reviewed.      Current Facility-Administered Medications:     acetaminophen (TYLENOL) 160 MG/5ML solution 650 mg, 650 mg, Nasogastric, Q6H PRN, Mando Olivo MD, 650 mg at 25 0837    acetaminophen (TYLENOL) suppository 650 mg, 650 mg, Rectal, Q4H PRN, Mando Olivo MD, 650 mg at 25 0355    acetaminophen (TYLENOL) tablet 650 mg, 650 mg, Per G Tube, Q6H PRN, Mando Olivo MD, 650 mg at 25 1255    [COMPLETED] amiodarone 150 mg in 100 mL D5W (loading dose), 150 mg, Intravenous, Once, 150 mg at 25 1800 **FOLLOWED BY** [] amiodarone 360 mg in 200 mL D5W infusion, 1 mg/min, Intravenous, Continuous, Last Rate: 33.3 mL/hr at 25 1908, 1 mg/min at 25 1908 **FOLLOWED BY** [] amiodarone 360 mg in 200 mL D5W infusion, 0.5 mg/min, Intravenous, Continuous, Stopped at 25 1821 **FOLLOWED BY** [COMPLETED] amiodarone (PACERONE) tablet 200 mg, 200 mg, Per G Tube, Once, 200 mg at 25 1820 **FOLLOWED BY** amiodarone (PACERONE) tablet 200 mg, 200 mg, Per G Tube, Q8H, 200 mg at 25 1240 **FOLLOWED BY** [START ON 2025] amiodarone (PACERONE) tablet 200 mg, 200 mg, Per G Tube, Q12H **FOLLOWED BY** [START ON 2025] amiodarone (PACERONE) tablet 200 mg, 200 mg, Per G Tube, Daily, Mando Olivo MD    amLODIPine (NORVASC) tablet 10 mg, 10 mg, Nasogastric, Nightly, Nicola Hare PA-C, 10 mg at 25    artificial tears ophthalmic ointment, , Both Eyes, Q1H PRN, Constanza Dial MD, Given at 25    atorvastatin (LIPITOR) tablet 40 mg, 40 mg, Nasogastric, Nightly, Nicola Hare PA-C, 40 mg at 25    sennosides-docusate (PERICOLACE) 8.6-50 MG per tablet 2 tablet, 2 tablet, Nasogastric, BID, 2 tablet at 25 **AND** polyethylene glycol (MIRALAX) packet 17 g, 17 g, Nasogastric, Daily PRN, 17 g  at 01/04/25 2020 **AND** [DISCONTINUED] bisacodyl (DULCOLAX) EC tablet 5 mg, 5 mg, Oral, Daily PRN **AND** bisacodyl (DULCOLAX) suppository 10 mg, 10 mg, Rectal, Daily PRN, Mando Olivo MD    budesonide (PULMICORT) nebulizer solution 0.5 mg, 0.5 mg, Nebulization, BID - RT, Cesar Alvarado MD, 0.5 mg at 01/06/25 0601    chlorhexidine (PERIDEX) 0.12 % solution 15 mL, 15 mL, Mouth/Throat, Q12H, Pravin Quinones APRN, 15 mL at 01/06/25 0837    Chlorhexidine Gluconate Cloth 2 % pads 1 Application, 1 Application, Topical, Q24H, Pravin Quinones APRN, 1 Application at 01/06/25 0426    clopidogrel (PLAVIX) tablet 75 mg, 75 mg, Nasogastric, Nightly, Nicola Hare PA-C, 75 mg at 01/05/25 2022    Enoxaparin Sodium (LOVENOX) syringe 40 mg, 40 mg, Subcutaneous, Daily, Pravin Quinones APRN, 40 mg at 01/06/25 0837    famotidine (PEPCID) tablet 20 mg, 20 mg, Nasogastric, Q12H, Nicola Hare PA-C, 20 mg at 01/06/25 0837    fentaNYL 2500 mcg/250 mL NS infusion,  mcg/hr, Intravenous, Titrated, Mando Olivo MD, Last Rate: 7.5 mL/hr at 01/06/25 1244, 75 mcg/hr at 01/06/25 1244    hydrALAZINE (APRESOLINE) injection 10 mg, 10 mg, Intravenous, Q6H PRN, Mando Olivo MD, 10 mg at 01/01/25 1256    ibuprofen (ADVIL,MOTRIN) 100 MG/5ML suspension 200 mg, 200 mg, Nasogastric, Q6H PRN, Constanza Dial MD    insulin glargine (LANTUS, SEMGLEE) injection 10 Units, 10 Units, Subcutaneous, Nightly, Mando Olivo MD, 10 Units at 01/05/25 2022    ipratropium-albuterol (DUO-NEB) nebulizer solution 3 mL, 3 mL, Nebulization, 4x Daily - RT, Mando Olivo MD, 3 mL at 01/06/25 1350    [Held by provider] lisinopril (PRINIVIL,ZESTRIL) tablet 40 mg, 40 mg, Nasogastric, Daily, Nicola Hare PA-C, 40 mg at 01/03/25 0902    metoprolol tartrate (LOPRESSOR) tablet 100 mg, 100 mg, Nasogastric, Q12H, Nicola Hare PA-C, 100 mg at 01/06/25 0837    nitroglycerin (NITROSTAT) SL tablet 0.4 mg, 0.4 mg, Sublingual, Q5 Min PRN,  "Pravin Quinones APRN    Phosphorus Replacement - Follow Nurse / BPA Driven Protocol, , Not Applicable, PRMARKOS, Hernan Gonzalez APRN    Potassium Replacement - Follow Nurse / BPA Driven Protocol, , Not Applicable, Carlos RHODES Matthew, APRN    propofol (DIPRIVAN) infusion 10 mg/mL 100 mL, 5-50 mcg/kg/min, Intravenous, Titrated, Imer Valenzuela MD, Stopped at 12/30/24 0725    sodium chloride 0.9 % flush 10 mL, 10 mL, Intravenous, Q12H, Pravin Quinones APRN, 10 mL at 01/06/25 0837    sodium chloride 0.9 % flush 10 mL, 10 mL, Intravenous, PRN, Pravin Quinones APRN, 10 mL at 12/31/24 2201    sodium chloride 0.9 % infusion 40 mL, 40 mL, Intravenous, PRN, Pravin Quinones APRN    Allergies   Allergen Reactions    Codeine Nausea Only     I have utilized all available immediate resources to obtain, update, or review the patient's current medications (including all prescriptions, over-the-counter products, herbals, cannabis/cannabidiol products, and vitamin/mineral/dietary (nutritional) supplements) for name, route of administration, type, dose and frequency.    A:    /74   Pulse 100   Temp 100.3 °F (37.9 °C) (Rectal)   Resp 25   Ht 172.7 cm (68\")   Wt 108 kg (238 lb 8.6 oz)   SpO2 97%   BMI 36.27 kg/m²     Vitals and nursing note reviewed.   Constitutional:       Appearance: Acutely ill-appearing.      Interventions: Sedated and intubated.      Comments: OG tube with tube feeds infusing.  Fentanyl infusing.   HENT:      Head: Normocephalic.   Pulmonary:      Effort: Intubated.  Increased pulmonary effort, tachypnea, discussed with nursing.  Cardiovascular:      Normal rate.   Edema:     Peripheral edema absent.   Abdominal:      Palpations: Abdomen is soft.   Genitourinary:     Comments: Montesinos catheter in place.  Neurological:      Mental Status: Unresponsive.      Comments: Positive gag.  Eyes open spontaneously.  Positive corneal reflex.  Does not blink to threat.  Sedated      Patient status: " Disease state: Controlled with current treatments.  Current Functional status: Palliative Performance Scale Score: Performance 10% based on the following measures: Ambulation: Totally bed bound, Activity and Evidence of Disease: Unable to do any work, extensive evidence of disease, Self-Care: Total care required,  Intake: Mouth care only, LOC: Drowsy or comatose   Baseline ECOG Status(4) Completely disabled, unable to carry out self-care.  Totally confined to bed or chair.  Nutritional status: Albumin 2.8 Body mass index is 36.27 kg/m².      Hospital Problem List      Anoxic brain injury    Acute respiratory failure with hypoxia    On mechanically assisted ventilation    Cocaine abuse    Positive urine drug screen    Substance abuse    Tobacco use    COPD (chronic obstructive pulmonary disease)    Bipolar disorder    Coronary artery disease involving autologous vein coronary bypass graft without angina pectoris    Essential hypertension    Hyperlipidemia LDL goal <100     Impression/Problem List:     Anoxic brain injury, suspected  Acute respiratory failure with hypoxia requiring ventilator support  History of bladder cancer  Cerebral microvascular disease per CT  Respiratory culture positive for Staphylococcus aureus  Substance use, UDS positive cocaine and TCA  Hypoalbuminemia  COPD  Coronary artery disease  Hypertension  Hyperlipidemia  Bipolar disorder  Anxiety  Tobacco use      Recommendations/Plan:  1. plan: Goals of care include no CPR/full support interventions.     Family support: The patient lacks significant family support..  Advance Directives: Advance Directive Status: Patient has advance directive, copy in chart   POA/Healthcare surrogate-guardianship pending. paris Miramontes verbalizes she and patient were  over 23 years and have been  over the last 11 years.  States she speaks to patient on a daily basis and has known him over the course of 50 years. Patient does not have children per  ex-spouse.     2.  Palliative care encounter  - Prognosis is poor long-term secondary to anoxic brain injury, respiratory failure, history of bladder cancer, cerebral microvascular disease, substance use, and multiple comorbidities.  -Family appears to have fair prognostic awareness.      -Intubated in the ED.  Respiratory panel negative.  Blood cultures pending.  Respiratory culture positive for Staphylococcus aureus.  UDS positive for cocaine and TCA.   Started on broad-spectrum antibiotic, breathing treatments, IV fluids.   -Suspected anoxic brain injury, remains on ventilator support.    1/2-According to chart review patient's ex-spouse is attempting to obtain legal guardianship.  Nursing reports court date set for Monday 1/4-Neurology consulted and repeat CT head and EEG completed-No seizures noted.  Notes extremely guarded prognosis.  1/6-Noted to have ongoing temperature receiving Tylenol and nursing utilizing cooling blanket.  Respiratory culture and blood cultures.   -Infectious disease following, off antibiotics at this time.    -Awaiting ex-spouse to establish guardianship.       1/3-continue supportive measures  -Further goals pending guardianship approval  -would anticipate potentially consult with neurology and further diagnostic neuro workup to provide more insight that may assist with medical decision making forward.   -Explored scenario with de-escalation options with goal of comfort per ex-spouse request    1/6-continue supportive measures  -Awaiting ex-spouse to establish guardianship.  -Neurology notes extremely guarded prognosis      Thank you for allowing us to participate in patient's plan of care. Palliative Care Team will continue to follow patient.     REMA Moseley  1/6/2025  14:11 CST     Electronically signed by Mariya Calix APRN at 01/06/25 6204

## 2025-01-07 NOTE — SIGNIFICANT NOTE
01/07/25 0605   Readings   Plateau Pressure (cm H2O) 12 cm H2O   Static Compliance (L/cm H2O) 52

## 2025-01-07 NOTE — PROGRESS NOTES
RT EQUIPMENT DEVICE RELATED - SKIN ASSESSMENT    Dany Score:  Dany Score: 11     RT Medical Equipment/Device:     ETT Linda/Anchorfast    Skin Assessment:      Cheek:  Intact  Lips:  Intact    Device Skin Pressure Protection:  Skin-to-device areas padded:  Anchorfast    Nurse Notification:  Evelin Castorena, CRT

## 2025-01-07 NOTE — PLAN OF CARE
Problem: Skin Injury Risk Increased  Goal: Skin Health and Integrity  Outcome: Not Progressing  Intervention: Optimize Skin Protection  Recent Flowsheet Documentation  Taken 1/7/2025 1700 by Leydi Garcia RN  Activity Management: bedrest  Head of Bed (HOB) Positioning: HOB at 30-45 degrees  Taken 1/7/2025 1500 by Leydi Garcia RN  Activity Management: bedrest  Head of Bed (HOB) Positioning: HOB at 30-45 degrees  Taken 1/7/2025 1400 by Leydi Garcia RN  Activity Management: bedrest  Taken 1/7/2025 1300 by Leydi Garcia RN  Activity Management: bedrest  Head of Bed (HOB) Positioning: HOB at 30-45 degrees  Taken 1/7/2025 1200 by Leydi Garcia RN  Activity Management: bedrest  Pressure Reduction Techniques:   weight shift assistance provided   heels elevated off bed   pressure points protected  Pressure Reduction Devices: pressure-redistributing mattress utilized  Skin Protection: transparent dressing maintained  Taken 1/7/2025 1100 by Leydi Garcia RN  Activity Management: bedrest  Head of Bed (HOB) Positioning: HOB at 30-45 degrees  Taken 1/7/2025 1000 by Leydi Garcia RN  Activity Management: bedrest  Taken 1/7/2025 0900 by Leydi Garcia RN  Activity Management: bedrest  Head of Bed (HOB) Positioning: HOB at 30-45 degrees  Taken 1/7/2025 0800 by Leydi Garcia RN  Activity Management: bedrest  Pressure Reduction Techniques:   weight shift assistance provided   heels elevated off bed   pressure points protected  Pressure Reduction Devices: pressure-redistributing mattress utilized  Skin Protection: transparent dressing maintained  Taken 1/7/2025 0700 by Leydi Garcia RN  Activity Management: bedrest  Head of Bed (HOB) Positioning: HOB at 30-45 degrees     Problem: Fall Injury Risk  Goal: Absence of Fall and Fall-Related Injury  Outcome: Not Progressing  Intervention: Promote Injury-Free Environment  Recent Flowsheet Documentation  Taken 1/7/2025 1700 by Leydi Garcia RN  Safety Promotion/Fall Prevention:   safety round/check  completed   fall prevention program maintained  Taken 1/7/2025 1500 by Leydi Garcia RN  Safety Promotion/Fall Prevention:   safety round/check completed   fall prevention program maintained  Taken 1/7/2025 1400 by Leydi Garcia RN  Safety Promotion/Fall Prevention:   safety round/check completed   fall prevention program maintained  Taken 1/7/2025 1300 by Leydi Garcia RN  Safety Promotion/Fall Prevention:   safety round/check completed   fall prevention program maintained  Taken 1/7/2025 1200 by Leydi Garcia RN  Safety Promotion/Fall Prevention:   safety round/check completed   fall prevention program maintained  Taken 1/7/2025 1100 by Leydi Garcia RN  Safety Promotion/Fall Prevention:   safety round/check completed   fall prevention program maintained  Taken 1/7/2025 1000 by Leydi Garcia RN  Safety Promotion/Fall Prevention:   safety round/check completed   fall prevention program maintained  Taken 1/7/2025 0900 by Leydi Garcia RN  Safety Promotion/Fall Prevention:   safety round/check completed   fall prevention program maintained  Taken 1/7/2025 0800 by Leydi Garcia RN  Safety Promotion/Fall Prevention:   safety round/check completed   fall prevention program maintained  Taken 1/7/2025 0700 by Leydi Garcia RN  Safety Promotion/Fall Prevention:   safety round/check completed   fall prevention program maintained     Problem: Mechanical Ventilation Invasive  Goal: Effective Communication  Outcome: Not Progressing  Intervention: Ensure Effective Communication  Recent Flowsheet Documentation  Taken 1/7/2025 1200 by Leydi Garcia RN  Trust Relationship/Rapport:   care explained   choices provided  Family/Support System Care: support provided  Communication Enhancement Strategies:   repetition utilized   one-step directions provided  Taken 1/7/2025 0800 by Leydi Garcia RN  Trust Relationship/Rapport:   care explained   choices provided  Diversional Activities: television  Family/Support System Care: support  provided  Communication Enhancement Strategies:   repetition utilized   one-step directions provided  Goal: Optimal Device Function  Outcome: Not Progressing  Intervention: Optimize Device Care and Function  Recent Flowsheet Documentation  Taken 1/7/2025 1200 by Leydi Garcia RN  Oral Care: swabbed with antiseptic solution  Taken 1/7/2025 0800 by Leydi Garcia RN  Oral Care: swabbed with antiseptic solution  Goal: Mechanical Ventilation Liberation  Outcome: Not Progressing  Goal: Optimal Nutrition Delivery  Outcome: Not Progressing  Goal: Absence of Device-Related Skin and Tissue Injury  Outcome: Not Progressing  Intervention: Maintain Skin and Tissue Health  Recent Flowsheet Documentation  Taken 1/7/2025 1200 by Leydi Garcia RN  Device Skin Pressure Protection: tubing/devices free from skin contact  Taken 1/7/2025 0800 by Leydi Garcia RN  Device Skin Pressure Protection: tubing/devices free from skin contact  Goal: Absence of Ventilator-Induced Lung Injury  Outcome: Not Progressing  Intervention: Prevent Ventilator-Associated Pneumonia  Recent Flowsheet Documentation  Taken 1/7/2025 1700 by Leydi Garcia RN  Head of Bed ([facility]) Positioning: HOB at 30-45 degrees  Taken 1/7/2025 1500 by Leydi Garcia RN  Head of Bed ([facility]) Positioning: HOB at 30-45 degrees  Taken 1/7/2025 1300 by Leydi Garcia RN  Head of Bed ([facility]) Positioning: HOB at 30-45 degrees  Taken 1/7/2025 1200 by Leydi Garcia RN  Oral Care: swabbed with antiseptic solution  Taken 1/7/2025 1100 by Leydi Garcia RN  Head of Bed ([facility]) Positioning: HOB at 30-45 degrees  Taken 1/7/2025 0900 by Leydi Garcia RN  Head of Bed ([facility]) Positioning: HOB at 30-45 degrees  Taken 1/7/2025 0800 by Leydi Garcia RN  VAP Prevention Measures: completed  Oral Care: swabbed with antiseptic solution  Taken 1/7/2025 0700 by Leydi Garcia RN  Head of Bed ([facility]) Positioning: HOB at 30-45 degrees     Problem: Enteral Nutrition  Goal: Absence of Aspiration Signs and Symptoms  Outcome: Not  Progressing  Intervention: Minimize Aspiration Risk  Recent Flowsheet Documentation  Taken 1/7/2025 1700 by Leydi Garcia RN  Head of Bed ([facility]) Positioning: HOB at 30-45 degrees  Taken 1/7/2025 1500 by Leydi Garcia RN  Head of Bed ([facility]) Positioning: HOB at 30-45 degrees  Taken 1/7/2025 1300 by Leydi Garcia RN  Head of Bed ([facility]) Positioning: HOB at 30-45 degrees  Taken 1/7/2025 1200 by Leydi Garcia RN  Oral Care: swabbed with antiseptic solution  Enteral Feeding Safety: placement verified by x-ray  Taken 1/7/2025 1100 by Leydi Garcia RN  Head of Bed ([facility]) Positioning: HOB at 30-45 degrees  Taken 1/7/2025 0900 by Leydi Garcia RN  Head of Bed ([facility]) Positioning: HOB at 30-45 degrees  Taken 1/7/2025 0800 by Leydi Garcia RN  Oral Care: swabbed with antiseptic solution  Enteral Feeding Safety: placement verified by x-ray  Taken 1/7/2025 0700 by Leydi Garcia RN  Head of Bed ([facility]) Positioning: HOB at 30-45 degrees  Goal: Safe, Effective Therapy Delivery  Outcome: Not Progressing  Intervention: Prevent Feeding-Related Adverse Events  Recent Flowsheet Documentation  Taken 1/7/2025 1200 by Ledyi Garcia RN  Enteral Feeding Safety: placement verified by x-ray  Taken 1/7/2025 0800 by Leydi Garcia RN  Enteral Feeding Safety: placement verified by x-ray  Goal: Feeding Tolerance  Outcome: Not Progressing     Problem: Sepsis/Septic Shock  Goal: Optimal Coping  Outcome: Not Progressing  Intervention: Support Patient and Family Response  Recent Flowsheet Documentation  Taken 1/7/2025 1200 by Leydi Garcia RN  Family/Support System Care: support provided  Taken 1/7/2025 0800 by Leydi Garcia RN  Family/Support System Care: support provided  Goal: Absence of Bleeding  Outcome: Not Progressing  Goal: Blood Glucose Level Within Target Range  Outcome: Not Progressing  Intervention: Optimize Glycemic Control  Recent Flowsheet Documentation  Taken 1/7/2025 1200 by Leydi Garcia RN  Hyperglycemia Management: blood glucose monitored  Goal: Absence  of Infection Signs and Symptoms  Outcome: Not Progressing  Intervention: Promote Stabilization  Recent Flowsheet Documentation  Taken 1/7/2025 1200 by Leydi Garcia RN  Fluid/Electrolyte Management: fluids provided  Taken 1/7/2025 0800 by Leydi Garcia RN  Fluid/Electrolyte Management: fluids provided  Intervention: Promote Recovery  Recent Flowsheet Documentation  Taken 1/7/2025 1700 by Leydi Garcia RN  Activity Management: bedrest  Taken 1/7/2025 1500 by Leydi Garcia RN  Activity Management: bedrest  Taken 1/7/2025 1400 by Leydi Garcia RN  Activity Management: bedrest  Taken 1/7/2025 1300 by Leydi Garcia RN  Activity Management: bedrest  Taken 1/7/2025 1200 by Leydi Garcia RN  Activity Management: bedrest  Taken 1/7/2025 1100 by Leydi Garcia RN  Activity Management: bedrest  Taken 1/7/2025 1000 by Leydi Garcia RN  Activity Management: bedrest  Taken 1/7/2025 0900 by Leydi Garcia RN  Activity Management: bedrest  Taken 1/7/2025 0800 by Leydi Garcia RN  Activity Management: bedrest  Taken 1/7/2025 0700 by Leydi Garcia RN  Activity Management: bedrest  Goal: Optimal Nutrition Delivery  Outcome: Not Progressing     Problem: Adult Inpatient Plan of Care  Goal: Plan of Care Review  Outcome: Not Progressing  Goal: Patient-Specific Goal (Individualized)  Outcome: Not Progressing  Goal: Absence of Hospital-Acquired Illness or Injury  Outcome: Not Progressing  Intervention: Identify and Manage Fall Risk  Recent Flowsheet Documentation  Taken 1/7/2025 1700 by Leydi Garcia RN  Safety Promotion/Fall Prevention:   safety round/check completed   fall prevention program maintained  Taken 1/7/2025 1500 by Leydi Garcia RN  Safety Promotion/Fall Prevention:   safety round/check completed   fall prevention program maintained  Taken 1/7/2025 1400 by Leydi Garcia RN  Safety Promotion/Fall Prevention:   safety round/check completed   fall prevention program maintained  Taken 1/7/2025 1300 by Leydi Garcia RN  Safety Promotion/Fall  Prevention:   safety round/check completed   fall prevention program maintained  Taken 1/7/2025 1200 by Leydi Garcia RN  Safety Promotion/Fall Prevention:   safety round/check completed   fall prevention program maintained  Taken 1/7/2025 1100 by Leydi Garcia RN  Safety Promotion/Fall Prevention:   safety round/check completed   fall prevention program maintained  Taken 1/7/2025 1000 by Leydi Garcia RN  Safety Promotion/Fall Prevention:   safety round/check completed   fall prevention program maintained  Taken 1/7/2025 0900 by Leydi Garcia RN  Safety Promotion/Fall Prevention:   safety round/check completed   fall prevention program maintained  Taken 1/7/2025 0800 by Leydi Garcia RN  Safety Promotion/Fall Prevention:   safety round/check completed   fall prevention program maintained  Taken 1/7/2025 0700 by Leydi Garcia RN  Safety Promotion/Fall Prevention:   safety round/check completed   fall prevention program maintained  Intervention: Prevent Skin Injury  Recent Flowsheet Documentation  Taken 1/7/2025 1700 by Leydi Garcia RN  Body Position:   turned   right  Taken 1/7/2025 1500 by Leydi Garcia RN  Body Position:   turned   left  Taken 1/7/2025 1300 by Leydi Garcia RN  Body Position:   turned   supine  Taken 1/7/2025 1200 by Leydi Garcia RN  Skin Protection: transparent dressing maintained  Taken 1/7/2025 1100 by Leydi Garcia RN  Body Position:   turned   right  Taken 1/7/2025 0900 by Leydi Garcia RN  Body Position:   turned   left  Taken 1/7/2025 0800 by Leydi Garcia RN  Skin Protection: transparent dressing maintained  Taken 1/7/2025 0700 by Leydi Garcia RN  Body Position:   turned   supine  Goal: Optimal Comfort and Wellbeing  Outcome: Not Progressing  Intervention: Provide Person-Centered Care  Recent Flowsheet Documentation  Taken 1/7/2025 1200 by Leydi Garcia RN  Trust Relationship/Rapport:   care explained   choices provided  Taken 1/7/2025 0800 by Leydi Garcia RN  Trust Relationship/Rapport:   care  explained   choices provided  Goal: Readiness for Transition of Care  Outcome: Not Progressing   Goal Outcome Evaluation:

## 2025-01-07 NOTE — PROGRESS NOTES
Neurology Consult Note    Consult Date: 2025  Referring MD: No ref. provider found  Reason for Consult: ???anoxic brain injury     Patient: Ld Green (64 y.o. male)  MRN: [id number]  : 1960    History of Present Illness:     : According to nursing staff the wife did receive guardianship yesterday.  She does not plan on returning to the intensive care unit until later this week.  The patient has increased work of breathing anytime the fentanyl was turned down.  He currently is on fentanyl 250 mcg.  Earlier the nursing staff tell me that he did have withdrawal of the left upper extremity.  No seizure activity reported overnight.  He does continue to have borderline fevers with a Tmax overnight of 100.9 but his white count decreased from 18,000-17,000 today.    :  He looks worse today overall compared with previous days.  He has increased work of breathing and has an elevated RR despite being intubated and on Fentanyl for vent sync.  Reportedly his ex-wife is attempting to establish guardianship today.  He continues to be febrile.  His Tmax overnight was 101.4.      2025: remains intubated and sedated with Fentanyl 250 mcg. Eyes closed and he opens to his name. He blinks spontaneously. He does not follow commands. No increase tone or posturing noted. No withdrawal to pain. EEG showed slowing consistent with encephalopathy. CT head no acute process. He is not able to have MRI as he has spinal stimulator. He is not breathing over the vent. Ex-wife Fe called on the phone and updated on test results as well as patient wishes and life with functional neurologic outcome. Fe tells me  patient had told her he had no living relatives and was surprised when she heard from his cousin yesterday. Some pratik/cough when suctioned.   Patient seen along with Dr. Scales, tele neurology.       2025:Ld Green is a 64 y.o. male with PMH substance abuse, depression, DM, bladder CA, chronic  back pain with pain stimulator, CAD s/p CABG, HLD, HTN, TYRELL with BiPAP. Monserrat lives alone. He is . History obtained by medical record as patient is sedated and intubated. No family at bedside at initial exam and cousin later arrives. Currently ex-wife is decision maker and trying to get guardianship with anticpation of this on Monday and today is Saturday.     At any rate, patient was found unresponsive on 12/29/2024 for undetermined amount of time. He was in respiratory failure and required intubation. There was mention at some point concern for seizure but no seizure like activity was observed. Since admission, he has had some decerebrate posturing but no rhythmic like movements. CT head on admission no acute process. UDS + cocaine and TCA.    Patient has 2 similar presentations in July and August 2024 both related to hypoglycemia. Into the 30-40s.  With one admission, there is mention of possible seizure as patient had tongue biting. Per notes, patient recovered quickly to his baseline. Neurology has been consulted as sedation has been reduced and has been off precedex several hours and is on Fentanyl and when paused minimal response. He does have right gaze preference. He has intact pratik and cough. Patient has had elevated temp for multiple days requiring cooling blanket. On Vancomycin for staphylococcus aureus. ID is following.   Patient seen along with Dr. Scales, tele neurology.       Medical History:   Past Medical/Surgical Hx:  Past Medical History:   Diagnosis Date    Anxiety     Atherosclerosis of coronary artery     Bipolar disorder     Bladder cancer     Colon polyp     Coronary artery disease     Depression     Diabetes mellitus     Hemorrhoids     Hyperlipidemia     Hypertension     Nephrolithiasis     Sleep apnea      Past Surgical History:   Procedure Laterality Date    ANGIOPLASTY      with stent placement    BACK SURGERY      BLADDER SURGERY      cancer    CARDIAC CATHETERIZATION       COLONOSCOPY  04/13/2015    COLONOSCOPY N/A 4/30/2018    Procedure: COLONOSCOPY WITH ANESTHESIA;  Surgeon: Gerson Martini MD;  Location: [facility] ENDOSCOPY;  Service: Gastroenterology    COLONOSCOPY N/A 4/8/2021    Procedure: COLONOSCOPY WITH ANESTHESIA;  Surgeon: Gerson Martini MD;  Location: [facility] ENDOSCOPY;  Service: Gastroenterology;  Laterality: N/A;  Pre: Hx Colon Polyps  Post: divertivculosis   Dr. Garza  CO2 Inflation Used    CORONARY ARTERY BYPASS GRAFT      KNEE SURGERY      SHOULDER SURGERY      x 2    SINUS SURGERY         Medications On Admission:  Medications Prior to Admission   Medication Sig Dispense Refill Last Dose/Taking    amLODIPine (NORVASC) 10 MG tablet Take 1 tablet by mouth Every Night.       atorvastatin (LIPITOR) 40 MG tablet Take 1 tablet by mouth Every Night.       Cariprazine HCl (Vraylar) 1.5 MG capsule capsule Take 1 capsule by mouth Daily.       clopidogrel (PLAVIX) 75 MG tablet Take 1 tablet by mouth Every Night.       cyclobenzaprine (FLEXERIL) 10 MG tablet Take 1 tablet by mouth 3 (Three) Times a Day As Needed for Muscle Spasms.       Dextromethorphan-buPROPion ER (AUVELITY)  MG tablet controlled-release Take 1 tablet by mouth 2 (Two) Times a Day.       eszopiclone (LUNESTA) 3 MG tablet Take 1 tablet by mouth Every Night. Take immediately before bedtime       furosemide (LASIX) 40 MG tablet Take 1 tablet by mouth Every Night.       isosorbide mononitrate (IMDUR) 60 MG 24 hr tablet Take 1 tablet by mouth Every Night.       lisinopril (PRINIVIL,ZESTRIL) 40 MG tablet Take 1 tablet by mouth Daily.       metoprolol succinate XL (TOPROL-XL) 200 MG 24 hr tablet Take 1 tablet by mouth Every Night.       naloxone (NARCAN) 4 MG/0.1ML nasal spray 1 spray into the nostril(s) as directed by provider As Needed for Opioid Reversal. Call 911. Don't prime. [place] in 1 nostril for overdose. Repeat in 2-3 minutes in other nostril if no or minimal breathing/responsiveness.        nitroglycerin (NITROSTAT) 0.4 MG SL tablet Place 1 tablet under the tongue Every 5 (Five) Minutes As Needed for Chest Pain.       oxyCODONE-acetaminophen (PERCOCET)  MG per tablet Take 1 tablet by mouth Every 8 (Eight) Hours.       Semaglutide, 2 MG/DOSE, (Ozempic, 2 MG/DOSE,) 8 MG/3ML solution pen-injector Inject 1 mg under the skin into the appropriate area as directed 1 (One) Time Per Week.          Current Medications:    Current Facility-Administered Medications:     acetaminophen (TYLENOL) 160 MG/5ML solution 650 mg, 650 mg, Nasogastric, Q6H PRN, Mando Olivo MD, 650 mg at 25 0005    acetaminophen (TYLENOL) suppository 650 mg, 650 mg, Rectal, Q4H PRN, Mando Olivo MD, 650 mg at 25 0355    acetaminophen (TYLENOL) tablet 650 mg, 650 mg, Per G Tube, Q6H PRN, Mando Olivo MD, 650 mg at 25 1255    [COMPLETED] amiodarone 150 mg in 100 mL D5W (loading dose), 150 mg, Intravenous, Once, 150 mg at 25 1800 **FOLLOWED BY** [] amiodarone 360 mg in 200 mL D5W infusion, 1 mg/min, Intravenous, Continuous, Last Rate: 33.3 mL/hr at 25 1908, 1 mg/min at 25 1908 **FOLLOWED BY** [] amiodarone 360 mg in 200 mL D5W infusion, 0.5 mg/min, Intravenous, Continuous, Stopped at 25 182 **FOLLOWED BY** [COMPLETED] amiodarone (PACERONE) tablet 200 mg, 200 mg, Per G Tube, Once, 200 mg at 25 1820 **FOLLOWED BY** amiodarone (PACERONE) tablet 200 mg, 200 mg, Per G Tube, Q8H, 200 mg at 25 0230 **FOLLOWED BY** [START ON 2025] amiodarone (PACERONE) tablet 200 mg, 200 mg, Per G Tube, Q12H **FOLLOWED BY** [START ON 2025] amiodarone (PACERONE) tablet 200 mg, 200 mg, Per G Tube, Daily, Mando Olivo MD    amLODIPine (NORVASC) tablet 10 mg, 10 mg, Nasogastric, Nightly, Nicola Hare PA-C, 10 mg at 25    artificial tears ophthalmic ointment, , Both Eyes, Q1H PRN, Constanza Dial MD, Given at 25 0237    atorvastatin (LIPITOR)  tablet 40 mg, 40 mg, Nasogastric, Nightly, Nicola Hare PA-C, 40 mg at 01/06/25 2046    sennosides-docusate (PERICOLACE) 8.6-50 MG per tablet 2 tablet, 2 tablet, Nasogastric, BID, 2 tablet at 01/06/25 2045 **AND** polyethylene glycol (MIRALAX) packet 17 g, 17 g, Nasogastric, Daily PRN, 17 g at 01/04/25 2020 **AND** [DISCONTINUED] bisacodyl (DULCOLAX) EC tablet 5 mg, 5 mg, Oral, Daily PRN **AND** bisacodyl (DULCOLAX) suppository 10 mg, 10 mg, Rectal, Daily PRN, Mando Olivo MD    budesonide (PULMICORT) nebulizer solution 0.5 mg, 0.5 mg, Nebulization, BID - RT, SensarmaCesar MD, 0.5 mg at 01/07/25 0600    cefepime 2000 mg IVPB in 100 mL NS (MBP), 2,000 mg, Intravenous, Once, Constanza Dial MD    cefepime 2000 mg IVPB in 100 mL NS (MBP), 2,000 mg, Intravenous, Q8H, Constanza Dial MD    chlorhexidine (PERIDEX) 0.12 % solution 15 mL, 15 mL, Mouth/Throat, Q12H, Pravin Quinones APRN, 15 mL at 01/06/25 2046    Chlorhexidine Gluconate Cloth 2 % pads 1 Application, 1 Application, Topical, Q24H, Pravin Quinones APRN, 1 Application at 01/07/25 0400    clopidogrel (PLAVIX) tablet 75 mg, 75 mg, Nasogastric, Nightly, Nicola Hare PA-C, 75 mg at 01/06/25 2046    Enoxaparin Sodium (LOVENOX) syringe 40 mg, 40 mg, Subcutaneous, Daily, Pravin Quinones APRN, 40 mg at 01/06/25 0837    famotidine (PEPCID) tablet 20 mg, 20 mg, Nasogastric, Q12H, Nicola Hare PA-C, 20 mg at 01/06/25 2046    fentaNYL 2500 mcg/250 mL NS infusion,  mcg/hr, Intravenous, Titrated, Mando Olivo MD, Last Rate: 30 mL/hr at 01/07/25 0411, 300 mcg/hr at 01/07/25 0411    hydrALAZINE (APRESOLINE) injection 10 mg, 10 mg, Intravenous, Q6H PRN, Mando Olivo MD, 10 mg at 01/01/25 1256    ibuprofen (ADVIL,MOTRIN) 100 MG/5ML suspension 200 mg, 200 mg, Nasogastric, Q6H PRN, Constanza Dial MD    insulin glargine (LANTUS, SEMGLEE) injection 10 Units, 10 Units, Subcutaneous, Nightly, Mando Olivo MD, 10 Units at  01/06/25 2046    ipratropium-albuterol (DUO-NEB) nebulizer solution 3 mL, 3 mL, Nebulization, 4x Daily - RT, Mando Olivo MD, 3 mL at 01/07/25 0600    [Held by provider] lisinopril (PRINIVIL,ZESTRIL) tablet 40 mg, 40 mg, Nasogastric, Daily, Nicola Hare PA-C, 40 mg at 01/03/25 0902    metoprolol tartrate (LOPRESSOR) tablet 100 mg, 100 mg, Nasogastric, Q12H, Nicola Hare PA-C, 100 mg at 01/06/25 2046    nitroglycerin (NITROSTAT) SL tablet 0.4 mg, 0.4 mg, Sublingual, Q5 Min PRN, Pravin Quinones APRN    Phosphorus Replacement - Follow Nurse / BPA Driven Protocol, , Not Applicable, PRN, Hernan Gonzalez APRN    Potassium Replacement - Follow Nurse / BPA Driven Protocol, , Not Applicable, PRN, Hernan Gonzalez APRN    propofol (DIPRIVAN) infusion 10 mg/mL 100 mL, 5-50 mcg/kg/min, Intravenous, Titrated, Imer Valenzuela MD, Stopped at 12/30/24 0725    sodium chloride 0.9 % flush 10 mL, 10 mL, Intravenous, Q12H, Pravin Quinones APRN, 10 mL at 01/06/25 2046    sodium chloride 0.9 % flush 10 mL, 10 mL, Intravenous, PRN, Pravin Quinones APRN, 10 mL at 12/31/24 2201    sodium chloride 0.9 % infusion 40 mL, 40 mL, Intravenous, PRN, Pravin Quinones APRN     Allergies:  Allergies   Allergen Reactions    Codeine Nausea Only       Social Hx:  Social History     Socioeconomic History    Marital status:    Tobacco Use    Smoking status: Every Day     Current packs/day: 0.50     Types: Cigarettes    Smokeless tobacco: Never    Tobacco comments:     2 a day   Vaping Use    Vaping status: Never Used   Substance and Sexual Activity    Alcohol use: Yes     Comment: Rarely    Drug use: Yes     Types: Cocaine(coke)    Sexual activity: Defer       Family Hx:  Family History   Adopted: Yes     Physical Examination:   Vital Signs:  Vitals:    01/07/25 0700 01/07/25 0730 01/07/25 0800 01/07/25 0830   BP: 167/81 160/81 175/87 157/84   Pulse: 103 106 106 103   Resp:       Temp:       TempSrc:       SpO2: 95% 95%  95% 95%   Weight:       Height:             General Exam:  Head:  Normocephalic, atraumatic  HEENT:  Neck supple.  Intubated.  Breathing over the vent  Abdomen:  Nontender, Nondistended  Extremities:  No signs of peripheral edema  Skin:  No rashes    Neurologic Exam:    Mental Status:    -patient sedated and intubated.  Sedation paused for exam.  Mental status: The patient is eyes are open but does not respond to any verbal or noxious stimulation  Cranial nerves: Pupils are reactive bilaterally.  Corneal reflexes intact.  No blink to threat bilaterally.  No nystagmus.  Does not track examiner around room    Motor: (strength out of 5:  1= minimal movement, 2 = movement in plane of gravity, 3 = movement against gravity, 4 = movement against some resistance, 5 = full strength)    -no movement of bilateral upper and lower extremities.   -Currently has some increased tone developing in all 4 extremities.  DTR:  -Right   Biceps: 2+ Triceps: 2+ Brachioradialis: 2+   Patella: 2+ Ankle: 2+  Babinski  -Left   Biceps: 2+ Triceps: 2+ Brachioradialis: 2+   Patella: 2+ Ankle: 2+ Babinski      Sensory:  -Reportedly had withdrawal of the left upper extremity to noxious stim earlier in the day but currently does not have this.    Coordination:  -no increase tone or tremor noted.        Recent Diagnostics:   Laboratory Results:   - Reviewed in EMR  Lab Results   Component Value Date    GLUCOSE 177 (H) 01/07/2025    CALCIUM 9.6 01/07/2025     01/07/2025    K 4.3 01/07/2025    CO2 33.0 (H) 01/07/2025     01/07/2025    BUN 18 01/07/2025    CREATININE 0.64 (L) 01/07/2025    EGFRIFAFRI >59 02/14/2022    EGFRIFNONA >60 02/14/2022    BCR 28.1 (H) 01/07/2025    ANIONGAP 5.0 01/07/2025     Lab Results   Component Value Date    WBC 17.82 (H) 01/07/2025    HGB 12.0 (L) 01/07/2025    HCT 37.8 01/07/2025    MCV 96.2 01/07/2025     01/07/2025     Lab Results   Component Value Date    PTT 28.6 11/13/2014    INR 0.96 12/29/2024      Lab Results   Component Value Date    TRIG 147 02/14/2022    HDL 37 (L) 02/14/2022    LDL 49 02/14/2022     Lab Results   Component Value Date    HGBA1C 5.40 08/26/2024       Imaging Results:  Imaging Results (Last 24 Hours)       ** No results found for the last 24 hours. **           Results:  CT of head without contrast on 1/4 shows no acute findings  EEG performed on 1/4 suggest encephalopathy with moderate to severe diffuse cerebral dysfunction with high amplitude delta activity and intermixed alpha and theta activity.  There is no evidence of seizures.    Assessment & Plan:   Patient 64 y.o. male with PMH substance abuse, depression, DM, bladder CA, chronic back pain with pain stimulator, CAD s/p CABG, HLD, HTN, TYRELL with BiPAP. Vinnytmarkus lives alone. He is . History obtained by medical record as patient is sedated and intubated. No family at bedside at initial exam and cousin later arrives. Currently ex-wife is decision maker and trying to get guardianship with anticpation of this on Monday and today is Saturday.     At any rate, patient was found unresponsive on 12/29/2024 for undetermined amount of time. He was in respiratory failure and required intubation. There was mention at some point concern for seizure but no seizure like activity was observed. Since admission, he has had some decerebrate posturing but no rhythmic like movements. CT head on admission no acute process. UDS + cocaine and TCA.    Patient has 2 similar presentations in July and August 2024 both related to hypoglycemia. Into the 30-40s.  With one admission, there is mention of possible seizure as patient had tongue biting. Per notes, patient recovered quickly to his baseline. Neurology has been consulted as sedation has been reduced and has been off precedex several hours and is on Fentanyl and when paused minimal response. He does have right gaze preference. He has intact pratik and cough. Patient has had elevated temp for  multiple days requiring cooling blanket. On Vancomycin for staphylococcus aureus. ID is following.     Active Hospital Problems    Diagnosis  POA    **Anoxic brain injury [G93.1]  Unknown    Acute respiratory failure with hypoxia [J96.01]  Yes    On mechanically assisted ventilation [Z99.11]  Not Applicable    Cocaine abuse [F14.10]  Unknown    Positive urine drug screen [R82.5]  Unknown    Substance abuse [F19.10]  Unknown    Tobacco use [Z72.0]  Unknown    COPD (chronic obstructive pulmonary disease) [J44.9]  Unknown    Bipolar disorder [F31.9]  Unknown    Coronary artery disease involving autologous vein coronary bypass graft without angina pectoris [I25.810]  Unknown    Essential hypertension [I10]  Unknown    Hyperlipidemia LDL goal <100 [E78.5]  Unknown        Impression:  Acute respiratory failure.  Illicit drug use. UDS + cocaine and TCA  No TCA's on home med list  Leukocytosis send ongoing febrile state -ID involved.  Currently no definitive infectious etiology.  Blood cultures remain pending.  Chronic back pain with spinal stimulator so unable to have MRI brain.  Tobacco use.  DM uncontrolled with hypoglycemia.      Plan:   Ex-wife established guardianship  Will call wife today and discuss prognosis  No significant neuro changes to suggest a better prognosis  Extremely guarded prognosis    Ole Vegas MD  01/07/25  09:12 CST    Medical Decision Making    Number/Complexity of Problems  Moderate  1 undiagnosed new problem with uncertain prognosis -   1 acute illness with systemic symptoms -   High  1 acute or chronic illness that poses a threat to life/body function -   high     MDM Data  Moderate - 1/3 categories  Extensive - 2/3 categories    Category 1: 3 of the following  Review of external notes  Review of results  Ordering of each unique test  Independent historian  Category 2:  Independent interpretation of test (ex: imaging)  Category 3:  Discussion of management with another  provider    extensive     Treatment Plan  Moderate - Prescription Drug management  High  Drug therapy requiring intensive monitoring for toxicity  Decision regarding hospitalization or escalation of care  De-escalate care/DNR decisions  high

## 2025-01-07 NOTE — PLAN OF CARE
Problem: Mechanical Ventilation Invasive  Goal: Effective Communication  Outcome: Not Progressing     Problem: Mechanical Ventilation Invasive  Goal: Optimal Device Function  Intervention: Optimize Device Care and Function  Recent Flowsheet Documentation  Taken 1/7/2025 1000 by Ailyn Castorena CRT  Airway Safety Measures:   manual resuscitator/mask at bedside   oxygen flowmeter at bedside   suction at bedside   Goal Outcome Evaluation:      Continues to be ventilated mechanically. No sign of improvement this shift. No SBT trial today.

## 2025-01-07 NOTE — PLAN OF CARE
Problem: Mechanical Ventilation Invasive  Goal: Mechanical Ventilation Liberation  Intervention: Promote Extubation and Mechanical Ventilation Liberation  Description: Assess for pain, agitation and delirium regularly, utilizing a validated tool; minimize medication effects that may contribute to agitation, delirium or delay extubation.  Encourage early rehabilitation using therapeutic intervention and functional mobility training to minimize deconditioning, weakness, functional dependence and delirium.  Assess readiness to wake up, breathe, wean and extubate; consider protocol approach to reduce ventilator and intensive care days.  Perform spontaneous awakening trial; adjust medication to minimize effects that may contribute to extubation failure.  Perform SBT (spontaneous breathing trial); consider low inspiratory-pressure support.  Facilitate clustered care and uninterrupted sleep/rest pattern that supports home sleep routine; promote calm environment.  Acknowledge fear and anxiety related to the patient's and support system's experience of prolonged mechanical ventilation; encourage complementary therapies, such as music therapy.  Perform a cuff leak test to predict postextubation risk for swelling or stridor; consider intravenous or inhaled steroids for high-risk patients.  Consider prophylactic noninvasive ventilation after extubation for high-risk patients [e.g., COPD (chronic obstructive pulmonary disease), heart failure, older adults].  Consider the need for a longer-term airway.  Recent Flowsheet Documentation  Taken 1/7/2025 0400 by Sybil Heller, RN  Medication Review/Management: medications reviewed  Taken 1/7/2025 0300 by Sybil Heller RN  Medication Review/Management: medications reviewed  Taken 1/7/2025 0200 by Sybil Heller RN  Medication Review/Management: medications reviewed  Taken 1/7/2025 0100 by Sybil Heller RN  Medication Review/Management: medications reviewed  Taken 1/7/2025  0000 by Sybil Heller RN  Medication Review/Management: medications reviewed  Taken 1/6/2025 2300 by Sybil Heller RN  Medication Review/Management: medications reviewed  Taken 1/6/2025 2200 by Sybil Heller RN  Medication Review/Management: medications reviewed  Taken 1/6/2025 2100 by Sybil Heller RN  Medication Review/Management: medications reviewed  Taken 1/6/2025 2000 by Sybil Heller RN  Medication Review/Management: medications reviewed  Taken 1/6/2025 1900 by Sybil Heller RN  Medication Review/Management: medications reviewed  Goal: Absence of Ventilator-Induced Lung Injury  Intervention: Prevent Ventilator-Associated Pneumonia  Description: Assess readiness to extubate; perform sedation interruption and spontaneous breathing trial.  Maintain semirecumbent position to minimize aspiration risk.  Provide ongoing oral care to reduce pathogens in oral cavity.  Consider the use of antiseptic cloths for daily bathing.  Minimize ventilator circuit breaks; consider use of closed suction device.  Minimize microaspiration risk; consider the use of ultrathin polyurethane tapered endotracheal tubes with subglottic secretion drainage, as well as cuff pressure monitoring.  Assess need for stress ulcer and venous thromboembolism prophylaxis due to increased risk during mechanical ventilation.  Recent Flowsheet Documentation  Taken 1/7/2025 0400 by Sybil Heller RN  Oral Care:   lip/mouth moisturizer applied   mouth wash rinse   suction provided  Taken 1/7/2025 0300 by Sybil Heller RN  Head of Bed (HOB) Positioning: HOB at 30-45 degrees  Taken 1/7/2025 0100 by Sybil Heller RN  Head of Bed (HOB) Positioning: HOB at 30-45 degrees  Taken 1/7/2025 0000 by Sybil Heller RN  Oral Care:   suction provided   lip/mouth moisturizer applied   swabbed with antiseptic solution   teeth brushed - suction toothbrush  Taken 1/6/2025 2300 by Sybil Heller RN  Head of Bed (HOB) Positioning: HOB at 30-45  degrees  Taken 1/6/2025 2100 by Sybil Heller RN  Head of Bed (HOB) Positioning: HOB at 30-45 degrees  Taken 1/6/2025 1900 by Sybil Heller RN  Head of Bed (HOB) Positioning: HOB at 30-45 degrees     Problem: Adult Inpatient Plan of Care  Goal: Absence of Hospital-Acquired Illness or Injury  Intervention: Identify and Manage Fall Risk  Description: Perform standard risk assessment on admission using a validated tool or comprehensive approach appropriate to the patient; reassess fall risk frequently, with change in status or transfer to another level of care.  Communicate risk to interprofessional healthcare team; ensure fall risk visible cue.  Determine need for increased observation, equipment and environmental modification, as well as use of supportive, nonskid footwear.  Adjust safety measures to individual needs and identified risk factors.  Reinforce the importance of active participation with fall risk prevention, safety, and physical activity with the patient and family.  Perform regular intentional rounding to assess need for position change, pain assessment and personal needs, including assistance with toileting.  Recent Flowsheet Documentation  Taken 1/7/2025 0400 by Sybil Heller RN  Safety Promotion/Fall Prevention:   safety round/check completed   fall prevention program maintained  Taken 1/7/2025 0300 by Sybil Heller RN  Safety Promotion/Fall Prevention: safety round/check completed  Taken 1/7/2025 0200 by Sybil Heller RN  Safety Promotion/Fall Prevention: safety round/check completed  Taken 1/7/2025 0100 by Sybil Heller RN  Safety Promotion/Fall Prevention: safety round/check completed  Taken 1/7/2025 0000 by Sybil Heller RN  Safety Promotion/Fall Prevention:   safety round/check completed   fall prevention program maintained  Taken 1/6/2025 2300 by Sybil Heller RN  Safety Promotion/Fall Prevention: safety round/check completed  Taken 1/6/2025 2200 by Sybil Heller  RN  Safety Promotion/Fall Prevention: safety round/check completed  Taken 1/6/2025 2100 by Sybil Heller RN  Safety Promotion/Fall Prevention: safety round/check completed  Taken 1/6/2025 2000 by Sybil Heller RN  Safety Promotion/Fall Prevention: safety round/check completed  Taken 1/6/2025 1900 by Sybil Heller RN  Safety Promotion/Fall Prevention: safety round/check completed  Intervention: Prevent Skin Injury  Description: Perform a screening for skin injury risk, such as pressure or moisture-associated skin damage on admission and at regular intervals throughout hospital stay.  Keep all areas of skin (especially folds) clean and dry.  Maintain adequate skin hydration.  Relieve and redistribute pressure and protect bony prominences and skin at risk for injury; implement measures based on patient-specific risk factors.  Match turning and repositioning schedule to clinical condition.  Encourage weight shift frequently; assist with reposition if unable to complete independently.  Float heels off bed; avoid pressure on the Achilles tendon.  Keep skin free from extended contact with medical devices.  Optimize nutrition and hydration.  Encourage functional activity and mobility, as early as tolerated.  Use aids (e.g., slide boards, mechanical lift) during transfer.  Recent Flowsheet Documentation  Taken 1/7/2025 0300 by Sybil Heller RN  Body Position:   turned   right  Taken 1/7/2025 0100 by Sybil Heller RN  Body Position:   turned   supine  Taken 1/6/2025 2300 by Sybil Heller RN  Body Position:   turned   left  Taken 1/6/2025 2100 by Sybil Heller RN  Body Position:   turned   right  Taken 1/6/2025 1900 by Sybil Heller RN  Body Position:   turned   supine     Problem: Fall Injury Risk  Goal: Absence of Fall and Fall-Related Injury  Intervention: Identify and Manage Contributors  Description: Develop a fall prevention plan, considering patient-centered interventions and family/caregiver  involvement; identify and address patient's facilitators and barriers.  Provide reorientation, appropriate sensory stimulation and routines with changes in mental status to decrease risk of fall.  Promote use of personal vision and auditory aids.  Assess assistance level required for safe and effective self-care; provide support as needed, such as toileting and mobilization. For age 65 and older, implement timed toileting with assistance.  Encourage physical activity, such as performance of mobility and self-care at highest level of patient ability, multicomponent exercise program and provision of appropriate assistive devices.  If fall occurs, assess the severity of injury; implement fall injury protocol. Determine the cause and revise fall injury prevention plan.  Regularly review and advocate for medication adjustment to decrease fall risk; consider administration times, polypharmacy and age.  Balance adequate pain management with potential for oversedation.  Recent Flowsheet Documentation  Taken 1/7/2025 0400 by Sybil Heller RN  Medication Review/Management: medications reviewed  Taken 1/7/2025 0300 by Sybil Heller RN  Medication Review/Management: medications reviewed  Taken 1/7/2025 0200 by Sybil Heller RN  Medication Review/Management: medications reviewed  Taken 1/7/2025 0100 by Sybil Heller RN  Medication Review/Management: medications reviewed  Taken 1/7/2025 0000 by Sybil Heller RN  Medication Review/Management: medications reviewed  Taken 1/6/2025 2300 by Sybil Heller RN  Medication Review/Management: medications reviewed  Taken 1/6/2025 2200 by Sybil Heller RN  Medication Review/Management: medications reviewed  Taken 1/6/2025 2100 by Sybil Heller RN  Medication Review/Management: medications reviewed  Taken 1/6/2025 2000 by Sybil Heller RN  Medication Review/Management: medications reviewed  Taken 1/6/2025 1900 by Sybil Heller RN  Medication Review/Management:  medications reviewed  Intervention: Promote Injury-Free Environment  Description: Provide a safe, barrier-free environment that encourages independent activity.  Keep care area uncluttered and well-lighted.  Determine need for increased observation or monitoring.  Avoid use of devices that minimize mobility, such as restraints or indwelling urinary catheter.  Recent Flowsheet Documentation  Taken 1/7/2025 0400 by Sybil Heller RN  Safety Promotion/Fall Prevention:   safety round/check completed   fall prevention program maintained  Taken 1/7/2025 0300 by Sybil Heller RN  Safety Promotion/Fall Prevention: safety round/check completed  Taken 1/7/2025 0200 by Sybil Heller RN  Safety Promotion/Fall Prevention: safety round/check completed  Taken 1/7/2025 0100 by Sybil Heller RN  Safety Promotion/Fall Prevention: safety round/check completed  Taken 1/7/2025 0000 by Sybil Heller RN  Safety Promotion/Fall Prevention:   safety round/check completed   fall prevention program maintained  Taken 1/6/2025 2300 by Sybil Heller RN  Safety Promotion/Fall Prevention: safety round/check completed  Taken 1/6/2025 2200 by Sybil Heller RN  Safety Promotion/Fall Prevention: safety round/check completed  Taken 1/6/2025 2100 by Sybil Heller RN  Safety Promotion/Fall Prevention: safety round/check completed  Taken 1/6/2025 2000 by Sybil Heller RN  Safety Promotion/Fall Prevention: safety round/check completed  Taken 1/6/2025 1900 by Sybil Heller RN  Safety Promotion/Fall Prevention: safety round/check completed     Problem: Skin Injury Risk Increased  Goal: Skin Health and Integrity  Intervention: Optimize Skin Protection  Description: Perform a full pressure injury risk assessment, as indicated by screening, upon admission to care unit.  Reassess skin (full inspection and injury risk, including skin temperature, consistency and color) frequently (e.g., scheduled interval, with change in condition) to  provide optimal early detection and prevention.  Maintain adequate tissue perfusion (e.g., encourage fluid balance; avoid crossing legs, constrictive clothing or devices) to promote tissue oxygenation.  Maintain head of bed at lowest degree of elevation tolerated, considering medical condition and other restrictions. Use positioning supports to prevent sliding and friction. Consider low friction textiles.  Avoid positioning onto an area that remains reddened or on bony prominences.  Minimize incontinence and moisture (e.g., toileting schedule; moisture-wicking pad, diaper or incontinence collection device; skin moisture barrier).  Cleanse skin promptly and gently, when soiled, utilizing a pH-balanced cleanser.  Relieve and redistribute pressure (e.g., scheduled position changes, weight shifts, use of support surface, medical device repositioning, protective dressing application, use of positioning device, microclimate control, use of pressure-injury-monitor  Encourage increased activity, such as sitting in a chair at the bedside or early mobilization, when able to tolerate. Avoid prolonged sitting.  Recent Flowsheet Documentation  Taken 1/7/2025 0300 by Sybil Heller RN  Head of Bed (HOB) Positioning: HOB at 30-45 degrees  Taken 1/7/2025 0100 by Sybil Heller RN  Head of Bed (HOB) Positioning: HOB at 30-45 degrees  Taken 1/6/2025 2300 by Sybil Heller RN  Head of Bed (HOB) Positioning: HOB at 30-45 degrees  Taken 1/6/2025 2100 by Sybil Heller RN  Head of Bed (HOB) Positioning: HOB at 30-45 degrees  Taken 1/6/2025 2000 by Sybil Heller RN  Activity Management: bedrest  Taken 1/6/2025 1900 by Sybil Heller RN  Head of Bed (HOB) Positioning: HOB at 30-45 degrees     Problem: Restraint, Nonviolent  Goal: Absence of Harm or Injury  Intervention: Protect Skin and Joint Integrity  Description: Frequently assess restraint application site for skin integrity, edema and perfusion distal to the site;  document findings.  Consider protective skin barrier with risk of skin injury.  Release and replace restraint at regular intervals.  Assist with frequent joint range of motion activity.  Recent Flowsheet Documentation  Taken 1/7/2025 0300 by Sybil Heller RN  Body Position:   turned   right  Taken 1/7/2025 0100 by Sybil Heller RN  Body Position:   turned   supine  Taken 1/6/2025 2300 by Sybil Heller RN  Body Position:   turned   left  Taken 1/6/2025 2100 by Sybil Heller RN  Body Position:   turned   right  Taken 1/6/2025 1900 by Sybil Heller RN  Body Position:   turned   supine     Problem: Sepsis/Septic Shock  Goal: Absence of Infection Signs and Symptoms  Intervention: Promote Recovery  Description: Encourage pulmonary hygiene, such as cough-enhancement and airway-clearance techniques, that may include use of incentive spirometry, deep breathing and cough.  Encourage early rehabilitation and physical activity to optimize functional ability and activity tolerance, as well as minimize delirium.  Promote energy conservation; minimize oxygen demand and consumption by adjusting environment, decreasing stimulation, maintaining normothermia and treating pain.  Optimize fluid balance, nutrition intake, sleep and glycemic control to maintain tissue perfusion and enhance immune response.  Recent Flowsheet Documentation  Taken 1/6/2025 2000 by Sybil Heller RN  Activity Management: bedrest     Problem: Enteral Nutrition  Goal: Absence of Aspiration Signs and Symptoms  Intervention: Minimize Aspiration Risk  Description: Elevate head of bed to a semi-recumbent position.  Provide routine oral care with antimicrobial solution to reduce risk of infection.  Promote continuous gastric feedings; advocate for postpyloric feeding if higher aspiration risk.  Confirm placement of gastric or gastroenteric access device prior to initiation of feedings and with adjustments.  Fredis tube at exit site at time of initial  x-ray; monitor exit site for tube migration.  Monitor for feeding intolerance (e.g., abdominal distension, gastric residual volume, nausea and vomiting).  Recent Flowsheet Documentation  Taken 1/7/2025 0400 by Sybil Heller RN  Oral Care:   lip/mouth moisturizer applied   mouth wash rinse   suction provided  Taken 1/7/2025 0300 by Sybil Heller RN  Head of Bed (HOB) Positioning: HOB at 30-45 degrees  Taken 1/7/2025 0100 by Sybil Heller RN  Head of Bed (HOB) Positioning: HOB at 30-45 degrees  Taken 1/7/2025 0000 by Sybil Heller RN  Oral Care:   suction provided   lip/mouth moisturizer applied   swabbed with antiseptic solution   teeth brushed - suction toothbrush  Taken 1/6/2025 2300 by Sybil Heller RN  Head of Bed (HOB) Positioning: HOB at 30-45 degrees  Taken 1/6/2025 2100 by Sybil Heller RN  Head of Bed (HOB) Positioning: HOB at 30-45 degrees  Taken 1/6/2025 1900 by Sybil Heller RN  Head of Bed (HOB) Positioning: HOB at 30-45 degrees     Problem: Skin Injury Risk Increased  Goal: Skin Health and Integrity  Intervention: Optimize Skin Protection  Description: Perform a full pressure injury risk assessment, as indicated by screening, upon admission to care unit.  Reassess skin (full inspection and injury risk, including skin temperature, consistency and color) frequently (e.g., scheduled interval, with change in condition) to provide optimal early detection and prevention.  Maintain adequate tissue perfusion (e.g., encourage fluid balance; avoid crossing legs, constrictive clothing or devices) to promote tissue oxygenation.  Maintain head of bed at lowest degree of elevation tolerated, considering medical condition and other restrictions. Use positioning supports to prevent sliding and friction. Consider low friction textiles.  Avoid positioning onto an area that remains reddened or on bony prominences.  Minimize incontinence and moisture (e.g., toileting schedule; moisture-wicking pad,  diaper or incontinence collection device; skin moisture barrier).  Cleanse skin promptly and gently, when soiled, utilizing a pH-balanced cleanser.  Relieve and redistribute pressure (e.g., scheduled position changes, weight shifts, use of support surface, medical device repositioning, protective dressing application, use of positioning device, microclimate control, use of pressure-injury-monitor  Encourage increased activity, such as sitting in a chair at the bedside or early mobilization, when able to tolerate. Avoid prolonged sitting.  Recent Flowsheet Documentation  Taken 1/7/2025 0300 by Sybil Heller RN  Head of Bed (HOB) Positioning: HOB at 30-45 degrees  Taken 1/7/2025 0100 by Sybil Heller RN  Head of Bed (HOB) Positioning: HOB at 30-45 degrees  Taken 1/6/2025 2300 by Sybil Heller RN  Head of Bed (HOB) Positioning: HOB at 30-45 degrees  Taken 1/6/2025 2100 by Sybil Heller RN  Head of Bed (HOB) Positioning: HOB at 30-45 degrees  Taken 1/6/2025 2000 by Sybil Heller RN  Activity Management: bedrest  Taken 1/6/2025 1900 by Sybil Heller RN  Head of Bed (HOB) Positioning: HOB at 30-45 degrees     Problem: Fall Injury Risk  Goal: Absence of Fall and Fall-Related Injury  Intervention: Identify and Manage Contributors  Description: Develop a fall prevention plan, considering patient-centered interventions and family/caregiver involvement; identify and address patient's facilitators and barriers.  Provide reorientation, appropriate sensory stimulation and routines with changes in mental status to decrease risk of fall.  Promote use of personal vision and auditory aids.  Assess assistance level required for safe and effective self-care; provide support as needed, such as toileting and mobilization. For age 65 and older, implement timed toileting with assistance.  Encourage physical activity, such as performance of mobility and self-care at highest level of patient ability, multicomponent exercise  program and provision of appropriate assistive devices.  If fall occurs, assess the severity of injury; implement fall injury protocol. Determine the cause and revise fall injury prevention plan.  Regularly review and advocate for medication adjustment to decrease fall risk; consider administration times, polypharmacy and age.  Balance adequate pain management with potential for oversedation.  Recent Flowsheet Documentation  Taken 1/7/2025 0400 by Sybil Heller RN  Medication Review/Management: medications reviewed  Taken 1/7/2025 0300 by Sybil Heller RN  Medication Review/Management: medications reviewed  Taken 1/7/2025 0200 by Sybil Heller RN  Medication Review/Management: medications reviewed  Taken 1/7/2025 0100 by Sybil Heller RN  Medication Review/Management: medications reviewed  Taken 1/7/2025 0000 by Sybil Heller RN  Medication Review/Management: medications reviewed  Taken 1/6/2025 2300 by Sybil Heller RN  Medication Review/Management: medications reviewed  Taken 1/6/2025 2200 by Sybil Heller RN  Medication Review/Management: medications reviewed  Taken 1/6/2025 2100 by Sybil Heller RN  Medication Review/Management: medications reviewed  Taken 1/6/2025 2000 by Sybil Heller RN  Medication Review/Management: medications reviewed  Taken 1/6/2025 1900 by Sybil Heller RN  Medication Review/Management: medications reviewed  Intervention: Promote Injury-Free Environment  Description: Provide a safe, barrier-free environment that encourages independent activity.  Keep care area uncluttered and well-lighted.  Determine need for increased observation or monitoring.  Avoid use of devices that minimize mobility, such as restraints or indwelling urinary catheter.  Recent Flowsheet Documentation  Taken 1/7/2025 0400 by Sybil Heller RN  Safety Promotion/Fall Prevention:   safety round/check completed   fall prevention program maintained  Taken 1/7/2025 0300 by Sybil Heller  RN  Safety Promotion/Fall Prevention: safety round/check completed  Taken 1/7/2025 0200 by Sybil Heller RN  Safety Promotion/Fall Prevention: safety round/check completed  Taken 1/7/2025 0100 by Sybil Heller RN  Safety Promotion/Fall Prevention: safety round/check completed  Taken 1/7/2025 0000 by Sybil Heller RN  Safety Promotion/Fall Prevention:   safety round/check completed   fall prevention program maintained  Taken 1/6/2025 2300 by Sybil Heller RN  Safety Promotion/Fall Prevention: safety round/check completed  Taken 1/6/2025 2200 by Sybil Heller RN  Safety Promotion/Fall Prevention: safety round/check completed  Taken 1/6/2025 2100 by Sybil Heller RN  Safety Promotion/Fall Prevention: safety round/check completed  Taken 1/6/2025 2000 by Sybil Heller RN  Safety Promotion/Fall Prevention: safety round/check completed  Taken 1/6/2025 1900 by Sybil Heller RN  Safety Promotion/Fall Prevention: safety round/check completed     Problem: Mechanical Ventilation Invasive  Goal: Optimal Device Function  Intervention: Optimize Device Care and Function  Description: Maintain head of bed elevation with regular position changes to minimize ventilation-perfusion mismatch and breathlessness.  Provide oral care regularly with subglottic suction to reduce the risk of infection; perform prior to cuff deflation or tube manipulation.  Assess tube size, depth, location and securement frequently to minimize the risk of tube displacement; confirm placement with radiography or ultrasonography.  Facilitate regular mechanical ventilator and humidification equipment checks to ensure proper function; monitor and manage ventilator and alarm settings.  Provide humidification and evaluate need for suctioning to minimize risk of airway obstruction; regularly replace closed suction equipment.  Perform ongoing device and stoma care to prevent infection; minimize excessive moisture around device; ensure  tracheostomy inner cannula or single lumen device is cleaned or replaced regularly to prevent obstruction from secretions.  Monitor and manage cuff pressure routinely, if present; deflate cuff when not clinically indicated.  Maintain readily available emergency equipment that includes appropriate-sized manual resuscitation bag, mask, suction equipment, cleaning supplies and replacement airway devices.  If displacement occurs, provide oxygen to the nose, mouth and stoma. Notify provider.  Recent Flowsheet Documentation  Taken 1/7/2025 0400 by Sybil Heller, RN  Oral Care:   lip/mouth moisturizer applied   mouth wash rinse   suction provided  Taken 1/7/2025 0000 by Sybil Heller, RN  Oral Care:   suction provided   lip/mouth moisturizer applied   swabbed with antiseptic solution   teeth brushed - suction toothbrush  Goal: Mechanical Ventilation Liberation  Intervention: Promote Extubation and Mechanical Ventilation Liberation  Description: Assess for pain, agitation and delirium regularly, utilizing a validated tool; minimize medication effects that may contribute to agitation, delirium or delay extubation.  Encourage early rehabilitation using therapeutic intervention and functional mobility training to minimize deconditioning, weakness, functional dependence and delirium.  Assess readiness to wake up, breathe, wean and extubate; consider protocol approach to reduce ventilator and intensive care days.  Perform spontaneous awakening trial; adjust medication to minimize effects that may contribute to extubation failure.  Perform SBT (spontaneous breathing trial); consider low inspiratory-pressure support.  Facilitate clustered care and uninterrupted sleep/rest pattern that supports home sleep routine; promote calm environment.  Acknowledge fear and anxiety related to the patient's and support system's experience of prolonged mechanical ventilation; encourage complementary therapies, such as music therapy.  Perform  a cuff leak test to predict postextubation risk for swelling or stridor; consider intravenous or inhaled steroids for high-risk patients.  Consider prophylactic noninvasive ventilation after extubation for high-risk patients [e.g., COPD (chronic obstructive pulmonary disease), heart failure, older adults].  Consider the need for a longer-term airway.  Recent Flowsheet Documentation  Taken 1/7/2025 0400 by Sybil Heller RN  Medication Review/Management: medications reviewed  Taken 1/7/2025 0300 by Sybil Heller RN  Medication Review/Management: medications reviewed  Taken 1/7/2025 0200 by Sybil Heller RN  Medication Review/Management: medications reviewed  Taken 1/7/2025 0100 by Sybil Heller RN  Medication Review/Management: medications reviewed  Taken 1/7/2025 0000 by Sybil Heller RN  Medication Review/Management: medications reviewed  Taken 1/6/2025 2300 by Sybil Heller RN  Medication Review/Management: medications reviewed  Taken 1/6/2025 2200 by Sybil Heller RN  Medication Review/Management: medications reviewed  Taken 1/6/2025 2100 by Sybil Heller RN  Medication Review/Management: medications reviewed  Taken 1/6/2025 2000 by Sybil Heller RN  Medication Review/Management: medications reviewed  Taken 1/6/2025 1900 by Sybil Heller RN  Medication Review/Management: medications reviewed  Goal: Absence of Ventilator-Induced Lung Injury  Intervention: Prevent Ventilator-Associated Pneumonia  Description: Assess readiness to extubate; perform sedation interruption and spontaneous breathing trial.  Maintain semirecumbent position to minimize aspiration risk.  Provide ongoing oral care to reduce pathogens in oral cavity.  Consider the use of antiseptic cloths for daily bathing.  Minimize ventilator circuit breaks; consider use of closed suction device.  Minimize microaspiration risk; consider the use of ultrathin polyurethane tapered endotracheal tubes with subglottic secretion  drainage, as well as cuff pressure monitoring.  Assess need for stress ulcer and venous thromboembolism prophylaxis due to increased risk during mechanical ventilation.  Recent Flowsheet Documentation  Taken 1/7/2025 0400 by Sybil Heller RN  Oral Care:   lip/mouth moisturizer applied   mouth wash rinse   suction provided  Taken 1/7/2025 0300 by Sybil Heller RN  Head of Bed (hospitals) Positioning: HOB at 30-45 degrees  Taken 1/7/2025 0100 by Sybil Heller RN  Head of Bed (hospitals) Positioning: HOB at 30-45 degrees  Taken 1/7/2025 0000 by Sybil Heller RN  Oral Care:   suction provided   lip/mouth moisturizer applied   swabbed with antiseptic solution   teeth brushed - suction toothbrush  Taken 1/6/2025 2300 by Sybil Heller RN  Head of Bed (hospitals) Positioning: HOB at 30-45 degrees  Taken 1/6/2025 2100 by Sybil Heller RN  Head of Bed (hospitals) Positioning: HOB at 30-45 degrees  Taken 1/6/2025 1900 by Sybil Heller RN  Head of Bed (hospitals) Positioning: HOB at 30-45 degrees     Problem: Enteral Nutrition  Goal: Absence of Aspiration Signs and Symptoms  Intervention: Minimize Aspiration Risk  Description: Elevate head of bed to a semi-recumbent position.  Provide routine oral care with antimicrobial solution to reduce risk of infection.  Promote continuous gastric feedings; advocate for postpyloric feeding if higher aspiration risk.  Confirm placement of gastric or gastroenteric access device prior to initiation of feedings and with adjustments.  Fredis tube at exit site at time of initial x-ray; monitor exit site for tube migration.  Monitor for feeding intolerance (e.g., abdominal distension, gastric residual volume, nausea and vomiting).  Recent Flowsheet Documentation  Taken 1/7/2025 0400 by Sybil Heller RN  Oral Care:   lip/mouth moisturizer applied   mouth wash rinse   suction provided  Taken 1/7/2025 0300 by Sybil Heller RN  Head of Bed (hospitals) Positioning: HOB at 30-45 degrees  Taken 1/7/2025 0100 by  Sybil Heller RN  Head of Bed (\[facility]\"") Positioning: HOB at 30-45 degrees  Taken 1/7/2025 0000 by Sybil Heller RN  Oral Care:   suction provided   lip/mouth moisturizer applied   swabbed with antiseptic solution   teeth brushed - suction toothbrush  Taken 1/6/2025 2300 by Sybil Heller RN  Head of Bed (HOB) Positioning: HOB at 30-45 degrees  Taken 1/6/2025 2100 by Sybil Heller RN  Head of Bed (\[facility]\"") Positioning: HOB at 30-45 degrees  Taken 1/6/2025 1900 by Sybil Heller RN  Head of Bed (\[facility]\"") Positioning: HOB at 30-45 degrees     Problem: Sepsis/Septic Shock  Goal: Absence of Infection Signs and Symptoms  Intervention: Promote Recovery  Description: Encourage pulmonary hygiene, such as cough-enhancement and airway-clearance techniques, that may include use of incentive spirometry, deep breathing and cough.  Encourage early rehabilitation and physical activity to optimize functional ability and activity tolerance, as well as minimize delirium.  Promote energy conservation; minimize oxygen demand and consumption by adjusting environment, decreasing stimulation, maintaining normothermia and treating pain.  Optimize fluid balance, nutrition intake, sleep and glycemic control to maintain tissue perfusion and enhance immune response.  Recent Flowsheet Documentation  Taken 1/6/2025 2000 by Sybil Heller RN  Activity Management: bedrest   Goal Outcome Evaluation:

## 2025-01-07 NOTE — PLAN OF CARE
Problem: Mechanical Ventilation Invasive  Goal: Absence of Ventilator-Induced Lung Injury  Intervention: Prevent Ventilator-Associated Pneumonia  Recent Flowsheet Documentation  Taken 1/6/2025 1850 by Araceli Guy RRT  Head of Bed (HOB) Positioning: HOB at 30-45 degrees     Problem: Mechanical Ventilation Invasive  Goal: Effective Communication  Outcome: Progressing  Goal: Optimal Device Function  Outcome: Progressing  Intervention: Optimize Device Care and Function  Recent Flowsheet Documentation  Taken 1/6/2025 1850 by Araceli Guy RRT  Airway Safety Measures:   suction at bedside   oxygen flowmeter at bedside   manual resuscitator/mask at bedside  Goal: Mechanical Ventilation Liberation  Outcome: Progressing  Goal: Optimal Nutrition Delivery  Outcome: Progressing  Goal: Absence of Device-Related Skin and Tissue Injury  Outcome: Progressing  Goal: Absence of Ventilator-Induced Lung Injury  Outcome: Progressing  Intervention: Prevent Ventilator-Associated Pneumonia  Recent Flowsheet Documentation  Taken 1/6/2025 1850 by Araceli Guy RRT  Head of Bed (HOB) Positioning: HOB at 30-45 degrees   Goal Outcome Evaluation:

## 2025-01-08 NOTE — PROGRESS NOTES
Neurology Consult Note    Consult Date: 2025  Referring MD: No ref. provider found  Reason for Consult: ???anoxic brain injury     Patient: Ld Green (64 y.o. male)  MRN: [id number]  : 1960    History of Present Illness:     : He continues to be on a fentanyl drip at 250.  The patient has no significant changes overnight.  He is breathing above the vent.  His eyes are open but he has no blink to threat this morning.  He was febrile again overnight with a Tmax of 101.6.  His white count continues to climb going from 17,000 yesterday to 20,000 today.  I was able to speak briefly to his ex-wife and guardian; Fe, over the phone this morning.  She was at the Social Security office attempting to take care of some of his personal business.  She requested a call back later in the day as they called her back to speak to a representative just as we had begun our conversation.    : According to nursing staff the wife did receive guardianship yesterday.  She does not plan on returning to the intensive care unit until later this week.  The patient has increased work of breathing anytime the fentanyl was turned down.  He currently is on fentanyl 250 mcg.  Earlier the nursing staff tell me that he did have withdrawal of the left upper extremity.  No seizure activity reported overnight.  He does continue to have borderline fevers with a Tmax overnight of 100.9 but his white count decreased from 18,000-17,000 today.    :  He looks worse today overall compared with previous days.  He has increased work of breathing and has an elevated RR despite being intubated and on Fentanyl for vent sync.  Reportedly his ex-wife is attempting to establish guardianship today.  He continues to be febrile.  His Tmax overnight was 101.4.      2025: remains intubated and sedated with Fentanyl 250 mcg. Eyes closed and he opens to his name. He blinks spontaneously. He does not follow commands. No increase tone or  posturing noted. No withdrawal to pain. EEG showed slowing consistent with encephalopathy. CT head no acute process. He is not able to have MRI as he has spinal stimulator. He is not breathing over the vent. Ex-wife Fe called on the phone and updated on test results as well as patient wishes and life with functional neurologic outcome. Fe tells me  patient had told her he had no living relatives and was surprised when she heard from his cousin yesterday. Some pratik/cough when suctioned.   Patient seen along with Dr. Scales, tele neurology.       1/4/2025:Ld Green is a 64 y.o. male with PMH substance abuse, depression, DM, bladder CA, chronic back pain with pain stimulator, CAD s/p CABG, HLD, HTN, TYRELL with BiPAP. [place] lives alone. He is . History obtained by medical record as patient is sedated and intubated. No family at bedside at initial exam and cousin later arrives. Currently ex-wife is decision maker and trying to get guardianship with anticpation of this on Monday and today is Saturday.     At any rate, patient was found unresponsive on 12/29/2024 for undetermined amount of time. He was in respiratory failure and required intubation. There was mention at some point concern for seizure but no seizure like activity was observed. Since admission, he has had some decerebrate posturing but no rhythmic like movements. CT head on admission no acute process. UDS + cocaine and TCA.    Patient has 2 similar presentations in July and August 2024 both related to hypoglycemia. Into the 30-40s.  With one admission, there is mention of possible seizure as patient had tongue biting. Per notes, patient recovered quickly to his baseline. Neurology has been consulted as sedation has been reduced and has been off precedex several hours and is on Fentanyl and when paused minimal response. He does have right gaze preference. He has intact pratik and cough. Patient has had elevated temp for multiple days  requiring cooling blanket. On Vancomycin for staphylococcus aureus. ID is following.   Patient seen along with Dr. Scales, tele neurology.       Medical History:   Past Medical/Surgical Hx:  Past Medical History:   Diagnosis Date    Anxiety     Atherosclerosis of coronary artery     Bipolar disorder     Bladder cancer     Colon polyp     Coronary artery disease     Depression     Diabetes mellitus     Hemorrhoids     Hyperlipidemia     Hypertension     Nephrolithiasis     Sleep apnea      Past Surgical History:   Procedure Laterality Date    ANGIOPLASTY      with stent placement    BACK SURGERY      BLADDER SURGERY      cancer    CARDIAC CATHETERIZATION      COLONOSCOPY  04/13/2015    COLONOSCOPY N/A 4/30/2018    Procedure: COLONOSCOPY WITH ANESTHESIA;  Surgeon: Gerson Martini MD;  Location:  [facility];  Service: Gastroenterology    COLONOSCOPY N/A 4/8/2021    Procedure: COLONOSCOPY WITH ANESTHESIA;  Surgeon: Gerson Martini MD;  Location:  [facility];  Service: Gastroenterology;  Laterality: N/A;  Pre: Hx Colon Polyps  Post: divertivculosis   Dr. Garza  CO2 Inflation Used    CORONARY ARTERY BYPASS GRAFT      KNEE SURGERY      SHOULDER SURGERY      x 2    SINUS SURGERY         Medications On Admission:  Medications Prior to Admission   Medication Sig Dispense Refill Last Dose/Taking    amLODIPine (NORVASC) 10 MG tablet Take 1 tablet by mouth Every Night.       atorvastatin (LIPITOR) 40 MG tablet Take 1 tablet by mouth Every Night.       Cariprazine HCl (Vraylar) 1.5 MG capsule capsule Take 1 capsule by mouth Daily.       clopidogrel (PLAVIX) 75 MG tablet Take 1 tablet by mouth Every Night.       cyclobenzaprine (FLEXERIL) 10 MG tablet Take 1 tablet by mouth 3 (Three) Times a Day As Needed for Muscle Spasms.       Dextromethorphan-buPROPion ER (AUVELITY)  MG tablet controlled-release Take 1 tablet by mouth 2 (Two) Times a Day.       eszopiclone (LUNESTA) 3 MG tablet Take 1 tablet by  mouth Every Night. Take immediately before bedtime       furosemide (LASIX) 40 MG tablet Take 1 tablet by mouth Every Night.       isosorbide mononitrate (IMDUR) 60 MG 24 hr tablet Take 1 tablet by mouth Every Night.       lisinopril (PRINIVIL,ZESTRIL) 40 MG tablet Take 1 tablet by mouth Daily.       metoprolol succinate XL (TOPROL-XL) 200 MG 24 hr tablet Take 1 tablet by mouth Every Night.       naloxone (NARCAN) 4 MG/0.1ML nasal spray 1 spray into the nostril(s) as directed by provider As Needed for Opioid Reversal. Call 911. Don't prime. McC[place]ill in 1 nostril for overdose. Repeat in 2-3 minutes in other nostril if no or minimal breathing/responsiveness.       nitroglycerin (NITROSTAT) 0.4 MG SL tablet Place 1 tablet under the tongue Every 5 (Five) Minutes As Needed for Chest Pain.       oxyCODONE-acetaminophen (PERCOCET)  MG per tablet Take 1 tablet by mouth Every 8 (Eight) Hours.       Semaglutide, 2 MG/DOSE, (Ozempic, 2 MG/DOSE,) 8 MG/3ML solution pen-injector Inject 1 mg under the skin into the appropriate area as directed 1 (One) Time Per Week.          Current Medications:    Current Facility-Administered Medications:     acetaminophen (TYLENOL) 160 MG/5ML solution 650 mg, 650 mg, Nasogastric, Q6H PRN, Mando Olivo MD, 650 mg at 25 0620    acetaminophen (TYLENOL) suppository 650 mg, 650 mg, Rectal, Q4H PRN, Mando Olivo MD, 650 mg at 25 0355    acetaminophen (TYLENOL) tablet 650 mg, 650 mg, Per G Tube, Q6H PRN, Mando Olivo MD, 650 mg at 25 1255    [COMPLETED] amiodarone 150 mg in 100 mL D5W (loading dose), 150 mg, Intravenous, Once, 150 mg at 25 1800 **FOLLOWED BY** [] amiodarone 360 mg in 200 mL D5W infusion, 1 mg/min, Intravenous, Continuous, Last Rate: 33.3 mL/hr at 25 1908, 1 mg/min at 25 1908 **FOLLOWED BY** [] amiodarone 360 mg in 200 mL D5W infusion, 0.5 mg/min, Intravenous, Continuous, Stopped at 25 182 **FOLLOWED BY**  [COMPLETED] amiodarone (PACERONE) tablet 200 mg, 200 mg, Per G Tube, Once, 200 mg at 01/02/25 1820 **FOLLOWED BY** amiodarone (PACERONE) tablet 200 mg, 200 mg, Per G Tube, Q8H, 200 mg at 01/08/25 0131 **FOLLOWED BY** [START ON 1/9/2025] amiodarone (PACERONE) tablet 200 mg, 200 mg, Per G Tube, Q12H **FOLLOWED BY** [START ON 1/23/2025] amiodarone (PACERONE) tablet 200 mg, 200 mg, Per G Tube, Daily, Mando Olivo MD    amLODIPine (NORVASC) tablet 10 mg, 10 mg, Nasogastric, Nightly, Nicola Hare PA-C, 10 mg at 01/07/25 2031    artificial tears ophthalmic ointment, , Both Eyes, Q1H PRN, Constanza Dial MD, Given at 01/07/25 0237    atorvastatin (LIPITOR) tablet 40 mg, 40 mg, Nasogastric, Nightly, Nicola Hare PA-C, 40 mg at 01/07/25 2031    sennosides-docusate (PERICOLACE) 8.6-50 MG per tablet 2 tablet, 2 tablet, Nasogastric, BID, 2 tablet at 01/06/25 2045 **AND** polyethylene glycol (MIRALAX) packet 17 g, 17 g, Nasogastric, Daily PRN, 17 g at 01/04/25 2020 **AND** [DISCONTINUED] bisacodyl (DULCOLAX) EC tablet 5 mg, 5 mg, Oral, Daily PRN **AND** bisacodyl (DULCOLAX) suppository 10 mg, 10 mg, Rectal, Daily PRN, Mando Olivo MD    budesonide (PULMICORT) nebulizer solution 0.5 mg, 0.5 mg, Nebulization, BID - RT, Cesar Alvarado MD, 0.5 mg at 01/08/25 0626    cefepime 2000 mg IVPB in 100 mL NS (MBP), 2,000 mg, Intravenous, Q8H, Constanza Dial MD, 2,000 mg at 01/08/25 0919    chlorhexidine (PERIDEX) 0.12 % solution 15 mL, 15 mL, Mouth/Throat, Q12H, Pravin Quinones APRN, 15 mL at 01/08/25 0920    Chlorhexidine Gluconate Cloth 2 % pads 1 Application, 1 Application, Topical, Q24H, Pravin Quinones APRN, 1 Application at 01/08/25 0318    clopidogrel (PLAVIX) tablet 75 mg, 75 mg, Nasogastric, Nightly, Nicola Hare PA-C, 75 mg at 01/07/25 2031    dextrose (D50W) (25 g/50 mL) IV injection 25 g, 25 g, Intravenous, Q15 Min PRN, Pravin Quinones APRN    dextrose (GLUTOSE) oral gel 15 g, 15 g,  Oral, Q15 Min PRN, Pravin Quinones APRN    Enoxaparin Sodium (LOVENOX) syringe 40 mg, 40 mg, Subcutaneous, Daily, Pravin Quinones APRN, 40 mg at 01/08/25 0920    famotidine (PEPCID) tablet 20 mg, 20 mg, Nasogastric, Q12H, Nicola Hare PA-C, 20 mg at 01/08/25 0920    fentaNYL 2500 mcg/250 mL NS infusion,  mcg/hr, Intravenous, Titrated, Mando Olivo MD, Last Rate: 25 mL/hr at 01/08/25 0202, 250 mcg/hr at 01/08/25 0202    glucagon (GLUCAGEN) injection 1 mg, 1 mg, Intramuscular, Q15 Min PRN, Pravin Quinones APRN    hydrALAZINE (APRESOLINE) injection 10 mg, 10 mg, Intravenous, Q6H PRN, Mando Olivo MD, 10 mg at 01/01/25 1256    ibuprofen (ADVIL,MOTRIN) 100 MG/5ML suspension 200 mg, 200 mg, Nasogastric, Q6H PRN, Constanza Dial MD    insulin glargine (LANTUS, SEMGLEE) injection 10 Units, 10 Units, Subcutaneous, Nightly, Mando Olivo MD, 10 Units at 01/07/25 2031    insulin regular (humuLIN R,novoLIN R) injection 2-7 Units, 2-7 Units, Subcutaneous, Q6H, Pravin Quinones APRN, 2 Units at 01/08/25 0545    ipratropium-albuterol (DUO-NEB) nebulizer solution 3 mL, 3 mL, Nebulization, 4x Daily - RT, Mando Olivo MD, 3 mL at 01/08/25 0626    [Held by provider] lisinopril (PRINIVIL,ZESTRIL) tablet 40 mg, 40 mg, Nasogastric, Daily, Nicola Hare PA-C, 40 mg at 01/03/25 0902    metoprolol tartrate (LOPRESSOR) tablet 100 mg, 100 mg, Nasogastric, Q12H, Nicola Hare PA-C, 100 mg at 01/08/25 0920    nitroglycerin (NITROSTAT) SL tablet 0.4 mg, 0.4 mg, Sublingual, Q5 Min PRN, Pravin Quinones APRN    Phosphorus Replacement - Follow Nurse / BPA Driven Protocol, , Not Applicable, PRN, Hernan Gonzalez, REMA    Potassium Replacement - Follow Nurse / BPA Driven Protocol, , Not Applicable, PRN, Hernan Gonzalez, REMA    propofol (DIPRIVAN) infusion 10 mg/mL 100 mL, 5-50 mcg/kg/min, Intravenous, Titrated, Imer Valenzuela MD, Stopped at 12/30/24 0725    sodium chloride 0.9 % flush 10 mL, 10  mL, Intravenous, Q12H, Pravin Quinones APRN, 10 mL at 01/08/25 0920    sodium chloride 0.9 % flush 10 mL, 10 mL, Intravenous, PRN, Pravin Quinones APRN, 10 mL at 12/31/24 2201    sodium chloride 0.9 % infusion 40 mL, 40 mL, Intravenous, PRN, Pravin Quinones, APRN     Allergies:  Allergies   Allergen Reactions    Codeine Nausea Only       Social Hx:  Social History     Socioeconomic History    Marital status:    Tobacco Use    Smoking status: Every Day     Current packs/day: 0.50     Types: Cigarettes    Smokeless tobacco: Never    Tobacco comments:     2 a day   Vaping Use    Vaping status: Never Used   Substance and Sexual Activity    Alcohol use: Yes     Comment: Rarely    Drug use: Yes     Types: Cocaine(coke)    Sexual activity: Defer       Family Hx:  Family History   Adopted: Yes     Physical Examination:   Vital Signs:  Vitals:    01/08/25 0700 01/08/25 0730 01/08/25 0800 01/08/25 0830   BP: 146/73 148/86     Pulse: 110 117 112 111   Resp: 20      Temp:       TempSrc:       SpO2: 91% (!) 88% (!) 89% 90%   Height:             General Exam:  Head:  Normocephalic, atraumatic  HEENT:  Neck supple.  Intubated.  Breathing over the vent  Abdomen:  Nontender, Nondistended  Extremities:  No signs of peripheral edema  Skin:  No rashes    Neurologic Exam:    Mental Status:    -patient sedated and intubated.  Sedation paused for exam.  Mental status: The patient is eyes are open but does not respond to any verbal or noxious stimulation  Cranial nerves: Pupils are reactive bilaterally.  Corneal reflexes intact.  No blink to threat bilaterally.  No nystagmus.  Does not track examiner around room    Motor: (strength out of 5:  1= minimal movement, 2 = movement in plane of gravity, 3 = movement against gravity, 4 = movement against some resistance, 5 = full strength)    -no movement of bilateral upper and lower extremities.   -Currently has some increased tone developing in all 4  extremities.  DTR:  -Right   Biceps: 2+ Triceps: 2+ Brachioradialis: 2+   Patella: 2+ Ankle: 2+  Babinski  -Left   Biceps: 2+ Triceps: 2+ Brachioradialis: 2+   Patella: 2+ Ankle: 2+ Babinski      Sensory:  -no withdrawal.      Coordination:  -no increase tone or tremor noted.        Recent Diagnostics:   Laboratory Results:   - Reviewed in EMR  Lab Results   Component Value Date    GLUCOSE 206 (H) 01/08/2025    CALCIUM 9.6 01/08/2025     01/08/2025    K 4.4 01/08/2025    CO2 33.0 (H) 01/08/2025     01/08/2025    BUN 25 (H) 01/08/2025    CREATININE 0.62 (L) 01/08/2025    EGFRIFAFRI >59 02/14/2022    EGFRIFNONA >60 02/14/2022    BCR 40.3 (H) 01/08/2025    ANIONGAP 7.0 01/08/2025     Lab Results   Component Value Date    WBC 20.63 (H) 01/08/2025    HGB 12.4 (L) 01/08/2025    HCT 38.8 01/08/2025    MCV 97.2 (H) 01/08/2025     01/08/2025     Lab Results   Component Value Date    PTT 28.6 11/13/2014    INR 0.96 12/29/2024     Lab Results   Component Value Date    TRIG 147 02/14/2022    HDL 37 (L) 02/14/2022    LDL 49 02/14/2022     Lab Results   Component Value Date    HGBA1C 5.40 08/26/2024       Imaging Results:  Imaging Results (Last 24 Hours)       Procedure Component Value Units Date/Time    XR Chest 1 View [[id number]] Collected: 01/08/25 0712     Updated: 01/08/25 0716    Narrative:      EXAMINATION: XR CHEST 1 VW-  1/8/2025 7:12 AM     HISTORY: Evaluate pneumonia.     FINDINGS: Upright frontal projection of the chest demonstrates no  interval line changes. Interval improvement in the subsegmental  atelectasis in the lingular segment of the left upper lobe. There is  persistent right basilar infiltrate. No effusion or free air present.       Impression:      1.. Right basilar pneumonia.  2. Improved discoid atelectasis in the lingular segment of the left  upper lobe.  3. No interval line changes.     This report was signed and finalized on 1/8/2025 7:13 AM by Dr. Deion Valdivia MD.               Results:  CT of head without contrast on 1/4 shows no acute findings  EEG performed on 1/4 suggest encephalopathy with moderate to severe diffuse cerebral dysfunction with high amplitude delta activity and intermixed alpha and theta activity.  There is no evidence of seizures.    Assessment & Plan:   Patient 64 y.o. male with PMH substance abuse, depression, DM, bladder CA, chronic back pain with pain stimulator, CAD s/p CABG, HLD, HTN, TYRELL with BiPAP. [place] lives alone. He is . History obtained by medical record as patient is sedated and intubated. No family at bedside at initial exam and cousin later arrives. Currently ex-wife is decision maker and trying to get guardianship with anticpation of this on Monday and today is Saturday.     At any rate, patient was found unresponsive on 12/29/2024 for undetermined amount of time. He was in respiratory failure and required intubation. There was mention at some point concern for seizure but no seizure like activity was observed. Since admission, he has had some decerebrate posturing but no rhythmic like movements. CT head on admission no acute process. UDS + cocaine and TCA.    Patient has 2 similar presentations in July and August 2024 both related to hypoglycemia. Into the 30-40s.  With one admission, there is mention of possible seizure as patient had tongue biting. Per notes, patient recovered quickly to his baseline. Neurology has been consulted as sedation has been reduced and has been off precedex several hours and is on Fentanyl and when paused minimal response. He does have right gaze preference. He has intact pratik and cough. Patient has had elevated temp for multiple days requiring cooling blanket. On Vancomycin for staphylococcus aureus. ID is following.     Active Hospital Problems    Diagnosis  POA    **Anoxic brain injury [G93.1]  Unknown    Acute respiratory failure with hypoxia [J96.01]  Yes    On mechanically assisted ventilation [Z99.11]   Not Applicable    Cocaine abuse [F14.10]  Unknown    Positive urine drug screen [R82.5]  Unknown    Substance abuse [F19.10]  Unknown    Tobacco use [Z72.0]  Unknown    COPD (chronic obstructive pulmonary disease) [J44.9]  Unknown    Bipolar disorder [F31.9]  Unknown    Coronary artery disease involving autologous vein coronary bypass graft without angina pectoris [I25.810]  Unknown    Essential hypertension [I10]  Unknown    Hyperlipidemia LDL goal <100 [E78.5]  Unknown        Impression:  Acute respiratory failure.  Illicit drug use. UDS + cocaine and TCA  No TCA's on home med list  Leukocytosis send ongoing febrile state -ID involved.  Currently no definitive infectious etiology.  Blood cultures remain pending.  Chronic back pain with spinal stimulator so unable to have MRI brain.  Tobacco use.  DM uncontrolled with hypoglycemia.      Plan:   Ex-wife established guardianship  Called guardian today and will call again later.  We could not complete our conversation initially.  No significant neuro changes to suggest a better prognosis  Extremely guarded prognosis    Ole Vegas MD  01/08/25  09:20 CST    Medical Decision Making    Number/Complexity of Problems  Moderate  1 undiagnosed new problem with uncertain prognosis -   1 acute illness with systemic symptoms -   High  1 acute or chronic illness that poses a threat to life/body function -   high     MDM Data  Moderate - 1/3 categories  Extensive - 2/3 categories    Category 1: 3 of the following  Review of external notes  Review of results  Ordering of each unique test  Independent historian  Category 2:  Independent interpretation of test (ex: imaging)  Category 3:  Discussion of management with another provider    extensive     Treatment Plan  Moderate - Prescription Drug management  High  Drug therapy requiring intensive monitoring for toxicity  Decision regarding hospitalization or escalation of care  De-escalate care/DNR  decisions  high    Addendum:    I was able to have a lengthy phone conversation with Fe his ex-wife and now legal guardian.  I explained to her that I believe his prognosis of a functional neurologic outcome is poor.  She expresses understanding in this.  He has said multiple times previously that he would not want to continue living like this and she feels very confident that in this situation he would want to transition to comfort care.  She wishes to do this immediately if possible.  I offered for her to come up and visit him 1 last time.  She feels that it would be too emotionally draining for her to do this and therefore does not plan to visit today.  She continues to seek out settling his finances and his estate.  She was hoping to do this before transition to comfort care but it has become obvious to her today that this will be a long process and does not fit with his overall wishes.    Electronically signed by Ole Vegas MD, 01/08/25, 10:54 AM CST.

## 2025-01-08 NOTE — PLAN OF CARE
Problem: Mechanical Ventilation Invasive  Goal: Absence of Ventilator-Induced Lung Injury  Intervention: Prevent Ventilator-Associated Pneumonia  Recent Flowsheet Documentation  Taken 1/7/2025 2302 by Araceli Guy RRT  Head of Bed ([facility]) Positioning: HOB at 30-45 degrees  Taken 1/7/2025 1833 by Araceli Guy RRT  Head of Bed ([facility]) Positioning: HOB at 30-45 degrees     Problem: Mechanical Ventilation Invasive  Goal: Effective Communication  Outcome: Progressing  Goal: Optimal Device Function  Outcome: Progressing  Intervention: Optimize Device Care and Function  Recent Flowsheet Documentation  Taken 1/7/2025 1833 by Araceli Guy RRT  Airway Safety Measures: manual resuscitator/mask at bedside  Goal: Mechanical Ventilation Liberation  Outcome: Progressing  Goal: Optimal Nutrition Delivery  Outcome: Progressing  Goal: Absence of Device-Related Skin and Tissue Injury  Outcome: Progressing  Goal: Absence of Ventilator-Induced Lung Injury  Outcome: Progressing  Intervention: Prevent Ventilator-Associated Pneumonia  Recent Flowsheet Documentation  Taken 1/7/2025 2302 by Araceli Guy RRT  Head of Bed ([facility]) Positioning: HOB at 30-45 degrees  Taken 1/7/2025 1833 by Araceli Guy RRT  Head of Bed ([facility]) Positioning: HOB at 30-45 degrees     Problem: Mechanical Ventilation Invasive  Goal: Effective Communication  Outcome: Progressing  Goal: Optimal Device Function  Outcome: Progressing  Intervention: Optimize Device Care and Function  Recent Flowsheet Documentation  Taken 1/7/2025 1833 by Araceli Guy RRT  Airway Safety Measures: manual resuscitator/mask at bedside  Goal: Mechanical Ventilation Liberation  Outcome: Progressing  Goal: Optimal Nutrition Delivery  Outcome: Progressing  Goal: Absence of Device-Related Skin and Tissue Injury  Outcome: Progressing  Goal: Absence of Ventilator-Induced Lung Injury  Outcome: Progressing  Intervention: Prevent Ventilator-Associated Pneumonia  Recent Flowsheet  Documentation  Taken 1/7/2025 2302 by Araceli Guy RRT  Head of Bed (HOB) Positioning: HOB at 30-45 degrees  Taken 1/7/2025 1833 by Araceli Guy RRT  Head of Bed (HOB) Positioning: HOB at 30-45 degrees     Problem: Skin Injury Risk Increased  Goal: Skin Health and Integrity  Intervention: Optimize Skin Protection  Recent Flowsheet Documentation  Taken 1/7/2025 2302 by Araceli Guy RRT  Head of Bed (HOB) Positioning: HOB at 30-45 degrees  Taken 1/7/2025 1833 by Araceli Guy RRT  Head of Bed (HOB) Positioning: HOB at 30-45 degrees     Problem: Skin Injury Risk Increased  Goal: Skin Health and Integrity  Intervention: Optimize Skin Protection  Recent Flowsheet Documentation  Taken 1/7/2025 2302 by Araceli Guy RRT  Head of Bed (HOB) Positioning: HOB at 30-45 degrees  Taken 1/7/2025 1833 by Araceli Guy RRT  Head of Bed (HOB) Positioning: HOB at 30-45 degrees   Goal Outcome Evaluation:

## 2025-01-08 NOTE — PROGRESS NOTES
[facility] Intensivist Services  INPATIENT PROGRESS NOTE    Patient Name: Ld Green  Date of Admission: 12/29/2024  Today's Date: 01/08/25  Length of Stay: 10  Primary Care Physician: Xiomara Neal APRN    Subjective   ICU summary:  64-year-old male with bipolar disorder, depression, tobacco abuse-smoker, DM type II, CAD, obesity, admitted to CCU on 12/29/2024 after being found unresponsive at home and ultimately required intubation in the emergency department.  His UDS was noted positive for cocaine and tricyclic's.  Since his admission, patient has had little to no improvement in his neurological exam.  Opens eyes with that fixed upward gaze.  Has cough and gag reflex.  Does not follow commands.  Continues to have fevers.  Concern for central source, response cooling blanket and acetaminophen only.  Has been treated with Zosyn and vancomycin and Levaquin.  Infectious disease consulted without obvious infectious source at this time.     Interval update:  1/7  With no significant change in neuroexam.  Remains febrile.  WBC trend down slightly.  Gram stain from respiratory culture with mixed gram-positive gram-negative kapil, possible aspiration picture.  Infectious disease started cefepime today.  Ex-wife, Fe, now establish guardianship.  She is also found a copy of living will from December 2016 and will bring copy.    1/8  Unchanged neurological exam.  Remains febrile, Tmax 38.7 °C.  WBC trending back up today.  Chest x-ray indicative of right lower lobe pneumonia, respiratory culture with light growth of Staph aureus, awaiting sensitivity, but likely consistent with previous culture result.    Review of Systems   All pertinent negatives and positives are as above. All other systems have been reviewed and are negative unless otherwise stated.     Objective    Temp:  [100.6 °F (38.1 °C)-101.6 °F (38.7 °C)] 101.1 °F (38.4 °C)  Heart Rate:  [] 111  Resp:   [17-22] 20  BP: (119-159)/(62-91) 148/86  FiO2 (%):  [45 %] 45 %  Physical Exam  Vitals and nursing note reviewed.   Constitutional:       General: He is not in acute distress.     Appearance: He is ill-appearing.      Interventions: He is intubated.   Eyes:      Pupils: Pupils are equal, round, and reactive to light.      Comments: Right upward deviated gaze   Cardiovascular:      Rate and Rhythm: Regular rhythm. Tachycardia present.      Pulses: Normal pulses.      Heart sounds: Normal heart sounds.   Pulmonary:      Effort: He is intubated.      Comments: Breathsounds rhonchi bilaterally, mildly labored respirations  Abdominal:      General: There is no distension.      Palpations: Abdomen is soft.   Skin:     General: Skin is warm and dry.      Capillary Refill: Capillary refill takes less than 2 seconds.   Neurological:      Comments: Positive cough and gag reflex, fixed gaze, does not blink to threat, blinks spontaneously, does not track, not following commands, does not withdraw from pain         Results Review:  Lab Results (last 24 hours)       Procedure Component Value Units Date/Time    Blood Culture - Blood, Hand, Left [[id number]]  (Normal) Collected: 01/05/25 0800    Specimen: Blood from Hand, Left Updated: 01/08/25 0831     Blood Culture No growth at 3 days    POC Glucose Once [[id number]]  (Abnormal) Collected: 01/08/25 0522    Specimen: Blood Updated: 01/08/25 0538     Glucose 180 mg/dL      Comment: : [id number] Dwight Hou ID: [id number]       Comprehensive Metabolic Panel [[id number]]  (Abnormal) Collected: 01/08/25 0244    Specimen: Blood Updated: 01/08/25 0341     Glucose 206 mg/dL      BUN 25 mg/dL      Creatinine 0.62 mg/dL      Sodium 142 mmol/L      Potassium 4.4 mmol/L      Chloride 102 mmol/L      CO2 33.0 mmol/L      Calcium 9.6 mg/dL      Total Protein 6.9 g/dL      Albumin 3.1 g/dL      ALT (SGPT) 39 U/L      AST (SGOT) 57 U/L      Alkaline Phosphatase 108 U/L      Total  Bilirubin 0.5 mg/dL      Globulin 3.8 gm/dL      A/G Ratio 0.8 g/dL      BUN/Creatinine Ratio 40.3     Anion Gap 7.0 mmol/L      eGFR 107.4 mL/min/1.73     Narrative:      GFR Categories in Chronic Kidney Disease (CKD)      GFR Category          GFR (mL/min/1.73)    Interpretation  G1                     90 or greater         Normal or high (1)  G2                      60-89                Mild decrease (1)  G3a                   45-59                Mild to moderate decrease  G3b                   30-44                Moderate to severe decrease  G4                    15-29                Severe decrease  G5                    14 or less           Kidney failure          (1)In the absence of evidence of kidney disease, neither GFR category G1 or G2 fulfill the criteria for CKD.    eGFR calculation 2021 CKD-EPI creatinine equation, which does not include race as a factor    CBC & Differential [[id number]]  (Abnormal) Collected: 01/08/25 0244    Specimen: Blood Updated: 01/08/25 0306    Narrative:      The following orders were created for panel order CBC & Differential.  Procedure                               Abnormality         Status                     ---------                               -----------         ------                     CBC Auto Differential[[id number]]        Abnormal            Final result                 Please view results for these tests on the individual orders.    CBC Auto Differential [[id number]]  (Abnormal) Collected: 01/08/25 0244    Specimen: Blood Updated: 01/08/25 0306     WBC 20.63 10*3/mm3      RBC 3.99 10*6/mm3      Hemoglobin 12.4 g/dL      Hematocrit 38.8 %      MCV 97.2 fL      MCH 31.1 pg      MCHC 32.0 g/dL      RDW 13.0 %      RDW-SD 47.0 fl      MPV 10.7 fL      Platelets 305 10*3/mm3      Neutrophil % 71.2 %      Lymphocyte % 9.4 %      Monocyte % 12.7 %      Eosinophil % 1.5 %      Basophil % 1.0 %      Immature Grans % 4.2 %      Neutrophils, Absolute 14.69 10*3/mm3       Lymphocytes, Absolute 1.94 10*3/mm3      Monocytes, Absolute 2.62 10*3/mm3      Eosinophils, Absolute 0.30 10*3/mm3      Basophils, Absolute 0.21 10*3/mm3      Immature Grans, Absolute 0.87 10*3/mm3      nRBC 0.0 /100 WBC     POC Glucose Once [[id number]]  (Abnormal) Collected: 01/07/25 2336    Specimen: Blood Updated: 01/07/25 2347     Glucose 161 mg/dL      Comment: : [id number] Dwight LUJANeter ID: [id number]       POC Glucose Once [[id number]]  (Abnormal) Collected: 01/07/25 2003    Specimen: Blood Updated: 01/07/25 2015     Glucose 197 mg/dL      Comment: : [id number] Roxana HickmanyMeter ID: [id number]       POC Glucose Once [[id number]]  (Abnormal) Collected: 01/07/25 1756    Specimen: Blood Updated: 01/07/25 1807     Glucose 204 mg/dL      Comment: : [id number] Radha Marion) SarahMeter ID: [id number]       Respiratory Culture - Sputum, ET Suction [[id number]]  (Abnormal) Collected: 01/06/25 1248    Specimen: Sputum from ET Suction Updated: 01/07/25 1334     Respiratory Culture Light growth (2+) Staphylococcus aureus      Scant growth (1+) Normal Respiratory Kapil     Gram Stain Many (4+) WBCs per low power field      No Epithelial cells per low power field      Moderate (3+) Mixed gram positive kapil      Few (2+) Gram negative bacilli      Mixed intracellular organisms suggestive of an aspiration event    POC Glucose Once [[id number]]  (Abnormal) Collected: 01/07/25 1210    Specimen: Blood Updated: 01/07/25 1228     Glucose 187 mg/dL      Comment: : [id number] Radha (Angela) SarahMeter ID: [id number]       Blood Culture - Blood, Arm, Left [[id number]]  (Normal) Collected: 01/06/25 1040    Specimen: Blood from Arm, Left Updated: 01/07/25 1101     Blood Culture No growth at 24 hours             XR Chest 1 View    Result Date: 1/8/2025  1.. Right basilar pneumonia. 2. Improved discoid atelectasis in the lingular segment of the left upper lobe. 3. No interval line changes.  This report was  signed and finalized on 1/8/2025 7:13 AM by Dr. Deion Valdivia MD.       Result Review:  I have personally reviewed the results from the time of this admission to 1/8/2025 09:04 CST and agree with these findings:  [x]  Laboratory list / accordion  [x]  Microbiology  [x]  Radiology  [x]  EKG/Telemetry   []  Cardiology/Vascular   []  Pathology  []  Old records  []  Other:    Culture Data:   Blood Culture   Date Value Ref Range Status   01/05/2025 No growth at 24 hours  Preliminary   01/01/2025 No growth at 4 days  Preliminary   01/01/2025 No growth at 4 days  Preliminary     Respiratory Culture   Date Value Ref Range Status   01/01/2025 Scant growth (1+) Staphylococcus aureus (A)  Final   01/01/2025 No Normal Respiratory Karen (A)  Final       I have reviewed the patient's current medications.     Assessment/Plan   Assessment  Active Hospital Problems    Diagnosis     **Anoxic brain injury     Acute respiratory failure with hypoxia     On mechanically assisted ventilation     Cocaine abuse     Positive urine drug screen     Substance abuse     Tobacco use     COPD (chronic obstructive pulmonary disease)     Bipolar disorder     Coronary artery disease involving autologous vein coronary bypass graft without angina pectoris     Essential hypertension     Hyperlipidemia LDL goal <100    64-year-old male with suspected anoxic brain injury, acute hypoxic respiratory failure, in CCU for critical care management.  Neuroexam unchanged.  Poor neurological prognosis.  Ex-wife, Fe, now established guardian.  A copy of living will from December 2016 indicates he would not want prolonged life support.  Ex-wife having issues coming to terms with the patient's wishes.  Appreciate palliative care medicine's assistance with goals of care.  Patient now with right lower lobe pneumonia on chest x-ray with worsening leukocytosis.  Started on cefepime on 1/7.  Respiratory culture with Staph aureus that has previously been isolated  twice during this admission.  Will change antibiotic to Unasyn at this time, attempted to contact infectious disease but unable.  Defer further antibiotic changes to them.    Suspected anoxic brain injury  -Neurology following, very guarded prognosis, they attempted to contact ex-wife but unable to have conversation, neurology planning for follow-up conversation with her later today  -I will reach out to wife as well and attempt to further goals of care discussions  -Awaiting living will to be put in system  -Continue off sedation, fentanyl to patient comfort  -Currently DNR, palliative care consulted and following to assist with goals of care    Acute hypoxic respiratory failure  -Secondary to neurological status above  -Continue trend ABGs, currently requiring 40% FiO2, adjust vent settings as indicated  -Continue ventilator bundle    Febrile illness/RLL PNA  -Central versus infectious  -Leukocytosis with trend back up today  -Started on cefepime 1/7, now with Staph aureus again isolated in respiratory culture, will change to Unasyn  -Attempted to contact ID but was unable, defer ultimate antibiotic management to them  -Cooling blanket as needed    DM type II  -Continue diabetic tube feeds  -Continue long-acting insulin  -Blood glucose monitoring and add sliding scale insulin every 6 hr    VTE: Lovenox  GI: Famotidine  NTN: Tube feeding  Abx: Unasyn  Lines/Tubes: Peripheral IVs, OG tube, endotracheal tube  CODE STATUS: No CPR, full medical support, palliative care consulted    Disposition: Continue critical care management.  Poor prognosis given neurological exam.  Awaiting living will, report from ex-wife that patient would not want prolonged life support or to be a organ donor.  Will attempt to place call again to ex-wife today for further goals of care discussion.    Total critical care time: Approximately 35 minutes     Due to a high probability of clinically significant, life threatening deterioration, the  patient required my highest level of preparedness to intervene emergently and I personally spent this critical care time directly and personally managing the patient.      This critical care time included obtaining a history; examining the patient; pulse oximetry; ordering and review of studies; arranging urgent treatment with development of a management plan; evaluation of patient's response to treatment; frequent reassessment; and, discussions with other providers.     This critical care time was performed to assess and manage the high probability of imminent, life-threatening deterioration that could result in multi-organ failure. It was exclusive of separately billable procedures and treating other patients and teaching time.     Please see MDM section and the rest of the note for further information on patient assessment and treatment.     Part of this note may be an electronic transcription/translation of spoken language to printed text using the Dragon dictation system       Electronically signed by REMA Sepulveda on 1/8/2025 at 10:29 CST

## 2025-01-08 NOTE — CASE MANAGEMENT/SOCIAL WORK
Continued Stay Note   [place]     Patient Name: Ld Green  MRN: [id number]  Today's Date: 1/8/2025    Admit Date: 12/29/2024    Plan: Pending   Discharge Plan       Row Name 01/08/25 1047       Plan    Plan Pending    Plan Comments SW following for plan of care.                   Discharge Codes    No documentation.                       DIANA Obregon

## 2025-01-08 NOTE — PROGRESS NOTES
[facility] Palliative Care Services    Palliative Care Daily Progress Note   Chief complaint-follow up support for patient/family, goals of care    Code Status:   Code Status and Medical Interventions: No CPR (Do Not Attempt to Resuscitate); Full Support   Ordered at: 01/01/25 1318     Level Of Support Discussed With:    Next of Kin (If No Surrogate)     Code Status (Patient has no pulse and is not breathing):    No CPR (Do Not Attempt to Resuscitate)     Medical Interventions (Patient has pulse or is breathing):    Full Support      Advanced Directives: Advance Directive Status: Patient has advance directive, copy in chart   Goals of Care: Ongoing.     S: Medical record reviewed. Events noted.  Worsening leukocytosis.  Chest imaging shows right basilar pneumonia.  Improved discoid atelectasis in lingular segment of the left upper lobe. Noted to have ongoing temperature receiving Tylenol.  Leukocytosis.  Anemia stable.  Respiratory culture and blood cultures pending.  Infectious disease following, antibiotics restarted 1/7.  Remains intubated on ventilator support, Day #11.  Fentanyl for sedation.  Ex-spouse has obtained emergency guardianship, discussed de-escalation with focus of comfort and palliative extubation versus trach, PEG and LTAC placement if aggressive care interventions desired.  Neurology following, notes extremely guarded prognosis and without significant neuro changes.    Advance Care Planning  Dr. Vegas spoke with guardiFe story this morning who verbalized wishes to transition care measures with focus of comfort.  I also spoke with Fe shortly afterward and confirmed plans for de-escalation of CODE STATUS to no CPR/comfort measures.  Comfort care expectations discussed in its entirety.  Guardian does not wish to be present for extubation.  Guardian has provided living will documentation notarized in 2016 that patient does not wish to be an organ or tissue donor,  documentation scanned to EMR.     Review of Systems   Unable to perform ROS: Intubated     Pain Assessment  CPOT and PAINAD Scales: CPOT (Critical-Care Pain Observation Tool)  CPOT Facial Expression: 0-->relaxed, neutral  CPOT Body Movements: 0-->absence of movements  CPOT Muscle Tension: 0-->relaxed  Ventilator Compliance/Vocalization: 0-->talking in normal tone or no sound  CPOT Score: 0    O:     Intake/Output Summary (Last 24 hours) at 2025 0940  Last data filed at 2025 0930  Gross per 24 hour   Intake 3342.5 ml   Output --   Net 3342.5 ml       Diagnostics and current medications: Reviewed.      Current Facility-Administered Medications:     acetaminophen (TYLENOL) 160 MG/5ML solution 650 mg, 650 mg, Nasogastric, Q6H PRN, Mando Olivo MD, 650 mg at 25 0620    acetaminophen (TYLENOL) suppository 650 mg, 650 mg, Rectal, Q4H PRN, Mando Olivo MD, 650 mg at 25 0355    acetaminophen (TYLENOL) tablet 650 mg, 650 mg, Per G Tube, Q6H PRN, Mando Olivo MD, 650 mg at 25 1255    [COMPLETED] amiodarone 150 mg in 100 mL D5W (loading dose), 150 mg, Intravenous, Once, 150 mg at 25 1800 **FOLLOWED BY** [] amiodarone 360 mg in 200 mL D5W infusion, 1 mg/min, Intravenous, Continuous, Last Rate: 33.3 mL/hr at 25 1908, 1 mg/min at 25 1908 **FOLLOWED BY** [] amiodarone 360 mg in 200 mL D5W infusion, 0.5 mg/min, Intravenous, Continuous, Stopped at 25 1821 **FOLLOWED BY** [COMPLETED] amiodarone (PACERONE) tablet 200 mg, 200 mg, Per G Tube, Once, 200 mg at 25 1820 **FOLLOWED BY** amiodarone (PACERONE) tablet 200 mg, 200 mg, Per G Tube, Q8H, 200 mg at 25 0934 **FOLLOWED BY** [START ON 2025] amiodarone (PACERONE) tablet 200 mg, 200 mg, Per G Tube, Q12H **FOLLOWED BY** [START ON 2025] amiodarone (PACERONE) tablet 200 mg, 200 mg, Per G Tube, Daily, Mando Olivo MD    amLODIPine (NORVASC) tablet 10 mg, 10 mg, Nasogastric, Nightly, Payam,  Nicola REN PA-C, 10 mg at 01/07/25 2031    ampicillin-sulbactam (UNASYN) 3 g in sodium chloride 0.9 % 100 mL MBP, 3 g, Intravenous, Q6H, Pravin Quinones APRN    artificial tears ophthalmic ointment, , Both Eyes, Q1H PRN, Constanza Dial MD, Given at 01/07/25 0237    atorvastatin (LIPITOR) tablet 40 mg, 40 mg, Nasogastric, Nightly, Nicola Hare PA-C, 40 mg at 01/07/25 2031    sennosides-docusate (PERICOLACE) 8.6-50 MG per tablet 2 tablet, 2 tablet, Nasogastric, BID, 2 tablet at 01/06/25 2045 **AND** polyethylene glycol (MIRALAX) packet 17 g, 17 g, Nasogastric, Daily PRN, 17 g at 01/04/25 2020 **AND** [DISCONTINUED] bisacodyl (DULCOLAX) EC tablet 5 mg, 5 mg, Oral, Daily PRN **AND** bisacodyl (DULCOLAX) suppository 10 mg, 10 mg, Rectal, Daily PRN, Mando Olivo MD    budesonide (PULMICORT) nebulizer solution 0.5 mg, 0.5 mg, Nebulization, BID - RT, SensCesar salgado MD, 0.5 mg at 01/08/25 0626    chlorhexidine (PERIDEX) 0.12 % solution 15 mL, 15 mL, Mouth/Throat, Q12H, Pravin Quinones APRN, 15 mL at 01/08/25 0920    Chlorhexidine Gluconate Cloth 2 % pads 1 Application, 1 Application, Topical, Q24H, Pravin Quinones APRN, 1 Application at 01/08/25 0318    clopidogrel (PLAVIX) tablet 75 mg, 75 mg, Nasogastric, Nightly, Nicola Hare PA-C, 75 mg at 01/07/25 2031    dextrose (D50W) (25 g/50 mL) IV injection 25 g, 25 g, Intravenous, Q15 Min PRN, Pravin Quinones APRN    dextrose (GLUTOSE) oral gel 15 g, 15 g, Oral, Q15 Min PRN, Pravin Quinones APRN    Enoxaparin Sodium (LOVENOX) syringe 40 mg, 40 mg, Subcutaneous, Daily, Pravin Quinones APRN, 40 mg at 01/08/25 0920    famotidine (PEPCID) tablet 20 mg, 20 mg, Nasogastric, Q12H, Nicola Hare PA-C, 20 mg at 01/08/25 0920    fentaNYL 2500 mcg/250 mL NS infusion,  mcg/hr, Intravenous, Titrated, Mando Olivo MD, Last Rate: 25 mL/hr at 01/08/25 0202, 250 mcg/hr at 01/08/25 0202    fentaNYL 2500 mcg/250 mL NS infusion,  mcg/hr,  Intravenous, Titrated, Pravin Quinones APRN    glucagon (GLUCAGEN) injection 1 mg, 1 mg, Intramuscular, Q15 Min PRN, Pravin Quinones APRN    hydrALAZINE (APRESOLINE) injection 10 mg, 10 mg, Intravenous, Q6H PRN, Mando Olivo MD, 10 mg at 01/01/25 1256    ibuprofen (ADVIL,MOTRIN) 100 MG/5ML suspension 200 mg, 200 mg, Nasogastric, Q6H PRN, Constanza Dial MD    insulin glargine (LANTUS, SEMGLEE) injection 10 Units, 10 Units, Subcutaneous, Nightly, Mando Olivo MD, 10 Units at 01/07/25 2031    insulin regular (humuLIN R,novoLIN R) injection 2-7 Units, 2-7 Units, Subcutaneous, Q6H, Pravin Quinones APRN, 2 Units at 01/08/25 0545    ipratropium-albuterol (DUO-NEB) nebulizer solution 3 mL, 3 mL, Nebulization, 4x Daily - RT, Mando Olivo MD, 3 mL at 01/08/25 0626    [Held by provider] lisinopril (PRINIVIL,ZESTRIL) tablet 40 mg, 40 mg, Nasogastric, Daily, Nicola Hare PA-C, 40 mg at 01/03/25 0902    metoprolol tartrate (LOPRESSOR) tablet 100 mg, 100 mg, Nasogastric, Q12H, Nicola Hare PA-C, 100 mg at 01/08/25 0920    nitroglycerin (NITROSTAT) SL tablet 0.4 mg, 0.4 mg, Sublingual, Q5 Min PRN, Pravin Quinones APRN    Phosphorus Replacement - Follow Nurse / BPA Driven Protocol, , Not Applicable, PRN, Hernan Gonzalez APRN    Potassium Replacement - Follow Nurse / BPA Driven Protocol, , Not Applicable, PRN, Hernan Gonzalez APRN    propofol (DIPRIVAN) infusion 10 mg/mL 100 mL, 5-50 mcg/kg/min, Intravenous, Titrated, Imer Valenzuela MD, Stopped at 12/30/24 0725    sodium chloride 0.9 % flush 10 mL, 10 mL, Intravenous, Q12H, Pravin Quinones APRN, 10 mL at 01/08/25 0920    sodium chloride 0.9 % flush 10 mL, 10 mL, Intravenous, PRN, Pravin Quinones APRN, 10 mL at 12/31/24 2201    sodium chloride 0.9 % infusion 40 mL, 40 mL, Intravenous, PRN, Pravin Quinones APRN    Allergies   Allergen Reactions    Codeine Nausea Only     I have utilized all available immediate resources to obtain,  "update, or review the patient's current medications (including all prescriptions, over-the-counter products, herbals, cannabis/cannabidiol products, and vitamin/mineral/dietary (nutritional) supplements) for name, route of administration, type, dose and frequency.    A:    /86   Pulse 111   Temp (!) 101 °F (38.3 °C) (Rectal)   Resp 20   Ht 172.7 cm (68\")   Wt 108 kg (238 lb 8.6 oz)   SpO2 90%   BMI 36.27 kg/m²     Vitals and nursing note reviewed.   Constitutional:       Appearance: Acutely ill-appearing.      Interventions: Sedated and intubated.      Comments: OG tube with tube feeds infusing.  Fentanyl infusing.   HENT:      Head: Normocephalic.   Pulmonary:      Effort: Intubated.    Cardiovascular:      Tachycardia rate.   Edema:     Peripheral edema absent.   Abdominal:      Palpations: Abdomen is soft.   Genitourinary:     Comments: External urinary catheter in place.  Neurological:      Mental Status: Unresponsive.      Comments: Sedated      Patient status: Disease state: Controlled with current treatments.  Current Functional status: Palliative Performance Scale Score: Performance 10% based on the following measures: Ambulation: Totally bed bound, Activity and Evidence of Disease: Unable to do any work, extensive evidence of disease, Self-Care: Total care required,  Intake: Mouth care only, LOC: Drowsy or comatose   Baseline ECOG Status(4) Completely disabled, unable to carry out self-care.  Totally confined to bed or chair.  Nutritional status: Albumin 2.8 Body mass index is 36.27 kg/m².      Hospital Problem List      Anoxic brain injury    Acute respiratory failure with hypoxia    On mechanically assisted ventilation    Cocaine abuse    Positive urine drug screen    Substance abuse    Tobacco use    COPD (chronic obstructive pulmonary disease)    Bipolar disorder    Coronary artery disease involving autologous vein coronary bypass graft without angina pectoris    Essential hypertension    " Hyperlipidemia LDL goal <100     Impression/Problem List:     Anoxic brain injury, suspected  Acute respiratory failure with hypoxia requiring ventilator support  History of bladder cancer  Cerebral microvascular disease per CT  Respiratory culture positive for Staphylococcus aureus  Substance use, UDS positive cocaine and TCA  Hypoalbuminemia  COPD  Coronary artery disease  Hypertension  Hyperlipidemia  Bipolar disorder  Anxiety  Tobacco use      Recommendations/Plan:  1. plan: Goals of care include no CPR/full support interventions.     Family support: The patient lacks significant family support..  Advance Directives: Advance Directive Status: Patient has advance directive, copy in chart   POA/Healthcare surrogate-guardianship pending. paris Miramontes verbalizes she and patient were  over 23 years and have been  over the last 11 years.  States she speaks to patient on a daily basis and has known him over the course of 50 years. Patient does not have children per ex-spouse.     2.  Palliative care encounter  - Prognosis is poor long-term secondary to anoxic brain injury, respiratory failure, history of bladder cancer, cerebral microvascular disease, substance use, and multiple comorbidities.  -Family appears to have fair prognostic awareness.      -Intubated in the ED.  Respiratory panel negative.  Blood cultures pending.  Respiratory culture positive for Staphylococcus aureus.  UDS positive for cocaine and TCA.   Started on broad-spectrum antibiotic, breathing treatments, IV fluids.   -Suspected anoxic brain injury, remains on ventilator support.    1/2-According to chart review patient's ex-spouse is attempting to obtain legal guardianship.  Nursing reports court date set for Monday 1/4-Neurology consulted and repeat CT head and EEG completed-No seizures noted.  Notes extremely guarded prognosis.  1/6-Noted to have ongoing temperature receiving Tylenol and nursing utilizing cooling blanket.   Respiratory culture and blood cultures.   -Infectious disease following, off antibiotics at this time.    -Awaiting ex-spouse to establish guardianship.       1/3-continue supportive measures  -Further goals pending guardianship approval  -would anticipate potentially consult with neurology and further diagnostic neuro workup to provide more insight that may assist with medical decision making forward.   -Explored scenario with de-escalation options with goal of comfort per ex-spouse request    1/83-[id number] care measures to comfort measures per guardian  -Ex-spouse established guardianship 1/6, a copy has been scanned to chart.  -Neurology notes guardian has elected to de-escalate  -Fe, ex-spouse reports she has identified a copy of patient's living will documentation dated 12/8/2016 designating patient's wishes for withdraw of life support interventions and not to be a organ or tissue donor.  A copy has been received and to be scanned to EMR.   - notified for spiritual support    3.  Comfort measures  -discontinue any treatments and adjuvants not in line with comfort  -Compassionate extubation order set  -Palliative admission order set as needed      Thank you for allowing us to participate in patient's plan of care. Palliative Care Team will continue to follow patient.     Mariya Calix, APRN  1/8/2025  09:40 CST

## 2025-01-08 NOTE — PLAN OF CARE
Goal Outcome Evaluation:  Plan of Care Reviewed With: patient        Progress: no change  Outcome Evaluation: Continues on fent gtt for vent synchrony. Posturing. BL eyes deviated to upper right. No response to pain. Tmax 101.6. Tylenol administered. Tube feedings running at goal. No residuals. Appears to have good urine output, but not able to measure d/t anatomy.

## 2025-01-09 VITALS
SYSTOLIC BLOOD PRESSURE: 58 MMHG | WEIGHT: 238.54 LBS | DIASTOLIC BLOOD PRESSURE: 35 MMHG | RESPIRATION RATE: 19 BRPM | BODY MASS INDEX: 36.15 KG/M2 | HEIGHT: 68 IN | TEMPERATURE: 103.1 F | OXYGEN SATURATION: 76 %

## 2025-01-09 LAB
BACTERIA SPEC RESP CULT: ABNORMAL
BACTERIA SPEC RESP CULT: ABNORMAL
GRAM STN SPEC: ABNORMAL

## 2025-01-09 NOTE — DISCHARGE SUMMARY
[facility]   DISCHARGE SUMMARY    Patient Name: Ld Green  : 1960  MRN: [id number]    Date of Admission: 2024  Date of Discharge:  2025, Time of Death:  232  Primary Care Physician: Xiomara Neal APRN    Consults       Date and Time Order Name Status Description    2025 12:18 PM Inpatient Neurology Consult Other (see comments) Completed     1/3/2025  7:06 AM Inpatient Infectious Diseases Consult Completed             Hospital Course     Presenting Problem:   Respiratory failure [J96.90]      Active and Resolved Problems  Active Hospital Problems    Diagnosis  POA    **Anoxic brain injury [G93.1]  Unknown    Acute respiratory failure with hypoxia [J96.01]  Yes    On mechanically assisted ventilation [Z99.11]  Not Applicable    Cocaine abuse [F14.10]  Unknown    Positive urine drug screen [R82.5]  Unknown    Substance abuse [F19.10]  Unknown    Tobacco use [Z72.0]  Unknown    COPD (chronic obstructive pulmonary disease) [J44.9]  Unknown    Bipolar disorder [F31.9]  Unknown    Coronary artery disease involving autologous vein coronary bypass graft without angina pectoris [I25.810]  Unknown    Essential hypertension [I10]  Unknown    Hyperlipidemia LDL goal <100 [E78.5]  Unknown      Resolved Hospital Problems   No resolved problems to display.         Hospital Course:    Mr. Green was admitted the ICU.  He was treated for pneumonia.  He was poorly responsive and exhibiting decorticate posturing on admission.  : Patient has minimal protection with arm drop on left.  Mental status has not improved.  Upgoing Babinski's bilaterally.  Some superior eye deviation intermittently.  Strong gag and cough.  Thick secretions and elevated temperature to 38.3 °C overnight.     25: Patient had fentanyl requiremrnts overnight due to work of breathing.  Started on fentanyl drip due to discomfort.  Ex-wife says he would not want to live like this.  She is the only person who shown up to  make decisions for him.  We did confirm with our risk department via nursing that she can be a surrogate decision maker in this situation.  She says she is not ready to make him comfort care because she has to reach out to the  in order to get a conservatorship so that she can get control over his finances to pay for his .  She does say that he would not want CPR in this situation.  Patient has persistent fevers.  Wife is not interested in more aggressive diagnostic testing.      25: EX Wife is trying to reach out to get patient conserved.  At this point patient is no CPR but full medical management otherwise.      1/3/25: Patient continues to spike fevers.  Fungitell and Aspergillus ordered yesterday.  Both still pending.  Consulted ID.  Restarted Vanco due to Staph aureus in the sputum (prior culture grew only MSSA-waiting on this culture to finalize).  MRSA probe previously negative.  Currently on Levaquin and vancomycin.  Await ID consultation.     2025: Tmax temp in the last 24 hours 101.1. He continues on Levaquin for now. He is currently on Precedex and fentanyl-Precedex discontinued as this can put him at risk for non-infectious fever. Will plan to sedate with propofol and fentanyl. He has not had any improvement in his neurological status-neurology consulted. CT of the head without contrast ordered. Mild ongoing leukocytosis-WBC-15.7. All other labs unchanged. Vent setting unchanged.  He continues to tolerate tube feedings at goal rate.     2025:  Tmax temp 101.2, consistently above 100 for the last 24 hours-Last dose of levaquin on 25-Repeat blood culture ordered and pending. Mild increase in WBC-16.6. EEG-slowing c/w encephalopathy. CT of the brain-unchanged.  Remains on fentanyl for sedation. Vent settings remain unchanged. ABG's do show some compensated metabolic alkalosis.     2025:  Continue critical care management.  Poor prognosis given neurological exam.  Follow ex-wife, Fe, progress with the court regarding conservatorship/guardianship.  She is indicated he would not want prolonged life support.     1/7/2025:  With no significant change in neuroexam.  Remains febrile.  WBC trend down slightly.  Gram stain from respiratory culture with mixed gram-positive gram-negative kapil, possible aspiration picture.  Infectious disease started cefepime today.  Ex-wife, Fe, now establish guardianship.  She is also found a copy of living will from December 2016 and will bring copy.    1/8: Unchanged neurological exam.  Remains febrile, Tmax 38.7 °C.  WBC trending back up today.  Chest x-ray indicative of right lower lobe pneumonia, respiratory culture with light growth of Staph aureus, awaiting sensitivity, but likely consistent with previous culture result.      64-year-old male with suspected anoxic brain injury, acute hypoxic respiratory failure, in CCU for critical care management.  Neuroexam unchanged.  Poor neurological prognosis.  Ex-wife, Fe, now established guardian.  A copy of living will from December 2016 indicates he would not want prolonged life support.  Ex-wife having issues coming to terms with the patient's wishes.  Appreciate palliative care medicine's assistance with goals of care.  Patient now with right lower lobe pneumonia on chest x-ray with worsening leukocytosis.  Started on cefepime on 1/7.  Respiratory culture with Staph aureus that has previously been isolated twice during this admission.  Will change antibiotic to Unasyn at this time, attempted to contact infectious disease but unable.  Defer further antibiotic changes to them.     Suspected anoxic brain injury  -Neurology following, very guarded prognosis, they attempted to contact ex-wife but unable to have conversation, neurology planning for follow-up conversation with her later today  -I will reach out to wife as well and attempt to further goals of care discussions  -Awaiting living  will to be put in system  -Continue off sedation, fentanyl to patient comfort  -Currently DNR, palliative care consulted and following to assist with goals of care     Acute hypoxic respiratory failure  -Secondary to neurological status above  -Continue trend ABGs, currently requiring 40% FiO2, adjust vent settings as indicated  -Continue ventilator bundle     Febrile illness/RLL PNA  -Central versus infectious  -Leukocytosis with trend back up today  -Started on cefepime 1/7, now with Staph aureus again isolated in respiratory culture, will change to Unasyn  -Attempted to contact ID but was unable, defer ultimate antibiotic management to them  -Cooling blanket as needed     DM type II  -Continue diabetic tube feeds  -Continue long-acting insulin  -Blood glucose monitoring and add sliding scale insulin every 6 hr    Mr. Green's neurologic status did not improve.  He continued to have persistent fevers.  Per his EX wife, he did not wish to be on life sustaining treatments if there was no hope of recovery.  He was transitioned to comfort focused care on 1/8/2025.    Time of Death:  1/8/2025 2326            Day of Discharge     HPI:   Ld Green is a 64 y.o. male who presented to the emergency department when he was found unresponsive.  He lives at the [facility] and apparently was found by somebody unresponsive and comatose state.  He has known history of hypertension, diabetes mellitus type 2 on insulin., coronary artery disease and also had history of hypoglycemia in the past.  He had last hospitalization in August 2024.  He was treated for altered mental status at the time.  His workup at that time was negative mostly except leukocytosis.     At the time of presentation to the ER the patient was unresponsive with GCS 6.  The patient was having oxygen desaturation and needed nonrebreather mask and withdrawing to painful stimuli on the right and left arm and left leg appeared to be little tonic suspected to  have seizure activity.  He was immediately intubated by ED physician and ICU admit was requested and patient was accepted in the ICU.     Patient could not provide any history because he was intubated on mechanical ventilation and sedated with propofol.  There is no ventilator dyssynchrony noted.  He was morbidly obese he was afebrile and hemodynamically stable.  He was started on broad-spectrum antibiotic for possible aspiration and he has a urinary tract was positive for cocaine and TCA..  In reviewing the chart it appears patient was DNR and DNI during the last hospitalization but he is intubated in the ER and currently in the CCU.  No further history could be obtained from the patient.  It also noted patient history of bladder cancer in the past and also has bipolar disorder.  He is an active smoker and probably has COPD.  No further history could be obtained from the patient.       His initial workup showed clear lung fields and the imaging studies but the endotracheal tube suction showed thick purulent secretions suspected to be tracheobronchitis or early developing pneumonia.  Lab work shows he has extreme leukocytosis white cell count 29,000.  No active seizures noted other lab work showed normal renal function with elevated blood glucose and arterial blood gas shows hypercapnia and hypoxia.  Respiratory viral panel was negative.  Respiratory culture was sent          Pertinent  and/or Most Recent Results     LAB RESULTS:        Lab 01/08/25  0244 01/07/25  0211 01/06/25  0326 01/06/25  0207 01/05/25  0111 01/04/25  0135   WBC 20.63* 17.82* 18.31*  --  16.64* 15.70*   HEMOGLOBIN 12.4* 12.0* 12.3*  --  12.2* 11.8*   HEMATOCRIT 38.8 37.8 38.0  --  38.0 36.8*   PLATELETS 305 253 239  --  213 181   NEUTROS ABS 14.69* 12.81* 13.27*  14.46*  --  11.96* 11.78*   IMMATURE GRANS (ABS) 0.87* 0.57* 0.55*  --  0.40* 0.26*   LYMPHS ABS 1.94 1.82 1.66  --  1.37 1.20   MONOS ABS 2.62* 2.20* 2.43*  --  2.47* 2.20*   EOS  ABS 0.30 0.25 0.28  0.00  --  0.32 0.17   MCV 97.2* 96.2 96.4  --  97.7* 96.8   PROCALCITONIN  --   --   --  0.10  --   --          Lab 01/08/25  0244 01/07/25  0211 01/06/25  0207 01/05/25  0111 01/04/25  0135 01/03/25  0207 01/02/25  0201   SODIUM 142 141 143 142 141 142 143   POTASSIUM 4.4 4.3 4.8 4.5 4.5 4.2 3.8   CHLORIDE 102 103 105 103 103 103 106   CO2 33.0* 33.0* 30.0* 32.0* 30.0* 31.0* 29.0   ANION GAP 7.0 5.0 8.0 7.0 8.0 8.0 8.0   BUN 25* 18 18 19 24* 21 17   CREATININE 0.62* 0.64* 0.53* 0.60* 0.54* 0.65* 0.54*   EGFR 107.4 106.4 112.6 108.5 112.0 105.9 112.0   GLUCOSE 206* 177* 151* 164* 183* 162* 198*   CALCIUM 9.6 9.6 9.7 9.2 8.9 9.1 9.1   MAGNESIUM  --  2.2  --  2.2 2.2 2.1 2.0   PHOSPHORUS  --   --   --  3.5 3.4 3.6 2.9         Lab 01/08/25  0244 01/07/25 0211 01/06/25 0207 01/05/25  0111 01/04/25  0135   TOTAL PROTEIN 6.9 6.3 6.5 6.5 6.1   ALBUMIN 3.1* 2.8* 2.8* 2.9* 2.9*   GLOBULIN 3.8 3.5 3.7 3.6 3.2   ALT (SGPT) 39 34 30 25 20   AST (SGOT) 57* 53* 47* 39 29   BILIRUBIN 0.5 0.6 0.7 0.5 0.6   ALK PHOS 108 104 103 94 94                     Lab 01/07/25  0341 01/06/25  0340 01/05/25  0335   PH, ARTERIAL 7.423 7.451* 7.424   PCO2, ARTERIAL 53.8* 50.1* 53.5*   PO2 ART 94.5 198.0* 81.2*   O2 SATURATION ART 97.7 99.8* 96.7   FIO2 50 50 40   HCO3 ART 35.1* 34.9* 35.0*   BASE EXCESS ART 9.0* 9.4* 9.0*     Brief Urine Lab Results  (Last result in the past 365 days)        Color   Clarity   Blood   Leuk Est   Nitrite   Protein   CREAT   Urine HCG        01/01/25 1056 Dark Yellow   Cloudy   Large (3+)   Trace   Negative   100 mg/dL (2+)                 Microbiology Results (last 10 days)       Procedure Component Value - Date/Time    Respiratory Culture - Sputum, ET Suction [[id number]]  (Abnormal) Collected: 01/06/25 1248    Lab Status: Preliminary result Specimen: Sputum from ET Suction Updated: 01/07/25 1334     Respiratory Culture Light growth (2+) Staphylococcus aureus      Scant growth (1+) Normal  Respiratory Kapil     Gram Stain Many (4+) WBCs per low power field      No Epithelial cells per low power field      Moderate (3+) Mixed gram positive kapil      Few (2+) Gram negative bacilli      Mixed intracellular organisms suggestive of an aspiration event    Blood Culture - Blood, Arm, Left [[id number]]  (Normal) Collected: 01/06/25 1040    Lab Status: Preliminary result Specimen: Blood from Arm, Left Updated: 01/08/25 1101     Blood Culture No growth at 2 days    Blood Culture - Blood, Hand, Left [[id number]]  (Normal) Collected: 01/05/25 0800    Lab Status: Preliminary result Specimen: Blood from Hand, Left Updated: 01/08/25 0831     Blood Culture No growth at 3 days    Blood Culture - Blood, Arm, Right [[id number]]  (Normal) Collected: 01/01/25 1053    Lab Status: Final result Specimen: Blood from Arm, Right Updated: 01/06/25 1100     Blood Culture No growth at 5 days    Blood Culture - Blood, Arm, Left [[id number]]  (Normal) Collected: 01/01/25 1051    Lab Status: Final result Specimen: Blood from Arm, Left Updated: 01/06/25 1115     Blood Culture No growth at 5 days    Fungus Culture - Sputum, ET Suction [[id number]] Collected: 01/01/25 1048    Lab Status: Preliminary result Specimen: Sputum from ET Suction Updated: 01/08/25 1101     Fungus Culture No fungus isolated at 1 week    Respiratory Culture - Aspirate, ET Suction [[id number]]  (Abnormal)  (Susceptibility) Collected: 01/01/25 1047    Lab Status: Final result Specimen: Aspirate from ET Suction Updated: 01/03/25 0820     Respiratory Culture Scant growth (1+) Staphylococcus aureus      No Normal Respiratory Kapil     Gram Stain Many (4+) WBCs per low power field      Few (2+) Epithelial cells per low power field      Rare (1+) Gram positive cocci    Susceptibility        Staphylococcus aureus      IMER      Clindamycin Susceptible      Oxacillin Susceptible      Tetracycline Susceptible      Trimethoprim + Sulfamethoxazole Susceptible      Vancomycin  "Susceptible                           Chlamydia trachomatis, Neisseria gonorrhoeae, PCR - Swab, Penis [[id number]]  (Normal) Collected: 12/30/24 0839    Lab Status: Final result Specimen: Swab from Penis Updated: 12/30/24 1255     Chlamydia DNA by PCR Not Detected     Neisseria gonorrhoeae by PCR Not Detected    Narrative:      Disclaimer: The Aptima Combo 2 assay is a target amplification nucleic acid probe test that utilizes target capture for the in vitro qualitative detection and differentiation of ribosomal RNA from Chlamydia trachomatis and Neisseria gonorrhoeae to aid in the diagnosis of chlamydial and/or gonococcal urogenital disease.  Cell culture was once considered to be the \"gold standard\" for detection of CT and NG.  Culture is quite specific, but scientific studies have demonstrated that the NAAT DNA probe technologies have higher clinical sensitivities than culture.            XR Chest 1 View    Result Date: 1/8/2025  Impression: 1.. Right basilar pneumonia. 2. Improved discoid atelectasis in the lingular segment of the left upper lobe. 3. No interval line changes.  This report was signed and finalized on 1/8/2025 7:13 AM by Dr. Deion Valdivia MD.      XR Chest 1 View    Result Date: 1/6/2025  Impression: 1. A new areas of discoid atelectasis in the left midlung. A new ill-defined opacity in the right lower lung which may represent atelectasis or evolving infiltrate. Further follow-up is recommended. 2. Moderate cardiomegaly. 3. Endotracheal tube in place. The distal end of the nasogastric tube is not visualized and not evaluated.   This report was signed and finalized on 1/6/2025 6:51 AM by Dr. Felix Watson MD.      CT Head Without Contrast    Result Date: 1/4/2025  Impression: 1. No acute radiographic abnormality identified. Similar mild chronic small vessel ischemia. No convincing evidence of cerebral edema. 2. Right maxillary sinusitis. Questionable small left mastoid effusion.  This report " was signed and finalized on 1/4/2025 2:44 PM by Dr. Flaca Tatum MD.      XR Chest 1 View    Result Date: 1/3/2025  Impression: 1. No focal infiltrate or definite pneumonia is seen.    This report was signed and finalized on 1/3/2025 10:37 AM by Dr. Luis Wells MD.      XR Chest 1 View    Result Date: 12/31/2024  Impression: 1. No active cardiopulmonary disease. 2. Moderate cardiomegaly. 3. Endotracheal tube and dorsal column stimulator electrodes in place.   This report was signed and finalized on 12/31/2024 6:45 AM by Dr. Felix Watson MD.      XR Chest 1 View    Result Date: 12/30/2024  Impression: 1. Probable mild pulmonary venous congestion more pronounced on the right side which may be due to patient's posture/positioning. 2. Moderate cardiomegaly. 3. The distal end of the nasogastric tube is not included in the study and not evaluated. An image of the upper abdomen may be obtained for evaluation of position of the nasogastric tube.   This report was signed and finalized on 12/30/2024 7:10 AM by Dr. Felix Watson MD.      XR Abdomen KUB    Result Date: 12/29/2024  Impression: Findings/impression:  Enteric tube courses below the diaphragm overlying the stomach with the tip overlying the gastric antrum/body.      This report was signed and finalized on 12/29/2024 2:28 PM by Rubin West.      XR Chest 1 View    Result Date: 12/29/2024  Impression:  Endotracheal tube tip 3 cm above the cipriano.    This report was signed and finalized on 12/29/2024 12:29 PM by Rubin West.      XR Chest 1 View    Result Date: 12/29/2024  Impression:  No visible endotracheal tube.  Clear lungs.    This report was signed and finalized on 12/29/2024 12:15 PM by Rubin West.      CT Cervical Spine Without Contrast    Result Date: 12/29/2024  Impression:  Motion-degraded study.  No obvious fracture or traumatic malalignment.    This report was signed and finalized on 12/29/2024 12:14 PM by Rubin West.      CT  Head Without Contrast    Result Date: 2024  Impression: Impression:   No acute intracranial abnormality.  This report was signed and finalized on 2024 12:08 PM by Rubin West.                   Pending Labs       Order Current Status    Blood Culture - Blood, Arm, Left Preliminary result    Blood Culture - Blood, Hand, Left Preliminary result    Fungus Culture - Sputum, ET Suction Preliminary result    Respiratory Culture - Sputum, ET Suction Preliminary result          Discharge Details           Allergies   Allergen Reactions    Codeine Nausea Only         Discharge Disposition:          [place]        CODE STATUS:    Code Status and Medical Interventions: No CPR (Do Not Attempt to Resuscitate); Comfort Measures; Guardian/ex-spouseFe   Ordered at: 25 1136     Level Of Support Discussed With:    Health Care Surrogate     Code Status (Patient has no pulse and is not breathing):    No CPR (Do Not Attempt to Resuscitate)     Medical Interventions (Patient has pulse or is breathing):    Comfort Measures     Comments:    Guardian/ex-spouseFe               Time spent on Discharge including face to face service:  28 minutes    REMA Herr

## 2025-01-09 NOTE — PAYOR COMM NOTE
"REF:   [id number]     [facility]  FAX  [phone]      Ld Green (Dcsd. Male)       Date of Birth   1960    Social Security Number       Address   [address] [place] [address]    Home Phone   [phone]    MRN   [id number]       Taoist   Other    Marital Status                               Admission Date   24    Admission Type   Emergency    Admitting Provider       Attending Provider       Department, Room/Bed   [facility] CARDIAC CARE, C002/1       Discharge Date   2025    Discharge Disposition    in Medical Facility    Discharge Destination                                 Attending Provider: (none)   Allergies: Codeine    Isolation: None   Infection: None   Code Status: Prior    Ht: 172.7 cm (68\")   Wt: 108 kg (238 lb 8.6 oz)    Admission Cmt: None   Principal Problem: Anoxic brain injury [G93.1]                   Active Insurance as of 2024       Primary Coverage       Payor Plan Insurance Group Employer/Plan Group    [facility] MEDICARE REPLACEMENT [facility] MED ADV SNP HMO [id number]       Payor Plan Address Payor Plan Phone Number Payor Plan Fax Number Effective Dates    [address] [address]   2024 - None Entered    [facility] [id number]         Subscriber Name Subscriber Birth Date Member ID       LD GREEN 1960 [id number]               Secondary Coverage       Payor Plan Insurance Group Employer/Plan Group    [employer] [place]        Payor Plan Address Payor Plan Phone Number Payor Plan Fax Number Effective Dates    [address]   10/1/2014 - None Entered    [facility] [id number]         Subscriber Name Subscriber Birth Date Member ID       LD GREEN 1960 [id number]                     Emergency Contacts        (Rel.) Home Phone Work Phone Mobile Phone    Fe Suresh (Other) -- [phone]                 Discharge " Summary        Deion Lerner APRN at 25 0019       Attestation signed by Trevor Davis MD at 25 0638    I have reviewed this documentation and agree.                    [facility]   DISCHARGE SUMMARY    Patient Name: Ld Green  : 1960  MRN: [id number]    Date of Admission: 2024  Date of Discharge:  2025, Time of Death:  232  Primary Care Physician: Xiomara Neal APRN    Consults       Date and Time Order Name Status Description    2025 12:18 PM Inpatient Neurology Consult Other (see comments) Completed     1/3/2025  7:06 AM Inpatient Infectious Diseases Consult Completed             Hospital Course     Presenting Problem:   Respiratory failure [J96.90]      Active and Resolved Problems  Active Hospital Problems    Diagnosis  POA    **Anoxic brain injury [G93.1]  Unknown    Acute respiratory failure with hypoxia [J96.01]  Yes    On mechanically assisted ventilation [Z99.11]  Not Applicable    Cocaine abuse [F14.10]  Unknown    Positive urine drug screen [R82.5]  Unknown    Substance abuse [F19.10]  Unknown    Tobacco use [Z72.0]  Unknown    COPD (chronic obstructive pulmonary disease) [J44.9]  Unknown    Bipolar disorder [F31.9]  Unknown    Coronary artery disease involving autologous vein coronary bypass graft without angina pectoris [I25.810]  Unknown    Essential hypertension [I10]  Unknown    Hyperlipidemia LDL goal <100 [E78.5]  Unknown      Resolved Hospital Problems   No resolved problems to display.         Hospital Course:    Mr. Green was admitted the ICU.  He was treated for pneumonia.  He was poorly responsive and exhibiting decorticate posturing on admission.  : Patient has minimal protection with arm drop on left.  Mental status has not improved.  Upgoing Babinski's bilaterally.  Some superior eye deviation intermittently.  Strong gag and cough.  Thick secretions and elevated temperature to 38.3 °C overnight.     25: Patient had fentanyl  requiremrnts overnight due to work of breathing.  Started on fentanyl drip due to discomfort.  Ex-wife says he would not want to live like this.  She is the only person who shown up to make decisions for him.  We did confirm with our risk department via nursing that she can be a surrogate decision maker in this situation.  She says she is not ready to make him comfort care because she has to reach out to the  in order to get a conservatorship so that she can get control over his finances to pay for his .  She does say that he would not want CPR in this situation.  Patient has persistent fevers.  Wife is not interested in more aggressive diagnostic testing.      25: EX Wife is trying to reach out to get patient conserved.  At this point patient is no CPR but full medical management otherwise.      1/3/25: Patient continues to spike fevers.  Fungitell and Aspergillus ordered yesterday.  Both still pending.  Consulted ID.  Restarted Vanco due to Staph aureus in the sputum (prior culture grew only MSSA-waiting on this culture to finalize).  MRSA probe previously negative.  Currently on Levaquin and vancomycin.  Await ID consultation.     2025: Tmax temp in the last 24 hours 101.1. He continues on Levaquin for now. He is currently on Precedex and fentanyl-Precedex discontinued as this can put him at risk for non-infectious fever. Will plan to sedate with propofol and fentanyl. He has not had any improvement in his neurological status-neurology consulted. CT of the head without contrast ordered. Mild ongoing leukocytosis-WBC-15.7. All other labs unchanged. Vent setting unchanged.  He continues to tolerate tube feedings at goal rate.     2025:  Tmax temp 101.2, consistently above 100 for the last 24 hours-Last dose of levaquin on 25-Repeat blood culture ordered and pending. Mild increase in WBC-16.6. EEG-slowing c/w encephalopathy. CT of the brain-unchanged.  Remains on fentanyl for  sedation. Vent settings remain unchanged. ABG's do show some compensated metabolic alkalosis.     1/6/2025:  Continue critical care management.  Poor prognosis given neurological exam. Follow ex-wife, Fe, progress with the court regarding conservatorship/guardianship.  She is indicated he would not want prolonged life support.     1/7/2025:  With no significant change in neuroexam.  Remains febrile.  WBC trend down slightly.  Gram stain from respiratory culture with mixed gram-positive gram-negative kapil, possible aspiration picture.  Infectious disease started cefepime today.  Ex-wife, Fe, now establish guardianship.  She is also found a copy of living will from December 2016 and will bring copy.    1/8: Unchanged neurological exam.  Remains febrile, Tmax 38.7 °C.  WBC trending back up today.  Chest x-ray indicative of right lower lobe pneumonia, respiratory culture with light growth of Staph aureus, awaiting sensitivity, but likely consistent with previous culture result.      64-year-old male with suspected anoxic brain injury, acute hypoxic respiratory failure, in CCU for critical care management.  Neuroexam unchanged.  Poor neurological prognosis.  Ex-wife, Fe, now established guardian.  A copy of living will from December 2016 indicates he would not want prolonged life support.  Ex-wife having issues coming to terms with the patient's wishes.  Appreciate palliative care medicine's assistance with goals of care.  Patient now with right lower lobe pneumonia on chest x-ray with worsening leukocytosis.  Started on cefepime on 1/7.  Respiratory culture with Staph aureus that has previously been isolated twice during this admission.  Will change antibiotic to Unasyn at this time, attempted to contact infectious disease but unable.  Defer further antibiotic changes to them.     Suspected anoxic brain injury  -Neurology following, very guarded prognosis, they attempted to contact ex-wife but unable to have  conversation, neurology planning for follow-up conversation with her later today  -I will reach out to wife as well and attempt to further goals of care discussions  -Awaiting living will to be put in system  -Continue off sedation, fentanyl to patient comfort  -Currently DNR, palliative care consulted and following to assist with goals of care     Acute hypoxic respiratory failure  -Secondary to neurological status above  -Continue trend ABGs, currently requiring 40% FiO2, adjust vent settings as indicated  -Continue ventilator bundle     Febrile illness/RLL PNA  -Central versus infectious  -Leukocytosis with trend back up today  -Started on cefepime 1/7, now with Staph aureus again isolated in respiratory culture, will change to Unasyn  -Attempted to contact ID but was unable, defer ultimate antibiotic management to them  -Cooling blanket as needed     DM type II  -Continue diabetic tube feeds  -Continue long-acting insulin  -Blood glucose monitoring and add sliding scale insulin every 6 hr    Mr. Green's neurologic status did not improve.  He continued to have persistent fevers.  Per his EX wife, he did not wish to be on life sustaining treatments if there was no hope of recovery.  He was transitioned to comfort focused care on 1/8/2025.    Time of Death:  1/8/2025 2326            Day of Discharge     HPI:   Ld Green is a 64 y.o. male who presented to the emergency department when he was found unresponsive.  He lives at the [facility] and apparently was found by somebody unresponsive and comatose state.  He has known history of hypertension, diabetes mellitus type 2 on insulin., coronary artery disease and also had history of hypoglycemia in the past.  He had last hospitalization in August 2024.  He was treated for altered mental status at the time.  His workup at that time was negative mostly except leukocytosis.     At the time of presentation to the ER the patient was unresponsive with GCS 6.  The  patient was having oxygen desaturation and needed nonrebreather mask and withdrawing to painful stimuli on the right and left arm and left leg appeared to be little tonic suspected to have seizure activity.  He was immediately intubated by ED physician and ICU admit was requested and patient was accepted in the ICU.     Patient could not provide any history because he was intubated on mechanical ventilation and sedated with propofol.  There is no ventilator dyssynchrony noted.  He was morbidly obese he was afebrile and hemodynamically stable.  He was started on broad-spectrum antibiotic for possible aspiration and he has a urinary tract was positive for cocaine and TCA..  In reviewing the chart it appears patient was DNR and DNI during the last hospitalization but he is intubated in the ER and currently in the CCU.  No further history could be obtained from the patient.  It also noted patient history of bladder cancer in the past and also has bipolar disorder.  He is an active smoker and probably has COPD.  No further history could be obtained from the patient.       His initial workup showed clear lung fields and the imaging studies but the endotracheal tube suction showed thick purulent secretions suspected to be tracheobronchitis or early developing pneumonia.  Lab work shows he has extreme leukocytosis white cell count 29,000.  No active seizures noted other lab work showed normal renal function with elevated blood glucose and arterial blood gas shows hypercapnia and hypoxia.  Respiratory viral panel was negative.  Respiratory culture was sent          Pertinent  and/or Most Recent Results     LAB RESULTS:        Lab 01/08/25  0244 01/07/25  0211 01/06/25  0326 01/06/25  0207 01/05/25  0111 01/04/25  0135   WBC 20.63* 17.82* 18.31*  --  16.64* 15.70*   HEMOGLOBIN 12.4* 12.0* 12.3*  --  12.2* 11.8*   HEMATOCRIT 38.8 37.8 38.0  --  38.0 36.8*   PLATELETS 305 253 239  --  213 181   NEUTROS ABS 14.69* 12.81* 13.27*   14.46*  --  11.96* 11.78*   IMMATURE GRANS (ABS) 0.87* 0.57* 0.55*  --  0.40* 0.26*   LYMPHS ABS 1.94 1.82 1.66  --  1.37 1.20   MONOS ABS 2.62* 2.20* 2.43*  --  2.47* 2.20*   EOS ABS 0.30 0.25 0.28  0.00  --  0.32 0.17   MCV 97.2* 96.2 96.4  --  97.7* 96.8   PROCALCITONIN  --   --   --  0.10  --   --          Lab 01/08/25 0244 01/07/25 0211 01/06/25 0207 01/05/25 0111 01/04/25 0135 01/03/25 0207 01/02/25 0201   SODIUM 142 141 143 142 141 142 143   POTASSIUM 4.4 4.3 4.8 4.5 4.5 4.2 3.8   CHLORIDE 102 103 105 103 103 103 106   CO2 33.0* 33.0* 30.0* 32.0* 30.0* 31.0* 29.0   ANION GAP 7.0 5.0 8.0 7.0 8.0 8.0 8.0   BUN 25* 18 18 19 24* 21 17   CREATININE 0.62* 0.64* 0.53* 0.60* 0.54* 0.65* 0.54*   EGFR 107.4 106.4 112.6 108.5 112.0 105.9 112.0   GLUCOSE 206* 177* 151* 164* 183* 162* 198*   CALCIUM 9.6 9.6 9.7 9.2 8.9 9.1 9.1   MAGNESIUM  --  2.2  --  2.2 2.2 2.1 2.0   PHOSPHORUS  --   --   --  3.5 3.4 3.6 2.9         Lab 01/08/25 0244 01/07/25 0211 01/06/25 0207 01/05/25 0111 01/04/25 0135   TOTAL PROTEIN 6.9 6.3 6.5 6.5 6.1   ALBUMIN 3.1* 2.8* 2.8* 2.9* 2.9*   GLOBULIN 3.8 3.5 3.7 3.6 3.2   ALT (SGPT) 39 34 30 25 20   AST (SGOT) 57* 53* 47* 39 29   BILIRUBIN 0.5 0.6 0.7 0.5 0.6   ALK PHOS 108 104 103 94 94                     Lab 01/07/25  0341 01/06/25  0340 01/05/25  0335   PH, ARTERIAL 7.423 7.451* 7.424   PCO2, ARTERIAL 53.8* 50.1* 53.5*   PO2 ART 94.5 198.0* 81.2*   O2 SATURATION ART 97.7 99.8* 96.7   FIO2 50 50 40   HCO3 ART 35.1* 34.9* 35.0*   BASE EXCESS ART 9.0* 9.4* 9.0*     Brief Urine Lab Results  (Last result in the past 365 days)        Color   Clarity   Blood   Leuk Est   Nitrite   Protein   CREAT   Urine HCG        01/01/25 1056 Dark Yellow   Cloudy   Large (3+)   Trace   Negative   100 mg/dL (2+)                 Microbiology Results (last 10 days)       Procedure Component Value - Date/Time    Respiratory Culture - Sputum, ET Suction [[id number]]  (Abnormal) Collected: 01/06/25 1240     Lab Status: Preliminary result Specimen: Sputum from ET Suction Updated: 01/07/25 1334     Respiratory Culture Light growth (2+) Staphylococcus aureus      Scant growth (1+) Normal Respiratory Kapil     Gram Stain Many (4+) WBCs per low power field      No Epithelial cells per low power field      Moderate (3+) Mixed gram positive kapil      Few (2+) Gram negative bacilli      Mixed intracellular organisms suggestive of an aspiration event    Blood Culture - Blood, Arm, Left [[id number]]  (Normal) Collected: 01/06/25 1040    Lab Status: Preliminary result Specimen: Blood from Arm, Left Updated: 01/08/25 1101     Blood Culture No growth at 2 days    Blood Culture - Blood, Hand, Left [[id number]]  (Normal) Collected: 01/05/25 0800    Lab Status: Preliminary result Specimen: Blood from Hand, Left Updated: 01/08/25 0831     Blood Culture No growth at 3 days    Blood Culture - Blood, Arm, Right [[id number]]  (Normal) Collected: 01/01/25 1053    Lab Status: Final result Specimen: Blood from Arm, Right Updated: 01/06/25 1100     Blood Culture No growth at 5 days    Blood Culture - Blood, Arm, Left [[id number]]  (Normal) Collected: 01/01/25 1051    Lab Status: Final result Specimen: Blood from Arm, Left Updated: 01/06/25 1115     Blood Culture No growth at 5 days    Fungus Culture - Sputum, ET Suction [[id number]] Collected: 01/01/25 1048    Lab Status: Preliminary result Specimen: Sputum from ET Suction Updated: 01/08/25 1101     Fungus Culture No fungus isolated at 1 week    Respiratory Culture - Aspirate, ET Suction [[id number]]  (Abnormal)  (Susceptibility) Collected: 01/01/25 1047    Lab Status: Final result Specimen: Aspirate from ET Suction Updated: 01/03/25 0820     Respiratory Culture Scant growth (1+) Staphylococcus aureus      No Normal Respiratory Kapil     Gram Stain Many (4+) WBCs per low power field      Few (2+) Epithelial cells per low power field      Rare (1+) Gram positive cocci    Susceptibility         "Staphylococcus aureus      IMER      Clindamycin Susceptible      Oxacillin Susceptible      Tetracycline Susceptible      Trimethoprim + Sulfamethoxazole Susceptible      Vancomycin Susceptible                           Chlamydia trachomatis, Neisseria gonorrhoeae, PCR - Swab, Penis [[id number]]  (Normal) Collected: 12/30/24 0839    Lab Status: Final result Specimen: Swab from Penis Updated: 12/30/24 1252     Chlamydia DNA by PCR Not Detected     Neisseria gonorrhoeae by PCR Not Detected    Narrative:      Disclaimer: The Aptima Combo 2 assay is a target amplification nucleic acid probe test that utilizes target capture for the in vitro qualitative detection and differentiation of ribosomal RNA from Chlamydia trachomatis and Neisseria gonorrhoeae to aid in the diagnosis of chlamydial and/or gonococcal urogenital disease.  Cell culture was once considered to be the \"gold standard\" for detection of CT and NG.  Culture is quite specific, but scientific studies have demonstrated that the NAAT DNA probe technologies have higher clinical sensitivities than culture.            XR Chest 1 View    Result Date: 1/8/2025  Impression: 1.. Right basilar pneumonia. 2. Improved discoid atelectasis in the lingular segment of the left upper lobe. 3. No interval line changes.  This report was signed and finalized on 1/8/2025 7:13 AM by Dr. Deion Valdivia MD.      XR Chest 1 View    Result Date: 1/6/2025  Impression: 1. A new areas of discoid atelectasis in the left midlung. A new ill-defined opacity in the right lower lung which may represent atelectasis or evolving infiltrate. Further follow-up is recommended. 2. Moderate cardiomegaly. 3. Endotracheal tube in place. The distal end of the nasogastric tube is not visualized and not evaluated.   This report was signed and finalized on 1/6/2025 6:51 AM by Dr. Felix Watson MD.      CT Head Without Contrast    Result Date: 1/4/2025  Impression: 1. No acute radiographic abnormality " identified. Similar mild chronic small vessel ischemia. No convincing evidence of cerebral edema. 2. Right maxillary sinusitis. Questionable small left mastoid effusion.  This report was signed and finalized on 1/4/2025 2:44 PM by Dr. Flaca Tatum MD.      XR Chest 1 View    Result Date: 1/3/2025  Impression: 1. No focal infiltrate or definite pneumonia is seen.    This report was signed and finalized on 1/3/2025 10:37 AM by Dr. Luis Wells MD.      XR Chest 1 View    Result Date: 12/31/2024  Impression: 1. No active cardiopulmonary disease. 2. Moderate cardiomegaly. 3. Endotracheal tube and dorsal column stimulator electrodes in place.   This report was signed and finalized on 12/31/2024 6:45 AM by Dr. Felix Watson MD.      XR Chest 1 View    Result Date: 12/30/2024  Impression: 1. Probable mild pulmonary venous congestion more pronounced on the right side which may be due to patient's posture/positioning. 2. Moderate cardiomegaly. 3. The distal end of the nasogastric tube is not included in the study and not evaluated. An image of the upper abdomen may be obtained for evaluation of position of the nasogastric tube.   This report was signed and finalized on 12/30/2024 7:10 AM by Dr. Felix Watson MD.      XR Abdomen KUB    Result Date: 12/29/2024  Impression: Findings/impression:  Enteric tube courses below the diaphragm overlying the stomach with the tip overlying the gastric antrum/body.      This report was signed and finalized on 12/29/2024 2:28 PM by Rubin West.      XR Chest 1 View    Result Date: 12/29/2024  Impression:  Endotracheal tube tip 3 cm above the cipriano.    This report was signed and finalized on 12/29/2024 12:29 PM by Rubin West.      XR Chest 1 View    Result Date: 12/29/2024  Impression:  No visible endotracheal tube.  Clear lungs.    This report was signed and finalized on 12/29/2024 12:15 PM by Rubin West.      CT Cervical Spine Without Contrast    Result Date:  2024  Impression:  Motion-degraded study.  No obvious fracture or traumatic malalignment.    This report was signed and finalized on 2024 12:14 PM by Rubin West.      CT Head Without Contrast    Result Date: 2024  Impression: Impression:   No acute intracranial abnormality.  This report was signed and finalized on 2024 12:08 PM by Rubin West.                   Pending Labs       Order Current Status    Blood Culture - Blood, Arm, Left Preliminary result    Blood Culture - Blood, Hand, Left Preliminary result    Fungus Culture - Sputum, ET Suction Preliminary result    Respiratory Culture - Sputum, ET Suction Preliminary result          Discharge Details           Allergies   Allergen Reactions    Codeine Nausea Only         Discharge Disposition:          [place]        CODE STATUS:    Code Status and Medical Interventions: No CPR (Do Not Attempt to Resuscitate); Comfort Measures; Guardian/ex-spouseFe   Ordered at: 25 1131     Level Of Support Discussed With:    Health Care Surrogate     Code Status (Patient has no pulse and is not breathing):    No CPR (Do Not Attempt to Resuscitate)     Medical Interventions (Patient has pulse or is breathing):    Comfort Measures     Comments:    Guardian/ex-Fe smith               Time spent on Discharge including face to face service:  28 minutes    REMA Herr     Electronically signed by Trevor Davis MD at 25 0610       Discharge Order (From admission, onward)      None

## 2025-01-10 LAB — BACTERIA SPEC AEROBE CULT: NORMAL

## 2025-01-11 LAB — BACTERIA SPEC AEROBE CULT: NORMAL

## 2025-01-15 LAB — FUNGUS WND CULT: NORMAL

## 2025-01-22 LAB — FUNGUS WND CULT: NORMAL

## 2025-01-29 LAB — FUNGUS WND CULT: NORMAL

## 2025-01-29 NOTE — PROGRESS NOTES
"Enter Query Response Below      Query Response: Unable to clinically determine.             If applicable, please update the problem list.   Patient: Ld Green        : 1960  Account: [id number]           Admit Date: 2024        How to Respond to this query:       a. Click New Note     b. Answer query within the yellow box.                c. Update the Problem List, if applicable.      If you have any questions about this query contact me at: [email or web address]     Dear REMA Mcdaniels:     64-year-old patient found unresponsive and comatose with GCS 6 (per Discharge Summary and ED Provider Notes) was admitted on 2024. Discharge Summary includes \"Cocaine abuse\", \"Substance abuse\", and \"Anoxic brain injury\". Patient also has diagnosis of pneumonia this admit.   Per ED Provider Notes, \"Etiology for symptoms uncertain.  Possible toxic ingestion.\"  Progress Notes -1/3 state, \"She thinks he probably overdosed accidentally at home\" and \"Possible suicidal ideation per ex-wife\".   Intensivist Progress Note states \"Follow neurology input, question TCA overdose\".   Intensivist Progress Notes - state \"Patient had cocaine and TCA positive urine, however, Poison control thinks that TCA could be cross-reactivity with Flexeril or Auvelity-TCA overdose not felt to be likely per poison control.\"  Neurology PN , \"No TCA's on home med list\".   Treatment has included mechanical ventilation, LR 1000 ml IV bolus,  ml IV bolus, IV antibiotics, Precedex drip, Fentanyl drip, Palliative Care consult, and Comfort Measures.   After study, can the condition being treated be further clarified as:    Accidental overdose, please clarify substance: ___________________  Overdose, unknown intention, please specify substance: ____________  Overdose ruled out  Unable to rule out overdose  Other, please specify: ___________________  Unable to clinically determine       By submitting this " query, we are merely seeking further clarification of documentation to accurately reflect all conditions that you are monitoring, evaluating, treating or that extend the hospitalization or utilize additional resources of care. Please utilize your independent clinical judgment when addressing the question(s) above.     This query and your response, once completed, will be entered into the legal medical record.    Sincerely,  Scarlet Stern RN, BSN, CCDS  Clinical Documentation Integrity Program

## 2025-01-29 NOTE — PROGRESS NOTES
"Enter Query Response Below      Query Response: MSSA pneumonia             If applicable, please update the problem list.   Patient: Ld Green        : 1960  Account: [id number]           Admit Date: 2024        How to Respond to this query:       a. Click New Note     b. Answer query within the yellow box.                c. Update the Problem List, if applicable.      If you have any questions about this query contact me at: [email or web address]     Dear Deion Lerner, APRN:     64-year-old patient with history of diabetes mellitus type 2 was admitted 2024 was found unresponsive at home and admitted with acute respiratory failure with hypoxia and required intubation. Discharge Summary includes \"Chest x-ray indicative of right lower lobe pneumonia\".   Intensivist Progress Note states \"Leukocytosis: Likely aspiration pneumonia.\"  1/3 Infectious Disease Consult Note includes \"Had respiratory cultures on admission with moderate growth of Staphylococcus aureus that is pan susceptible\".  Per , \"Gram stain from respiratory culture with mixed gram-positive gram-negative kapil, possible aspiration picture.\"   Treatment has included Zosyn IV (-), Vancomycin IV (-1/3), Cefepime IV (-), Levaquin per NG tube (-1/3), mechanical ventilation, Palliative Care consult, and Comfort Measures.     Please clarify the type of pneumonia the patient was treated/monitored for:    Gram negative pneumonia (excluding Haemophilus influenzae)  MSSA pneumonia  Bacterial pneumonia unspecified  Aspiration pneumonia  MSSA and aspiration pneumonia  Other- specify______    By submitting this query, we are merely seeking further clarification of documentation to accurately reflect all conditions that you are monitoring, evaluating, treating or that extend the hospitalization or utilize additional resources of care. Please utilize your independent clinical judgment when addressing the " question(s) above.     This query and your response, once completed, will be entered into the legal medical record.    Sincerely,  Scarlet Stern RN, BSN, CCDS  Clinical Documentation Integrity Program

## 2025-02-10 LAB — FUNGUS WND CULT: NORMAL

## (undated) DEVICE — Device: Brand: DEFENDO AIR/WATER/SUCTION AND BIOPSY VALVE

## (undated) DEVICE — CUFF,BP,DISP,1 TUBE,ADULT,HP: Brand: MEDLINE

## (undated) DEVICE — THE CHANNEL CLEANING BRUSH IS A NYLON FLEXI BRUSH ATTACHED TO A FLEXIBLE PLASTIC SHEATH DESIGNED TO SAFELY REMOVE DEBRIS FROM FLEXIBLE ENDOSCOPES.

## (undated) DEVICE — FRCP BIOP ENDO CAPTURAPRO SPK SERR 2.8MM 230CM

## (undated) DEVICE — ENDOGATOR AUXILIARY WATER JET CONNECTOR: Brand: ENDOGATOR

## (undated) DEVICE — THE SINGLE USE ETRAP – POLYP TRAP IS USED FOR SUCTION RETRIEVAL OF ENDOSCOPICALLY REMOVED POLYPS.: Brand: ETRAP

## (undated) DEVICE — MASK,OXYGEN,MED CONC,ADLT,7' TUB, UC: Brand: PENDING

## (undated) DEVICE — TBG SMPL FLTR LINE NASL 02/C02 A/ BX/100

## (undated) DEVICE — SNAR POLYP SENSATION MICRO OVL 13 240X40

## (undated) DEVICE — SENSR O2 OXIMAX FNGR A/ 18IN NONSTR

## (undated) DEVICE — MSK O2 MD CONCENTR A/ LF 7FT 1P/U

## (undated) DEVICE — YANKAUER,BULB TIP WITH VENT: Brand: ARGYLE